# Patient Record
Sex: MALE | Race: WHITE | Employment: FULL TIME | URBAN - METROPOLITAN AREA
[De-identification: names, ages, dates, MRNs, and addresses within clinical notes are randomized per-mention and may not be internally consistent; named-entity substitution may affect disease eponyms.]

---

## 2019-01-19 ENCOUNTER — HOSPITAL ENCOUNTER (INPATIENT)
Age: 54
LOS: 12 days | Discharge: HOME | DRG: 871 | End: 2019-01-31
Attending: EMERGENCY MEDICINE | Admitting: GENERAL ACUTE CARE HOSPITAL
Payer: COMMERCIAL

## 2019-01-19 ENCOUNTER — APPOINTMENT (OUTPATIENT)
Dept: GENERAL RADIOLOGY | Age: 54
DRG: 871 | End: 2019-01-19
Attending: EMERGENCY MEDICINE
Payer: COMMERCIAL

## 2019-01-19 DIAGNOSIS — E11.65 TYPE 2 DIABETES MELLITUS WITH HYPERGLYCEMIA, WITHOUT LONG-TERM CURRENT USE OF INSULIN (HCC): ICD-10-CM

## 2019-01-19 DIAGNOSIS — J18.9 COMMUNITY ACQUIRED PNEUMONIA OF LEFT LOWER LOBE OF LUNG: ICD-10-CM

## 2019-01-19 DIAGNOSIS — J10.1 INFLUENZA A: Primary | ICD-10-CM

## 2019-01-19 PROBLEM — J11.1 INFLUENZA: Status: ACTIVE | Noted: 2019-01-19

## 2019-01-19 LAB
ALBUMIN SERPL-MCNC: 2.9 G/DL (ref 3.5–5)
ALBUMIN/GLOB SERPL: 0.6 {RATIO} (ref 1.1–2.2)
ALP SERPL-CCNC: 110 U/L (ref 45–117)
ALT SERPL-CCNC: 17 U/L (ref 12–78)
ANION GAP SERPL CALC-SCNC: 14 MMOL/L (ref 5–15)
AST SERPL-CCNC: 34 U/L (ref 15–37)
BASOPHILS # BLD: 0 K/UL (ref 0–0.1)
BASOPHILS NFR BLD: 0 % (ref 0–1)
BILIRUB SERPL-MCNC: 0.6 MG/DL (ref 0.2–1)
BUN SERPL-MCNC: 24 MG/DL (ref 6–20)
BUN/CREAT SERPL: 21 (ref 12–20)
CALCIUM SERPL-MCNC: 8.9 MG/DL (ref 8.5–10.1)
CHLORIDE SERPL-SCNC: 89 MMOL/L (ref 97–108)
CK SERPL-CCNC: 53 U/L (ref 39–308)
CO2 SERPL-SCNC: 26 MMOL/L (ref 21–32)
CREAT SERPL-MCNC: 1.16 MG/DL (ref 0.7–1.3)
DIFFERENTIAL METHOD BLD: ABNORMAL
EOSINOPHIL # BLD: 0 K/UL (ref 0–0.4)
EOSINOPHIL NFR BLD: 0 % (ref 0–7)
ERYTHROCYTE [DISTWIDTH] IN BLOOD BY AUTOMATED COUNT: 12.3 % (ref 11.5–14.5)
GLOBULIN SER CALC-MCNC: 4.8 G/DL (ref 2–4)
GLUCOSE BLD STRIP.AUTO-MCNC: 365 MG/DL (ref 65–100)
GLUCOSE BLD STRIP.AUTO-MCNC: 368 MG/DL (ref 65–100)
GLUCOSE SERPL-MCNC: 361 MG/DL (ref 65–100)
HCT VFR BLD AUTO: 52.7 % (ref 36.6–50.3)
HGB BLD-MCNC: 18.1 G/DL (ref 12.1–17)
IMM GRANULOCYTES # BLD AUTO: 0 K/UL (ref 0–0.04)
IMM GRANULOCYTES NFR BLD AUTO: 0 % (ref 0–0.5)
LIPASE SERPL-CCNC: 67 U/L (ref 73–393)
LYMPHOCYTES # BLD: 0.9 K/UL (ref 0.8–3.5)
LYMPHOCYTES NFR BLD: 10 % (ref 12–49)
MCH RBC QN AUTO: 30.6 PG (ref 26–34)
MCHC RBC AUTO-ENTMCNC: 34.3 G/DL (ref 30–36.5)
MCV RBC AUTO: 89.2 FL (ref 80–99)
MONOCYTES # BLD: 0.2 K/UL (ref 0–1)
MONOCYTES NFR BLD: 2 % (ref 5–13)
NEUTS BAND NFR BLD MANUAL: 3 %
NEUTS SEG # BLD: 8 K/UL (ref 1.8–8)
NEUTS SEG NFR BLD: 85 % (ref 32–75)
NRBC # BLD: 0 K/UL (ref 0–0.01)
NRBC BLD-RTO: 0 PER 100 WBC
PLATELET # BLD AUTO: 240 K/UL (ref 150–400)
PLATELET COMMENTS,PCOM: ABNORMAL
PMV BLD AUTO: 9.8 FL (ref 8.9–12.9)
POTASSIUM SERPL-SCNC: 3.7 MMOL/L (ref 3.5–5.1)
PROT SERPL-MCNC: 7.7 G/DL (ref 6.4–8.2)
RBC # BLD AUTO: 5.91 M/UL (ref 4.1–5.7)
RBC MORPH BLD: ABNORMAL
SERVICE CMNT-IMP: ABNORMAL
SERVICE CMNT-IMP: ABNORMAL
SODIUM SERPL-SCNC: 129 MMOL/L (ref 136–145)
TROPONIN I SERPL-MCNC: <0.05 NG/ML
WBC # BLD AUTO: 9.1 K/UL (ref 4.1–11.1)

## 2019-01-19 PROCEDURE — 96375 TX/PRO/DX INJ NEW DRUG ADDON: CPT

## 2019-01-19 PROCEDURE — 94640 AIRWAY INHALATION TREATMENT: CPT

## 2019-01-19 PROCEDURE — 96376 TX/PRO/DX INJ SAME DRUG ADON: CPT

## 2019-01-19 PROCEDURE — 80053 COMPREHEN METABOLIC PANEL: CPT

## 2019-01-19 PROCEDURE — 74011250636 HC RX REV CODE- 250/636: Performed by: GENERAL ACUTE CARE HOSPITAL

## 2019-01-19 PROCEDURE — 74011636637 HC RX REV CODE- 636/637: Performed by: EMERGENCY MEDICINE

## 2019-01-19 PROCEDURE — 96365 THER/PROPH/DIAG IV INF INIT: CPT

## 2019-01-19 PROCEDURE — 82550 ASSAY OF CK (CPK): CPT

## 2019-01-19 PROCEDURE — 94761 N-INVAS EAR/PLS OXIMETRY MLT: CPT

## 2019-01-19 PROCEDURE — 74011000250 HC RX REV CODE- 250: Performed by: EMERGENCY MEDICINE

## 2019-01-19 PROCEDURE — 77030029684 HC NEB SM VOL KT MONA -A

## 2019-01-19 PROCEDURE — 84484 ASSAY OF TROPONIN QUANT: CPT

## 2019-01-19 PROCEDURE — 36415 COLL VENOUS BLD VENIPUNCTURE: CPT

## 2019-01-19 PROCEDURE — 83690 ASSAY OF LIPASE: CPT

## 2019-01-19 PROCEDURE — 96366 THER/PROPH/DIAG IV INF ADDON: CPT

## 2019-01-19 PROCEDURE — 65660000000 HC RM CCU STEPDOWN

## 2019-01-19 PROCEDURE — 74011250636 HC RX REV CODE- 250/636: Performed by: EMERGENCY MEDICINE

## 2019-01-19 PROCEDURE — 99285 EMERGENCY DEPT VISIT HI MDM: CPT

## 2019-01-19 PROCEDURE — 74011636637 HC RX REV CODE- 636/637

## 2019-01-19 PROCEDURE — 82962 GLUCOSE BLOOD TEST: CPT

## 2019-01-19 PROCEDURE — 85025 COMPLETE CBC W/AUTO DIFF WBC: CPT

## 2019-01-19 PROCEDURE — 71046 X-RAY EXAM CHEST 2 VIEWS: CPT

## 2019-01-19 PROCEDURE — 93005 ELECTROCARDIOGRAM TRACING: CPT

## 2019-01-19 PROCEDURE — 96361 HYDRATE IV INFUSION ADD-ON: CPT

## 2019-01-19 RX ORDER — SODIUM CHLORIDE 9 MG/ML
125 INJECTION, SOLUTION INTRAVENOUS CONTINUOUS
Status: DISCONTINUED | OUTPATIENT
Start: 2019-01-19 | End: 2019-01-21

## 2019-01-19 RX ORDER — PRAVASTATIN SODIUM 10 MG/1
20 TABLET ORAL DAILY
Status: DISCONTINUED | OUTPATIENT
Start: 2019-01-20 | End: 2019-01-31 | Stop reason: HOSPADM

## 2019-01-19 RX ORDER — DEXTROSE 50 % IN WATER (D50W) INTRAVENOUS SYRINGE
12.5-25 AS NEEDED
Status: DISCONTINUED | OUTPATIENT
Start: 2019-01-19 | End: 2019-01-31 | Stop reason: HOSPADM

## 2019-01-19 RX ORDER — PANTOPRAZOLE SODIUM 40 MG/1
40 TABLET, DELAYED RELEASE ORAL
Status: DISCONTINUED | OUTPATIENT
Start: 2019-01-19 | End: 2019-01-30

## 2019-01-19 RX ORDER — ONDANSETRON 2 MG/ML
4 INJECTION INTRAMUSCULAR; INTRAVENOUS
Status: DISCONTINUED | OUTPATIENT
Start: 2019-01-19 | End: 2019-01-31 | Stop reason: HOSPADM

## 2019-01-19 RX ORDER — ENOXAPARIN SODIUM 100 MG/ML
40 INJECTION SUBCUTANEOUS EVERY 24 HOURS
Status: DISCONTINUED | OUTPATIENT
Start: 2019-01-20 | End: 2019-01-31 | Stop reason: HOSPADM

## 2019-01-19 RX ORDER — INSULIN LISPRO 100 [IU]/ML
INJECTION, SOLUTION INTRAVENOUS; SUBCUTANEOUS
Status: DISCONTINUED | OUTPATIENT
Start: 2019-01-20 | End: 2019-01-31 | Stop reason: HOSPADM

## 2019-01-19 RX ORDER — KETOROLAC TROMETHAMINE 30 MG/ML
15 INJECTION, SOLUTION INTRAMUSCULAR; INTRAVENOUS
Status: COMPLETED | OUTPATIENT
Start: 2019-01-19 | End: 2019-01-19

## 2019-01-19 RX ORDER — INSULIN GLARGINE 100 [IU]/ML
10 INJECTION, SOLUTION SUBCUTANEOUS
Status: DISCONTINUED | OUTPATIENT
Start: 2019-01-19 | End: 2019-01-21

## 2019-01-19 RX ORDER — SODIUM CHLORIDE 0.9 % (FLUSH) 0.9 %
5-40 SYRINGE (ML) INJECTION AS NEEDED
Status: DISCONTINUED | OUTPATIENT
Start: 2019-01-19 | End: 2019-01-31 | Stop reason: HOSPADM

## 2019-01-19 RX ORDER — SODIUM CHLORIDE 0.9 % (FLUSH) 0.9 %
5-40 SYRINGE (ML) INJECTION EVERY 8 HOURS
Status: DISCONTINUED | OUTPATIENT
Start: 2019-01-19 | End: 2019-01-21

## 2019-01-19 RX ORDER — HYDROCHLOROTHIAZIDE 25 MG/1
25 TABLET ORAL DAILY
Status: DISCONTINUED | OUTPATIENT
Start: 2019-01-20 | End: 2019-01-20

## 2019-01-19 RX ORDER — ASPIRIN 325 MG
325 TABLET ORAL DAILY
Status: DISCONTINUED | OUTPATIENT
Start: 2019-01-20 | End: 2019-01-23 | Stop reason: ALTCHOICE

## 2019-01-19 RX ORDER — LEVOFLOXACIN 5 MG/ML
750 INJECTION, SOLUTION INTRAVENOUS EVERY 24 HOURS
Status: DISCONTINUED | OUTPATIENT
Start: 2019-01-20 | End: 2019-01-22

## 2019-01-19 RX ORDER — MAGNESIUM SULFATE 100 %
4 CRYSTALS MISCELLANEOUS AS NEEDED
Status: DISCONTINUED | OUTPATIENT
Start: 2019-01-19 | End: 2019-01-31 | Stop reason: HOSPADM

## 2019-01-19 RX ORDER — ACETAMINOPHEN 325 MG/1
650 TABLET ORAL
Status: DISCONTINUED | OUTPATIENT
Start: 2019-01-19 | End: 2019-01-20

## 2019-01-19 RX ORDER — LEVOFLOXACIN 5 MG/ML
750 INJECTION, SOLUTION INTRAVENOUS
Status: COMPLETED | OUTPATIENT
Start: 2019-01-19 | End: 2019-01-19

## 2019-01-19 RX ORDER — IPRATROPIUM BROMIDE AND ALBUTEROL SULFATE 2.5; .5 MG/3ML; MG/3ML
3 SOLUTION RESPIRATORY (INHALATION) ONCE
Status: COMPLETED | OUTPATIENT
Start: 2019-01-19 | End: 2019-01-19

## 2019-01-19 RX ADMIN — IPRATROPIUM BROMIDE AND ALBUTEROL SULFATE 3 ML: .5; 3 SOLUTION RESPIRATORY (INHALATION) at 19:57

## 2019-01-19 RX ADMIN — SODIUM CHLORIDE 1000 ML: 900 INJECTION, SOLUTION INTRAVENOUS at 19:39

## 2019-01-19 RX ADMIN — LEVOFLOXACIN 750 MG: 5 INJECTION, SOLUTION INTRAVENOUS at 21:08

## 2019-01-19 RX ADMIN — INSULIN HUMAN 10 UNITS: 100 INJECTION, SOLUTION PARENTERAL at 19:51

## 2019-01-19 RX ADMIN — INSULIN HUMAN 10 UNITS: 100 INJECTION, SOLUTION PARENTERAL at 22:27

## 2019-01-19 RX ADMIN — SODIUM CHLORIDE 1000 ML: 900 INJECTION, SOLUTION INTRAVENOUS at 22:26

## 2019-01-19 RX ADMIN — KETOROLAC TROMETHAMINE 15 MG: 30 INJECTION, SOLUTION INTRAMUSCULAR at 20:07

## 2019-01-19 RX ADMIN — SODIUM CHLORIDE 100 ML/HR: 900 INJECTION, SOLUTION INTRAVENOUS at 23:50

## 2019-01-20 LAB
ANION GAP SERPL CALC-SCNC: 7 MMOL/L (ref 5–15)
ATRIAL RATE: 116 BPM
BASOPHILS # BLD: 0 K/UL (ref 0–0.1)
BASOPHILS NFR BLD: 0 % (ref 0–1)
BUN SERPL-MCNC: 21 MG/DL (ref 6–20)
BUN/CREAT SERPL: 27 (ref 12–20)
CALCIUM SERPL-MCNC: 7.7 MG/DL (ref 8.5–10.1)
CALCULATED P AXIS, ECG09: 40 DEGREES
CALCULATED R AXIS, ECG10: -39 DEGREES
CALCULATED T AXIS, ECG11: 35 DEGREES
CHLORIDE SERPL-SCNC: 97 MMOL/L (ref 97–108)
CO2 SERPL-SCNC: 25 MMOL/L (ref 21–32)
CREAT SERPL-MCNC: 0.79 MG/DL (ref 0.7–1.3)
DIAGNOSIS, 93000: NORMAL
DIFFERENTIAL METHOD BLD: ABNORMAL
EOSINOPHIL # BLD: 0 K/UL (ref 0–0.4)
EOSINOPHIL NFR BLD: 0 % (ref 0–7)
ERYTHROCYTE [DISTWIDTH] IN BLOOD BY AUTOMATED COUNT: 12.3 % (ref 11.5–14.5)
GLUCOSE BLD STRIP.AUTO-MCNC: 178 MG/DL (ref 65–100)
GLUCOSE BLD STRIP.AUTO-MCNC: 201 MG/DL (ref 65–100)
GLUCOSE BLD STRIP.AUTO-MCNC: 213 MG/DL (ref 65–100)
GLUCOSE BLD STRIP.AUTO-MCNC: 236 MG/DL (ref 65–100)
GLUCOSE SERPL-MCNC: 260 MG/DL (ref 65–100)
HCT VFR BLD AUTO: 42.4 % (ref 36.6–50.3)
HGB BLD-MCNC: 14.6 G/DL (ref 12.1–17)
IMM GRANULOCYTES # BLD AUTO: 0 K/UL (ref 0–0.04)
IMM GRANULOCYTES NFR BLD AUTO: 0 % (ref 0–0.5)
LYMPHOCYTES # BLD: 1.3 K/UL (ref 0.8–3.5)
LYMPHOCYTES NFR BLD: 19 % (ref 12–49)
MAGNESIUM SERPL-MCNC: 2 MG/DL (ref 1.6–2.4)
MCH RBC QN AUTO: 30.2 PG (ref 26–34)
MCHC RBC AUTO-ENTMCNC: 34.4 G/DL (ref 30–36.5)
MCV RBC AUTO: 87.8 FL (ref 80–99)
MONOCYTES # BLD: 0.1 K/UL (ref 0–1)
MONOCYTES NFR BLD: 1 % (ref 5–13)
NEUTS BAND NFR BLD MANUAL: 2 %
NEUTS SEG # BLD: 5.6 K/UL (ref 1.8–8)
NEUTS SEG NFR BLD: 78 % (ref 32–75)
NRBC # BLD: 0 K/UL (ref 0–0.01)
NRBC BLD-RTO: 0 PER 100 WBC
P-R INTERVAL, ECG05: 154 MS
PHOSPHATE SERPL-MCNC: 1.8 MG/DL (ref 2.6–4.7)
PLATELET # BLD AUTO: 207 K/UL (ref 150–400)
PMV BLD AUTO: 10.1 FL (ref 8.9–12.9)
POTASSIUM SERPL-SCNC: 3.3 MMOL/L (ref 3.5–5.1)
Q-T INTERVAL, ECG07: 336 MS
QRS DURATION, ECG06: 100 MS
QTC CALCULATION (BEZET), ECG08: 467 MS
RBC # BLD AUTO: 4.83 M/UL (ref 4.1–5.7)
RBC MORPH BLD: ABNORMAL
SERVICE CMNT-IMP: ABNORMAL
SODIUM SERPL-SCNC: 129 MMOL/L (ref 136–145)
VENTRICULAR RATE, ECG03: 116 BPM
WBC # BLD AUTO: 7 K/UL (ref 4.1–11.1)

## 2019-01-20 PROCEDURE — 94760 N-INVAS EAR/PLS OXIMETRY 1: CPT

## 2019-01-20 PROCEDURE — 74011636637 HC RX REV CODE- 636/637: Performed by: GENERAL ACUTE CARE HOSPITAL

## 2019-01-20 PROCEDURE — 85025 COMPLETE CBC W/AUTO DIFF WBC: CPT

## 2019-01-20 PROCEDURE — 84100 ASSAY OF PHOSPHORUS: CPT

## 2019-01-20 PROCEDURE — 36415 COLL VENOUS BLD VENIPUNCTURE: CPT

## 2019-01-20 PROCEDURE — 65660000000 HC RM CCU STEPDOWN

## 2019-01-20 PROCEDURE — 74011250637 HC RX REV CODE- 250/637: Performed by: INTERNAL MEDICINE

## 2019-01-20 PROCEDURE — 74011250636 HC RX REV CODE- 250/636: Performed by: GENERAL ACUTE CARE HOSPITAL

## 2019-01-20 PROCEDURE — 77010033678 HC OXYGEN DAILY

## 2019-01-20 PROCEDURE — 82962 GLUCOSE BLOOD TEST: CPT

## 2019-01-20 PROCEDURE — 83735 ASSAY OF MAGNESIUM: CPT

## 2019-01-20 PROCEDURE — 87040 BLOOD CULTURE FOR BACTERIA: CPT

## 2019-01-20 PROCEDURE — 80048 BASIC METABOLIC PNL TOTAL CA: CPT

## 2019-01-20 PROCEDURE — 74011250637 HC RX REV CODE- 250/637: Performed by: GENERAL ACUTE CARE HOSPITAL

## 2019-01-20 RX ORDER — ACETAMINOPHEN 10 MG/ML
1000 INJECTION, SOLUTION INTRAVENOUS
Status: DISCONTINUED | OUTPATIENT
Start: 2019-01-20 | End: 2019-01-20

## 2019-01-20 RX ORDER — ACETAMINOPHEN 650 MG/1
975 SUPPOSITORY RECTAL EVERY 8 HOURS
Status: DISCONTINUED | OUTPATIENT
Start: 2019-01-20 | End: 2019-01-21

## 2019-01-20 RX ORDER — IBUPROFEN 400 MG/1
400 TABLET ORAL
Status: DISCONTINUED | OUTPATIENT
Start: 2019-01-20 | End: 2019-01-31 | Stop reason: HOSPADM

## 2019-01-20 RX ORDER — ACETAMINOPHEN 10 MG/ML
1000 INJECTION, SOLUTION INTRAVENOUS EVERY 8 HOURS
Status: DISCONTINUED | OUTPATIENT
Start: 2019-01-20 | End: 2019-01-20

## 2019-01-20 RX ADMIN — ENOXAPARIN SODIUM 40 MG: 40 INJECTION SUBCUTANEOUS at 09:34

## 2019-01-20 RX ADMIN — INSULIN GLARGINE 10 UNITS: 100 INJECTION, SOLUTION SUBCUTANEOUS at 22:41

## 2019-01-20 RX ADMIN — ACETAMINOPHEN 650 MG: 325 TABLET ORAL at 01:12

## 2019-01-20 RX ADMIN — ACETAMINOPHEN 650 MG: 325 TABLET ORAL at 07:07

## 2019-01-20 RX ADMIN — ASPIRIN 325 MG: 325 TABLET ORAL at 09:34

## 2019-01-20 RX ADMIN — ONDANSETRON 4 MG: 2 INJECTION INTRAMUSCULAR; INTRAVENOUS at 09:21

## 2019-01-20 RX ADMIN — LEVOFLOXACIN 750 MG: 5 INJECTION, SOLUTION INTRAVENOUS at 20:51

## 2019-01-20 RX ADMIN — INSULIN GLARGINE 10 UNITS: 100 INJECTION, SOLUTION SUBCUTANEOUS at 01:12

## 2019-01-20 RX ADMIN — PRAVASTATIN SODIUM 20 MG: 10 TABLET ORAL at 09:34

## 2019-01-20 RX ADMIN — Medication 10 ML: at 06:02

## 2019-01-20 RX ADMIN — Medication 10 ML: at 14:49

## 2019-01-20 RX ADMIN — INSULIN LISPRO 2 UNITS: 100 INJECTION, SOLUTION INTRAVENOUS; SUBCUTANEOUS at 17:29

## 2019-01-20 RX ADMIN — INSULIN LISPRO 2 UNITS: 100 INJECTION, SOLUTION INTRAVENOUS; SUBCUTANEOUS at 22:41

## 2019-01-20 RX ADMIN — INSULIN LISPRO 3 UNITS: 100 INJECTION, SOLUTION INTRAVENOUS; SUBCUTANEOUS at 11:30

## 2019-01-20 RX ADMIN — IBUPROFEN 400 MG: 400 TABLET ORAL at 11:42

## 2019-01-20 RX ADMIN — INSULIN LISPRO 3 UNITS: 100 INJECTION, SOLUTION INTRAVENOUS; SUBCUTANEOUS at 08:01

## 2019-01-20 RX ADMIN — Medication 10 ML: at 22:41

## 2019-01-20 NOTE — ED NOTES
TRANSFER - OUT REPORT: 
 
Verbal report given to Xochilt JOHNSON(name) on David Hinojosa  being transferred to Renal (unit) for routine progression of care Report consisted of patients Situation, Background, Assessment and  
Recommendations(SBAR). Information from the following report(s) SBAR, ED Summary, [facility] and Recent Results was reviewed with the receiving nurse. Lines:  
Peripheral IV 01/19/19 Left Antecubital (Active) Site Assessment Clean, dry, & intact 1/19/2019  7:33 PM  
Phlebitis Assessment 0 1/19/2019  7:33 PM  
Infiltration Assessment 0 1/19/2019  7:33 PM  
Dressing Status Clean, dry, & intact 1/19/2019  7:33 PM  
Dressing Type Transparent 1/19/2019  7:33 PM  
Hub Color/Line Status Pink 1/19/2019  7:33 PM  
  
 
Opportunity for questions and clarification was provided. Patient transported with: 
 ZUCHEM

## 2019-01-20 NOTE — H&P
Hospitalist Admission NoteNAME: Thomas Eisenmenger :  1965 MRN:  [id number] Date/Time:  2019 10:37 PM 
 
Patient PCP: None 
______________________________________________________________________ Given the patient's current clinical presentation, I have a high level of concern for decompensation if discharged from the emergency department. Complex decision making was performed, which includes reviewing the patient's available past medical records, laboratory results, and x-ray films. My assessment of this patient's clinical condition and my plan of care is as follows. Assessment / Plan: 
Sepsis secondary to Influenza A and/or LLL PNA Dehydration Decreased PO intake with N/V 
-Admit to Telemetry -IVF - pt clearly dehydrated - H/H above normal, Cr elevated from baseline  
-Continue Levofloxacin qdaily 
-O2 supplementation as needed 
-Follow BCx 
-Anti-pyretics PRN 
-Anti-emetics PRN 
-CLD - titrate up as tolerated 
-CXR PA/Lateral: Left lower lobe pneumonia versus atelectasis. -Isolation DMI 
-Will give low dose Lantus, FS monitoring, SS 
-Hold oral anti-hypogylcemics Code Status: Full Surrogate Decision Maker: Wife DVT Prophylaxis: Lovenox GI Prophylaxis: not indicated Baseline: Independent Subjective: CHIEF COMPLAINT: fever, chills, nausea, vomiting, decreased PO intake HISTORY OF PRESENT ILLNESS:    
Thomas Eisenmenger is a 47 y.o.  male who presents with CC listed above. Pt states that he has been feeling unwell since  evening and has just progressively become worse. He states that he went to [facility] on Tuesday and was diagnosed with Influenza A and was given the option of starting Tamiflu. Pt states that he refused because he was not sure how it would interact with his DM. Pt states that he has been constantly febrile at home, around 101-102.  Pt also states that he cannot remember the last time he had a full meal.  
 Currently he states that he feels \"terrible. \" We were asked to admit for work up and evaluation of the above problems. Past Medical History:  
Diagnosis Date  Diabetes ([address]  Nausea & vomiting  Pancreatitis  Unspecified sleep apnea Past Surgical History:  
Procedure Laterality Date  HX GI    
 upper endoscopies  HX HEENT    
 oral surgery  HX HEENT    
 turbinate reduction Social History Tobacco Use  Smoking status: Former Smoker Years: 1.00 Types: Cigarettes  Smokeless tobacco: Never Used Substance Use Topics  Alcohol use: Yes Family History Problem Relation Age of Onset  Heart Disease Mother   
     s/p stent  Diabetes Father  Cancer Brother   
     stomach cancer Allergies Allergen Reactions  Lisinopril Anaphylaxis Prior to Admission medications Medication Sig Start Date End Date Taking? Authorizing Provider  
pravastatin (PRAVACHOL) 20 mg tablet Take 1 Tab by mouth daily. 6/6/14   Valerie Bee MD  
hydrochlorothiazide (HYDRODIURIL) 25 mg tablet Take 1 Tab by mouth daily. 6/6/14   Valerie Bee MD  
insulin detemir (LEVEMIR) 100 unit/mL (3 mL) pen 45 Units by SubCUTAneous route daily. Indications: TYPE 2 DIABETES MELLITUS 6/6/14   Valerie Bee MD  
insulin aspart (NOVOLOG) 100 unit/mL flexpen 10 Units by SubCUTAneous route three (3) times daily (with meals). 6/6/14   Valerie Bee MD  
glipiZIDE SR (GLUCOTROL) 5 mg CR tablet Take 1 Tab by mouth two (2) times a day. 6/6/14   Valerie Bee MD  
glucose blood VI test strips (ACCU-CHEK COMPACT TEST) strip Check fsbs bid--disp 90 day supply 6/6/14   Valerie Bee MD  
metFORMIN (GLUCOPHAGE) 500 mg tablet Take 1 Tab by mouth two (2) times daily (with meals). 6/6/14   Valerie Bee MD  
omeprazole (PRILOSEC) 20 mg capsule Take 20 mg by mouth daily as needed.     Provider, Historical  
Insulin Needles, Disposable, 32 x 1/4 \" Ndle 1 Container by Does Not Apply route daily. Use with chelsea flex pen 5/4/12   Sergio Alfredo MD  
Lancets [facility] Lancet for accuchek compact plus glucometer 5/4/12   Sergio Alfredo MD  
aspirin (ASPIRIN) 325 mg tablet Take 325 mg by mouth as needed. Provider, Historical  
 
 
REVIEW OF SYSTEMS:    
I am not able to complete the review of systems because: The patient is intubated and sedated The patient has altered mental status due to his acute medical problems The patient has baseline aphasia from prior stroke(s) The patient has baseline dementia and is not reliable historian The patient is in acute medical distress and unable to provide information Total of 12 systems reviewed as follows:   
   POSITIVE= underlined text  Negative = text not underlined General:  fever, chills, sweats, generalized weakness, weight loss/gain,  
   loss of appetite Eyes:    blurred vision, eye pain, loss of vision, double vision ENT:    rhinorrhea, pharyngitis Respiratory:   cough, sputum production, SOB, FABIAN, wheezing, pleuritic pain  
Cardiology:   chest pain, palpitations, orthopnea, PND, edema, syncope Gastrointestinal:  abdominal pain , N/V, diarrhea, dysphagia, constipation, bleeding Genitourinary:  frequency, urgency, dysuria, hematuria, incontinence Muskuloskeletal :  arthralgia, myalgia, back pain Hematology:  easy bruising, nose or gum bleeding, lymphadenopathy Dermatological: rash, ulceration, pruritis, color change / jaundice Endocrine:   hot flashes or polydipsia Neurological:  headache, dizziness, confusion, focal weakness, paresthesia, Speech difficulties, memory loss, gait difficulty Psychological: Feelings of anxiety, depression, agitation Objective: VITALS:   
Visit Vitals BP (!) 143/101 (BP 1 Location: Left arm, BP Patient Position: At rest) Pulse (!) 113 Temp 97.9 °F (36.6 °C) Resp 18 Ht 6' (1.829 m) Wt 192 lb 0.3 oz (87.1 kg) SpO2 92% BMI 26.04 kg/m² PHYSICAL EXAM: 
 
 General:    Alert, cooperative, in distress, diaphoretic HEENT: Atraumatic, anicteric sclerae, pink conjunctivae No oral ulcers, mucosa moist, throat clear, dentition fair Neck:  Supple, symmetrical,  thyroid: non tender Lungs:   Clear to auscultation bilaterally. No Wheezing or Rhonchi. No rales. Chest wall:  No tenderness  No Accessory muscle use. Heart:   Regular  rhythm,  No  murmur   No edema Abdomen:   Soft, non-tender. Not distended. Bowel sounds normal 
Extremities: No cyanosis. No clubbing,   
  Skin turgor normal, Capillary refill normal, Radial dial pulse 2+ Skin:     Not pale. Not Jaundiced  No rashes Psych:  Good insight. Not depressed. Not anxious or agitated. Neurologic: EOMs intact. No facial asymmetry. No aphasia or slurred speech. Symmetrical strength, Sensation grossly intact. Alert and oriented X 4.  
 
_______________________________________________________________________ Care Plan discussed with: 
  Comments Patient x Family  x   
RN x Care Manager Consultant:     
_______________________________________________________________________ Expected  Disposition:  
Home with Family HH/PT/OT/RN x  
SNF/LTC   
LUIGI   
________________________________________________________________________ TOTAL TIME:  55 Minutes Critical Care Provided     Minutes non procedure based Comments Reviewed previous records  
>50% of visit spent in counseling and coordination of care  Discussion with patient and/or family and questions answered 
  
 
________________________________________________________________________ Signed: Ash Schmitt MD 
 
Procedures: see electronic medical records for all procedures/Xrays and details which were not copied into this note but were reviewed prior to creation of Plan. LAB DATA REVIEWED:   
Recent Results (from the past 24 hour(s)) EKG, 12 LEAD, INITIAL  Collection Time: 01/19/19  6:38 PM  
 Result Value Ref Range Ventricular Rate 116 BPM  
 Atrial Rate 116 BPM  
 P-R Interval 154 ms QRS Duration 100 ms Q-T Interval 336 ms  
 QTC Calculation (Bezet) 467 ms Calculated P Axis 40 degrees Calculated R Axis -39 degrees Calculated T Axis 35 degrees Diagnosis Sinus tachycardia Possible Left atrial enlargement Left axis deviation Anteroseptal infarct (cited on or before 30-MAY-2014) When compared with ECG of 30-MAY-2014 11:28, 
Criteria for Inferior infarct are no longer present CBC WITH AUTOMATED DIFF Collection Time: 01/19/19  6:52 PM  
Result Value Ref Range WBC 9.1 4.1 - 11.1 K/uL  
 RBC 5.91 (H) 4.10 - 5.70 M/uL  
 HGB 18.1 (H) 12.1 - 17.0 g/dL HCT 52.7 (H) 36.6 - 50.3 % MCV 89.2 80.0 - 99.0 FL  
 MCH 30.6 26.0 - 34.0 PG  
 MCHC 34.3 30.0 - 36.5 g/dL  
 RDW 12.3 11.5 - 14.5 % PLATELET 725 361 - 854 K/uL MPV 9.8 8.9 - 12.9 FL  
 NRBC 0.0 0  WBC ABSOLUTE NRBC 0.00 0.00 - 0.01 K/uL NEUTROPHILS 85 (H) 32 - 75 % BAND NEUTROPHILS 3 % LYMPHOCYTES 10 (L) 12 - 49 % MONOCYTES 2 (L) 5 - 13 % EOSINOPHILS 0 0 - 7 % BASOPHILS 0 0 - 1 % IMMATURE GRANULOCYTES 0 0.0 - 0.5 % ABS. NEUTROPHILS 8.0 1.8 - 8.0 K/UL  
 ABS. LYMPHOCYTES 0.9 0.8 - 3.5 K/UL  
 ABS. MONOCYTES 0.2 0.0 - 1.0 K/UL  
 ABS. EOSINOPHILS 0.0 0.0 - 0.4 K/UL  
 ABS. BASOPHILS 0.0 0.0 - 0.1 K/UL  
 ABS. IMM. GRANS. 0.0 0.00 - 0.04 K/UL  
 DF AUTOMATED PLATELET COMMENTS FEW ATYPICAL LYMPHS   
 RBC COMMENTS NORMOCYTIC, NORMOCHROMIC METABOLIC PANEL, COMPREHENSIVE Collection Time: 01/19/19  6:52 PM  
Result Value Ref Range Sodium 129 (L) 136 - 145 mmol/L Potassium 3.7 3.5 - 5.1 mmol/L Chloride 89 (L) 97 - 108 mmol/L  
 CO2 26 21 - 32 mmol/L Anion gap 14 5 - 15 mmol/L Glucose 361 (H) 65 - 100 mg/dL BUN 24 (H) 6 - 20 MG/DL Creatinine 1.16 0.70 - 1.30 MG/DL  
 BUN/Creatinine ratio 21 (H) 12 - 20 GFR est AA >60 >60 ml/min/1.73m2 GFR est non-AA >60 >60 ml/min/1.73m2 Calcium 8.9 8.5 - 10.1 MG/DL Bilirubin, total 0.6 0.2 - 1.0 MG/DL  
 ALT (SGPT) 17 12 - 78 U/L  
 AST (SGOT) 34 15 - 37 U/L Alk. phosphatase 110 45 - 117 U/L Protein, total 7.7 6.4 - 8.2 g/dL Albumin 2.9 (L) 3.5 - 5.0 g/dL Globulin 4.8 (H) 2.0 - 4.0 g/dL A-G Ratio 0.6 (L) 1.1 - 2.2 LIPASE Collection Time: 01/19/19  6:52 PM  
Result Value Ref Range Lipase 67 (L) 73 - 393 U/L  
CK W/ REFLX CKMB Collection Time: 01/19/19  6:52 PM  
Result Value Ref Range CK 53 39 - 308 U/L  
TROPONIN I Collection Time: 01/19/19  6:52 PM  
Result Value Ref Range Troponin-I, Qt. <0.05 <0.05 ng/mL GLUCOSE, POC Collection Time: 01/19/19  7:38 PM  
Result Value Ref Range Glucose (POC) 368 (H) 65 - 100 mg/dL Performed by Liban Valenzuela GLUCOSE, POC Collection Time: 01/19/19  9:42 PM  
Result Value Ref Range Glucose (POC) 365 (H) 65 - 100 mg/dL Performed by Liban Valenzuela

## 2019-01-20 NOTE — PROGRESS NOTES
Hospitalist Progress Note NAME: Yue Fish :  1965 MRN:  [id number] Assessment / Plan: 
Sepsis secondary to Influenza A and/or LLL PNA Dehydration Decreased PO intake with N/V 
-IVF - pt clearly dehydrated - H/H above normal, Cr elevated from baseline  
-Continue Levofloxacin qdaily 
-O2 supplementation as needed 
-Follow BCx 
-Anti-pyretics PRN 
-Anti-emetics PRN 
-CLD - titrate up as tolerated 
-CXR PA/Lateral: Left lower lobe pneumonia versus atelectasis. - droplets iIsolation DMI 
- blood sugars elevated  
- will check Hba1c  
-Will give low dose Lantus, FS monitoring, SS 
-Hold oral anti-hypogylcemics Code Status: Full Surrogate Decision Maker: Wife DVT Prophylaxis: Lovenox GI Prophylaxis: not indicated Baseline: Independent Body mass index is 26.04 kg/m². therefore classifying patient as overweight, NOS (body mass index [BMI] of 25 to 29.9 kg/m2) 
-counseled exercise/diet. Patient receptive. Subjective: Chief Complaint / Reason for Physician Visit Had a rapid response this am for MEWS 5. Discussed with RN events overnight. Review of Systems: 
Symptom Y/N Comments  Symptom Y/N Comments Fever/Chills y   Chest Pain n   
Poor Appetite y   Edema Cough    Abdominal Pain n   
Sputum    Joint Pain SOB/FABIAN n   Pruritis/Rash Nausea/vomit y   Tolerating PT/OT Diarrhea n   Tolerating Diet n   
Constipation    Other Could NOT obtain due to:   
 
Objective: VITALS:  
Last 24hrs VS reviewed since prior progress note. Most recent are: 
Patient Vitals for the past 24 hrs: 
 Temp Pulse Resp BP SpO2  
19 0827 (!) (P) 101.1 °F (38.4 °C) (!) (P) 111 (P) 20 (!) (P) 154/93   
19 0716 (!) (P) 102.8 °F (39.3 °C) (!) 108 24 (!) 172/101 (!) 89 % 19 0701 (!) 102 °F (38.9 °C) (!) 102     
19 0604 99 °F (37.2 °C) (!) 102 19 133/80 92 % 19 0307 99.7 °F (37.6 °C) (!) 103 19 133/83 91 % 01/20/19 0052 (!) 102 °F (38.9 °C) (!) 105 18 (!) 145/91 91 % 01/19/19 2348 97.4 °F (36.3 °C) (!) 108 19 135/76 91 % 01/19/19 2115     92 % 01/19/19 2100     91 % 01/19/19 2045     92 % 01/19/19 2030     95 % 01/19/19 2015     96 % 01/19/19 2000     94 % 01/19/19 1931    141/85   
01/19/19 1834 97.9 °F (36.6 °C) (!) 113 18 (!) 143/101 97 % Intake/Output Summary (Last 24 hours) at 1/20/2019 9402 Last data filed at 1/20/2019 8338 Gross per 24 hour Intake 355.83 ml Output 650 ml Net -294.17 ml PHYSICAL EXAM: 
General: WD, WN. Alert, cooperative, no acute distress   
EENT:  EOMI. Anicteric sclerae. MMM Resp:  CTA bilaterally, no wheezing or rales. No accessory muscle use CV:  Regular  rhythm,  No edema GI:  Soft, Non distended, Non tender.  +Bowel sounds Neurologic:  Alert and oriented X 3, normal speech, Psych:   Good insight. Not anxious nor agitated Skin:  No rashes. No jaundice Reviewed most current lab test results and cultures  YES Reviewed most current radiology test results   YES Review and summation of old records today    NO Reviewed patient's current orders and MAR    YES 
PMH/SH reviewed - no change compared to H&P 
________________________________________________________________________ Care Plan discussed with: 
  Comments Patient x Family  x wife RN x Care Manager Consultant Multidiciplinary team rounds were held today with , nursing, pharmacist and clinical coordinator. Patient's plan of care was discussed; medications were reviewed and discharge planning was addressed. ________________________________________________________________________ Total NON critical care TIME:  35   Minutes Total CRITICAL CARE TIME Spent:   Minutes non procedure based Comments >50% of visit spent in counseling and coordination of care x As above ________________________________________________________________________ Nadia Spicer MD  
 
Procedures: see electronic medical records for all procedures/Xrays and details which were not copied into this note but were reviewed prior to creation of Plan. LABS: 
I reviewed today's most current labs and imaging studies. Pertinent labs include: 
Recent Labs  
  01/20/19 0359 01/19/19 1852 WBC 7.0 9.1 HGB 14.6 18.1* HCT 42.4 52.7*  
 240 Recent Labs  
  01/20/19 0359 01/19/19 1852 * 129*  
K 3.3* 3.7 CL 97 89* CO2 25 26 * 361* BUN 21* 24* CREA 0.79 1.16  
CA 7.7* 8.9 MG 2.0  --   
PHOS 1.8*  --   
ALB  --  2.9* TBILI  --  0.6 SGOT  --  34 ALT  --  17 Signed: Nadia Spicer MD

## 2019-01-20 NOTE — PROGRESS NOTES
Pharmacy Automatic Renal Dosing Protocol - Antimicrobials Indication for Antimicrobials: CAP Current Regimen of Each Antimicrobial: 
Levofloxacin 750mg IV q24h x7 days  (Start Date ; Day # 1 of 7) Previous Antimicrobial Therapy: 
 
 
Significant Cultures:  
 
 
Radiology / Imaging results: (X-ray, CT scan or MRI):  
: CXR: Left lower lobe pneumonia versus atelectasis. Paralysis, amputations, malnutrition: n/a Labs: 
Recent Labs  
  19 
1852 CREA 1.16  
BUN 24* WBC 9.1 Temp (24hrs), Av.9 °F (36.6 °C), Min:97.9 °F (36.6 °C), Max:97.9 °F (36.6 °C) Creatinine Clearance (mL/min) or Dialysis: 82 ml/min Impression/Plan:  
· Levofloxacin 750mg IV q24h is dosed appropriately based on indication and renal function · Antimicrobial stop date 7 days Pharmacy will follow daily and adjust medications as appropriate for renal function and/or serum levels. Thank you, 
Amadou Haro, [facility] Recommended duration of therapy 
[email or web address]/[place]/[facility]/Pharmacy/Clinical%20Companion/Duration%[email or web address]. docx Renal Dosing 
[email or web address]/[place]/[facility]/Pharmacy/Clinical%20Companion/Renal%[email or web address]. pdf

## 2019-01-20 NOTE — ED NOTES
Assumed care of pt. Pt c/o testing positive for flu earlier this week (Influenza A) and having trouble breathing. Pt reports having diabetes (type 2) and that he is currently feeling dizzy. Pt reports taking advil for fever and that he only gets down to 101.0.

## 2019-01-20 NOTE — PROGRESS NOTES
Bedside and Verbal shift change report given to Oumou Sauer (oncoming nurse) by Surjit Handy RN (offgoing nurse). Report included the following information SBAR, Kardex, Intake/Output, MAR, Accordion, Recent Results and Med Rec Status.

## 2019-01-20 NOTE — PROGRESS NOTES
RAPID RESPONSE TEAM 
 
0913  Responded to rapid response due to elevated MEWS of 5. Patient has an elevated temperature and is tachycardic. He was admitted on on 1/19 with Influenza A and possible PNA. Primary RN administered Tylenol at 8375 with minimal decline in temp. On assessment, patient is alert and oriented. He states \"I am burning up and feel crummy, but no worse than before\". VSS. No acute distress. 0930   at bedside to evaluate patient. MD to enter orders for additional antipyretics. 1055  Septic shock assessment triggered positive due to elevated temp, heart rate, and blood glucose. It appears that blood cultures and lactic acid were not drawn during the sepsis work up. Spoke with  and received orders for paired blood cultures to be drawn. Jeanne Aguilar Rapid Response RN Nicanor Winn

## 2019-01-20 NOTE — ED PROVIDER NOTES
EMERGENCY DEPARTMENT HISTORY AND PHYSICAL EXAM 
 
 
Date: 1/19/2019 Patient Name: Sara Cao History of Presenting Illness Chief Complaint Patient presents with  Shortness of Breath Pt ambulatory to triage, states he was dx with Flu type A and has been having SOB ever since; denies CP  Flu  
  x 1 week; nausea, decreased appetite  Abdominal Pain  
  generalized abdominal pain  Dizziness  
  intermittently x 2 days History Provided By: Patient and Patient's Wife HPI: Sara Cao, 47 y.o. male with PMHx significant for DM, pancreatitis, sleep apnea, presents ambulatory to the ED with cc of SOB that started 1 week ago. Pt states he was seen at [facility] 1 week ago and was Dx'ed with the flu (type A). Pt states he has had a decreased appetite and diffuse abd pain. Pt notes intermittent dizziness for the past 2 days. Pt states his DM is diet controlled. Pt states he has taken no meds to try and relieve his Sx. He denies any modifying factors to his Sx. Pt specifically denies CP, fever, chills, nausea, vomiting, diarrhea, hematemesis, hematuria, dysuria, melena, hematochezia, constipation, and back pain. There are no other complaints, changes, or physical findings at this time. PCP: None Current Facility-Administered Medications Medication Dose Route Frequency Provider Last Rate Last Dose  levoFLOXacin (LEVAQUIN) 750 mg in D5W IVPB  750 mg IntraVENous NOW Mason Purcell  mL/hr at 01/19/19 2108 750 mg at 01/19/19 2108  insulin regular (NOVOLIN R, HUMULIN R) injection 10 Units  10 Units IntraVENous NOW Chcuk White MD      
 sodium chloride 0.9 % bolus infusion 1,000 mL  1,000 mL IntraVENous NOW Mason Purcell MD      
 
Current Outpatient Medications Medication Sig Dispense Refill  pravastatin (PRAVACHOL) 20 mg tablet Take 1 Tab by mouth daily. 90 Tab 1  
 hydrochlorothiazide (HYDRODIURIL) 25 mg tablet Take 1 Tab by mouth daily.  90 Tab 1  
  insulin detemir (LEVEMIR) 100 unit/mL (3 mL) pen 45 Units by SubCUTAneous route daily. Indications: TYPE 2 DIABETES MELLITUS 3 Package 3  
 insulin aspart (NOVOLOG) 100 unit/mL flexpen 10 Units by SubCUTAneous route three (3) times daily (with meals). 3 Package 3  
 glipiZIDE SR (GLUCOTROL) 5 mg CR tablet Take 1 Tab by mouth two (2) times a day. 180 Tab 1  
 glucose blood VI test strips (ACCU-CHEK COMPACT TEST) strip Check fsbs bid--disp 90 day supply 3 Package 3  
 metFORMIN (GLUCOPHAGE) 500 mg tablet Take 1 Tab by mouth two (2) times daily (with meals). 180 Tab 1  
 omeprazole (PRILOSEC) 20 mg capsule Take 20 mg by mouth daily as needed.  Insulin Needles, Disposable, 32 x 1/4 \" Ndle 1 Container by Does Not Apply route daily. Use with chelsea flex pen 3 Container 3  
 Lancets Misc Lancet for accuchek compact plus glucometer 3 Package 3  
 aspirin (ASPIRIN) 325 mg tablet Take 325 mg by mouth as needed. Past History Past Medical History: 
Past Medical History:  
Diagnosis Date  Diabetes ([address]  Nausea & vomiting  Pancreatitis  Unspecified sleep apnea Past Surgical History: 
Past Surgical History:  
Procedure Laterality Date  HX GI    
 upper endoscopies  HX HEENT    
 oral surgery  HX HEENT    
 turbinate reduction Family History: 
Family History Problem Relation Age of Onset  Heart Disease Mother   
     s/p stent  Diabetes Father  Cancer Brother   
     stomach cancer Social History: 
Social History Tobacco Use  Smoking status: Former Smoker Years: 1.00 Types: Cigarettes  Smokeless tobacco: Never Used Substance Use Topics  Alcohol use: Yes  Drug use: Not on file Allergies: Allergies Allergen Reactions  Lisinopril Anaphylaxis Review of Systems Review of Systems Constitutional: Positive for appetite change (decreased). Negative for activity change, chills, diaphoresis and fever. HENT: Negative for congestion and sore throat. Eyes: Negative for pain and redness. Respiratory: Positive for shortness of breath. Negative for cough and chest tightness. Cardiovascular: Negative for chest pain and palpitations. Gastrointestinal: Positive for abdominal pain (diffuse). Negative for blood in stool, constipation, diarrhea, nausea and vomiting. Denies melena, denies hematemesis, denies hematochezia Genitourinary: Negative for dysuria, frequency, hematuria and urgency. Musculoskeletal: Negative for back pain and neck pain. Skin: Negative for rash. Neurological: Positive for dizziness. Negative for syncope, light-headedness and headaches. Psychiatric/Behavioral: Negative for confusion. All other systems reviewed and are negative. Physical Exam  
Physical Exam  
Constitutional: He is oriented to person, place, and time. He appears well-developed and well-nourished. No distress. HENT:  
Head: Normocephalic. Nose: Nose normal.  
Mouth/Throat: Oropharynx is clear and moist. No oropharyngeal exudate. Eyes: Conjunctivae are normal. Pupils are equal, round, and reactive to light. No scleral icterus. Neck: Normal range of motion. Neck supple. No JVD present. No tracheal deviation present. No thyromegaly present. Cardiovascular: Normal rate, regular rhythm and intact distal pulses. Exam reveals no gallop and no friction rub. No murmur heard. Pulmonary/Chest: Effort normal. No stridor. No respiratory distress. He has wheezes (Bilateral respiratory wheezes). He has no rales. Abdominal: Soft. Bowel sounds are normal. He exhibits no distension. There is no tenderness. There is no rebound and no guarding. Musculoskeletal: Normal range of motion. He exhibits no edema. Lymphadenopathy:  
  He has no cervical adenopathy. Neurological: He is alert and oriented to person, place, and time. No cranial nerve deficit. He exhibits normal muscle tone.  Coordination normal.  
 Skin: Skin is warm and dry. No rash noted. He is not diaphoretic. No erythema. Psychiatric: He has a normal mood and affect. His behavior is normal.  
Nursing note and vitals reviewed. Diagnostic Study Results Labs - Recent Results (from the past 12 hour(s)) EKG, 12 LEAD, INITIAL Collection Time: 01/19/19  6:38 PM  
Result Value Ref Range Ventricular Rate 116 BPM  
 Atrial Rate 116 BPM  
 P-R Interval 154 ms QRS Duration 100 ms Q-T Interval 336 ms  
 QTC Calculation (Bezet) 467 ms Calculated P Axis 40 degrees Calculated R Axis -39 degrees Calculated T Axis 35 degrees Diagnosis Sinus tachycardia Possible Left atrial enlargement Left axis deviation Anteroseptal infarct (cited on or before 30-MAY-2014) When compared with ECG of 30-MAY-2014 11:28, 
Criteria for Inferior infarct are no longer present CBC WITH AUTOMATED DIFF Collection Time: 01/19/19  6:52 PM  
Result Value Ref Range WBC 9.1 4.1 - 11.1 K/uL  
 RBC 5.91 (H) 4.10 - 5.70 M/uL  
 HGB 18.1 (H) 12.1 - 17.0 g/dL HCT 52.7 (H) 36.6 - 50.3 % MCV 89.2 80.0 - 99.0 FL  
 MCH 30.6 26.0 - 34.0 PG  
 MCHC 34.3 30.0 - 36.5 g/dL  
 RDW 12.3 11.5 - 14.5 % PLATELET 775 920 - 924 K/uL MPV 9.8 8.9 - 12.9 FL  
 NRBC 0.0 0  WBC ABSOLUTE NRBC 0.00 0.00 - 0.01 K/uL NEUTROPHILS 85 (H) 32 - 75 % BAND NEUTROPHILS 3 % LYMPHOCYTES 10 (L) 12 - 49 % MONOCYTES 2 (L) 5 - 13 % EOSINOPHILS 0 0 - 7 % BASOPHILS 0 0 - 1 % IMMATURE GRANULOCYTES 0 0.0 - 0.5 % ABS. NEUTROPHILS 8.0 1.8 - 8.0 K/UL  
 ABS. LYMPHOCYTES 0.9 0.8 - 3.5 K/UL  
 ABS. MONOCYTES 0.2 0.0 - 1.0 K/UL  
 ABS. EOSINOPHILS 0.0 0.0 - 0.4 K/UL  
 ABS. BASOPHILS 0.0 0.0 - 0.1 K/UL  
 ABS. IMM. GRANS. 0.0 0.00 - 0.04 K/UL  
 DF AUTOMATED PLATELET COMMENTS FEW ATYPICAL LYMPHS   
 RBC COMMENTS NORMOCYTIC, NORMOCHROMIC METABOLIC PANEL, COMPREHENSIVE Collection Time: 01/19/19  6:52 PM  
Result Value Ref Range Sodium 129 (L) 136 - 145 mmol/L Potassium 3.7 3.5 - 5.1 mmol/L Chloride 89 (L) 97 - 108 mmol/L  
 CO2 26 21 - 32 mmol/L Anion gap 14 5 - 15 mmol/L Glucose 361 (H) 65 - 100 mg/dL BUN 24 (H) 6 - 20 MG/DL Creatinine 1.16 0.70 - 1.30 MG/DL  
 BUN/Creatinine ratio 21 (H) 12 - 20 GFR est AA >60 >60 ml/min/1.73m2 GFR est non-AA >60 >60 ml/min/1.73m2 Calcium 8.9 8.5 - 10.1 MG/DL Bilirubin, total 0.6 0.2 - 1.0 MG/DL  
 ALT (SGPT) 17 12 - 78 U/L  
 AST (SGOT) 34 15 - 37 U/L Alk. phosphatase 110 45 - 117 U/L Protein, total 7.7 6.4 - 8.2 g/dL Albumin 2.9 (L) 3.5 - 5.0 g/dL Globulin 4.8 (H) 2.0 - 4.0 g/dL A-G Ratio 0.6 (L) 1.1 - 2.2 LIPASE Collection Time: 01/19/19  6:52 PM  
Result Value Ref Range Lipase 67 (L) 73 - 393 U/L  
CK W/ REFLX CKMB Collection Time: 01/19/19  6:52 PM  
Result Value Ref Range CK 53 39 - 308 U/L  
TROPONIN I Collection Time: 01/19/19  6:52 PM  
Result Value Ref Range Troponin-I, Qt. <0.05 <0.05 ng/mL GLUCOSE, POC Collection Time: 01/19/19  7:38 PM  
Result Value Ref Range Glucose (POC) 368 (H) 65 - 100 mg/dL Performed by Eudelia Wolff GLUCOSE, POC Collection Time: 01/19/19  9:42 PM  
Result Value Ref Range Glucose (POC) 365 (H) 65 - 100 mg/dL Performed by Eudelia Wolff Radiologic Studies -  
XR CHEST PA LAT Final Result IMPRESSION:  
  
Left lower lobe pneumonia versus atelectasis. Recommend followup PA and lateral  
chest views in 8-10 weeks to ensure resolution. CXR Results  (Last 48 hours) 01/19/19 1859  XR CHEST PA LAT Final result Impression:  IMPRESSION:  
   
Left lower lobe pneumonia versus atelectasis. Recommend followup PA and lateral  
chest views in 8-10 weeks to ensure resolution. Narrative:  EXAM: XR CHEST PA LAT INDICATION: Shortness of breath and intermittent dizziness for 2 days. COMPARISON: Chest views on 5/30/2014 TECHNIQUE: PA and lateral chest views FINDINGS: The cardiomediastinal and hilar contours are within normal limits. The  
pulmonary vasculature is within normal limits. Left lower lobe airspace opacity is new. Right lung is clear. No pneumothorax. The visualized bones and upper abdomen are age-appropriate. Medical Decision Making I am the first provider for this patient. I reviewed the vital signs, available nursing notes, past medical history, past surgical history, family history and social history. Vital Signs-Reviewed the patient's vital signs. Patient Vitals for the past 12 hrs: 
 Temp Pulse Resp BP SpO2  
01/19/19 2115     92 % 01/19/19 2100     91 % 01/19/19 2045     92 % 01/19/19 2030     95 % 01/19/19 2015     96 % 01/19/19 2000     94 % 01/19/19 1834 97.9 °F (36.6 °C) (!) 113 18 (!) 143/101 97 % Pulse Oximetry Analysis - 97% on RA Cardiac Monitor:  
Rate: 113 bpm 
Rhythm: Sinus Tachycardia ED EKG interpretation:18:38 Rhythm: sinus tachycardia; and regular . Rate (approx.): 116; Axis: left axis deviation; [place] Interval: normal; QRS interval: normal ; ST/T wave: non-specific changes; This EKG was interpreted by Ashlee Claudio MD,ED Provider. Records Reviewed: Nursing Notes, Old Medical Records, Previous Radiology Studies and Previous Laboratory Studies Provider Notes (Medical Decision Making): DDx: PNA, flu, acute bronchitis, metabolic abnormality ED Course:  
Initial assessment performed. The patients presenting problems have been discussed, and they are in agreement with the care plan formulated and outlined with them. I have encouraged them to ask questions as they arise throughout their visit. Progress Note: 
9:40 PM 
Pt ambulated with drops to his O2 saturations. Will page the hospitalist for admission. Consult Note: 
10:06 PM 
Ashlee Claudio MD spoke with Dr. Lana Luna, Specialty: Hospitalist 
 Discussed pt's hx, disposition, and available diagnostic and imaging results. Reviewed care plans. Consultant agrees with plans as outlined. Dr. Ritchie Ovalles will admit this patient. Medications  
levoFLOXacin (LEVAQUIN) 750 mg in D5W IVPB (750 mg IntraVENous New Bag 1/19/19 2108) insulin regular (NOVOLIN R, HUMULIN R) injection 10 Units (not administered)  
sodium chloride 0.9 % bolus infusion 1,000 mL (not administered)  
sodium chloride 0.9 % bolus infusion 1,000 mL (0 mL IntraVENous IV Completed 1/19/19 2028) insulin regular (NOVOLIN R, HUMULIN R) injection 10 Units (10 Units IntraVENous Given 1/19/19 1951)  
albuterol-ipratropium (DUO-NEB) 2.5 MG-0.5 MG/3 ML (3 mL Nebulization Given 1/19/19 1957)  
ketorolac (TORADOL) injection 15 mg (15 mg IntraVENous Given 1/19/19 2007) Critical Care Time:  
0 minutes Pt with significant sob/wheezing and dyspnea with minimal exertion; flu a positive with LLL pneumonia on cxr; cbc wnl; sats dropped to 88/89% with ambulation; sx improved with neb yet resting sats now 90%; pt without pcp; currently on no meds as outpt; discussed need for possible admission for iv abx, iv fluids, blood sugar control and pt was agreeable; will admit to hospitalist. Allen Dela Cruz MD 
 
 
Disposition: 
Admit Note: 
10:05 PM 
Pt is being admitted by Dr. Ritchie Ovalles. The results of their tests and reason(s) for their admission have been discussed with pt and/or available family. They convey agreement and understanding for the need to be admitted and for admission diagnosis. PLAN: 
1. Current Discharge Medication List  
  
 
2. Follow-up Information None Return to ED if worse Diagnosis Clinical Impression:  
1. Influenza A 2. Community acquired pneumonia of left lower lobe of lung ([address] 3. Type 2 diabetes mellitus with hyperglycemia, without long-term current use of insulin (HCC) Attestations: This note is prepared by Romain Oro.  Jose G Austin, acting as Scribe for [employer] Jonathan Rees MD. Rito Marr MD: The scribe's documentation has been prepared under my direction and personally reviewed by me in its entirety. I confirm that the note above accurately reflects all work, treatment, procedures, and medical decision making performed by me. This note will not be viewable in [address]

## 2019-01-20 NOTE — PROGRESS NOTES
Consult Information [address] [facility], [place] MRN [id number] Consult ID [id number] Facility Time Zone ET Date and Time of Consult 01/20/2019 01:49:41 AM 
Requesting Clinician Ramírez Grijalva Time of Call 01/20/2019 01:52:00 AM 
Patient Name Geoff Pleitez Date of Birth 1965 Gender Male Clinical Note Clinical Note: RN called as patient with elevated MEWS (BP mildly elevated, mildly tachycardic but febrile to 102). Patient just admitted on evening of 1/19 with Influenza A and possible PNA. Has recently received tylenol, despite fluids patient's urine is dark per RN report and admit labs suggest patient may be at risk for SHERIDAN. IVF rate increased and ice 
packs ordered. Will avoid NSAIDs at this time.  
 
This document was electronically signed by Mervin Laguerre MD 01/20/2019 02:00 AM 
Page 1 of 1

## 2019-01-20 NOTE — ED NOTES
Pt reports that he is feeling much better and breathing much better than he was when he came in. Pt O2 saturation 90-96%

## 2019-01-20 NOTE — PROGRESS NOTES
2338: Pt  with elevated MEWS score of 4,  Temp 102 and tylenol 650 mg given. Dr. Gwynne Osler on call notified. Orders received to increase IV fluid and  Ice  Packs. Will continue to monitor.

## 2019-01-20 NOTE — ED NOTES
Called housekeeping to see if hospital bed could be acquired for pt. Housekeeping looking for hospital bed.

## 2019-01-20 NOTE — PROGRESS NOTES
Spiritual Care Assessment/Progress Note [address] 
 
 
NAME: Francois Aguayo      MRN: [id number] AGE: 47 y.o. SEX: male Yarsani Affiliation: Anglican  
Language: English  
 
1/20/2019     Total Time (in minutes): 12 Spiritual Assessment begun in [facility] 3 MED TELE through conversation with: 
  
    []Patient        [x] Family    [] Friend(s) Reason for Consult: Rapid response team  
 
Spiritual beliefs: (Please include comment if needed) 
   [] Identifies with a jessie tradition:     
   [] Supported by a jessie community:        
   [] Claims no spiritual orientation:       
   [] Seeking spiritual identity:            
   [] Adheres to an individual form of spirituality:       
   [x] Not able to assess:                   
 
    
Identified resources for coping:  
   [] Prayer                           
   [] Music                  [] Guided Imagery [x] Family/friends                 [] Pet visits [] Devotional reading                         [] Unknown 
   [] Other:                                        
 
 
Interventions offered during this visit: (See comments for more details) Patient Interventions: Initial/Spiritual assessment, patient floor Family/Friend(s): Affirmation of emotions/emotional suffering, Catharsis/review of pertinent events in supportive environment Plan of Care: 
 
 [] Support spiritual and/or cultural needs  
 [] Support AMD and/or advance care planning process    
 [] Support grieving process 
 [] Coordinate Rites and/or Rituals  
 [] Coordination with community clergy 
 [x] No spiritual needs identified at this time 
 [] Detailed Plan of Care below (See Comments)  [] Make referral to Music Therapy 
[] Make referral to Pet Therapy    
[] Make referral to Addiction services 
[] Make referral to [facility] 
[] Make referral to Spiritual Care Partner 
[] No future visits requested       
[] Follow up visits as needed Comments:  Responded to RRT on Med Tele. Patient's wife was present. Patient was being evaluated. Patient's wife spoke briefly about 's present illness. She seems to be coping effectively at this time. No spiritual needs or concerns were raised. Provided pastoral presence and advised of  availability. INC Oro, [facility],  [facility]  Paging Service  [phone])

## 2019-01-20 NOTE — PROGRESS NOTES
TRANSFER - IN REPORT: 
 
Verbal report received from [facility]) on Olita Hipps  being received fromED(unit) for routine progression of care Report consisted of patients Situation, Background, Assessment and  
Recommendations(SBAR). Information from the following report(s) SBAR, Kardex, Intake/Output, MAR, Accordion, Recent Results and Med Rec Status was reviewed with the receiving nurse. Opportunity for questions and clarification was provided. Assessment completed upon patients arrival to unit and care assumed.

## 2019-01-21 ENCOUNTER — APPOINTMENT (OUTPATIENT)
Dept: GENERAL RADIOLOGY | Age: 54
DRG: 871 | End: 2019-01-21
Attending: INTERNAL MEDICINE
Payer: COMMERCIAL

## 2019-01-21 LAB
ANION GAP SERPL CALC-SCNC: 8 MMOL/L (ref 5–15)
ARTERIAL PATENCY WRIST A: YES
BASE EXCESS BLDA CALC-SCNC: 1.9 MMOL/L
BASOPHILS # BLD: 0 K/UL (ref 0–0.1)
BASOPHILS NFR BLD: 0 % (ref 0–1)
BDY SITE: ABNORMAL
BREATHS.SPONTANEOUS ON VENT: 20
BUN SERPL-MCNC: 15 MG/DL (ref 6–20)
BUN/CREAT SERPL: 23 (ref 12–20)
CALCIUM SERPL-MCNC: 7.6 MG/DL (ref 8.5–10.1)
CHLORIDE SERPL-SCNC: 102 MMOL/L (ref 97–108)
CO2 SERPL-SCNC: 25 MMOL/L (ref 21–32)
CREAT SERPL-MCNC: 0.64 MG/DL (ref 0.7–1.3)
DIFFERENTIAL METHOD BLD: ABNORMAL
EOSINOPHIL # BLD: 0 K/UL (ref 0–0.4)
EOSINOPHIL NFR BLD: 0 % (ref 0–7)
ERYTHROCYTE [DISTWIDTH] IN BLOOD BY AUTOMATED COUNT: 12.5 % (ref 11.5–14.5)
EST. AVERAGE GLUCOSE BLD GHB EST-MCNC: 278 MG/DL
GAS FLOW.O2 O2 DELIVERY SYS: 4.5 L/MIN
GLUCOSE BLD STRIP.AUTO-MCNC: 115 MG/DL (ref 65–100)
GLUCOSE BLD STRIP.AUTO-MCNC: 118 MG/DL (ref 65–100)
GLUCOSE BLD STRIP.AUTO-MCNC: 129 MG/DL (ref 65–100)
GLUCOSE BLD STRIP.AUTO-MCNC: 131 MG/DL (ref 65–100)
GLUCOSE SERPL-MCNC: 157 MG/DL (ref 65–100)
HBA1C MFR BLD: 11.3 % (ref 4.2–6.3)
HCO3 BLDA-SCNC: 25 MMOL/L (ref 22–26)
HCT VFR BLD AUTO: 44.7 % (ref 36.6–50.3)
HGB BLD-MCNC: 15.1 G/DL (ref 12.1–17)
IMM GRANULOCYTES # BLD AUTO: 0 K/UL (ref 0–0.04)
IMM GRANULOCYTES NFR BLD AUTO: 0 % (ref 0–0.5)
LYMPHOCYTES # BLD: 0.7 K/UL (ref 0.8–3.5)
LYMPHOCYTES NFR BLD: 10 % (ref 12–49)
MCH RBC QN AUTO: 30.4 PG (ref 26–34)
MCHC RBC AUTO-ENTMCNC: 33.8 G/DL (ref 30–36.5)
MCV RBC AUTO: 89.9 FL (ref 80–99)
MONOCYTES # BLD: 0.1 K/UL (ref 0–1)
MONOCYTES NFR BLD: 2 % (ref 5–13)
NEUTS SEG # BLD: 5.8 K/UL (ref 1.8–8)
NEUTS SEG NFR BLD: 88 % (ref 32–75)
NRBC # BLD: 0 K/UL (ref 0–0.01)
NRBC BLD-RTO: 0 PER 100 WBC
PCO2 BLDA: 35 MMHG (ref 35–45)
PH BLDA: 7.47 [PH] (ref 7.35–7.45)
PLATELET # BLD AUTO: 242 K/UL (ref 150–400)
PMV BLD AUTO: 9.8 FL (ref 8.9–12.9)
PO2 BLDA: 53 MMHG (ref 80–100)
POTASSIUM SERPL-SCNC: 3.3 MMOL/L (ref 3.5–5.1)
RBC # BLD AUTO: 4.97 M/UL (ref 4.1–5.7)
RBC MORPH BLD: ABNORMAL
SAO2 % BLD: 90 % (ref 92–97)
SAO2% DEVICE SAO2% SENSOR NAME: ABNORMAL
SERVICE CMNT-IMP: ABNORMAL
SODIUM SERPL-SCNC: 135 MMOL/L (ref 136–145)
SPECIMEN SITE: ABNORMAL
WBC # BLD AUTO: 6.6 K/UL (ref 4.1–11.1)

## 2019-01-21 PROCEDURE — 82803 BLOOD GASES ANY COMBINATION: CPT

## 2019-01-21 PROCEDURE — 36415 COLL VENOUS BLD VENIPUNCTURE: CPT

## 2019-01-21 PROCEDURE — 71045 X-RAY EXAM CHEST 1 VIEW: CPT

## 2019-01-21 PROCEDURE — 74011250636 HC RX REV CODE- 250/636: Performed by: INTERNAL MEDICINE

## 2019-01-21 PROCEDURE — 80048 BASIC METABOLIC PNL TOTAL CA: CPT

## 2019-01-21 PROCEDURE — 85025 COMPLETE CBC W/AUTO DIFF WBC: CPT

## 2019-01-21 PROCEDURE — 74011000250 HC RX REV CODE- 250: Performed by: INTERNAL MEDICINE

## 2019-01-21 PROCEDURE — 77030029684 HC NEB SM VOL KT MONA -A

## 2019-01-21 PROCEDURE — 74011636637 HC RX REV CODE- 636/637: Performed by: INTERNAL MEDICINE

## 2019-01-21 PROCEDURE — 74011250636 HC RX REV CODE- 250/636: Performed by: GENERAL ACUTE CARE HOSPITAL

## 2019-01-21 PROCEDURE — 74011250637 HC RX REV CODE- 250/637: Performed by: INTERNAL MEDICINE

## 2019-01-21 PROCEDURE — 83036 HEMOGLOBIN GLYCOSYLATED A1C: CPT

## 2019-01-21 PROCEDURE — 36600 WITHDRAWAL OF ARTERIAL BLOOD: CPT

## 2019-01-21 PROCEDURE — 65660000000 HC RM CCU STEPDOWN

## 2019-01-21 PROCEDURE — 82962 GLUCOSE BLOOD TEST: CPT

## 2019-01-21 PROCEDURE — 77010033678 HC OXYGEN DAILY

## 2019-01-21 PROCEDURE — 77030018846 HC SOL IRR STRL H20 ICUM -A

## 2019-01-21 PROCEDURE — 74011250637 HC RX REV CODE- 250/637: Performed by: GENERAL ACUTE CARE HOSPITAL

## 2019-01-21 PROCEDURE — 94760 N-INVAS EAR/PLS OXIMETRY 1: CPT

## 2019-01-21 RX ORDER — LOPERAMIDE HYDROCHLORIDE 2 MG/1
2 CAPSULE ORAL
Status: DISCONTINUED | OUTPATIENT
Start: 2019-01-21 | End: 2019-01-31 | Stop reason: HOSPADM

## 2019-01-21 RX ORDER — LEVALBUTEROL INHALATION SOLUTION 1.25 MG/3ML
1.25 SOLUTION RESPIRATORY (INHALATION)
Status: DISCONTINUED | OUTPATIENT
Start: 2019-01-21 | End: 2019-01-31 | Stop reason: HOSPADM

## 2019-01-21 RX ORDER — FUROSEMIDE 10 MG/ML
40 INJECTION INTRAMUSCULAR; INTRAVENOUS ONCE
Status: COMPLETED | OUTPATIENT
Start: 2019-01-21 | End: 2019-01-21

## 2019-01-21 RX ORDER — OSELTAMIVIR PHOSPHATE 75 MG/1
75 CAPSULE ORAL 2 TIMES DAILY
Status: DISCONTINUED | OUTPATIENT
Start: 2019-01-21 | End: 2019-01-23 | Stop reason: ALTCHOICE

## 2019-01-21 RX ORDER — INSULIN GLARGINE 100 [IU]/ML
5 INJECTION, SOLUTION SUBCUTANEOUS
Status: DISCONTINUED | OUTPATIENT
Start: 2019-01-21 | End: 2019-01-30

## 2019-01-21 RX ORDER — INSULIN GLARGINE 100 [IU]/ML
20 INJECTION, SOLUTION SUBCUTANEOUS
Status: DISCONTINUED | OUTPATIENT
Start: 2019-01-21 | End: 2019-01-21

## 2019-01-21 RX ORDER — POTASSIUM CHLORIDE 750 MG/1
40 TABLET, FILM COATED, EXTENDED RELEASE ORAL ONCE
Status: COMPLETED | OUTPATIENT
Start: 2019-01-21 | End: 2019-01-21

## 2019-01-21 RX ORDER — ACETAMINOPHEN 325 MG/1
650 TABLET ORAL
Status: DISCONTINUED | OUTPATIENT
Start: 2019-01-21 | End: 2019-01-31 | Stop reason: HOSPADM

## 2019-01-21 RX ORDER — LEVALBUTEROL INHALATION SOLUTION 1.25 MG/3ML
1.25 SOLUTION RESPIRATORY (INHALATION)
Status: COMPLETED | OUTPATIENT
Start: 2019-01-21 | End: 2019-01-21

## 2019-01-21 RX ORDER — INSULIN GLARGINE 100 [IU]/ML
15 INJECTION, SOLUTION SUBCUTANEOUS
Status: DISCONTINUED | OUTPATIENT
Start: 2019-01-21 | End: 2019-01-21

## 2019-01-21 RX ADMIN — ENOXAPARIN SODIUM 40 MG: 40 INJECTION SUBCUTANEOUS at 09:03

## 2019-01-21 RX ADMIN — IBUPROFEN 400 MG: 400 TABLET ORAL at 08:59

## 2019-01-21 RX ADMIN — LOPERAMIDE HYDROCHLORIDE 2 MG: 2 CAPSULE ORAL at 15:14

## 2019-01-21 RX ADMIN — INSULIN GLARGINE 5 UNITS: 100 INJECTION, SOLUTION SUBCUTANEOUS at 22:17

## 2019-01-21 RX ADMIN — Medication 10 ML: at 05:27

## 2019-01-21 RX ADMIN — LEVOFLOXACIN 750 MG: 5 INJECTION, SOLUTION INTRAVENOUS at 21:37

## 2019-01-21 RX ADMIN — IBUPROFEN 400 MG: 400 TABLET ORAL at 23:08

## 2019-01-21 RX ADMIN — LOPERAMIDE HYDROCHLORIDE 2 MG: 2 CAPSULE ORAL at 21:40

## 2019-01-21 RX ADMIN — POTASSIUM CHLORIDE 40 MEQ: 750 TABLET, EXTENDED RELEASE ORAL at 10:23

## 2019-01-21 RX ADMIN — SODIUM CHLORIDE 125 ML/HR: 900 INJECTION, SOLUTION INTRAVENOUS at 13:08

## 2019-01-21 RX ADMIN — SODIUM CHLORIDE 125 ML/HR: 900 INJECTION, SOLUTION INTRAVENOUS at 04:47

## 2019-01-21 RX ADMIN — FUROSEMIDE 40 MG: 10 INJECTION, SOLUTION INTRAMUSCULAR; INTRAVENOUS at 21:10

## 2019-01-21 RX ADMIN — LOPERAMIDE HYDROCHLORIDE 2 MG: 2 CAPSULE ORAL at 00:26

## 2019-01-21 RX ADMIN — LEVALBUTEROL HYDROCHLORIDE 1.25 MG: 1.25 SOLUTION RESPIRATORY (INHALATION) at 21:07

## 2019-01-21 NOTE — PROGRESS NOTES
Reason for Admission:   Flu RRAT Score:          9 Plan for utilizing home health:      No plan to use [facility] Likelihood of Readmission:  Low Transition of Care Plan:     Introduced myself and explained purpose of CM. Wife at bedside. Anticipate D/C to home with wife when stable. Care Management Interventions Palliative Care Criteria Met (RRAT>21 & CHF Dx)?: No 
Mode of Transport at Discharge: (Wife at bedside, to transport home at time of discharge.) Transition of Care Consult (CM Consult): (Patient anticipates D/C to home, wife anticipates \"by Thursday\". No services anticipated.) Physical Therapy Consult: No 
Current Support Network: Lives with Spouse(Independent of all needs in the home. Lifes with wife. Does not anticipate any services at D/C.) Confirm Follow Up Transport: Family Plan discussed with Pt/Family/Caregiver: Yes Discharge Location Discharge Placement: (Anticipates DC to home with family.) CM will follow and assist with any transition to home needs.  
 
Elmer River RN

## 2019-01-21 NOTE — DIABETES MGMT
[facility] Elevated A1C Visit Note Recommendations/ Comments: noted lantus increased to 20 units daily. Pt will likely need to be be discharged on insulin given hgb A1c > 9 percent. Pt will require the following prescriptions at discharge: 
Contour Next strips # 50 Simi Microlet Lancets # 100 Current hospital DM medication: lispro correction - normal sensitivity  , Lantus 20 units Patient is a 47 y.o. male with hx of DM, reported he was diagnosed 20 years ago and \"has been on every medication there is , but none of them brought my A1c down\". Pt has not been taking any diabetes medications for years, shared that \"after I lost weight, I didn't need to take it\" (this is contradictory to patient reporting that A1c never came down? ?). Pt shared that he was on metformin and insulin 5+ years ago, reports that he feels comfortable giving himself insulin; shared that he used to inject in front side part of arm. Pt shared that he does not have glucometer currently. Pt reported that his appetite was good before getting sick, ate 3 meals daily, avoided sugary beverages. A1c:  
Lab Results Component Value Date/Time Hemoglobin A1c 11.3 (H) 01/21/2019 02:54 AM  
 Hemoglobin A1c 11.5 (H) 05/30/2014 07:56 AM  
 
 
Recent Glucose Results:  
Lab Results Component Value Date/Time  (H) 01/21/2019 02:54 AM  
 GLUCPOC 131 (H) 01/21/2019 11:12 AM  
 GLUCPOC 129 (H) 01/21/2019 07:19 AM  
 GLUCPOC 201 (H) 01/20/2019 08:55 PM  
  
 
Lab Results Component Value Date/Time Creatinine 0.64 (L) 01/21/2019 02:54 AM  
 
 
Active Orders Diet DIET CLEAR LIQUID No Conc. Sweets Assessed and instructed patient on the following:  
·  blood sugar goals, complications of diabetes mellitus, hypoglycemia prevention and treatment, illness management, nutrition, referred to Diabetes Educator and site rotation · Discussed importance in checking BG at least 1x/daily, provided pt with Contour Next One glucometer (pt declined for demonstration) · Discussed that pt will likely require at least basal insulin to help with BG, discussed proper injection sites for insulin, rotation of insulin, discussed importance in f/u with PCP and/or endocrinologist if BG is above target · Strongly encouraged pt to schedule outpt education to help with DM management given reported difficulty in managing DM on his own. · Discussed complications that can arise with elevated BG, emphasized complications from getting sick Provided patient with the following: [x]          Diabetes Self-Care Guide 
             []          Insulin education materials 
             []          Diabetes survival skills handout []          BG guidelines for post-op patients []          \"Decreasing  the Cost of Diabetes Care\"               [x]          Outpatient DTC contact number Patient to follow up with PCP after discharge. Pt did not want to schedule outpt education at this time as \" I just want to wait until I get better first\". Overall, pt expressed discouraged manner regarding DM and did not seem particularly interested in education. Will continue to follow as needed. Thank you. Bruno Reynolds RD [facility] Office: [phone] Time spent: 15 minutes

## 2019-01-21 NOTE — PROGRESS NOTES
Rapid Response called, patient Temp 102.8 with MEWS 5. Patient verbalized having difficult time taking Tylenol po with gagging and feeling nauseated. Dr Albarado Gains in to assess patient with Tylenol supp ordered. Zofran IV given per order. Tylenol supp ordered and refused by patient. Motrin for temperature given instead. Patient sleeping remainder of shift. Up to bathroomr x1- no further manifestations of nausea or vomiting. Temp stable and patient in bed resting. Denied pain, discomfort when asked.

## 2019-01-21 NOTE — INTERDISCIPLINARY ROUNDS
Interdisciplinary team rounds were held 1/21/2019 with the following team members:Nursing, Pharmacy and Physician and the patient. Plan of care discussed. See clinical pathway and/or care plan for interventions and desired outcomes. Cont current tx-IVF, IV abx. MD anticipates d/c to home in next 2-3 days. Med rec completed.

## 2019-01-21 NOTE — PROGRESS NOTES
Hospitalist Progress Note NAME: Elvia Berg :  1965 MRN:  [id number] Assessment / Plan: 
Sepsis secondary to Influenza A and/or LLL PNA Dehydration Decreased PO intake with N/V resolved -c/w IVF  
-Continue Levofloxacin qdaily 
-O2 supplementation as needed 
-Follow BCx 
-Anti-pyretics PRN 
-Anti-emetics PRN 
-CLD - titrate up as tolerated 
-CXR PA/Lateral: Left lower lobe pneumonia versus atelectasis. - droplets iIsolation 
- refused Tamiflu on admission DMI HLD  
- blood sugars elevated - check Hba1c 11% 
-Will increase  dose Lantus, FS monitoring, SS 
-Hold oral anti-hypogylcemics - pt reported not taking lantus or any medication for many years . Med rec to be updated by pharmacy . I 
- I explained the importance on staying on insulin and statin Code Status: Full Surrogate Decision Maker: Wife DVT Prophylaxis: Lovenox GI Prophylaxis: not indicated Baseline: Independent Body mass index is 26.04 kg/m². therefore classifying patient as overweight, NOS (body mass index [BMI] of 25 to 29.9 kg/m2) 
-counseled exercise/diet. Patient receptive. Subjective: Chief Complaint / Reason for Physician Visit Persistent fever , feels sleepy . Very pale   Discussed with RN events overnight. Review of Systems: 
Symptom Y/N Comments  Symptom Y/N Comments Fever/Chills y   Chest Pain n   
Poor Appetite y   Edema Cough    Abdominal Pain n   
Sputum    Joint Pain SOB/FABIAN n   Pruritis/Rash Nausea/vomit n   Tolerating PT/OT Diarrhea n   Tolerating Diet n   
Constipation    Other Could NOT obtain due to:   
 
Objective: VITALS:  
Last 24hrs VS reviewed since prior progress note. Most recent are: 
Patient Vitals for the past 24 hrs: 
 Temp Pulse Resp BP SpO2  
19 0721 100 °F (37.8 °C) (!) 103 16 (!) 149/93 91 % 19 0245 98.4 °F (36.9 °C) 100 16 (!) 151/91 93 % 19 2244 98.2 °F (36.8 °C) (!) 101 15 133/88 92 % 01/20/19 1905 98.6 °F (37 °C) 93 15 (!) 149/94 94 % 01/20/19 1652 98.3 °F (36.8 °C) 91 13 138/88 90 % 01/20/19 1224 99 °F (37.2 °C) 95 19 134/87 92 % Intake/Output Summary (Last 24 hours) at 1/21/2019 9762 Last data filed at 1/21/2019 7038 Gross per 24 hour Intake 3104.16 ml Output  Net 3104.16 ml PHYSICAL EXAM: 
General: WD, WN. Pale and tired , ill appearing , cooperative, no acute distress   
EENT:  EOMI. Anicteric sclerae. MMM Resp:  CTA bilaterally, no wheezing or rales. No accessory muscle use CV:  Regular  rhythm,  No edema GI:  Soft, Non distended, Non tender.  +Bowel sounds Neurologic:  Alert and oriented X 3, normal speech, Psych:   Good insight. Not anxious nor agitated Skin:  No rashes. No jaundice Reviewed most current lab test results and cultures  YES Reviewed most current radiology test results   YES Review and summation of old records today    NO Reviewed patient's current orders and MAR    YES 
PMH/ reviewed - no change compared to H&P 
________________________________________________________________________ Care Plan discussed with: 
  Comments Patient x Family  x wife RN x Care Manager Consultant Multidiciplinary team rounds were held today with , nursing, pharmacist and clinical coordinator. Patient's plan of care was discussed; medications were reviewed and discharge planning was addressed. ________________________________________________________________________ Total NON critical care TIME:  25  Minutes Total CRITICAL CARE TIME Spent:   Minutes non procedure based Comments >50% of visit spent in counseling and coordination of care x As above   
________________________________________________________________________ Joy Anglin MD  
 
Procedures: see electronic medical records for all procedures/Xrays and details which were not copied into this note but were reviewed prior to creation of Plan. LABS: 
I reviewed today's most current labs and imaging studies. Pertinent labs include: 
Recent Labs  
  01/21/19 0254 01/20/19 0359 01/19/19 1852 WBC 6.6 7.0 9.1 HGB 15.1 14.6 18.1* HCT 44.7 42.4 52.7*  
 207 240 Recent Labs  
  01/21/19 0254 01/20/19 0359 01/19/19 1852 * 129* 129*  
K 3.3* 3.3* 3.7  97 89* CO2 25 25 26 * 260* 361* BUN 15 21* 24* CREA 0.64* 0.79 1.16  
CA 7.6* 7.7* 8.9 MG  --  2.0  --   
PHOS  --  1.8*  --   
ALB  --   --  2.9* TBILI  --   --  0.6 SGOT  --   --  34 ALT  --   --  17 Signed: Reyes Bullock, MD

## 2019-01-21 NOTE — PROGRESS NOTES
Pharmacy Medication Reconciliation The patient was interviewed regarding current PTA medication list, use and drug allergies;  patient present in room and obtained permission from patient to discuss drug regimen with visitor(s) present. The patient was questioned regarding use of any other inhalers, topical products, over the counter medications, herbal medications, vitamin products or ophthalmic/nasal/otic medication use. Allergy Update: None Recommendations/Findings: The following amendments were made to the patient's active medication list on file at [facility]:  
1) Additions: None 2) Deletions: All previous medications 3) Changes: None 
 
 
-Clarified PTA med list with Fill history, patient. PTA medication list was corrected to the following:  
 
None Only medication from insurance fill history was tamiflu on 1/15/19 which would have completed on 1/20 Thank you, Cedrick Chavarria, GÓMEZD

## 2019-01-22 LAB
BNP SERPL-MCNC: 321 PG/ML (ref 0–125)
GLUCOSE BLD STRIP.AUTO-MCNC: 138 MG/DL (ref 65–100)
GLUCOSE BLD STRIP.AUTO-MCNC: 140 MG/DL (ref 65–100)
GLUCOSE BLD STRIP.AUTO-MCNC: 148 MG/DL (ref 65–100)
GLUCOSE BLD STRIP.AUTO-MCNC: 152 MG/DL (ref 65–100)
SERVICE CMNT-IMP: ABNORMAL

## 2019-01-22 PROCEDURE — 74011250636 HC RX REV CODE- 250/636: Performed by: GENERAL ACUTE CARE HOSPITAL

## 2019-01-22 PROCEDURE — 82962 GLUCOSE BLOOD TEST: CPT

## 2019-01-22 PROCEDURE — 74011636637 HC RX REV CODE- 636/637: Performed by: GENERAL ACUTE CARE HOSPITAL

## 2019-01-22 PROCEDURE — 74011250637 HC RX REV CODE- 250/637: Performed by: GENERAL ACUTE CARE HOSPITAL

## 2019-01-22 PROCEDURE — 77010033711 HC HIGH FLOW OXYGEN

## 2019-01-22 PROCEDURE — 74011250637 HC RX REV CODE- 250/637: Performed by: INTERNAL MEDICINE

## 2019-01-22 PROCEDURE — 83880 ASSAY OF NATRIURETIC PEPTIDE: CPT

## 2019-01-22 PROCEDURE — 74011636637 HC RX REV CODE- 636/637: Performed by: INTERNAL MEDICINE

## 2019-01-22 PROCEDURE — 65660000000 HC RM CCU STEPDOWN

## 2019-01-22 PROCEDURE — 36415 COLL VENOUS BLD VENIPUNCTURE: CPT

## 2019-01-22 RX ORDER — LEVOFLOXACIN 750 MG/1
750 TABLET ORAL EVERY 24 HOURS
Status: DISCONTINUED | OUTPATIENT
Start: 2019-01-22 | End: 2019-01-24

## 2019-01-22 RX ADMIN — INSULIN GLARGINE 5 UNITS: 100 INJECTION, SOLUTION SUBCUTANEOUS at 22:50

## 2019-01-22 RX ADMIN — ASPIRIN 325 MG: 325 TABLET ORAL at 08:20

## 2019-01-22 RX ADMIN — PRAVASTATIN SODIUM 20 MG: 10 TABLET ORAL at 08:20

## 2019-01-22 RX ADMIN — INSULIN LISPRO 2 UNITS: 100 INJECTION, SOLUTION INTRAVENOUS; SUBCUTANEOUS at 12:47

## 2019-01-22 RX ADMIN — LEVOFLOXACIN 750 MG: 750 TABLET, FILM COATED ORAL at 20:47

## 2019-01-22 RX ADMIN — ENOXAPARIN SODIUM 40 MG: 40 INJECTION SUBCUTANEOUS at 08:20

## 2019-01-22 NOTE — PROGRESS NOTES
Upon assessment, pt. Has crackles to Right middle lobe, SOB on 4.5 liters NC, and fluids running at 125mL./hr. Called Dr. Brian Falcon. Fluids paused, orders for stat ABG, discontinue fluids, and Xopenex Neb treatment now and PRN ordered. Dr. Brian Falcon placed orders for transfer of pt. To higher level of care. Still waiting on xopenex from pharmacy. Have called twice. Pharmacy says they will tube asap. Spoke to Delfino from [facility], stated she was in a Delivery and gave permission for myself to give breathing treatment. Will give when med delivered to unit. 2100: Breathing treatment administered. Updated pt. And family about transfer to higher level of care. 2120: . Lantus (20 units) scheduled for bedtime. Will message on call MD 
2130: 5 units Lantus ordered 2210: Report given to Harley Selby on PCU 
 
2245: Pt. Transferred to PCU with 2 RNs, tech and family member TRANSFER - OUT REPORT: 
 
Verbal report given to ELIZABETH Muñiz(name) on Soniya Stafford  being transferred to PCU (unit) for change in pt. condition Report consisted of patients Situation, Background, Assessment and  
Recommendations(SBAR). Information from the following report(s) SBAR, Kardex, ED Summary, Procedure Summary, Intake/Output, MAR, Recent Results and Cardiac Rhythm ST was reviewed with the receiving nurse. Lines:  
Peripheral IV 01/19/19 Left Antecubital (Active) Site Assessment Clean, dry, & intact 1/21/2019  7:33 PM  
Phlebitis Assessment 0 1/21/2019  7:33 PM  
Infiltration Assessment 0 1/21/2019  7:33 PM  
Dressing Status Clean, dry, & intact 1/21/2019  7:33 PM  
Dressing Type Tape;Transparent 1/21/2019  3:14 PM  
Hub Color/Line Status Pink; Infusing 1/21/2019  3:14 PM  
  
 
Opportunity for questions and clarification was provided. Patient transported with: 
 Monitor O2 @ 4 liters Patient-specific medications from Pharmacy Registered Nurse Tech

## 2019-01-22 NOTE — ROUTINE PROCESS
Notified of Pt declining with worsening SOB and hypoxia today (previously on 2 LPM, no on 4.5 LPM). Pt admitted with influenza A and possible PNA, on levaquin. Per H&P, Pt dx with influenza A 6 days ago and refused Tamiflu due to concerns that it would interact with DM. 
 
- STAT ABG 
- STAT CXR 
- xopenex neb now. Nebs added prn as well. - Pt now 6 days beyond Influenza dx, so outside traditional treatment window for Tamiflu, but given clinical worsening will still offer treatment per guidelines

## 2019-01-22 NOTE — ROUTINE PROCESS
F/u on prior nursing call, testing: - ABG 7.47/35/53 on 4.5L 
- CXR with progression of bilateral airspace disease which may represent pulmonary edema versus diffuse pneumonia. I am concerned for progression of influenza vs pulmonary edema r/t fluids. - lasix 40mg IV now 
- transfer to stepdown 
- high flow prn (no CO2 retention) - tamiflu as ordered by me on prior call

## 2019-01-22 NOTE — CONSULTS
[facility]  Pulmonary, Critical Care, and Sleep Medicine    Initial Patient Consult    Name: Maureen Celis MRN: [id number]   : 1965 Hospital: [facility]   Date: 2019        IMPRESSION:   · Acute hypoxic respiratory failure  · Acute influenza pneumonia, clinically worsening  · Can not exclude bacterial superinfection  · Can not completely exclude heart failure  · Uncontrolled diabetes       RECOMMENDATIONS:   · O2, high flow if needed  · Bronchodilators as needed  · Agree with empiric antibiotics  · Patient has refused Tamiflu  · Agree with checking echo for completeness  · Insulin  · At risk for further decompensation from influenza pneumonia. Continue close monitoring in PCU. Subjective: This patient has been seen and evaluated at the request of Dr. Sarah Mata for worsening pneumonia. Patient is a 47 y.o. male former smoker, presented to the ER  with dyspnea and generalized malaise. He was diagnosed with influenza at [facility] 1 week ago. He declined to take Tamiflu because he was concerned it would give him side effects. He did not get a flu shot this year. He has been treated with antibiotics, IVF, but he has become progressively more hypoxic with malaise and dyspnea. He was again offered Tamiflu here but has refused again. Due to worsening hypoxia, I was asked to evaluate him.      Past Medical History:   Diagnosis Date    Diabetes ([address]     Nausea & vomiting     Pancreatitis     Unspecified sleep apnea       Past Surgical History:   Procedure Laterality Date    HX GI      upper endoscopies    HX HEENT      oral surgery    HX HEENT      turbinate reduction      Prior to Admission medications    Not on File     Allergies   Allergen Reactions    Lisinopril Anaphylaxis      Social History     Tobacco Use    Smoking status: Former Smoker     Years: 1.00     Types: Cigarettes    Smokeless tobacco: Never Used   Substance Use Topics    Alcohol use: Yes      Family History   Problem Relation Age of Onset    Heart Disease Mother         s/p stent    Diabetes Father     Cancer Brother         stomach cancer        Current Facility-Administered Medications   Medication Dose Route Frequency    levoFLOXacin (LEVAQUIN) tablet 750 mg  750 mg Oral Q24H    oseltamivir (TAMIFLU) capsule 75 mg  75 mg Oral BID    insulin glargine (LANTUS) injection 5 Units  5 Units SubCUTAneous QHS    pravastatin (PRAVACHOL) tablet 20 mg  20 mg Oral DAILY    insulin lispro (HUMALOG) injection   SubCUTAneous AC&HS    aspirin tablet 325 mg  325 mg Oral DAILY    enoxaparin (LOVENOX) injection 40 mg  40 mg SubCUTAneous Q24H       Review of Systems:  A comprehensive review of systems was negative except for: Ears, nose, mouth, throat, and face: positive for snoring    Objective:   Vital Signs:    Visit Vitals  /80   Pulse 91   Temp 98.5 °F (36.9 °C)   Resp 20   Ht 6' (1.829 m)   Wt 87.1 kg (192 lb 0.3 oz)   SpO2 96%   BMI 26.04 kg/m²       O2 Device: Hi flow nasal cannula   O2 Flow Rate (L/min): 6 l/min   Temp (24hrs), Av.5 °F (36.9 °C), Min:97.1 °F (36.2 °C), Max:101.2 °F (38.4 °C)       Intake/Output:   Last shift:      No intake/output data recorded. Last 3 shifts:  1901 -  0700  In: 3104.2 [I.V.:3104.2]  Out: 400 [Urine:400]    Intake/Output Summary (Last 24 hours) at 2019 1622  Last data filed at 2019 0000  Gross per 24 hour   Intake    Output 400 ml   Net -400 ml      Physical Exam:   General:  Alert, ill appearing, no acute dstress   Head:  Normocephalic, without obvious abnormality, atraumatic. Eyes:  Conjunctivae/corneas clear. Nose: Nares normal. Septum midline. Mucosa normal.     Throat: Lips, mucosa, and tongue normal. Teeth and gums normal.   Neck: Supple, symmetrical, trachea midline    Back:   Symmetric, no curvature. ROM normal.   Lungs:   Scattered bilateral rales   Chest wall:  No tenderness or deformity.    Heart: Tachy, regular, no murmur   Abdomen:   Soft, non-tender.  Bowel sounds normal.     Extremities: Extremities normal, atraumatic, no cyanosis or clubbing   Skin: Skin color, texture, turgor normal. No rashes or lesions   Lymph nodes: Cervical, supraclavicular nodes normal.   Neurologic: Grossly nonfocal     Data review:     Recent Results (from the past 24 hour(s))   GLUCOSE, POC    Collection Time: 01/21/19  4:48 PM   Result Value Ref Range    Glucose (POC) 115 (H) 65 - 100 mg/dL    Performed by Maricel Saldivar (CON) PCT    BLOOD GAS, ARTERIAL    Collection Time: 01/21/19  8:10 PM   Result Value Ref Range    pH 7.47 (H) 7.35 - 7.45      PCO2 35 35.0 - 45.0 mmHg    PO2 53 (L) 80 - 100 mmHg    O2 SAT 90 (L) 92 - 97 %    BICARBONATE 25 22 - 26 mmol/L    BASE EXCESS 1.9 mmol/L    O2 METHOD NASAL O2      O2 FLOW RATE 4.50 L/min    SPONTANEOUS RATE 20.0      Sample source ARTERIAL      SITE LEFT RADIAL      SARHA'S TEST YES     GLUCOSE, POC    Collection Time: 01/21/19  8:46 PM   Result Value Ref Range    Glucose (POC) 118 (H) 65 - 100 mg/dL    Performed by [place] (PCT)    GLUCOSE, POC    Collection Time: 01/22/19  8:19 AM   Result Value Ref Range    Glucose (POC) 138 (H) 65 - 100 mg/dL    Performed by Marielle Trejo    GLUCOSE, POC    Collection Time: 01/22/19 11:18 AM   Result Value Ref Range    Glucose (POC) 152 (H) 65 - 100 mg/dL    Performed by Lexi Diop    NT-PRO BNP    Collection Time: 01/22/19  1:59 PM   Result Value Ref Range    NT pro- (H) 0 - 125 PG/ML       Imaging:  I have personally reviewed the patients radiographs and have reviewed the reports:  Increasing bilateral interstitial infiltrates         Flavia Stovall MD

## 2019-01-22 NOTE — PROGRESS NOTES
[facility]  
 
2259-Received pt to unit. Spouse present. Did not wear PPE mask. Educated on rationale for use. Still refused. Both pt and wife appeared agitated, with pt not moving on command and wife answering questions for him. Pt noted for gruff, pressured speech. 2308-Medicated with motrin for Fever 101.2 debbie. Wants hi-flow 02 on even though sats 92-95% Respiratory called. 1238-hi-flow nc applied 0125-Temp 97.5 oral.

## 2019-01-22 NOTE — PROGRESS NOTES
Hospitalist Progress Note NAME: Pam Edwards :  1965 MRN:  [id number] Assessment / Plan: 
 
Acute hypoxic respiratory failure Sepsis secondary to Influenza A Viral PNA vs Pulmonary edema (? Developing ARDS) -CXR  Progression of bilateral airspace disease which may represent pulmonary edema versus diffuse pneumonia 
-Refused Tamiflu 
-continue empiric levaquin 
-get Echocardiogram and proBNP. Lasix prn 
-concern about progression in the setting of influenza. Consult Pulmonary 
-currently on 6L. Adjust prn Dehydration Decreased PO intake with N/V resolved -IVFs stopped overnight.   
-nausea resolved. Advance diet DMI HLD  
- check Hba1c 11% - pt reported not taking lantus or any medication for many years . Med rec to be updated by pharmacy  
- continue lantus with SSI prn Code Status: Full Surrogate Decision Maker: Wife DVT Prophylaxis: Lovenox GI Prophylaxis: not indicated Baseline: Independent Body mass index is 26.04 kg/m². therefore classifying patient as overweight, NOS (body mass index [BMI] of 25 to 29.9 kg/m2) 
-counseled exercise/diet. Patient receptive. Subjective: Chief Complaint / Reason for Physician Visit Follow up PNA, influenza, hypoxemia, DM Events from last night noted. Review of Systems: 
Symptom Y/N Comments  Symptom Y/N Comments Fever/Chills y   Chest Pain n   
Poor Appetite y   Edema Cough    Abdominal Pain n   
Sputum    Joint Pain SOB/FABIAN y   Pruritis/Rash Nausea/vomit n   Tolerating PT/OT Diarrhea n   Tolerating Diet n   
Constipation    Other Could NOT obtain due to:   
 
Objective: VITALS:  
Last 24hrs VS reviewed since prior progress note. Most recent are: 
Patient Vitals for the past 24 hrs: 
 Temp Pulse Resp BP SpO2  
19 1115 98.7 °F (37.1 °C) 93 20 131/78 96 % 19 0756 98.9 °F (37.2 °C) 94 20 146/76 94 % 19 0400    145/77  01/22/19 0308 97.1 °F (36.2 °C) 86 20 137/77 95 % 01/22/19 0125 97.4 °F (36.3 °C)      
01/22/19 0021     96 % 01/21/19 2259 (!) 101.2 °F (38.4 °C) (!) 102 20 (!) 118/98 91 % 01/21/19 1933     93 % 01/21/19 1849 97.7 °F (36.5 °C) 94 16 (!) 167/93 92 % 01/21/19 1514 97.7 °F (36.5 °C) 89 16 134/85 91 % Intake/Output Summary (Last 24 hours) at 1/22/2019 1309 Last data filed at 1/22/2019 0000 Gross per 24 hour Intake  Output 400 ml Net -400 ml PHYSICAL EXAM: 
General: WD, WN. Pale and tired , ill appearing , cooperative, no acute distress   
EENT:  EOMI. Anicteric sclerae. MMM Resp:  Bibasal rales. No accessory muscle use CV:  Regular  rhythm,  Trace edema GI:  Soft, Non distended, Non tender.  +Bowel sounds Neurologic:  Alert and oriented X 3, normal speech, Psych:   Good insight. Not anxious nor agitated Skin:  No rashes. No jaundice Reviewed most current lab test results and cultures  YES Reviewed most current radiology test results   YES Review and summation of old records today    NO Reviewed patient's current orders and MAR    YES 
PMH/SH reviewed - no change compared to H&P 
________________________________________________________________________ Care Plan discussed with: 
  Comments Patient x Family  x wife RN x Care Manager Consultant  x Multidiciplinary team rounds were held today with , nursing, pharmacist and clinical coordinator. Patient's plan of care was discussed; medications were reviewed and discharge planning was addressed. ________________________________________________________________________ Total NON critical care TIME:  35  Minutes Total CRITICAL CARE TIME Spent:   Minutes non procedure based Comments >50% of visit spent in counseling and coordination of care x As above   
________________________________________________________________________ Taylor Vyas MD  
 
 Procedures: see electronic medical records for all procedures/Xrays and details which were not copied into this note but were reviewed prior to creation of Plan. LABS: 
I reviewed today's most current labs and imaging studies. Pertinent labs include: 
Recent Labs  
  01/21/19 0254 01/20/19 0359 01/19/19 1852 WBC 6.6 7.0 9.1 HGB 15.1 14.6 18.1* HCT 44.7 42.4 52.7*  
 207 240 Recent Labs  
  01/21/19 0254 01/20/19 0359 01/19/19 1852 * 129* 129*  
K 3.3* 3.3* 3.7  97 89* CO2 25 25 26 * 260* 361* BUN 15 21* 24* CREA 0.64* 0.79 1.16  
CA 7.6* 7.7* 8.9 MG  --  2.0  --   
PHOS  --  1.8*  --   
ALB  --   --  2.9* TBILI  --   --  0.6 SGOT  --   --  34 ALT  --   --  17 Signed: Pia Viera MD

## 2019-01-23 LAB
ALBUMIN SERPL-MCNC: 1.9 G/DL (ref 3.5–5)
ALBUMIN/GLOB SERPL: 0.5 {RATIO} (ref 1.1–2.2)
ALP SERPL-CCNC: 77 U/L (ref 45–117)
ALT SERPL-CCNC: 16 U/L (ref 12–78)
ANION GAP SERPL CALC-SCNC: 10 MMOL/L (ref 5–15)
AST SERPL-CCNC: 43 U/L (ref 15–37)
BASOPHILS # BLD: 0 K/UL (ref 0–0.1)
BASOPHILS NFR BLD: 0 % (ref 0–1)
BILIRUB SERPL-MCNC: 0.6 MG/DL (ref 0.2–1)
BUN SERPL-MCNC: 11 MG/DL (ref 6–20)
BUN/CREAT SERPL: 22 (ref 12–20)
CALCIUM SERPL-MCNC: 7.7 MG/DL (ref 8.5–10.1)
CHLORIDE SERPL-SCNC: 99 MMOL/L (ref 97–108)
CO2 SERPL-SCNC: 28 MMOL/L (ref 21–32)
CREAT SERPL-MCNC: 0.51 MG/DL (ref 0.7–1.3)
DIFFERENTIAL METHOD BLD: ABNORMAL
EOSINOPHIL # BLD: 0 K/UL (ref 0–0.4)
EOSINOPHIL NFR BLD: 0 % (ref 0–7)
ERYTHROCYTE [DISTWIDTH] IN BLOOD BY AUTOMATED COUNT: 12.6 % (ref 11.5–14.5)
GLOBULIN SER CALC-MCNC: 4.2 G/DL (ref 2–4)
GLUCOSE BLD STRIP.AUTO-MCNC: 154 MG/DL (ref 65–100)
GLUCOSE BLD STRIP.AUTO-MCNC: 163 MG/DL (ref 65–100)
GLUCOSE BLD STRIP.AUTO-MCNC: 166 MG/DL (ref 65–100)
GLUCOSE BLD STRIP.AUTO-MCNC: 168 MG/DL (ref 65–100)
GLUCOSE SERPL-MCNC: 163 MG/DL (ref 65–100)
HCT VFR BLD AUTO: 42.1 % (ref 36.6–50.3)
HGB BLD-MCNC: 14.3 G/DL (ref 12.1–17)
IMM GRANULOCYTES # BLD AUTO: 0.1 K/UL (ref 0–0.04)
IMM GRANULOCYTES NFR BLD AUTO: 1 % (ref 0–0.5)
LYMPHOCYTES # BLD: 1.2 K/UL (ref 0.8–3.5)
LYMPHOCYTES NFR BLD: 13 % (ref 12–49)
MCH RBC QN AUTO: 30.4 PG (ref 26–34)
MCHC RBC AUTO-ENTMCNC: 34 G/DL (ref 30–36.5)
MCV RBC AUTO: 89.4 FL (ref 80–99)
MONOCYTES # BLD: 0.6 K/UL (ref 0–1)
MONOCYTES NFR BLD: 6 % (ref 5–13)
NEUTS SEG # BLD: 7.3 K/UL (ref 1.8–8)
NEUTS SEG NFR BLD: 80 % (ref 32–75)
NRBC # BLD: 0 K/UL (ref 0–0.01)
NRBC BLD-RTO: 0 PER 100 WBC
PLATELET # BLD AUTO: 340 K/UL (ref 150–400)
PMV BLD AUTO: 9.6 FL (ref 8.9–12.9)
POTASSIUM SERPL-SCNC: 2.9 MMOL/L (ref 3.5–5.1)
PROT SERPL-MCNC: 6.1 G/DL (ref 6.4–8.2)
RBC # BLD AUTO: 4.71 M/UL (ref 4.1–5.7)
SERVICE CMNT-IMP: ABNORMAL
SODIUM SERPL-SCNC: 137 MMOL/L (ref 136–145)
WBC # BLD AUTO: 9.2 K/UL (ref 4.1–11.1)

## 2019-01-23 PROCEDURE — 65660000000 HC RM CCU STEPDOWN

## 2019-01-23 PROCEDURE — 74011250637 HC RX REV CODE- 250/637: Performed by: GENERAL ACUTE CARE HOSPITAL

## 2019-01-23 PROCEDURE — 74011636637 HC RX REV CODE- 636/637: Performed by: GENERAL ACUTE CARE HOSPITAL

## 2019-01-23 PROCEDURE — 80053 COMPREHEN METABOLIC PANEL: CPT

## 2019-01-23 PROCEDURE — 77030021668 HC NEB PREFIL KT VYRM -A

## 2019-01-23 PROCEDURE — 93306 TTE W/DOPPLER COMPLETE: CPT

## 2019-01-23 PROCEDURE — 74011250637 HC RX REV CODE- 250/637: Performed by: INTERNAL MEDICINE

## 2019-01-23 PROCEDURE — 77010033711 HC HIGH FLOW OXYGEN

## 2019-01-23 PROCEDURE — 74011250636 HC RX REV CODE- 250/636: Performed by: GENERAL ACUTE CARE HOSPITAL

## 2019-01-23 PROCEDURE — 85025 COMPLETE CBC W/AUTO DIFF WBC: CPT

## 2019-01-23 PROCEDURE — 82962 GLUCOSE BLOOD TEST: CPT

## 2019-01-23 PROCEDURE — 77030027138 HC INCENT SPIROMETER -A

## 2019-01-23 PROCEDURE — 36415 COLL VENOUS BLD VENIPUNCTURE: CPT

## 2019-01-23 PROCEDURE — 74011636637 HC RX REV CODE- 636/637: Performed by: INTERNAL MEDICINE

## 2019-01-23 RX ORDER — POTASSIUM CHLORIDE 750 MG/1
40 TABLET, FILM COATED, EXTENDED RELEASE ORAL
Status: COMPLETED | OUTPATIENT
Start: 2019-01-23 | End: 2019-01-23

## 2019-01-23 RX ADMIN — INSULIN LISPRO 2 UNITS: 100 INJECTION, SOLUTION INTRAVENOUS; SUBCUTANEOUS at 17:13

## 2019-01-23 RX ADMIN — SALINE NASAL SPRAY 2 SPRAY: 1.5 SOLUTION NASAL at 11:05

## 2019-01-23 RX ADMIN — INSULIN LISPRO 2 UNITS: 100 INJECTION, SOLUTION INTRAVENOUS; SUBCUTANEOUS at 21:58

## 2019-01-23 RX ADMIN — POTASSIUM CHLORIDE 40 MEQ: 750 TABLET, EXTENDED RELEASE ORAL at 11:05

## 2019-01-23 RX ADMIN — INSULIN GLARGINE 5 UNITS: 100 INJECTION, SOLUTION SUBCUTANEOUS at 22:00

## 2019-01-23 RX ADMIN — LEVOFLOXACIN 750 MG: 750 TABLET, FILM COATED ORAL at 21:58

## 2019-01-23 RX ADMIN — ENOXAPARIN SODIUM 40 MG: 40 INJECTION SUBCUTANEOUS at 08:27

## 2019-01-23 RX ADMIN — PRAVASTATIN SODIUM 20 MG: 10 TABLET ORAL at 08:27

## 2019-01-23 RX ADMIN — ONDANSETRON 4 MG: 2 INJECTION INTRAMUSCULAR; INTRAVENOUS at 22:03

## 2019-01-23 RX ADMIN — ASPIRIN 325 MG: 325 TABLET ORAL at 08:27

## 2019-01-23 RX ADMIN — INSULIN LISPRO 2 UNITS: 100 INJECTION, SOLUTION INTRAVENOUS; SUBCUTANEOUS at 08:27

## 2019-01-23 NOTE — PROGRESS NOTES
Hospitalist Progress Note NAME: Soniya Stafford :  1965 MRN:  [id number] Assessment / Plan: 
 
Acute hypoxic respiratory failure Sepsis secondary to Influenza A pneumonia 
-CXR  Progression of bilateral airspace disease which may represent pulmonary edema versus diffuse pneumonia 
-Refused Tamiflu so will discontinue 
-continue empiric levaquin 
-Echo EF 41-29%, diastolic dysfunction, no MR, no AS 
-currently on high flow 6L. Adjust prn Dehydration Decreased PO intake with N/V, resolved  
-off IVFs. Tolerating diet Hypokalemia 
-replete and check Mg 
 
DMI HLD  
- Hba1c 11%; pt reported not taking lantus or any medication for many years . - continue lantus with SSI prn Code Status: Full Surrogate Decision Maker: Wife DVT Prophylaxis: Lovenox GI Prophylaxis: not indicated Baseline: Independent Body mass index is 26.04 kg/m². therefore classifying patient as overweight, NOS (body mass index [BMI] of 25 to 29.9 kg/m2) 
-counseled exercise/diet. Patient receptive. Subjective: Chief Complaint / Reason for Physician Visit Follow up PNA, influenza, hypoxemia, DM Feels a bit better. Still on high flow 6-7L Review of Systems: 
Symptom Y/N Comments  Symptom Y/N Comments Fever/Chills    Chest Pain n   
Poor Appetite    Edema Cough    Abdominal Pain n   
Sputum    Joint Pain SOB/FABIAN   improved  Pruritis/Rash Nausea/vomit n   Tolerating PT/OT Diarrhea n   Tolerating Diet n   
Constipation    Other Could NOT obtain due to:   
 
Objective: VITALS:  
Last 24hrs VS reviewed since prior progress note. Most recent are: 
Patient Vitals for the past 24 hrs: 
 Temp Pulse Resp BP SpO2  
19 1236 98.8 °F (37.1 °C) 95 20 120/78   
19 0752 98.5 °F (36.9 °C) 99 20 148/82   
19 0346 98.6 °F (37 °C) 89 20 132/86 97 % 19 0000 98.6 °F (37 °C) 98 18 132/80 97 % 19 2258    143/83  01/22/19 2000 99.1 °F (37.3 °C) 97 18 146/85 91 % 01/22/19 1925    125/71  No intake or output data in the 24 hours ending 01/23/19 1726 PHYSICAL EXAM: 
General: WD, WN. Pale and tired , ill appearing , cooperative, no acute distress   
EENT:  EOMI. Anicteric sclerae. MMM Resp:  Bibasal rales. No accessory muscle use CV:  Regular  rhythm,  Trace edema GI:  Soft, Non distended, Non tender.  +Bowel sounds Neurologic:  Alert and oriented X 3, normal speech, Psych:   Good insight. Not anxious nor agitated Skin:  No rashes. No jaundice Reviewed most current lab test results and cultures  YES Reviewed most current radiology test results   YES Review and summation of old records today    NO Reviewed patient's current orders and MAR    YES 
PMH/ reviewed - no change compared to H&P 
________________________________________________________________________ Care Plan discussed with: 
  Comments Patient x Family  x wife RN x Care Manager Consultant Multidiciplinary team rounds were held today with , nursing, pharmacist and clinical coordinator. Patient's plan of care was discussed; medications were reviewed and discharge planning was addressed. ________________________________________________________________________ Total NON critical care TIME:  20  Minutes Total CRITICAL CARE TIME Spent:   Minutes non procedure based Comments >50% of visit spent in counseling and coordination of care x As above   
________________________________________________________________________ Marlene Ornelas MD  
 
Procedures: see electronic medical records for all procedures/Xrays and details which were not copied into this note but were reviewed prior to creation of Plan. LABS: 
I reviewed today's most current labs and imaging studies. Pertinent labs include: 
Recent Labs  
  01/23/19 
0348 01/21/19 
0254 WBC 9.2 6.6 HGB 14.3 15.1 HCT 42.1 44.7  242 Recent Labs  
  01/23/19 
0348 01/21/19 
0254  135*  
K 2.9* 3.3*  
CL 99 102 CO2 28 25 * 157* BUN 11 15 CREA 0.51* 0.64* CA 7.7* 7.6* ALB 1.9*  --   
TBILI 0.6  --   
SGOT 43*  --   
ALT 16  --   
 
 
Signed: Debbie Duarte MD

## 2019-01-23 NOTE — PROGRESS NOTES
[facility], Critical Care, and Sleep Medicine Name: Jennifer Vernon MRN: [id number] : 1965 Hospital: [place] [facility] Date: 2019 IMPRESSION:  
· Acute hypoxic respiratory failure · Acute influenza pneumonia, clinically worsening · Can not exclude bacterial superinfection · Can not completely exclude heart failure · Uncontrolled diabetes RECOMMENDATIONS:  
· O2, high flow if needed · Bronchodilators as needed · Agree with empiric antibiotics · Patient has refused Tamiflu · Await echo · Insulin · Still at risk for further decompensation from influenza pneumonia. Subjective:  
 
19: no events. Feeling better overall. Lots of complaints about bed discomfort, echo not done, etc.  
 
Initial history: This patient has been seen and evaluated at the request of Dr. Emma Thompson for worsening pneumonia. Patient is a 47 y.o. male former smoker, presented to the ER  with dyspnea and generalized malaise. He was diagnosed with influenza at [facility] 1 week ago. He declined to take Tamiflu because he was concerned it would give him side effects. He did not get a flu shot this year. He has been treated with antibiotics, IVF, but he has become progressively more hypoxic with malaise and dyspnea. He was again offered Tamiflu here but has refused again. Due to worsening hypoxia, I was asked to evaluate him. Past Medical History:  
Diagnosis Date  Diabetes ([address]  Nausea & vomiting  Pancreatitis  Unspecified sleep apnea Past Surgical History:  
Procedure Laterality Date  HX GI    
 upper endoscopies  HX HEENT    
 oral surgery  HX HEENT    
 turbinate reduction Prior to Admission medications Not on File Allergies Allergen Reactions  Lisinopril Anaphylaxis Social History Tobacco Use  Smoking status: Former Smoker Years: 1.00 Types: Cigarettes  Smokeless tobacco: Never Used Substance Use Topics  Alcohol use: Yes Family History Problem Relation Age of Onset  Heart Disease Mother   
     s/p stent  Diabetes Father  Cancer Brother   
     stomach cancer Current Facility-Administered Medications Medication Dose Route Frequency  levoFLOXacin (LEVAQUIN) tablet 750 mg  750 mg Oral Q24H  
 oseltamivir (TAMIFLU) capsule 75 mg  75 mg Oral BID  insulin glargine (LANTUS) injection 5 Units  5 Units SubCUTAneous QHS  pravastatin (PRAVACHOL) tablet 20 mg  20 mg Oral DAILY  insulin lispro (HUMALOG) injection   SubCUTAneous AC&HS  aspirin tablet 325 mg  325 mg Oral DAILY  enoxaparin (LOVENOX) injection 40 mg  40 mg SubCUTAneous Q24H Review of Systems: A comprehensive review of systems was negative except for: Ears, nose, mouth, throat, and face: positive for snoring Objective: 
Vital Signs:   
Visit Vitals /86 Pulse 98 Temp 98.6 °F (37 °C) Resp 18 Ht 6' (1.829 m) Wt 87.1 kg (192 lb 0.3 oz) SpO2 97% BMI 26.04 kg/m² O2 Device: Hi flow nasal cannula O2 Flow Rate (L/min): 6 l/min Temp (24hrs), Av.7 °F (37.1 °C), Min:98.5 °F (36.9 °C), Max:99.1 °F (37.3 °C) Intake/Output:  
Last shift:      No intake/output data recorded. Last 3 shifts:  1901 -  0700 In: -  
Out: 400 [Urine:400] No intake or output data in the 24 hours ending 19 0756 Physical Exam:  
General:  Alert, ill appearing, no acute dstress Head:  Normocephalic, without obvious abnormality, atraumatic. Eyes:  Conjunctivae/corneas clear. Nose: Nares normal. Septum midline. Mucosa normal.    
Throat: Lips, mucosa, and tongue normal. Teeth and gums normal.  
Neck: Supple, symmetrical, trachea midline Back:   Symmetric, no curvature. ROM normal.  
Lungs:   Scattered bilateral rales Chest wall:  No tenderness or deformity. Heart:  Tachy, regular, no murmur Abdomen:   Soft, non-tender. Bowel sounds normal.    
Extremities: Extremities normal, atraumatic, no cyanosis or clubbing Skin: Skin color, texture, turgor normal. No rashes or lesions Neurologic: Grossly nonfocal  
 
Data:  
Labs: 
Recent Labs  
  01/23/19 0348 01/21/19 
0254 WBC 9.2 6.6 HGB 14.3 15.1 HCT 42.1 44.7  242 Recent Labs  
  01/23/19 0348 01/21/19 0254  135*  
K 2.9* 3.3*  
CL 99 102 CO2 28 25 * 157* BUN 11 15 CREA 0.51* 0.64* CA 7.7* 7.6* ALB 1.9*  --   
TBILI 0.6  --   
SGOT 43*  --   
ALT 16  --   
 
Recent Labs  
  01/21/19 2010 PH 7.47* PCO2 35  
PO2 53* HCO3 25 Imaging: 
I have personally reviewed the patients radiographs: 
None today Edilma Hawley MD

## 2019-01-23 NOTE — PROGRESS NOTES
2000-pt and spouse both more communicative. Pt still flushed, still not feeling well but reports feels better since eating. Resting in bed without complaints.  Temp 99.1 oral.

## 2019-01-24 ENCOUNTER — APPOINTMENT (OUTPATIENT)
Dept: GENERAL RADIOLOGY | Age: 54
DRG: 871 | End: 2019-01-24
Attending: INTERNAL MEDICINE
Payer: COMMERCIAL

## 2019-01-24 LAB
ANION GAP SERPL CALC-SCNC: 12 MMOL/L (ref 5–15)
BUN SERPL-MCNC: 10 MG/DL (ref 6–20)
BUN/CREAT SERPL: 20 (ref 12–20)
CALCIUM SERPL-MCNC: 8 MG/DL (ref 8.5–10.1)
CHLORIDE SERPL-SCNC: 99 MMOL/L (ref 97–108)
CO2 SERPL-SCNC: 27 MMOL/L (ref 21–32)
CREAT SERPL-MCNC: 0.51 MG/DL (ref 0.7–1.3)
GLUCOSE BLD STRIP.AUTO-MCNC: 167 MG/DL (ref 65–100)
GLUCOSE BLD STRIP.AUTO-MCNC: 170 MG/DL (ref 65–100)
GLUCOSE BLD STRIP.AUTO-MCNC: 176 MG/DL (ref 65–100)
GLUCOSE BLD STRIP.AUTO-MCNC: 189 MG/DL (ref 65–100)
GLUCOSE SERPL-MCNC: 174 MG/DL (ref 65–100)
MAGNESIUM SERPL-MCNC: 2.1 MG/DL (ref 1.6–2.4)
PHOSPHATE SERPL-MCNC: 2.3 MG/DL (ref 2.6–4.7)
POTASSIUM SERPL-SCNC: 3.2 MMOL/L (ref 3.5–5.1)
SERVICE CMNT-IMP: ABNORMAL
SODIUM SERPL-SCNC: 138 MMOL/L (ref 136–145)

## 2019-01-24 PROCEDURE — 82962 GLUCOSE BLOOD TEST: CPT

## 2019-01-24 PROCEDURE — 36415 COLL VENOUS BLD VENIPUNCTURE: CPT

## 2019-01-24 PROCEDURE — 74011000250 HC RX REV CODE- 250: Performed by: INTERNAL MEDICINE

## 2019-01-24 PROCEDURE — 77010033678 HC OXYGEN DAILY

## 2019-01-24 PROCEDURE — C9113 INJ PANTOPRAZOLE SODIUM, VIA: HCPCS | Performed by: INTERNAL MEDICINE

## 2019-01-24 PROCEDURE — 80048 BASIC METABOLIC PNL TOTAL CA: CPT

## 2019-01-24 PROCEDURE — 74011250637 HC RX REV CODE- 250/637: Performed by: GENERAL ACUTE CARE HOSPITAL

## 2019-01-24 PROCEDURE — 83735 ASSAY OF MAGNESIUM: CPT

## 2019-01-24 PROCEDURE — 74011250636 HC RX REV CODE- 250/636: Performed by: GENERAL ACUTE CARE HOSPITAL

## 2019-01-24 PROCEDURE — 84100 ASSAY OF PHOSPHORUS: CPT

## 2019-01-24 PROCEDURE — 74011250636 HC RX REV CODE- 250/636: Performed by: INTERNAL MEDICINE

## 2019-01-24 PROCEDURE — 71045 X-RAY EXAM CHEST 1 VIEW: CPT

## 2019-01-24 PROCEDURE — 74011636637 HC RX REV CODE- 636/637: Performed by: INTERNAL MEDICINE

## 2019-01-24 PROCEDURE — 74011636637 HC RX REV CODE- 636/637: Performed by: GENERAL ACUTE CARE HOSPITAL

## 2019-01-24 PROCEDURE — 65660000000 HC RM CCU STEPDOWN

## 2019-01-24 RX ORDER — PANTOPRAZOLE SODIUM 40 MG/10ML
40 INJECTION, POWDER, LYOPHILIZED, FOR SOLUTION INTRAVENOUS ONCE
Status: COMPLETED | OUTPATIENT
Start: 2019-01-24 | End: 2019-01-24

## 2019-01-24 RX ORDER — LEVOFLOXACIN 5 MG/ML
750 INJECTION, SOLUTION INTRAVENOUS EVERY 24 HOURS
Status: COMPLETED | OUTPATIENT
Start: 2019-01-24 | End: 2019-01-25

## 2019-01-24 RX ADMIN — PRAVASTATIN SODIUM 20 MG: 10 TABLET ORAL at 08:34

## 2019-01-24 RX ADMIN — INSULIN LISPRO 2 UNITS: 100 INJECTION, SOLUTION INTRAVENOUS; SUBCUTANEOUS at 08:34

## 2019-01-24 RX ADMIN — PANTOPRAZOLE SODIUM 40 MG: 40 INJECTION, POWDER, FOR SOLUTION INTRAVENOUS at 03:34

## 2019-01-24 RX ADMIN — ENOXAPARIN SODIUM 40 MG: 40 INJECTION SUBCUTANEOUS at 08:34

## 2019-01-24 RX ADMIN — INSULIN LISPRO 2 UNITS: 100 INJECTION, SOLUTION INTRAVENOUS; SUBCUTANEOUS at 17:52

## 2019-01-24 RX ADMIN — PROCHLORPERAZINE EDISYLATE 10 MG: 5 INJECTION INTRAMUSCULAR; INTRAVENOUS at 03:34

## 2019-01-24 RX ADMIN — INSULIN LISPRO 2 UNITS: 100 INJECTION, SOLUTION INTRAVENOUS; SUBCUTANEOUS at 12:38

## 2019-01-24 RX ADMIN — INSULIN GLARGINE 5 UNITS: 100 INJECTION, SOLUTION SUBCUTANEOUS at 22:03

## 2019-01-24 RX ADMIN — ONDANSETRON 4 MG: 2 INJECTION INTRAMUSCULAR; INTRAVENOUS at 02:13

## 2019-01-24 RX ADMIN — SODIUM CHLORIDE: 900 INJECTION, SOLUTION INTRAVENOUS at 10:56

## 2019-01-24 RX ADMIN — LEVOFLOXACIN 750 MG: 5 INJECTION, SOLUTION INTRAVENOUS at 22:03

## 2019-01-24 RX ADMIN — Medication 10 ML: at 05:24

## 2019-01-24 NOTE — PROGRESS NOTES
1/23/19-received report from Susana Weiner [facility] bedside report and update. Pt resting in bed with eyes closed attempting not to participate. 1/23/-medicated for nausea. Pt reports he feels r/t levoquin. No complaints of nausea on previous night and levoquin given. 2230-gave pt incentive spirometer and discussed it's use, rationales and benefits with spouse and pt. Demonstrated technique and got pt to re-call and demonstrate. Although pt reluctantly performed technique, he did very well. Encouraged wife to remind pt. Instructed to to sit up and use, bath and have linens changed so he can feel better and move more. Also encouraged pt to use NC and wean off high flow which is PRN but pt has used continuously. Both acknowleged understanding and agreement. 0247-pt complained of nausea and levaquin having bad taste and feeling stuck. Also at this time, sats drop despite being on 7L of hi flow o2. Pt hesitant to use dejan spirometer but when he did sats when up to 97%. Message send to hospitalist requesting another antiemetic and protonix IV for reflux. Pt refused PO protonix. 0250-spoke with Dr. Scarlett Spicer who wrote for compazine and protonix and am cxr. Informed pt and spouse of new orders and encouraged them to speak with MD in the morning. Awaiting verification of medication from pharmacy. 0330-medicated with compazine and protonix. Pt resting. 0430-pt resting . 0545-pt sats 88% on 9LPM hii flow. Got pt to use incentive. Pt more cooperative, even using on his own.  spoke with RT who agreed incentive would be benefit pt. Asked about accapella/flutter valve, RT agreed and ordered flutter valve. Pt open to it use.

## 2019-01-24 NOTE — PROGRESS NOTES
Bedside shift change report given to [facility]. (oncoming nurse) by Joe Malin (offgoing nurse). Report included the following information SBAR.

## 2019-01-24 NOTE — PROGRESS NOTES
[facility], Critical Care, and Sleep Medicine Name: Livan President MRN: [id number] : 1965 Hospital: [address] [address] Date: 2019 IMPRESSION:  
· Acute hypoxic respiratory failure · Acute influenza pneumonia, clinically worsening · Can not exclude bacterial superinfection · Can not completely exclude heart failure · Uncontrolled diabetes RECOMMENDATIONS:  
· O2, high flow · Repeat chest X-ray · Bronchodilators as needed · Agree with empiric antibiotics · Patient has refused Tamiflu · Insulin · Still at risk for further decompensation from influenza pneumonia. Subjective:  
 
19: worsening hypoxia overnight, though he denies worsening dyspnea. Poorly tolerating PO levofloxacin. Initial history: This patient has been seen and evaluated at the request of Dr. Zoila Ulrich for worsening pneumonia. Patient is a 47 y.o. male former smoker, presented to the ER  with dyspnea and generalized malaise. He was diagnosed with influenza at [facility] 1 week ago. He declined to take Tamiflu because he was concerned it would give him side effects. He did not get a flu shot this year. He has been treated with antibiotics, IVF, but he has become progressively more hypoxic with malaise and dyspnea. He was again offered Tamiflu here but has refused again. Due to worsening hypoxia, I was asked to evaluate him. Past Medical History:  
Diagnosis Date  Diabetes ([address]  Nausea & vomiting  Pancreatitis  Unspecified sleep apnea Past Surgical History:  
Procedure Laterality Date  HX GI    
 upper endoscopies  HX HEENT    
 oral surgery  HX HEENT    
 turbinate reduction Prior to Admission medications Not on File Allergies Allergen Reactions  Lisinopril Anaphylaxis Social History Tobacco Use  Smoking status: Former Smoker Years: 1.00 Types: Cigarettes  Smokeless tobacco: Never Used Substance Use Topics  Alcohol use: Yes Family History Problem Relation Age of Onset  Heart Disease Mother   
     s/p stent  Diabetes Father  Cancer Brother   
     stomach cancer Current Facility-Administered Medications Medication Dose Route Frequency  levoFLOXacin (LEVAQUIN) tablet 750 mg  750 mg Oral Q24H  
 insulin glargine (LANTUS) injection 5 Units  5 Units SubCUTAneous QHS  pravastatin (PRAVACHOL) tablet 20 mg  20 mg Oral DAILY  insulin lispro (HUMALOG) injection   SubCUTAneous AC&HS  enoxaparin (LOVENOX) injection 40 mg  40 mg SubCUTAneous Q24H Review of Systems: A comprehensive review of systems was negative except for: Ears, nose, mouth, throat, and face: positive for snoring Objective: 
Vital Signs:   
Visit Vitals /80 (BP 1 Location: Right arm, BP Patient Position: At rest) Pulse 97 Temp 98.5 °F (36.9 °C) Resp 20 Ht 6' (1.829 m) Wt 87.1 kg (192 lb 0.3 oz) SpO2 98% BMI 26.04 kg/m² O2 Device: Hi flow nasal cannula O2 Flow Rate (L/min): 7 l/min Temp (24hrs), Av.9 °F (37.2 °C), Min:98.5 °F (36.9 °C), Max:99.5 °F (37.5 °C) Intake/Output:  
Last shift:      No intake/output data recorded. Last 3 shifts: No intake/output data recorded. No intake or output data in the 24 hours ending 19 0719 Physical Exam:  
General:  Alert, ill appearing, no acute dstress Head:  Normocephalic, without obvious abnormality, atraumatic. Eyes:  Conjunctivae/corneas clear. Nose: Nares normal. Septum midline. Mucosa normal.    
Throat: Lips, mucosa, and tongue normal. Teeth and gums normal.  
Neck: Supple, symmetrical, trachea midline Back:   Symmetric, no curvature. ROM normal.  
Lungs:   Scattered bilateral rales Chest wall:  No tenderness or deformity. Heart:  Tachy, regular, no murmur Abdomen:   Soft, non-tender.  Bowel sounds normal.    
 Extremities: Extremities normal, atraumatic, no cyanosis or clubbing Skin: Skin color, texture, turgor normal. No rashes or lesions Neurologic: Grossly nonfocal  
 
Data:  
Labs: 
Recent Labs  
  01/23/19 
0348 WBC 9.2 HGB 14.3 HCT 42.1  Recent Labs  
  01/24/19 
0516 01/23/19 
0348  137  
K 3.2* 2.9*  
CL 99 99 CO2 27 28 * 163* BUN 10 11 CREA 0.51* 0.51* CA 8.0* 7.7* MG 2.1  --   
PHOS 2.3*  --   
ALB  --  1.9* TBILI  --  0.6 SGOT  --  43* ALT  --  16 Recent Labs  
  01/21/19 2010 PH 7.47* PCO2 35  
PO2 53* HCO3 25 Imaging: 
I have personally reviewed the patients radiographs: 
None today Jacinta Moses MD

## 2019-01-24 NOTE — CDMP QUERY
Dr. Charito Escalera MD : 
There is noted documentation of \"DMI\" and \"Uncontrolled diabetes\" on this record. Could this be further clarified as 
 
=> Type 1 diabetes mellitus with hyperglycemia in the setting of known history of DM1 in 53M treated with Lantus and SSI prn 
=> Type 1 diabetes mellitus with hyperglycemia in the setting of non-compliance with insulin in 53M treated with Lantus and SSI prn 
=> Other explanation of clinical findings 
=> Clinically Undetermined (no explanation for clinical findings) The medical record reflects the following clinical findings, treatment, and risk factors. Risk Factors:  Hx DM1; pt reported not taking Lantus or any meds for many years Clinical Indicators:  Gluc: 361 ^ 260 ^ 157 ^ 163 ; Hgb A1c: 11.3; Est Average Glucose: 278 Treatment: Lantus and SSI prn Please clarify and document your clinical opinion in the progress notes and discharge summary including the definitive and/or presumptive diagnosis, (suspected or probable), related to the above clinical findings. Please include clinical findings supporting your diagnosis. Thank Lisa [facility] 
[phone]

## 2019-01-24 NOTE — PROGRESS NOTES
Hospitalist Progress Note NAME: Shaun Hoover :  1965 MRN:  [id number] Assessment / Plan: 
 
Acute hypoxic respiratory failure Sepsis secondary to Influenza A pneumonia 
-CXR  Progression of bilateral airspace disease which may represent pulmonary edema versus diffuse pneumonia 
-Refused Tamiflu so will discontinue 
-continue empiric levaquin 
-Echo EF 64-31%, diastolic dysfunction, no MR, no AS 
-clinically worse and needing more oxygen, 13L high flow. Repeat CXR pending Nausea vomiting Hypokalemia Hypophosphatemia 
-levaquin and K makes things worse. Switch to IV levaquin 
-replete Kphos IV 
 
DMI HLD  
- Hba1c 11%; pt reported not taking lantus or any medication for many years . - continue lantus with SSI prn Code Status: Full Surrogate Decision Maker: Wife DVT Prophylaxis: Lovenox GI Prophylaxis: not indicated Baseline: Independent Body mass index is 26.04 kg/m². therefore classifying patient as overweight, NOS (body mass index [BMI] of 25 to 29.9 kg/m2) 
-counseled exercise/diet. Patient receptive. Subjective: Chief Complaint / Reason for Physician Visit Follow up PNA, influenza, hypoxemia, DM Still feels weak. Needing more oxygen today Review of Systems: 
Symptom Y/N Comments  Symptom Y/N Comments Fever/Chills    Chest Pain n   
Poor Appetite    Edema Cough    Abdominal Pain n   
Sputum    Joint Pain SOB/FABIAN   improved  Pruritis/Rash Nausea/vomit n   Tolerating PT/OT Diarrhea n   Tolerating Diet n   
Constipation    Other Could NOT obtain due to:   
 
Objective: VITALS:  
Last 24hrs VS reviewed since prior progress note. Most recent are: 
Patient Vitals for the past 24 hrs: 
 Temp Pulse Resp BP SpO2  
19 0822 98.4 °F (36.9 °C) 97 20 136/76 96 % 19 0252 98.5 °F (36.9 °C) 97 20 128/80 98 % 19 2349 99.3 °F (37.4 °C) 93 18 154/85 94 % 19    112/86  01/23/19 1937 99.5 °F (37.5 °C) 95 18 142/87 96 % 01/23/19 1236 98.8 °F (37.1 °C) 95 20 120/78  No intake or output data in the 24 hours ending 01/24/19 0851 PHYSICAL EXAM: 
General: WD, WN. Pale and tired , ill appearing , cooperative, no acute distress   
EENT:  EOMI. Anicteric sclerae. MMM Resp:  Bibasal rales. No accessory muscle use CV:  Regular  rhythm,  Trace edema GI:  Soft, Non distended, Non tender.  +Bowel sounds Neurologic:  Alert and oriented X 3, normal speech, Psych:   Good insight. Not anxious nor agitated Skin:  No rashes. No jaundice Reviewed most current lab test results and cultures  YES Reviewed most current radiology test results   YES Review and summation of old records today    NO Reviewed patient's current orders and MAR    YES 
PMH/ reviewed - no change compared to H&P 
________________________________________________________________________ Care Plan discussed with: 
  Comments Patient x Family  x wife RN x Care Manager Consultant Multidiciplinary team rounds were held today with , nursing, pharmacist and clinical coordinator. Patient's plan of care was discussed; medications were reviewed and discharge planning was addressed. ________________________________________________________________________ Total NON critical care TIME:  20  Minutes Total CRITICAL CARE TIME Spent:   Minutes non procedure based Comments >50% of visit spent in counseling and coordination of care x As above   
________________________________________________________________________ Archie Negrete MD  
 
Procedures: see electronic medical records for all procedures/Xrays and details which were not copied into this note but were reviewed prior to creation of Plan. LABS: 
I reviewed today's most current labs and imaging studies. Pertinent labs include: 
Recent Labs  
  01/23/19 
0348 WBC 9.2 HGB 14.3 HCT 42.1  Recent Labs  
  01/24/19 
0516 01/23/19 
0348  137  
K 3.2* 2.9*  
CL 99 99 CO2 27 28 * 163* BUN 10 11 CREA 0.51* 0.51* CA 8.0* 7.7* MG 2.1  --   
PHOS 2.3*  --   
ALB  --  1.9* TBILI  --  0.6 SGOT  --  43* ALT  --  16 Signed: Lala Vaca MD

## 2019-01-25 LAB
ALBUMIN SERPL-MCNC: 1.8 G/DL (ref 3.5–5)
ALBUMIN/GLOB SERPL: 0.4 {RATIO} (ref 1.1–2.2)
ALP SERPL-CCNC: 72 U/L (ref 45–117)
ALT SERPL-CCNC: 20 U/L (ref 12–78)
ANION GAP SERPL CALC-SCNC: 12 MMOL/L (ref 5–15)
AST SERPL-CCNC: 25 U/L (ref 15–37)
BACTERIA SPEC CULT: NORMAL
BILIRUB SERPL-MCNC: 0.7 MG/DL (ref 0.2–1)
BUN SERPL-MCNC: 9 MG/DL (ref 6–20)
BUN/CREAT SERPL: 18 (ref 12–20)
CALCIUM SERPL-MCNC: 8.3 MG/DL (ref 8.5–10.1)
CHLORIDE SERPL-SCNC: 97 MMOL/L (ref 97–108)
CO2 SERPL-SCNC: 28 MMOL/L (ref 21–32)
CREAT SERPL-MCNC: 0.5 MG/DL (ref 0.7–1.3)
GLOBULIN SER CALC-MCNC: 4.9 G/DL (ref 2–4)
GLUCOSE BLD STRIP.AUTO-MCNC: 166 MG/DL (ref 65–100)
GLUCOSE BLD STRIP.AUTO-MCNC: 168 MG/DL (ref 65–100)
GLUCOSE BLD STRIP.AUTO-MCNC: 173 MG/DL (ref 65–100)
GLUCOSE BLD STRIP.AUTO-MCNC: 184 MG/DL (ref 65–100)
GLUCOSE SERPL-MCNC: 180 MG/DL (ref 65–100)
MAGNESIUM SERPL-MCNC: 2.1 MG/DL (ref 1.6–2.4)
PHOSPHATE SERPL-MCNC: 2 MG/DL (ref 2.6–4.7)
POTASSIUM SERPL-SCNC: 3 MMOL/L (ref 3.5–5.1)
PROT SERPL-MCNC: 6.7 G/DL (ref 6.4–8.2)
SERVICE CMNT-IMP: ABNORMAL
SERVICE CMNT-IMP: NORMAL
SODIUM SERPL-SCNC: 137 MMOL/L (ref 136–145)

## 2019-01-25 PROCEDURE — 94760 N-INVAS EAR/PLS OXIMETRY 1: CPT

## 2019-01-25 PROCEDURE — 82962 GLUCOSE BLOOD TEST: CPT

## 2019-01-25 PROCEDURE — 74011250636 HC RX REV CODE- 250/636: Performed by: INTERNAL MEDICINE

## 2019-01-25 PROCEDURE — 80053 COMPREHEN METABOLIC PANEL: CPT

## 2019-01-25 PROCEDURE — 74011250636 HC RX REV CODE- 250/636: Performed by: GENERAL ACUTE CARE HOSPITAL

## 2019-01-25 PROCEDURE — 65660000000 HC RM CCU STEPDOWN

## 2019-01-25 PROCEDURE — 84100 ASSAY OF PHOSPHORUS: CPT

## 2019-01-25 PROCEDURE — 74011250637 HC RX REV CODE- 250/637: Performed by: GENERAL ACUTE CARE HOSPITAL

## 2019-01-25 PROCEDURE — 77010033711 HC HIGH FLOW OXYGEN

## 2019-01-25 PROCEDURE — 74011636637 HC RX REV CODE- 636/637: Performed by: INTERNAL MEDICINE

## 2019-01-25 PROCEDURE — 74011636637 HC RX REV CODE- 636/637: Performed by: GENERAL ACUTE CARE HOSPITAL

## 2019-01-25 PROCEDURE — 77010033678 HC OXYGEN DAILY

## 2019-01-25 PROCEDURE — 83735 ASSAY OF MAGNESIUM: CPT

## 2019-01-25 PROCEDURE — 74011000250 HC RX REV CODE- 250: Performed by: INTERNAL MEDICINE

## 2019-01-25 PROCEDURE — 36415 COLL VENOUS BLD VENIPUNCTURE: CPT

## 2019-01-25 RX ADMIN — LEVOFLOXACIN 750 MG: 5 INJECTION, SOLUTION INTRAVENOUS at 20:15

## 2019-01-25 RX ADMIN — INSULIN LISPRO 2 UNITS: 100 INJECTION, SOLUTION INTRAVENOUS; SUBCUTANEOUS at 17:55

## 2019-01-25 RX ADMIN — PRAVASTATIN SODIUM 20 MG: 10 TABLET ORAL at 08:37

## 2019-01-25 RX ADMIN — SODIUM CHLORIDE: 900 INJECTION, SOLUTION INTRAVENOUS at 13:47

## 2019-01-25 RX ADMIN — INSULIN GLARGINE 5 UNITS: 100 INJECTION, SOLUTION SUBCUTANEOUS at 21:38

## 2019-01-25 RX ADMIN — ENOXAPARIN SODIUM 40 MG: 40 INJECTION SUBCUTANEOUS at 08:36

## 2019-01-25 RX ADMIN — INSULIN LISPRO 2 UNITS: 100 INJECTION, SOLUTION INTRAVENOUS; SUBCUTANEOUS at 11:56

## 2019-01-25 RX ADMIN — INSULIN LISPRO 2 UNITS: 100 INJECTION, SOLUTION INTRAVENOUS; SUBCUTANEOUS at 08:37

## 2019-01-25 NOTE — PROGRESS NOTES
CM will complete room visit and review d/c plans and needs with pt. Pt will review with pt to verify if pt has an PCP to schedule an follow up appointment. CM will continue to follow. CLARENCE Everett CM 
[phone]

## 2019-01-25 NOTE — PROGRESS NOTES
PCU SHIFT NURSING NOTE Bedside and Verbal shift [facility] by Leona Perez RN (offgoing nurse). Report included the following information SBAR, Kardex, ED Summary, Intake/Output, MAR, Accordion, Recent Results, Med Rec Status and Cardiac Rhythm NSR/ST. Shift Summary:  
2215: Pt refusing bipap that was ordered for him to wear overnight as pt has sleep apnea. Pt stated \"I have not worn my cpap mask in years. \" Pts wife also stated \"please don't make him wear it as he keeps wanting to take it off to drink. \" 
0015: Bedside and Verbal shift change report given to [address] (oncoming nurse) by [address] [facility] (offgoing nurse). Report included the following information SBAR, Kardex, ED Summary, Intake/Output, MAR, Accordion, Recent Results, Med Rec Status and Cardiac Rhythm NSR/ST. Admission Date 1/19/2019 Admission Diagnosis Influenza Consults IP CONSULT TO HOSPITALIST 
IP CONSULT TO INTENSIVIST Consults []PT []OT []Speech  
[]Case Management  
  
[] Palliative Cardiac Monitoring Order []Yes []No  
 
IV drips []Yes Drip:                            Dose: 
Drip:                            Dose: 
Drip:                            Dose:  
[]No  
 
GI Prophylaxis []Yes []No  
 
 
 
DVT Prophylaxis SCDs:     
     
 Bartolome stockings:     
  
[] Medication []Contraindicated []None Activity Level Activity Level: Up with Assistance Activity Assistance: Partial (one person) Purposeful Rounding every 1-2 hour? []Yes Trivedi Score  Total Score: 2 Bed Alarm (If score 3 or >) []Yes  
[] Refused (See signed refusal form in chart) Connor Score  Connor Score: 19 Connor Score (if score 14 or less) []PMT consult  
[]Wound Care consult []Specialty bed  
[] Nutrition consult Needs prior to discharge:  
Home O2 required:   
[]Yes []No  
 If yes, how much O2 required? Other: Last Bowel Movement: Last Bowel Movement Date: 01/21/19 Influenza Vaccine Received Flu Vaccine for Current Season (usually Sept-March): No  
 Patient/Guardian Refused (Notify MD): Yes Pneumonia Vaccine Diet Active Orders Diet DIET DIABETIC CONSISTENT CARB Regular LDAs Peripheral IV 01/19/19 Left Antecubital (Active) Site Assessment Clean, dry, & intact 1/25/2019  3:33 PM  
Phlebitis Assessment 0 1/25/2019  3:33 PM  
Infiltration Assessment 0 1/25/2019  3:33 PM  
Dressing Status Clean, dry, & intact 1/25/2019  3:33 PM  
Dressing Type Transparent 1/25/2019  3:33 PM  
Hub Color/Line Status Pink;Flushed 1/25/2019  3:33 PM  
Action Taken Open ports on tubing capped 1/24/2019  7:47 PM  
Alcohol Cap Used Yes 1/24/2019  7:47 PM  
                  
Urinary Catheter Intake & Output Date 01/24/19 0700 - 01/25/19 5998 01/25/19 0700 - 01/26/19 0567 Shift [id number] 0948-4914 24 Hour Total 2294-6059 9293-1099 24 Hour Total  
INTAKE  
P.O.    840  840  
  P. O.    840  840  
I. V.(mL/kg/hr)  150(0.1) 150(0.1) Volume (levoFLOXacin (LEVAQUIN) 750 mg in D5W IVPB)  150 150 Shift Total(mL/kg)  150(1.7) 150(1.7) 840(9.6)  840(9.6) OUTPUT Urine(mL/kg/hr) Urine Occurrence(s)  1 x 1 x Shift Total(mL/kg) NET  150 150 840  840 Weight (kg) 87.1 87.1 87.1 87.1 87.1 87.1 Readmission Risk Assessment Tool Score Low Risk 12 Total Score 3 Has Seen PCP in Last 6 Months (Yes=3, No=0) 2 . Living with Significant Other. Assisted Living. LTAC. SNF. or  
Rehab  
 3 Patient Length of Stay (>5 days = 3) 4 Charlson Comorbidity Score (Age + Comorbid Conditions) Criteria that do not apply:  
 IP Visits Last 12 Months (1-3=4, 4=9, >4=11) Pt. Coverage (Medicare=5 , Medicaid, or Self-Pay=4) Expected Length of Stay 4d 19h Actual Length of Stay 6

## 2019-01-25 NOTE — PROGRESS NOTES
Initial Nutrition Assessment: 
 
INTERVENTIONS/RECOMMENDATIONS:  
· Meals/Snacks: General/healthful diet: Switch to Consistent carbohydrate diet · Supplements: Commercial supplement: Add Glucerna BID ASSESSMENT:  
Patient medically noted for respiratory failure, influenza A, and developing ARDS. PMH for DM, HTN, and GERD. Patient not up for much of a discussion today; reports being very tired. Able to report a poor appetite and not eating well. Using menu and room service. Agreeable to trying Glucerna shakes. Will switch to CCD, essentially liberalizing diet. A1c 11.3. Encouraged intake of meals/supplements. Diet Order: Cardiac 
% Eaten:   
Patient Vitals for the past 72 hrs: 
 % Diet Eaten 01/25/19 1004 10 % Pertinent Medications: [x]Reviewed []Other: lantus, Humalog, Pravastatin, Kphos Pertinent Labs: [x]Reviewed []Other: K+ 3.0, Phos 2.0, [phone], A1c 11.3 Food Allergies: [x]None []Other Last BM: 1/21 [x]Active     []Hyperactive  []Hypoactive       [] Absent BS Skin:    [x] Intact   [] Incision  [] Breakdown: [] Edema []Other: Anthropometrics:  
Height: 6' (182.9 cm) Weight: 87.1 kg (192 lb 0.3 oz) IBW (%IBW):   ( ) UBW (%UBW):   (  %) Last Weight Metrics: 
Weight Loss Metrics 1/19/2019 6/6/2014 5/30/2014 4/21/2011 4/1/2011 2/4/2011 10/1/2010 Today's Wt 192 lb 0.3 oz 209 lb 207 lb 14.3 oz 221 lb 6.4 oz 226 lb 228 lb 234 lb BMI 26.04 kg/m2 25.45 kg/m2 25.32 kg/m2 32.68 kg/m2 33.36 kg/m2 28.11 kg/m2 30.45 kg/m2 BMI: Body mass index is 26.04 kg/m². This BMI is indicative of: 
 []Underweight    []Normal    [x]Overweight    [] Obesity   [] Extreme Obesity (BMI>40) Estimated Nutrition Needs (Based on):  
7129 Kcals/day(BMR (9708) x 1. 3AF) , 87 g(1.0 g/kg bw) Protein Carbohydrate: At Least 130 g/day  Fluids: 2300 mL/day (1ml/kcal) Pt expected to meet estimated nutrient needs: [x]Yes []No 
 
NUTRITION DIAGNOSES:  
Problem:  Inadequate oral intake Etiology: related to decreased appetite Signs/Symptoms: as evidenced by PO intake <25% of meals NUTRITION INTERVENTIONS: 
Meals/Snacks: General/healthful diet   Supplements: Commercial supplement GOAL:  
PO intake >50% of meals/supplement next 3-5 days LEARNING NEEDS (Diet, Food/Nutrient-Drug Interaction):  
 [x] None Identified 
 [] Identified and Education Provided/Documented 
 [] Identified and Pt declined/was not appropriate Cultural, Quaker, OR Ethnic Dietary Needs:  
 [x] None Identified 
 [] Identified and Addressed 
 
 [x] Interdisciplinary Care Plan Reviewed/Documented  
 [x] Discharge Planning:  Consistent carb diet MONITORING /EVALUATION:  
Food/Nutrient Intake Outcomes: Total energy intake Physical Signs/Symptoms Outcomes: Weight/weight change, Glucose profile, Electrolyte and renal profile NUTRITION RISK:  
 [] High              [x] Moderate           []  Low  []  Minimal/Uncompromised PT SEEN FOR:  
 []  MD Consult: []Calorie Count []Diabetic Diet Education []Diet Education []Electrolyte Management []General Nutrition Management and Supplements []Management of Tube Feeding []TPN Recommendations []  RN Referral:  []MST score >=2 
   []Enteral/Parenteral Nutrition PTA []Pregnant: Gestational DM or Multigestation 
   []Pressure Ulcer/Wound Care needs 
     
[]  Low BMI [x]  LOS Sriram Boys Pager [phone] Weekend Pager [phone]

## 2019-01-25 NOTE — PROGRESS NOTES
Hospitalist Progress Note NAME: Ede Shen :  1965 MRN:  [id number] Assessment / Plan: 
 
Acute hypoxic respiratory failure Sepsis secondary to Influenza A pneumonia Developing ARDS 
-CXR  Progression of bilateral airspace disease which may represent pulmonary edema versus diffuse pneumonia 
-Refused Tamiflu  
-completed course of empiric levaquin  
-Echo EF 91-28%, diastolic dysfunction, no MR, no AS 
-weaning down high flow oxygen. Nausea vomiting, improving Hypokalemia Hypophosphatemia 
-levaquin and K makes things worse. Switch to IV levaquin 
-replete Kphos IV 
 
DMI, uncontrolled with hyperglycemia POA HLD  
- Hba1c 11%; pt reported not taking lantus or any medication for many years . - continue lantus with SSI prn 
 
KATJA 
- ordered NIV for night Code Status: Full Surrogate Decision Maker: Wife DVT Prophylaxis: Lovenox GI Prophylaxis: not indicated Baseline: Independent Body mass index is 26.04 kg/m². therefore classifying patient as overweight, NOS (body mass index [BMI] of 25 to 29.9 kg/m2) 
-counseled exercise/diet. Patient receptive. Subjective: Chief Complaint / Reason for Physician Visit Follow up PNA, influenza, hypoxemia, DM Looks more comfortable on high flow oxygen Review of Systems: 
Symptom Y/N Comments  Symptom Y/N Comments Fever/Chills    Chest Pain n   
Poor Appetite    Edema Cough    Abdominal Pain n   
Sputum    Joint Pain SOB/FABIAN   improved  Pruritis/Rash Nausea/vomit n   Tolerating PT/OT Diarrhea n   Tolerating Diet n   
Constipation    Other Could NOT obtain due to:   
 
Objective: VITALS:  
Last 24hrs VS reviewed since prior progress note. Most recent are: 
Patient Vitals for the past 24 hrs: 
 Temp Pulse Resp BP SpO2  
19 0800 98.9 °F (37.2 °C) 98 20 150/81 92 % 19 0400  (!) 101  137/82 92 % 19 2345  (!) 130   (!) 88 % 01/24/19 2242 98.9 °F (37.2 °C) 98 18 153/78 92 % 01/24/19 2000    151/88   
01/24/19 1947 99.3 °F (37.4 °C) 93 18 152/75 93 % 01/24/19 1601 99.1 °F (37.3 °C) 92 20 142/80 96 % 01/24/19 1146 98.9 °F (37.2 °C) (!) 102 20 142/77 90 % Intake/Output Summary (Last 24 hours) at 1/25/2019 1119 Last data filed at 1/25/2019 1004 Gross per 24 hour Intake 750 ml Output  Net 750 ml PHYSICAL EXAM: 
General: WD, WN. Pale and tired , ill appearing , cooperative, no acute distress   
EENT:  EOMI. Anicteric sclerae. MMM Resp:  Bibasal rales. No accessory muscle use CV:  Regular  rhythm,  Trace edema GI:  Soft, Non distended, Non tender.  +Bowel sounds Neurologic:  Alert and oriented X 3, normal speech, Psych:   Good insight. Not anxious nor agitated Skin:  No rashes. No jaundice Reviewed most current lab test results and cultures  YES Reviewed most current radiology test results   YES Review and summation of old records today    NO Reviewed patient's current orders and MAR    YES 
PMH/SH reviewed - no change compared to H&P 
________________________________________________________________________ Care Plan discussed with: 
  Comments Patient x Family  x wife RN x Care Manager Consultant Multidiciplinary team rounds were held today with , nursing, pharmacist and clinical coordinator. Patient's plan of care was discussed; medications were reviewed and discharge planning was addressed. ________________________________________________________________________ Total NON critical care TIME:  20  Minutes Total CRITICAL CARE TIME Spent:   Minutes non procedure based Comments >50% of visit spent in counseling and coordination of care x As above   
________________________________________________________________________ Mumtaz Mcnair MD  
 
Procedures: see electronic medical records for all procedures/Xrays and details which were not copied into this note but were reviewed prior to creation of Plan. LABS: 
I reviewed today's most current labs and imaging studies. Pertinent labs include: 
Recent Labs  
  01/23/19 
0348 WBC 9.2 HGB 14.3 HCT 42.1  Recent Labs  
  01/25/19 
0415 01/24/19 
0516 01/23/19 
0348  138 137  
K 3.0* 3.2* 2.9*  
CL 97 99 99 CO2 28 27 28 * 174* 163* BUN 9 10 11 CREA 0.50* 0.51* 0.51* CA 8.3* 8.0* 7.7* MG 2.1 2.1  --   
PHOS 2.0* 2.3*  --   
ALB 1.8*  --  1.9* TBILI 0.7  --  0.6 SGOT 25  --  43* ALT 20  --  16 Signed: Josh Alegria MD

## 2019-01-25 NOTE — PROGRESS NOTES
[facility], Critical Care, and Sleep Medicine Name: Jennifer Vernon MRN: [id number] : 1965 Hospital: [facility] Date: 2019 IMPRESSION:  
· Acute hypoxic respiratory failure · Acute influenza pneumonia, clinically worsening · Mild-to-moderate ARDS · Can not exclude bacterial superinfection · Can not completely exclude heart failure · Uncontrolled diabetes RECOMMENDATIONS:  
· O2, high flow · Will try NIV at night to accommodate patient request for treatment of his KATJA for comfort at night · Repeat chest X-ray · Bronchodilators as needed · Agree with empiric antibiotics · Patient has refused Tamiflu · Insulin · Still at risk for further decompensation from influenza pneumonia. Subjective:  
 
19: oxygenation remains poor; on 10 LPM nasal cannula. Complaining about nasal dryness, but doesn't want mask that covers mouth. He also is complaining about sleep apnea and he feels that this is why he is hypoxic during the day. Has not worn CPAP in years, but now is upset he is not being treated with it nightly here. He does not recall his settings, but he knows they were \"high\". From his description he had an auto-CPAP. Initial history: This patient has been seen and evaluated at the request of Dr. Emma Thompson for worsening pneumonia. Patient is a 47 y.o. male former smoker, presented to the ER  with dyspnea and generalized malaise. He was diagnosed with influenza at [facility] 1 week ago. He declined to take Tamiflu because he was concerned it would give him side effects. He did not get a flu shot this year. He has been treated with antibiotics, IVF, but he has become progressively more hypoxic with malaise and dyspnea. He was again offered Tamiflu here but has refused again. Due to worsening hypoxia, I was asked to evaluate him. Past Medical History:  
Diagnosis Date  Diabetes ([address]  Nausea & vomiting  Pancreatitis  Unspecified sleep apnea Past Surgical History:  
Procedure Laterality Date  HX GI    
 upper endoscopies  HX HEENT    
 oral surgery  HX HEENT    
 turbinate reduction Prior to Admission medications Not on File Allergies Allergen Reactions  Lisinopril Anaphylaxis Social History Tobacco Use  Smoking status: Former Smoker Years: 1.00 Types: Cigarettes  Smokeless tobacco: Never Used Substance Use Topics  Alcohol use: Yes Family History Problem Relation Age of Onset  Heart Disease Mother   
     s/p stent  Diabetes Father  Cancer Brother   
     stomach cancer Current Facility-Administered Medications Medication Dose Route Frequency  levoFLOXacin (LEVAQUIN) 750 mg in D5W IVPB  750 mg IntraVENous Q24H  
 insulin glargine (LANTUS) injection 5 Units  5 Units SubCUTAneous QHS  pravastatin (PRAVACHOL) tablet 20 mg  20 mg Oral DAILY  insulin lispro (HUMALOG) injection   SubCUTAneous AC&HS  enoxaparin (LOVENOX) injection 40 mg  40 mg SubCUTAneous Q24H Review of Systems: A comprehensive review of systems was negative except for: Ears, nose, mouth, throat, and face: positive for snoring Objective: 
Vital Signs:   
Visit Vitals /81 (BP 1 Location: Right arm, BP Patient Position: At rest) Pulse 98 Temp 98.9 °F (37.2 °C) Resp 20 Ht 6' (1.829 m) Wt 87.1 kg (192 lb 0.3 oz) SpO2 92% BMI 26.04 kg/m² O2 Device: Hi flow nasal cannula O2 Flow Rate (L/min): 10 l/min Temp (24hrs), Av °F (37.2 °C), Min:98.9 °F (37.2 °C), Max:99.3 °F (37.4 °C) Intake/Output:  
Last shift:      No intake/output data recorded. Last 3 shifts:  1901 -  0700 In: 150 [I.V.:150] Out: - Intake/Output Summary (Last 24 hours) at 2019 0830 Last data filed at 2019 2203 Gross per 24 hour Intake 150 ml Output  Net 150 ml Physical Exam: General:  Alert, ill appearing, no acute dstress Head:  Normocephalic, without obvious abnormality, atraumatic. Eyes:  Conjunctivae/corneas clear. Nose: Nares normal. Septum midline. Mucosa normal.    
Throat: Lips, mucosa, and tongue normal. Teeth and gums normal.  
Neck: Supple, symmetrical, trachea midline Back:   Symmetric, no curvature. ROM normal.  
Lungs:   Scattered bilateral rales Chest wall:  No tenderness or deformity. Heart:  Tachy, regular, no murmur Abdomen:   Soft, non-tender. Bowel sounds normal.    
Extremities: Extremities normal, atraumatic, no cyanosis or clubbing Skin: Skin color, texture, turgor normal. No rashes or lesions Neurologic: Grossly nonfocal  
 
Data:  
Labs: 
Recent Labs  
  01/23/19 
0348 WBC 9.2 HGB 14.3 HCT 42.1  Recent Labs  
  01/25/19 
0415 01/24/19 
0516 01/23/19 
0348  138 137  
K 3.0* 3.2* 2.9*  
CL 97 99 99 CO2 28 27 28 * 174* 163* BUN 9 10 11 CREA 0.50* 0.51* 0.51* CA 8.3* 8.0* 7.7* MG 2.1 2.1  --   
PHOS 2.0* 2.3*  --   
ALB 1.8*  --  1.9* TBILI 0.7  --  0.6 SGOT 25  --  43* ALT 20  --  16 No results for input(s): PH, PCO2, PO2, HCO3, FIO2 in the last 72 hours. Imaging: 
I have personally reviewed the patients radiographs: 
No change yesterday Marissa Reynolds MD

## 2019-01-25 NOTE — PROGRESS NOTES
1937-Received bedside report from Abhilash Jackson pt up in recliner without complaints. Sats 94%  Wife at bedside. Both more verbal. 
 
2345-episode of ST in the 130s sats 88% pt removed hi flow cannula to clean nose. Wife brushed staff nurse off who responded to pt's alarm. I went to check on pt she reports \"he's ok\". Hi flow increased per RT to 15L pt sats on 90-92% 0025-decreased hi flow to 12 sats 94% pt and wife complaining of nasal prongs causing stuffiness and wanting take it off and switch for a mask. Discussed process of trying to wean pt not increase dependence. 0130 able to decrease hi flow to 10L sats 91-92% 0430-decrease pt to 9L hi-flow and pt desatted. Kept pt on 10L sats 94. 
 
0700-discussed with respiratory about using Bipap on pt's Cpap settings.  Informed oncoming nurse Celso Tavares, RN during bedside report to inform MD.

## 2019-01-25 NOTE — PROGRESS NOTES
PCU SHIFT NURSING NOTE Bedside shift change report given to Anthony Berg (oncoming nurse) by Blaze Buck (offgoing nurse). Report included the following information SBAR, Kardex, Intake/Output, MAR and Recent Results. Shift Summary: 0800  MEWS 1  Pt in bed resting  No complaints of pain  High flow O2 10L  NSR- ST on monitor  UAL  A&O x 4  Wife at bedside 1138  MEWS 1  Pt back in bed  Wife reports she assisted with bath, clean gown [facility] given to [employer] Admission Date 1/19/2019 Admission Diagnosis Influenza Consults IP CONSULT TO HOSPITALIST 
IP CONSULT TO INTENSIVIST Consults []PT []OT []Speech  
[]Case Management  
  
[] Palliative Cardiac Monitoring Order []Yes []No  
 
IV drips []Yes Drip:                            Dose: 
Drip:                            Dose: 
Drip:                            Dose:  
[]No  
 
GI Prophylaxis []Yes []No  
 
 
 
DVT Prophylaxis SCDs:     
     
 Bartolome stockings:     
  
[] Medication []Contraindicated []None Activity Level Activity Level: Up with Assistance Activity Assistance: Partial (one person) Purposeful Rounding every 1-2 hour? []Yes Trivedi Score  Total Score: 2 Bed Alarm (If score 3 or >) []Yes  
[] Refused (See signed refusal form in chart) Connor Score  Connor Score: 19 Connor Score (if score 14 or less) []PMT consult  
[]Wound Care consult []Specialty bed  
[] Nutrition consult Needs prior to discharge:  
Home O2 required:   
[]Yes []No  
 If yes, how much O2 required? Other:  
 Last Bowel Movement: Last Bowel Movement Date: 01/21/19 Influenza Vaccine Received Flu Vaccine for Current Season (usually Sept-March): No  
 Patient/Guardian Refused (Notify MD): Yes Pneumonia Vaccine Diet Active Orders Diet DIET CARDIAC Regular LDAs Peripheral IV 01/19/19 Left Antecubital (Active) Site Assessment Clean, dry, & intact 1/24/2019  7:47 PM  
Phlebitis Assessment 0 1/24/2019  7:47 PM  
Infiltration Assessment 0 1/24/2019  7:47 PM  
Dressing Status Clean, dry, & intact 1/24/2019  7:47 PM  
Dressing Type Transparent 1/24/2019  7:47 PM  
Hub Color/Line Status Pink; Infusing 1/24/2019  7:47 PM  
Action Taken Open ports on tubing capped 1/24/2019  7:47 PM  
Alcohol Cap Used Yes 1/24/2019  7:47 PM  
                  
Urinary Catheter Intake & Output Date 01/24/19 0700 - 01/25/19 1753 01/25/19 0700 - 01/26/19 7064 Shift 3359-4805 4132-0019 24 Hour Total 2446-5729 7319-1774 24 Hour Total  
INTAKE  
I.V.(mL/kg/hr)  150(0.1) 150(0.1) Volume (levoFLOXacin (LEVAQUIN) 750 mg in D5W IVPB)  150 150 Shift Total(mL/kg)  150(1.7) 150(1.7) OUTPUT Urine(mL/kg/hr) Urine Occurrence(s)  1 x 1 x Shift Total(mL/kg) NET  150 150 Weight (kg) 87.1 87.1 87.1 87.1 87.1 87.1 Readmission Risk Assessment Tool Score Low Risk 12 Total Score 3 Has Seen PCP in Last 6 Months (Yes=3, No=0) 2 . Living with Significant Other. Assisted Living. LTAC. SNF. or  
Rehab  
 3 Patient Length of Stay (>5 days = 3) 4 Charlson Comorbidity Score (Age + Comorbid Conditions) Criteria that do not apply:  
 IP Visits Last 12 Months (1-3=4, 4=9, >4=11) Pt. Coverage (Medicare=5 , Medicaid, or Self-Pay=4) Expected Length of Stay 4d 19h Actual Length of Stay 6

## 2019-01-25 NOTE — PROGRESS NOTES
Bedside shift change report given to [facility] (oncoming nurse) by Dustin Valerio (offgoing nurse). Report included the following information SBAR.

## 2019-01-26 LAB
ANION GAP SERPL CALC-SCNC: 12 MMOL/L (ref 5–15)
BUN SERPL-MCNC: 9 MG/DL (ref 6–20)
BUN/CREAT SERPL: 18 (ref 12–20)
CALCIUM SERPL-MCNC: 8.3 MG/DL (ref 8.5–10.1)
CHLORIDE SERPL-SCNC: 97 MMOL/L (ref 97–108)
CO2 SERPL-SCNC: 29 MMOL/L (ref 21–32)
CREAT SERPL-MCNC: 0.5 MG/DL (ref 0.7–1.3)
GLUCOSE BLD STRIP.AUTO-MCNC: 138 MG/DL (ref 65–100)
GLUCOSE BLD STRIP.AUTO-MCNC: 151 MG/DL (ref 65–100)
GLUCOSE BLD STRIP.AUTO-MCNC: 175 MG/DL (ref 65–100)
GLUCOSE BLD STRIP.AUTO-MCNC: 181 MG/DL (ref 65–100)
GLUCOSE SERPL-MCNC: 168 MG/DL (ref 65–100)
PHOSPHATE SERPL-MCNC: 2.5 MG/DL (ref 2.6–4.7)
POTASSIUM SERPL-SCNC: 3.2 MMOL/L (ref 3.5–5.1)
SERVICE CMNT-IMP: ABNORMAL
SODIUM SERPL-SCNC: 138 MMOL/L (ref 136–145)

## 2019-01-26 PROCEDURE — 84100 ASSAY OF PHOSPHORUS: CPT

## 2019-01-26 PROCEDURE — 74011250637 HC RX REV CODE- 250/637: Performed by: GENERAL ACUTE CARE HOSPITAL

## 2019-01-26 PROCEDURE — 74011000250 HC RX REV CODE- 250: Performed by: HOSPITALIST

## 2019-01-26 PROCEDURE — 74011636637 HC RX REV CODE- 636/637: Performed by: GENERAL ACUTE CARE HOSPITAL

## 2019-01-26 PROCEDURE — 36415 COLL VENOUS BLD VENIPUNCTURE: CPT

## 2019-01-26 PROCEDURE — 65660000000 HC RM CCU STEPDOWN

## 2019-01-26 PROCEDURE — 80048 BASIC METABOLIC PNL TOTAL CA: CPT

## 2019-01-26 PROCEDURE — 74011250636 HC RX REV CODE- 250/636: Performed by: HOSPITALIST

## 2019-01-26 PROCEDURE — 77010033711 HC HIGH FLOW OXYGEN

## 2019-01-26 PROCEDURE — 74011250636 HC RX REV CODE- 250/636: Performed by: GENERAL ACUTE CARE HOSPITAL

## 2019-01-26 PROCEDURE — 82962 GLUCOSE BLOOD TEST: CPT

## 2019-01-26 PROCEDURE — 74011636637 HC RX REV CODE- 636/637: Performed by: INTERNAL MEDICINE

## 2019-01-26 RX ORDER — POTASSIUM CHLORIDE 20 MEQ/1
20 TABLET, EXTENDED RELEASE ORAL
Status: DISPENSED | OUTPATIENT
Start: 2019-01-26 | End: 2019-01-27

## 2019-01-26 RX ORDER — OXYMETAZOLINE HCL 0.05 %
2 SPRAY, NON-AEROSOL (ML) NASAL
Status: DISCONTINUED | OUTPATIENT
Start: 2019-01-26 | End: 2019-01-31 | Stop reason: HOSPADM

## 2019-01-26 RX ADMIN — SODIUM CHLORIDE: 900 INJECTION, SOLUTION INTRAVENOUS at 17:58

## 2019-01-26 RX ADMIN — INSULIN LISPRO 2 UNITS: 100 INJECTION, SOLUTION INTRAVENOUS; SUBCUTANEOUS at 08:46

## 2019-01-26 RX ADMIN — ENOXAPARIN SODIUM 40 MG: 40 INJECTION SUBCUTANEOUS at 08:46

## 2019-01-26 RX ADMIN — Medication 10 ML: at 22:00

## 2019-01-26 RX ADMIN — INSULIN LISPRO 2 UNITS: 100 INJECTION, SOLUTION INTRAVENOUS; SUBCUTANEOUS at 12:18

## 2019-01-26 RX ADMIN — INSULIN GLARGINE 5 UNITS: 100 INJECTION, SOLUTION SUBCUTANEOUS at 21:58

## 2019-01-26 RX ADMIN — PRAVASTATIN SODIUM 20 MG: 10 TABLET ORAL at 08:46

## 2019-01-26 NOTE — PROGRESS NOTES
Bedside and Verbal shift change report given to Anahy Rose (oncoming nurse) by Emory Santos (offgoing nurse). Report included the following information SBAR, Kardex, MAR and Recent Results.

## 2019-01-26 NOTE — PROGRESS NOTES
Pt is refusing to wear Bipap at this time. Mult attempts made by RT, but patient keeps taking off mask to drink and then refuses to wear again. Pt's spouse at bedside and is not helpful with pt continuing therapy. RN aware of findings. Pt currently on 10L via Hi flow nasal cannula.

## 2019-01-26 NOTE — PROGRESS NOTES
PCU SHIFT NURSING NOTE Bedside shift change report given to Juno Esparza (oncoming nurse) by Wendy Eli (offgoing nurse). Report included the following information SBAR, Kardex, Intake/Output, MAR and Recent Results. Shift Summary: 0710  Pt in bed resting  Wife at bedside  10L high flow  No complaints of pain  ST on monitor  Encouraged use of incentive spirometer and flutter valve  UAL  A&O x 4   
0850  Decreased high flow to 8L  Continue to monitor 1043  MEWS 1  
1045  High flow 7L  SPO2 92% 1218  High flow 6L  SPO2 96%  Pt up in chair 1527  MEWS 2   
1532  Pt SPO2 86% - this is after standing at side of bed per wife report  Pt symptomatic, increased high flow to 8L  SPO2 92 %  Continue to monitor 1800  Pt refused PO K+ Admission Date 1/19/2019 Admission Diagnosis Influenza Consults IP CONSULT TO HOSPITALIST 
IP CONSULT TO INTENSIVIST Consults []PT []OT []Speech  
[]Case Management  
  
[] Palliative Cardiac Monitoring Order []Yes []No  
 
IV drips []Yes Drip:                            Dose: 
Drip:                            Dose: 
Drip:                            Dose:  
[]No  
 
GI Prophylaxis []Yes []No  
 
 
 
DVT Prophylaxis SCDs:     
     
 Bartolome stockings:     
  
[] Medication []Contraindicated []None Activity Level Activity Level: Up with Assistance Activity Assistance: Partial (one person) Purposeful Rounding every 1-2 hour? []Yes Trivedi Score  Total Score: 2 Bed Alarm (If score 3 or >) []Yes  
[] Refused (See signed refusal form in chart) Connor Score  Connor Score: 19 Connor Score (if score 14 or less) []PMT consult  
[]Wound Care consult []Specialty bed  
[] Nutrition consult Needs prior to discharge:  
Home O2 required:   
[]Yes []No  
 If yes, how much O2 required? Other:  
 Last Bowel Movement: Last Bowel Movement Date: 01/21/19 Influenza Vaccine Received Flu Vaccine for Current Season (usually Sept-March): No  
 Patient/Guardian Refused (Notify MD): Yes Pneumonia Vaccine Diet Active Orders Diet DIET DIABETIC CONSISTENT CARB Regular LDAs Peripheral IV 01/19/19 Left Antecubital (Active) Site Assessment Clean, dry, & intact 1/26/2019  3:45 AM  
Phlebitis Assessment 0 1/26/2019  3:45 AM  
Infiltration Assessment 0 1/26/2019  3:45 AM  
Dressing Status Clean, dry, & intact 1/26/2019  3:45 AM  
Dressing Type Tape;Transparent 1/26/2019  3:45 AM  
Hub Color/Line Status Pink;Capped 1/26/2019  3:45 AM  
Action Taken Open ports on tubing capped 1/25/2019  7:20 PM  
Alcohol Cap Used Yes 1/25/2019  7:20 PM  
                  
Urinary Catheter Intake & Output Date 01/25/19 0700 - 01/26/19 0659 01/26/19 0700 - 01/27/19 0307 Shift 9110-3498 5180-4355 24 Hour Total 0713-6920 5571-4841 24 Hour Total  
INTAKE  
P.O. 1080  1080     
  P. O. 1080  1080 Shift Total(mL/kg) 2530(72.6)  [id number]) OUTPUT Shift Total(mL/kg) NET 1080  1080 Weight (kg) 87.1 87.1 87.1 87.1 87.1 87.1 Readmission Risk Assessment Tool Score Low Risk 12 Total Score 3 Has Seen PCP in Last 6 Months (Yes=3, No=0) 2 . Living with Significant Other. Assisted Living. LTAC. SNF. or  
Rehab  
 3 Patient Length of Stay (>5 days = 3) 4 Charlson Comorbidity Score (Age + Comorbid Conditions) Criteria that do not apply:  
 IP Visits Last 12 Months (1-3=4, 4=9, >4=11) Pt. Coverage (Medicare=5 , Medicaid, or Self-Pay=4) Expected Length of Stay 4d 19h Actual Length of Stay 7

## 2019-01-26 NOTE — PROGRESS NOTES
Hospitalist Progress Note NAME: Francois Aguayo :  1965 MRN:  [id number] Assessment / Plan: 
 
Acute hypoxic respiratory failure Sepsis secondary to Influenza A pneumonia Developing ARDS 
-CXR  Progression of bilateral airspace disease which may represent pulmonary edema versus diffuse pneumonia Repeat chest xray on :Slightly improved aeration relative to comparison which may be technical and 
-Refused Tamiflu  
-completed course of empiric levaquin  
-Echo EF 07-33%, diastolic dysfunction, no MR, no AS 
-trying to wean high flow oxygen Nausea vomiting, improving Hypokalemia Hypophosphatemia 
- replace prn, check labs in am 
 
DMI, uncontrolled with hyperglycemia POA HLD  
- Hba1c 11%; pt reported not taking lantus or any medication for many years . - continue lantus with SSI prn 
 
KATJA 
- ordered NIV Per the RT note on  pt refusing bipap Code Status: Full Surrogate Decision Maker: Wife DVT Prophylaxis: Lovenox GI Prophylaxis: not indicated Baseline: Independent Body mass index is 26.04 kg/m². therefore classifying patient as overweight, NOS (body mass index [BMI] of 25 to 29.9 kg/m2) 
-counseled exercise/diet. Patient receptive. Subjective: Chief Complaint / Reason for Physician Visit Follow up PNA, influenza, hypoxemia, DM Sitting in the recliner, tired, able to talk in full sentences, wife at the bedside Refused bipap over the night Review of Systems: 
Symptom Y/N Comments  Symptom Y/N Comments Fever/Chills    Chest Pain n   
Poor Appetite    Edema Cough    Abdominal Pain n   
Sputum    Joint Pain SOB/FABIAN   improving  Pruritis/Rash Nausea/vomit n   Tolerating PT/OT Diarrhea n   Tolerating Diet n   
Constipation    Other Could NOT obtain due to:   
 
Objective: VITALS:  
Last 24hrs VS reviewed since prior progress note. Most recent are: 
Patient Vitals for the past 24 hrs: 
 Temp Pulse Resp BP SpO2 01/26/19 1532     92 % 01/26/19 1527 98.3 °F (36.8 °C) (!) 105 20 151/86 (!) 86 % 01/26/19 1218     96 % 01/26/19 1045     92 % 01/26/19 1043 98.5 °F (36.9 °C) 95 20 145/87 92 % 01/26/19 0850     94 % 01/26/19 0710 98.8 °F (37.1 °C) (!) 102 18 147/86 92 % 01/26/19 0345 99.2 °F (37.3 °C) 100 18 139/84 98 % 01/25/19 2253 98.3 °F (36.8 °C) 94 18 122/74 98 % 01/25/19 1920 98.1 °F (36.7 °C) 86 20 140/85 95 % Intake/Output Summary (Last 24 hours) at 1/26/2019 1558 Last data filed at 1/26/2019 3994 Gross per 24 hour Intake 720 ml Output  Net 720 ml PHYSICAL EXAM: 
General: WD, WN. Pale and tired , ill appearing , cooperative, no acute distress   
EENT:  EOMI. Anicteric sclerae. MMM Resp:  Bilateral air entry, basal rales  No accessory muscle use CV:  Regular  rhythm,  Trace edema GI:  Soft, Non distended, Non tender.  +Bowel sounds Neurologic:  Alert and oriented X 3, normal speech, Psych:   Good insight. Not anxious nor agitated Skin:  No rashes. No jaundice Reviewed most current lab test results and cultures  YES Reviewed most current radiology test results   YES Review and summation of old records today    NO Reviewed patient's current orders and MAR    YES 
PMH/SH reviewed - no change compared to H&P 
________________________________________________________________________ Care Plan discussed with: 
  Comments Patient y Family  y wife RN y   
Care Manager Consultant Multidiciplinary team rounds were held today with , nursing, pharmacist and clinical coordinator. Patient's plan of care was discussed; medications were reviewed and discharge planning was addressed. ________________________________________________________________________ Total NON critical care TIME: 25 Minutes Total CRITICAL CARE TIME Spent:   Minutes non procedure based Comments >50% of visit spent in counseling and coordination of care x As above   
________________________________________________________________________ Jairo Boo MD  
 
Procedures: see electronic medical records for all procedures/Xrays and details which were not copied into this note but were reviewed prior to creation of Plan. LABS: 
I reviewed today's most current labs and imaging studies. Pertinent labs include: No results for input(s): WBC, HGB, HCT, PLT, HGBEXT, HCTEXT, PLTEXT, HGBEXT, HCTEXT, PLTEXT in the last 72 hours. Recent Labs  
  01/26/19 
0455 01/25/19 
0415 01/24/19 
1128  137 138  
K 3.2* 3.0* 3.2*  
CL 97 97 99 CO2 29 28 27 * 180* 174* BUN 9 9 10 CREA 0.50* 0.50* 0.51* CA 8.3* 8.3* 8.0*  
MG  --  2.1 2.1 PHOS 2.5* 2.0* 2.3* ALB  --  1.8*  --   
TBILI  --  0.7  --   
SGOT  --  25  --   
ALT  --  20  --   
 
 
Signed: Jairo Boo MD

## 2019-01-27 LAB
ANION GAP SERPL CALC-SCNC: 11 MMOL/L (ref 5–15)
BUN SERPL-MCNC: 9 MG/DL (ref 6–20)
BUN/CREAT SERPL: 17 (ref 12–20)
CALCIUM SERPL-MCNC: 8 MG/DL (ref 8.5–10.1)
CHLORIDE SERPL-SCNC: 99 MMOL/L (ref 97–108)
CO2 SERPL-SCNC: 29 MMOL/L (ref 21–32)
CREAT SERPL-MCNC: 0.53 MG/DL (ref 0.7–1.3)
ERYTHROCYTE [DISTWIDTH] IN BLOOD BY AUTOMATED COUNT: 12.7 % (ref 11.5–14.5)
GLUCOSE BLD STRIP.AUTO-MCNC: 124 MG/DL (ref 65–100)
GLUCOSE BLD STRIP.AUTO-MCNC: 133 MG/DL (ref 65–100)
GLUCOSE BLD STRIP.AUTO-MCNC: 150 MG/DL (ref 65–100)
GLUCOSE BLD STRIP.AUTO-MCNC: 155 MG/DL (ref 65–100)
GLUCOSE SERPL-MCNC: 160 MG/DL (ref 65–100)
HCT VFR BLD AUTO: 38.5 % (ref 36.6–50.3)
HGB BLD-MCNC: 13.1 G/DL (ref 12.1–17)
MAGNESIUM SERPL-MCNC: 2.1 MG/DL (ref 1.6–2.4)
MCH RBC QN AUTO: 30.8 PG (ref 26–34)
MCHC RBC AUTO-ENTMCNC: 34 G/DL (ref 30–36.5)
MCV RBC AUTO: 90.4 FL (ref 80–99)
NRBC # BLD: 0 K/UL (ref 0–0.01)
NRBC BLD-RTO: 0 PER 100 WBC
PHOSPHATE SERPL-MCNC: 3 MG/DL (ref 2.6–4.7)
PLATELET # BLD AUTO: 691 K/UL (ref 150–400)
PMV BLD AUTO: 9.6 FL (ref 8.9–12.9)
POTASSIUM SERPL-SCNC: 3 MMOL/L (ref 3.5–5.1)
RBC # BLD AUTO: 4.26 M/UL (ref 4.1–5.7)
SERVICE CMNT-IMP: ABNORMAL
SODIUM SERPL-SCNC: 139 MMOL/L (ref 136–145)
WBC # BLD AUTO: 11 K/UL (ref 4.1–11.1)

## 2019-01-27 PROCEDURE — 74011636637 HC RX REV CODE- 636/637: Performed by: INTERNAL MEDICINE

## 2019-01-27 PROCEDURE — 74011250637 HC RX REV CODE- 250/637: Performed by: GENERAL ACUTE CARE HOSPITAL

## 2019-01-27 PROCEDURE — 65660000000 HC RM CCU STEPDOWN

## 2019-01-27 PROCEDURE — 80048 BASIC METABOLIC PNL TOTAL CA: CPT

## 2019-01-27 PROCEDURE — 74011636637 HC RX REV CODE- 636/637: Performed by: GENERAL ACUTE CARE HOSPITAL

## 2019-01-27 PROCEDURE — 36415 COLL VENOUS BLD VENIPUNCTURE: CPT

## 2019-01-27 PROCEDURE — 85027 COMPLETE CBC AUTOMATED: CPT

## 2019-01-27 PROCEDURE — 77010033711 HC HIGH FLOW OXYGEN

## 2019-01-27 PROCEDURE — 83735 ASSAY OF MAGNESIUM: CPT

## 2019-01-27 PROCEDURE — 74011250636 HC RX REV CODE- 250/636: Performed by: GENERAL ACUTE CARE HOSPITAL

## 2019-01-27 PROCEDURE — 74011250636 HC RX REV CODE- 250/636: Performed by: HOSPITALIST

## 2019-01-27 PROCEDURE — 84100 ASSAY OF PHOSPHORUS: CPT

## 2019-01-27 PROCEDURE — 82962 GLUCOSE BLOOD TEST: CPT

## 2019-01-27 RX ORDER — POTASSIUM CHLORIDE 7.45 MG/ML
10 INJECTION INTRAVENOUS ONCE
Status: COMPLETED | OUTPATIENT
Start: 2019-01-27 | End: 2019-01-27

## 2019-01-27 RX ORDER — POTASSIUM CHLORIDE 20 MEQ/1
40 TABLET, EXTENDED RELEASE ORAL EVERY 4 HOURS
Status: DISPENSED | OUTPATIENT
Start: 2019-01-27 | End: 2019-01-27

## 2019-01-27 RX ADMIN — INSULIN LISPRO 2 UNITS: 100 INJECTION, SOLUTION INTRAVENOUS; SUBCUTANEOUS at 11:38

## 2019-01-27 RX ADMIN — ENOXAPARIN SODIUM 40 MG: 40 INJECTION SUBCUTANEOUS at 09:18

## 2019-01-27 RX ADMIN — PRAVASTATIN SODIUM 20 MG: 10 TABLET ORAL at 09:18

## 2019-01-27 RX ADMIN — INSULIN GLARGINE 5 UNITS: 100 INJECTION, SOLUTION SUBCUTANEOUS at 22:37

## 2019-01-27 RX ADMIN — POTASSIUM CHLORIDE 10 MEQ: 10 INJECTION, SOLUTION INTRAVENOUS at 10:15

## 2019-01-27 RX ADMIN — INSULIN LISPRO 2 UNITS: 100 INJECTION, SOLUTION INTRAVENOUS; SUBCUTANEOUS at 09:18

## 2019-01-27 NOTE — PROGRESS NOTES
0800 morning medications late. Attempted to give patient his medication as his breakfast arrived. I entered the room and patients wife yelled \"for god sake he is eating his breakfast can we please have some privacy\". Exited room, will give medications when they 'allow'

## 2019-01-27 NOTE — PROGRESS NOTES
Problem: Falls - Risk of 
Goal: *Absence of Falls Document Su[place] End Fall Risk and appropriate interventions in the flowsheet. Outcome: Progressing Towards Goal 
Fall Risk Interventions: 
Mobility Interventions: Bed/chair exit alarm, OT consult for ADLs, PT Consult for mobility concerns Medication Interventions: Evaluate medications/consider consulting pharmacy, Patient to call before getting OOB Elimination Interventions: Bed/chair exit alarm, Call light in reach, Urinal in reach History of Falls Interventions: Evaluate medications/consider consulting pharmacy

## 2019-01-27 NOTE — PROGRESS NOTES
Hospitalist Progress Note NAME: Yue Fish :  1965 MRN:  [id number] Assessment / Plan: 
Acute hypoxic respiratory failure Sepsis secondary to Influenza A pneumonia Mild to moderate ARDS 
 
-CXR  Progression of bilateral airspace disease which may represent pulmonary edema versus diffuse pneumonia Repeat chest xray on :Slightly improved aeration relative to comparison which may be technical and 
-Refused Tamiflu  
-completed course of empiric levaquin  
-Echo EF 09-93%, diastolic dysfunction, no MR, no AS 
-continue efforts to wean high flow - PT eval  
 
Nausea vomiting,resolved Hypokalemia, phos and mag normal today 
- replace k, repeat labs in am 
 
DMI, uncontrolled with hyperglycemia POA HLD  
- Hba1c 11%; pt reported not taking lantus or any medication for many years . - continue lantus with SSI prn 
BS reviewed 175 151  138  181  150 KTAJA 
- ordered NIV Per the RT note on  pt refusing bipap Code Status: Full Surrogate Decision Maker: Wife DVT Prophylaxis: Lovenox GI Prophylaxis: not indicated Baseline: Independent Body mass index is 26.04 kg/m². therefore classifying patient as overweight, NOS (body mass index [BMI] of 25 to 29.9 kg/m2) 
-counseled exercise/diet by my collegue Subjective: Chief Complaint / Reason for Physician Visit Follow up PNA, influenza, hypoxemia, DM In the bed, unable to get to comfortable position Refused bipap over the night Review of Systems: 
Symptom Y/N Comments  Symptom Y/N Comments Fever/Chills n   Chest Pain n   
Poor Appetite    Edema Cough    Abdominal Pain n   
Sputum    Joint Pain SOB/FABIAN y  improving  Pruritis/Rash Nausea/vomit n   Tolerating PT/OT Diarrhea n   Tolerating Diet n   
Constipation    Other Could NOT obtain due to:   
 
Objective: VITALS:  
Last 24hrs VS reviewed since prior progress note. Most recent are: 
Patient Vitals for the past 24 hrs: Temp Pulse Resp BP SpO2  
01/27/19 0751 98.1 °F (36.7 °C) 86 18 139/73 95 % 01/27/19 0400  86 18 141/81 96 % 01/27/19 0000  87 18 139/81 96 % 01/26/19 2357  87   96 % 01/26/19 2000 98.6 °F (37 °C) 99 20 147/81 95 % 01/26/19 1959  99   95 % 01/26/19 1532     92 % 01/26/19 1527 98.3 °F (36.8 °C) (!) 105 20 151/86 (!) 86 % 01/26/19 1218     96 % 01/26/19 1045     92 % 01/26/19 1043 98.5 °F (36.9 °C) 95 20 145/87 92 % Intake/Output Summary (Last 24 hours) at 1/27/2019 4791 Last data filed at 1/27/2019 4253 Gross per 24 hour Intake 2400 ml Output  Net 2400 ml PHYSICAL EXAM: 
General: WD, WN. Pale and tired , ill appearing , cooperative, no acute distress   
EENT:  EOMI. Anicteric sclerae. MMM Resp:  Bilateral decreased air entry, no crackles  No accessory muscle use CV:  Regular  rhythm,  Trace edema GI:  Soft, Non distended, Non tender.  +Bowel sounds Neurologic:  Alert and oriented X 3, normal speech, Psych:   Good insight. Not anxious nor agitated Skin:  No rashes. No jaundice Reviewed most current lab test results and cultures  YES Reviewed most current radiology test results   YES Review and summation of old records today    NO Reviewed patient's current orders and MAR    YES 
PMH/ reviewed - no change compared to H&P 
________________________________________________________________________ Care Plan discussed with: 
  Comments Patient y Family  y wife RN y   
Care Manager Consultant Multidiciplinary team rounds were held today with , nursing, pharmacist and clinical coordinator. Patient's plan of care was discussed; medications were reviewed and discharge planning was addressed. ________________________________________________________________________ Total NON critical care TIME: 20 Minutes Total CRITICAL CARE TIME Spent:   Minutes non procedure based Comments >50% of visit spent in counseling and coordination of care x As above   
________________________________________________________________________ Ortega Martinez MD  
 
Procedures: see electronic medical records for all procedures/Xrays and details which were not copied into this note but were reviewed prior to creation of Plan. LABS: 
I reviewed today's most current labs and imaging studies. Pertinent labs include: 
Recent Labs  
  01/27/19 
0538 WBC 11.0 HGB 13.1 HCT 38.5 * Recent Labs  
  01/27/19 
0538 01/26/19 
0455 01/25/19 
0415  138 137  
K 3.0* 3.2* 3.0*  
CL 99 97 97 CO2 29 29 28 * 168* 180* BUN 9 9 9 CREA 0.53* 0.50* 0.50* CA 8.0* 8.3* 8.3*  
MG 2.1  --  2.1 PHOS 3.0 2.5* 2.0* ALB  --   --  1.8* TBILI  --   --  0.7 SGOT  --   --  25 ALT  --   --  20 Signed: Ortega Martinez MD

## 2019-01-28 LAB
ANION GAP SERPL CALC-SCNC: 11 MMOL/L (ref 5–15)
BUN SERPL-MCNC: 10 MG/DL (ref 6–20)
BUN/CREAT SERPL: 21 (ref 12–20)
CALCIUM SERPL-MCNC: 8.3 MG/DL (ref 8.5–10.1)
CHLORIDE SERPL-SCNC: 98 MMOL/L (ref 97–108)
CO2 SERPL-SCNC: 31 MMOL/L (ref 21–32)
CREAT SERPL-MCNC: 0.47 MG/DL (ref 0.7–1.3)
ERYTHROCYTE [DISTWIDTH] IN BLOOD BY AUTOMATED COUNT: 12.7 % (ref 11.5–14.5)
GLUCOSE BLD STRIP.AUTO-MCNC: 130 MG/DL (ref 65–100)
GLUCOSE BLD STRIP.AUTO-MCNC: 138 MG/DL (ref 65–100)
GLUCOSE BLD STRIP.AUTO-MCNC: 157 MG/DL (ref 65–100)
GLUCOSE BLD STRIP.AUTO-MCNC: 170 MG/DL (ref 65–100)
GLUCOSE SERPL-MCNC: 148 MG/DL (ref 65–100)
HCT VFR BLD AUTO: 38.4 % (ref 36.6–50.3)
HGB BLD-MCNC: 12.7 G/DL (ref 12.1–17)
MCH RBC QN AUTO: 30 PG (ref 26–34)
MCHC RBC AUTO-ENTMCNC: 33.1 G/DL (ref 30–36.5)
MCV RBC AUTO: 90.8 FL (ref 80–99)
NRBC # BLD: 0 K/UL (ref 0–0.01)
NRBC BLD-RTO: 0 PER 100 WBC
PLATELET # BLD AUTO: 732 K/UL (ref 150–400)
PMV BLD AUTO: 9.5 FL (ref 8.9–12.9)
POTASSIUM SERPL-SCNC: 3.1 MMOL/L (ref 3.5–5.1)
RBC # BLD AUTO: 4.23 M/UL (ref 4.1–5.7)
SERVICE CMNT-IMP: ABNORMAL
SODIUM SERPL-SCNC: 140 MMOL/L (ref 136–145)
WBC # BLD AUTO: 11 K/UL (ref 4.1–11.1)

## 2019-01-28 PROCEDURE — 74011636637 HC RX REV CODE- 636/637: Performed by: INTERNAL MEDICINE

## 2019-01-28 PROCEDURE — 74011250636 HC RX REV CODE- 250/636: Performed by: GENERAL ACUTE CARE HOSPITAL

## 2019-01-28 PROCEDURE — 36415 COLL VENOUS BLD VENIPUNCTURE: CPT

## 2019-01-28 PROCEDURE — 82962 GLUCOSE BLOOD TEST: CPT

## 2019-01-28 PROCEDURE — 74011000250 HC RX REV CODE- 250: Performed by: INTERNAL MEDICINE

## 2019-01-28 PROCEDURE — 74011636637 HC RX REV CODE- 636/637: Performed by: GENERAL ACUTE CARE HOSPITAL

## 2019-01-28 PROCEDURE — 80048 BASIC METABOLIC PNL TOTAL CA: CPT

## 2019-01-28 PROCEDURE — 97116 GAIT TRAINING THERAPY: CPT | Performed by: PHYSICAL THERAPIST

## 2019-01-28 PROCEDURE — 65660000000 HC RM CCU STEPDOWN

## 2019-01-28 PROCEDURE — 74011250636 HC RX REV CODE- 250/636: Performed by: INTERNAL MEDICINE

## 2019-01-28 PROCEDURE — 85027 COMPLETE CBC AUTOMATED: CPT

## 2019-01-28 PROCEDURE — C9113 INJ PANTOPRAZOLE SODIUM, VIA: HCPCS | Performed by: INTERNAL MEDICINE

## 2019-01-28 PROCEDURE — 74011250637 HC RX REV CODE- 250/637: Performed by: GENERAL ACUTE CARE HOSPITAL

## 2019-01-28 PROCEDURE — 97161 PT EVAL LOW COMPLEX 20 MIN: CPT | Performed by: PHYSICAL THERAPIST

## 2019-01-28 RX ORDER — ACETAMINOPHEN 10 MG/ML
1000 INJECTION, SOLUTION INTRAVENOUS
Status: DISPENSED | OUTPATIENT
Start: 2019-01-28 | End: 2019-01-29

## 2019-01-28 RX ORDER — POTASSIUM CHLORIDE 7.45 MG/ML
10 INJECTION INTRAVENOUS
Status: COMPLETED | OUTPATIENT
Start: 2019-01-28 | End: 2019-01-28

## 2019-01-28 RX ADMIN — SODIUM CHLORIDE 40 MG: 9 INJECTION, SOLUTION INTRAMUSCULAR; INTRAVENOUS; SUBCUTANEOUS at 13:43

## 2019-01-28 RX ADMIN — ENOXAPARIN SODIUM 40 MG: 40 INJECTION SUBCUTANEOUS at 08:56

## 2019-01-28 RX ADMIN — POTASSIUM CHLORIDE 10 MEQ: 10 INJECTION, SOLUTION INTRAVENOUS at 13:42

## 2019-01-28 RX ADMIN — INSULIN LISPRO 2 UNITS: 100 INJECTION, SOLUTION INTRAVENOUS; SUBCUTANEOUS at 08:57

## 2019-01-28 RX ADMIN — PRAVASTATIN SODIUM 20 MG: 10 TABLET ORAL at 08:56

## 2019-01-28 RX ADMIN — ACETAMINOPHEN 1000 MG: 10 INJECTION, SOLUTION INTRAVENOUS at 11:55

## 2019-01-28 RX ADMIN — INSULIN LISPRO 2 UNITS: 100 INJECTION, SOLUTION INTRAVENOUS; SUBCUTANEOUS at 11:57

## 2019-01-28 RX ADMIN — POTASSIUM CHLORIDE 10 MEQ: 10 INJECTION, SOLUTION INTRAVENOUS at 14:00

## 2019-01-28 RX ADMIN — POTASSIUM CHLORIDE 10 MEQ: 10 INJECTION, SOLUTION INTRAVENOUS at 11:58

## 2019-01-28 RX ADMIN — INSULIN GLARGINE 5 UNITS: 100 INJECTION, SOLUTION SUBCUTANEOUS at 21:38

## 2019-01-28 RX ADMIN — POTASSIUM CHLORIDE 10 MEQ: 10 INJECTION, SOLUTION INTRAVENOUS at 15:10

## 2019-01-28 NOTE — PROGRESS NOTES
[facility] Name: Ymailet Cortés MRN: [id number] : 1965 Hospital: [facility] Date: 2019 IMPRESSION:  
· Acute hypoxic respiratory failure · Acute influenza pneumonia, lingering · Mild-to-moderate ARDS · Dysphagia · KATJA · Can not exclude bacterial superinfection · Can not completely exclude heart failure · Uncontrolled diabetes RECOMMENDATIONS:  
· O2, high flow · NIV prn · GI consult · Aspiration precautions · May need TPN · chest X-ray in AM 
· Bronchodilators as needed · Agree with empiric antibiotics · Patient has refused Tamiflu · Insulin · Still at risk for further decompensation from influenza pneumonia.  refusing BIPAP but sats holding on high flow/ midflow nasal O2. Long h/o dysphagia. Poor PO intake for many days. Remote extensive workup by . Subjective:  
 
19: oxygenation remains poor; on 10 LPM nasal cannula. Complaining about nasal dryness, but doesn't want mask that covers mouth. He also is complaining about sleep apnea and he feels that this is why he is hypoxic during the day. Has not worn CPAP in years, but now is upset he is not being treated with it nightly here. He does not recall his settings, but he knows they were \"high\". From his description he had an auto-CPAP. Initial history: This patient has been seen and evaluated at the request of Dr. Roberta Sal for worsening pneumonia. Patient is a 47 y.o. male former smoker, presented to the ER  with dyspnea and generalized malaise. He was diagnosed with influenza at [facility] 1 week ago. He declined to take Tamiflu because he was concerned it would give him side effects. He did not get a flu shot this year. He has been treated with antibiotics, IVF, but he has become progressively more hypoxic with malaise and dyspnea.   He was again offered Tamiflu here but has refused again. Due to worsening hypoxia, I was asked to evaluate him. Past Medical History:  
Diagnosis Date  Diabetes ([address]  Nausea & vomiting  Pancreatitis  Unspecified sleep apnea Past Surgical History:  
Procedure Laterality Date  HX GI    
 upper endoscopies  HX HEENT    
 oral surgery  HX HEENT    
 turbinate reduction Prior to Admission medications Not on File Allergies Allergen Reactions  Lisinopril Anaphylaxis Social History Tobacco Use  Smoking status: Former Smoker Years: 1.00 Types: Cigarettes  Smokeless tobacco: Never Used Substance Use Topics  Alcohol use: Yes Family History Problem Relation Age of Onset  Heart Disease Mother   
     s/p stent  Diabetes Father  Cancer Brother   
     stomach cancer Current Facility-Administered Medications Medication Dose Route Frequency  potassium chloride 10 mEq in 100 ml IVPB  10 mEq IntraVENous Q1H  
 pantoprazole (PROTONIX) 40 mg in sodium chloride 0.9% 10 mL injection  40 mg IntraVENous DAILY  insulin glargine (LANTUS) injection 5 Units  5 Units SubCUTAneous QHS  pravastatin (PRAVACHOL) tablet 20 mg  20 mg Oral DAILY  insulin lispro (HUMALOG) injection   SubCUTAneous AC&HS  enoxaparin (LOVENOX) injection 40 mg  40 mg SubCUTAneous Q24H Review of Systems: A comprehensive review of systems was negative except for: Ears, nose, mouth, throat, and face: positive for snoring Objective: 
Vital Signs:   
Visit Vitals /77 (BP 1 Location: Right arm, BP Patient Position: At rest) Pulse 92 Temp 98.9 °F (37.2 °C) Resp 20 Ht 6' (1.829 m) Wt 77.9 kg (171 lb 11.8 oz) SpO2 96% BMI 23.29 kg/m² O2 Device: Hi flow nasal cannula O2 Flow Rate (L/min): 4 l/min Temp (24hrs), Av.1 °F (37.3 °C), Min:98.1 °F (36.7 °C), Max:99.5 °F (37.5 °C) Intake/Output: Last shift:      01/28 0701 - 01/28 1900 In: 500 [P.O.:500] Out: - Last 3 shifts: 01/26 1901 - 01/28 0700 In: 2460 [P.O.:2460] Out: - Intake/Output Summary (Last 24 hours) at 1/28/2019 1451 Last data filed at 1/28/2019 1598 Gross per 24 hour Intake 1060 ml Output  Net 1060 ml Physical Exam:  
General:  Alert, ill appearing, no acute dstress Head:  Normocephalic, without obvious abnormality, atraumatic. Eyes:  Conjunctivae/corneas clear. Nose: Nares normal. Septum midline. Mucosa normal.    
Throat: Lips, mucosa, and tongue normal. Teeth and gums normal.  
Neck: Supple, symmetrical, trachea midline Back:   Symmetric, no curvature. ROM normal.  
Lungs:   Scattered bilateral rales Chest wall:  No tenderness or deformity. Heart:  Tachy, regular, no murmur Abdomen:   Soft, non-tender. Bowel sounds normal.    
Extremities: Extremities normal, atraumatic, no cyanosis or clubbing Skin: Skin color, texture, turgor normal. No rashes or lesions Neurologic: Grossly nonfocal  
 
Data:  
Labs: 
Recent Labs  
  01/28/19 
0449 01/27/19 
4490 WBC 11.0 11.0 HGB 12.7 13.1 HCT 38.4 38.5 * 691* Recent Labs  
  01/28/19 
0449 01/27/19 
0538 01/26/19 
0455  139 138  
K 3.1* 3.0* 3.2*  
CL 98 99 97 CO2 31 29 29 * 160* 168* BUN 10 9 9 CREA 0.47* 0.53* 0.50* CA 8.3* 8.0* 8.3*  
MG  --  2.1  --   
PHOS  --  3.0 2.5* No results for input(s): PH, PCO2, PO2, HCO3, FIO2 in the last 72 hours. Imaging: 
I have personally reviewed the patients radiographs: 
No change yesterday Chasidy Petty MD

## 2019-01-28 NOTE — PROGRESS NOTES
Speech pathology note Reviewed chart and discussed case with RN. Note MBS/SLP ordered in error, as patient with long-standing esophageal dysphagia. Order corrected for Barium swallow/esophogram to be completed today. If there is concern for oropharyngeal dysphagia and SLP evaluation desired, please re-consult. Thank you. Neno Mcgarry., CCC-SLP

## 2019-01-28 NOTE — PROGRESS NOTES
Bedside shift change report given to P. Hanley RN (oncoming nurse) by Chandra Blackmon (offgoing nurse). Report included the following information SBAR, Kardex, ED Summary, Intake/Output, MAR, Accordion, Recent Results, Med Rec Status and Cardiac Rhythm NSR.  
  
no c/o overnight- VSS, afebrile, wife assisted with cares, continue to monitor

## 2019-01-28 NOTE — PROGRESS NOTES
Problem: Falls - Risk of 
Goal: *Absence of Falls Document Kamille Georges Fall Risk and appropriate interventions in the flowsheet. Outcome: Progressing Towards Goal 
Fall Risk Interventions: 
Mobility Interventions: Bed/chair exit alarm, OT consult for ADLs, PT Consult for mobility concerns Medication Interventions: Bed/chair exit alarm, Evaluate medications/consider consulting pharmacy, Teach patient to arise slowly Elimination Interventions: Call light in reach, Elevated toilet seat, Toilet paper/wipes in reach History of Falls Interventions: Evaluate medications/consider consulting pharmacy

## 2019-01-28 NOTE — PROGRESS NOTES
Problem: Mobility Impaired (Adult and Pediatric) Goal: *Acute Goals and Plan of Care (Insert Text) Physical Therapy Goals Initiated 1/28/2019 1. Patient will move from supine to sit and sit to supine  in bed with independence within 7 day(s). 2.  Patient will transfer from bed to chair and chair to bed with independence using the least restrictive device within 7 day(s). 3.  Patient will perform sit to stand with independence within 7 day(s). 4.  Patient will ambulate with independence for 200 feet with the least restrictive device within 7 day(s). 5.  Patient will ascend/descend 12 stairs with 1 handrail(s) with modified independence within 7 day(s). physical Therapy EVALUATION Patient: Keyon Devries (24 y.o. male) Date: 1/28/2019 Primary Diagnosis: Influenza Precautions:   Fall ASSESSMENT :  
Based on the objective data described below, the patient presents with Stand-by assistance functional transfers, Contact guard assistance gait, 50 feet ambulated, and with no assistive device. Maintained SpO2 92% on % L high flow O2 after ambulation. Patient is safe to mobilize in room with wife to work on strength and endurance. The following are barriers to independence while in acute care:  
-Cognitive and/or behavioral: None 
-Medical condition: strength, standing balance and pulmonary tolerance   
-Other:    
 
Patient will benefit from skilled acute intervention to address the above impairments. Patients rehabilitation potential is considered to be Good Discharge recommendations: None-MAYBE  PT if strength does not return prior to DC Patient's barriers to discharging home, in addition to above impairments: none. Equipment recommendations for successful discharge (if) home: none PLAN : 
Recommendations and Planned Interventions: bed mobility training, transfer training, gait training, therapeutic exercises, therapeutic activities and neuromuscular re-education Frequency/Duration: Patient will be followed by physical therapy  3 times a week to address goals. SUBJECTIVE:  
Patient stated I would be doing better if I was off this oxygen and I could move around like I want to.  OBJECTIVE DATA SUMMARY:  
HISTORY:   
Past Medical History:  
Diagnosis Date  Diabetes ([address]  Nausea & vomiting  Pancreatitis  Unspecified sleep apnea Past Surgical History:  
Procedure Laterality Date  HX GI    
 upper endoscopies  HX HEENT    
 oral surgery  HX HEENT    
 turbinate reduction Prior Level of Function/Home Situation: Independent at baseline. Works in IT setting. Personal factors and/or comorbidities impacting plan of care:  
 
Home Situation Home Environment: Private residence # Steps to Enter: 3 Rails to Enter: Yes One/Two Story Residence: Two story Living Alone: No 
Support Systems: Spouse/Significant Other/Partner Patient Expects to be Discharged to[de-identified] Private residence Current DME Used/Available at Home: None EXAMINATION/PRESENTATION/DECISION MAKING: Critical Behavior: 
Neurologic State: Alert Orientation Level: Oriented X4 Hearing: Auditory Auditory Impairment: None Range Of Motion: 
AROM: Generally decreased, functional 
  
  
  
  
  
  
  
Strength:   
Strength: Generally decreased, functional 
  
  
  
Functional Mobility: 
Bed Mobility: 
  
  
  
  
Transfers: 
Sit to Stand: Stand-by assistance Stand to Sit: Stand-by assistance Balance:  
Sitting: Intact Standing: Impaired Standing - Static: Good Standing - Dynamic : FairAmbulation/Gait Training:Distance (ft): 50 Feet (ft) Assistive Device: Gait belt Ambulation - Level of Assistance: Contact guard assistance; Additional time;Assist x1 Gait Description (WDL): Exceptions to [facility] Gait Abnormalities: Decreased step clearance Base of Support: Narrowed Step Length: Left shortened;Right shortened Functional Measure: Barthel Index: 
 
Bathin Bladder: 10 Bowels: 10 
Groomin Dressin Feeding: 10 Mobility: 10 Stairs: 5 Toilet Use: 10 Transfer (Bed to Chair and Back): 10 Total: 80 The Barthel ADL Index: Guidelines 1. The index should be used as a record of what a patient does, not as a record of what a patient could do. 2. The main aim is to establish degree of independence from any help, physical or verbal, however minor and for whatever reason. 3. The need for supervision renders the patient not independent. 4. A patient's performance should be established using the best available evidence. Asking the patient, friends/relatives and nurses are the usual sources, but direct observation and common sense are also important. However direct testing is not needed. 5. Usually the patient's performance over the preceding 24-48 hours is important, but occasionally longer periods will be relevant. 6. Middle categories imply that the patient supplies over 50 per cent of the effort. 7. Use of aids to be independent is allowed. Ema Cho., Barthel, DAilynW. (5755). Functional evaluation: the Barthel Index. [address] [facility] (14)2. Nilsa Lindsey antonino JESUS Rogers.F, Samir Light., [employer] [place], [address] [place] [address] (). Measuring the change indisability after inpatient rehabilitation; comparison of the responsiveness of the Barthel Index and Functional Miller Measure. Journal of Neurology, Neurosurgery, and Psychiatry, 66(4), 717-952. Alfonso Godinez, N.J.A, VIKRAM Busch, & Tamica Lawson M.A. (2004.) Assessment of post-stroke quality of life in cost-effectiveness studies: The usefulness of the Barthel Index and the EuroQoL-5D. [facility], 13, 420-41 Pain: 
Pre treatment: 0 /10 Post treatment:  0/10 Location: n/a Description:none Aggravating factors: Activity Tolerance:  
Fair Please refer to the flowsheet for vital signs taken during this treatment. After treatment patient left:  
Up in chair Caregiver at bedside COMMUNICATION/EDUCATION:  
The patients plan of care was discussed with: Physical Therapist, Occupational Therapist and Registered Nurse. Fall prevention education was provided and the patient/caregiver indicated understanding. Thank you for this referral. 
Marc Trent, PT, DPT Time Calculation: 28 mins

## 2019-01-28 NOTE — CONSULTS
GI Consultation Note Cyrus Schofield, previously Veronica)    NAME: Alexis Boyd : 1965 MRN: [id number]   ATTG: Luis Manuel Alejandre MD PCP: None  Date/Time:  2019 10:12 AM  Subjective:   REASON FOR CONSULT:      Rober Philippe is a 47 y.o.   male who I was asked to see for dysphagia, esophageal dysfunction. He has been admitted to the hospital since 20 for acute respiratory failure secondary to influenza A, PNA, and ARDS. Medical history includes DM, Nausea, pancreatitis, sleep apnea (not compliant with home CPAP), and hx of tobacco abuse. Currently states he cannot tolerate anything except ice chips at this time and even they get stuck going down. When he eats or drinks anything, causes his subxiphoid area to spasm and tighten up. States he has had this issue started early 's but has gotten worse the last couple of days. He was previously seen by Dr Nuvia Carter with an extensive work up including barium swallow, manometry, botox injections and dilation- last seen by GI in late . We have none of these records to review unfortunately. No abdominal pain, nausea, vomiting, or acid reflux. Denies weight loss prior to admission or inability to maintain weight, states this \"trouble swallowing hasn't changed in years until being here. \" Years ago tried protonix but not currently on any PPI. Currently on high flow NC and shortness of breath with mobility. Refuses to wear bipap, he was never intubated.        Past Medical History:   Diagnosis Date    Diabetes ([address]     Nausea & vomiting     Pancreatitis     Unspecified sleep apnea       Past Surgical History:   Procedure Laterality Date    HX GI      upper endoscopies    HX HEENT      oral surgery    HX HEENT      turbinate reduction     Social History     Tobacco Use    Smoking status: Former Smoker     Years: 1.00     Types: Cigarettes    Smokeless tobacco: Never Used   Substance Use Topics    Alcohol use: Yes      Family History   Problem Relation Age of Onset    Heart Disease Mother         s/p stent    Diabetes Father     Cancer Brother         stomach cancer      Allergies   Allergen Reactions    Lisinopril Anaphylaxis      Home Medications:  None     Hospital medications:  Current Facility-Administered Medications   Medication Dose Route Frequency    oxymetazoline (AFRIN) 0.05 % nasal spray 2 Spray  2 Spray Both Nostrils BID PRN    sodium chloride (OCEAN) 0.65 % nasal squeeze bottle 2 Spray  2 Spray Both Nostrils Q2H PRN    loperamide (IMODIUM) capsule 2 mg  2 mg Oral Q4H PRN    acetaminophen (TYLENOL) tablet 650 mg  650 mg Oral Q6H PRN    levalbuterol (XOPENEX) nebulizer soln 1.25 mg/3 mL  1.25 mg Nebulization Q4H PRN    insulin glargine (LANTUS) injection 5 Units  5 Units SubCUTAneous QHS    ibuprofen (MOTRIN) tablet 400 mg  400 mg Oral Q6H PRN    pravastatin (PRAVACHOL) tablet 20 mg  20 mg Oral DAILY    pantoprazole (PROTONIX) tablet 40 mg  40 mg Oral DAILY PRN    insulin lispro (HUMALOG) injection   SubCUTAneous AC&HS    glucose chewable tablet 16 g  4 Tab Oral PRN    dextrose (D50W) injection syrg 12.5-25 g  12.5-25 g IntraVENous PRN    glucagon (GLUCAGEN) injection 1 mg  1 mg IntraMUSCular PRN    sodium chloride (NS) flush 5-40 mL  5-40 mL IntraVENous PRN    ondansetron (ZOFRAN) injection 4 mg  4 mg IntraVENous Q4H PRN    enoxaparin (LOVENOX) injection 40 mg  40 mg SubCUTAneous Q24H     REVIEW OF SYSTEMS:     []     Unable to obtain  ROS due to  []    mental status change  []    sedated   []    intubated   [x]    Total of 11 systems reviewed as follows:  Const:   negative fever, negative chills, negative weight loss  Eyes:   negative diplopia or visual changes, negative eye pain  ENT:   negative coryza, negative sore throat  Resp:   negative hemoptysis  Cards:  negative for chest pain, palpitations, lower extremity edema  :  negative for frequency, dysuria and hematuria  Skin:   negative for rash and pruritus  Heme:   negative for easy bruising and gum/nose bleeding  MS:  negative for myalgias, arthralgias, back pain and muscle weakness  Neurolo:  negative for headaches, dizziness, vertigo, memory problems   Psych:  negative for feelings of anxiety, depression     Pertinent Positives include : dyspnea    Objective:   VITALS:    Visit Vitals  /84   Pulse 92   Temp 99.1 °F (37.3 °C)   Resp 20   Ht 6' (1.829 m)   Wt 77.9 kg (171 lb 11.8 oz)   SpO2 94%   BMI 23.29 kg/m²     Temp (24hrs), Av.9 °F (37.2 °C), Min:98.1 °F (36.7 °C), Max:99.5 °F (37.5 °C)    PHYSICAL EXAM:   General:    Alert, cooperative, no distress, appears stated age. Head:   Normocephalic, without obvious abnormality, atraumatic. Eyes:   Conjunctivae clear, anicteric sclerae. Pupils are equal  Nose:  Nares normal. No drainage or sinus tenderness. Throat: Lips, mucosa, and tongue normal.  No Thrush  Neck:  Supple, symmetrical,  no adenopathy, thyroid: non tender  Back:    Symmetric,  No CVA tenderness. Lungs:   CTA bilaterally. No wheezing/rhonchi/rales. Chest wall:  No tenderness or deformity. No Accessory muscle use. Heart:   Regular rate and rhythm,  no murmur, rub or gallop. Abdomen:   Soft, non-tender. Not distended. Bowel sounds normal. No masses. No epigastric tenderness. Extremities: Atraumatic, No cyanosis. No edema. No clubbing  Skin:     Texture, turgor normal. No rashes/lesions/jaundice  Lymph: Cervical, supraclavicular normal.  Psych:  Good insight. Not depressed. Not anxious or agitated. Neurologic: EOMs intact. No facial asymmetry. No aphasia or slurred speech. Normal  strength, A/O X 3.      LAB DATA REVIEWED:    Recent Results (from the past 48 hour(s))   GLUCOSE, POC    Collection Time: 19 11:29 AM   Result Value Ref Range    Glucose (POC) 151 (H) 65 - 100 mg/dL    Performed by [employer] [place], POC    Collection Time: 19  5:18 PM   Result Value Ref Range    Glucose (POC) 138 (H) 65 - 100 mg/dL    Performed by [employer] [place], POC    Collection Time: 01/26/19  9:06 PM   Result Value Ref Range    Glucose (POC) 181 (H) 65 - 100 mg/dL    Performed by Esau Eric (PCT)    PHOSPHORUS    Collection Time: 01/27/19  5:38 AM   Result Value Ref Range    Phosphorus 3.0 2.6 - 4.7 MG/DL   METABOLIC PANEL, BASIC    Collection Time: 01/27/19  5:38 AM   Result Value Ref Range    Sodium 139 136 - 145 mmol/L    Potassium 3.0 (L) 3.5 - 5.1 mmol/L    Chloride 99 97 - 108 mmol/L    CO2 29 21 - 32 mmol/L    Anion gap 11 5 - 15 mmol/L    Glucose 160 (H) 65 - 100 mg/dL    BUN 9 6 - 20 MG/DL    Creatinine 0.53 (L) 0.70 - 1.30 MG/DL    BUN/Creatinine ratio 17 12 - 20      GFR est AA >60 >60 ml/min/1.73m2    GFR est non-AA >60 >60 ml/min/1.73m2    Calcium 8.0 (L) 8.5 - 10.1 MG/DL   CBC W/O DIFF    Collection Time: 01/27/19  5:38 AM   Result Value Ref Range    WBC 11.0 4.1 - 11.1 K/uL    RBC 4.26 4.10 - 5.70 M/uL    HGB 13.1 12.1 - 17.0 g/dL    HCT 38.5 36.6 - 50.3 %    MCV 90.4 80.0 - 99.0 FL    MCH 30.8 26.0 - 34.0 PG    MCHC 34.0 30.0 - 36.5 g/dL    RDW 12.7 11.5 - 14.5 %    PLATELET 886 (H) 420 - 400 K/uL    MPV 9.6 8.9 - 12.9 FL    NRBC 0.0 0  WBC    ABSOLUTE NRBC 0.00 0.00 - 0.01 K/uL   MAGNESIUM    Collection Time: 01/27/19  5:38 AM   Result Value Ref Range    Magnesium 2.1 1.6 - 2.4 mg/dL   GLUCOSE, POC    Collection Time: 01/27/19  7:45 AM   Result Value Ref Range    Glucose (POC) 150 (H) 65 - 100 mg/dL    Performed by Lynette Chatman    GLUCOSE, POC    Collection Time: 01/27/19 11:34 AM   Result Value Ref Range    Glucose (POC) 155 (H) 65 - 100 mg/dL    Performed by Lynette Chatman    GLUCOSE, POC    Collection Time: 01/27/19  3:58 PM   Result Value Ref Range    Glucose (POC) 124 (H) 65 - 100 mg/dL    Performed by Lynette Chatman    GLUCOSE, POC    Collection Time: 01/27/19  8:50 PM   Result Value Ref Range    Glucose (POC) 133 (H) 65 - 100 mg/dL    Performed by Esau Eric (REBECA)    METABOLIC PANEL, BASIC    Collection Time: 01/28/19  4:49 AM Result Value Ref Range    Sodium 140 136 - 145 mmol/L    Potassium 3.1 (L) 3.5 - 5.1 mmol/L    Chloride 98 97 - 108 mmol/L    CO2 31 21 - 32 mmol/L    Anion gap 11 5 - 15 mmol/L    Glucose 148 (H) 65 - 100 mg/dL    BUN 10 6 - 20 MG/DL    Creatinine 0.47 (L) 0.70 - 1.30 MG/DL    BUN/Creatinine ratio 21 (H) 12 - 20      GFR est AA >60 >60 ml/min/1.73m2    GFR est non-AA >60 >60 ml/min/1.73m2    Calcium 8.3 (L) 8.5 - 10.1 MG/DL   CBC W/O DIFF    Collection Time: 01/28/19  4:49 AM   Result Value Ref Range    WBC 11.0 4.1 - 11.1 K/uL    RBC 4.23 4. 10 - 5.70 M/uL    HGB 12.7 12.1 - 17.0 g/dL    HCT 38.4 36.6 - 50.3 %    MCV 90.8 80.0 - 99.0 FL    MCH 30.0 26.0 - 34.0 PG    MCHC 33.1 30.0 - 36.5 g/dL    RDW 12.7 11.5 - 14.5 %    PLATELET 555 (H) 722 - 400 K/uL    MPV 9.5 8.9 - 12.9 FL    NRBC 0.0 0  WBC    ABSOLUTE NRBC 0.00 0.00 - 0.01 K/uL   GLUCOSE, POC    Collection Time: 01/28/19  7:27 AM   Result Value Ref Range    Glucose (POC) 157 (H) 65 - 100 mg/dL    Performed by Rosendo Briceno (PCT)      IMAGING RESULTS:     No related imaging to review. Recommendations/Plan:      Active Problems:    Influenza (1/19/2019)       Mr. David Arthur is a 47 y.o. Male who was admitted for acute respiratory failure with worsening chronic (since 1990's) esophageal dysfunction for the past few days. Primarily tolerating only ice chips at this time, has glucerna shakes but these get stuck. Previously had extensive work up in 1990's with little to no resolution. Some improvement with botox for for only short period of time. Not currently on any PPI. Previously never tried any CCB for esophageal spasms. Concern for esophageal spasm, achalasia, and cannot exclude obstruction or mass. Not ideal time to complete EGD due to respiratory status and no dramatic change in symptoms.  Much of this can be done outpatient  but will start work up.  ___________________________________________________  RECOMMENDATIONS:      1. Modified barium swallow today  2. Will hold off on EGD due to respiratory status on High flow NC and may be able to complete outpatient pending findings. 3. Consider CCB if esophageal spasm but will need manometry first  4. No PPI needed at this time. 5. Change to liquid diet to see if he tolerates this better. 6. Encourage PO intake including glucerna shakes, small amounts     Discussed Code Status:    [x]    Full Code      []    DNR    ___________________________________________________  Care Plan discussed with:    [x]    Patient   [x]    Family - spouse   []    Nursing   []    Attending  Total Time :  50   minutes   ___________________________________________________  GI: Kwaku Nowak NP   GI Consult Attg Addendum Anselmo Churchill):  Pt seen and d/w [address] [place] NP. Agree with eval and plan as above. In brief pt is a 48yo with long-standing swallowing issues worked up 15-20yrs ago, here now with acute respiratory illness over last 1-2 weeks, c/o of worsening swallowing now. Tolerating ice, but feels like even his Glucerna shake are getting stuck. Reports transient improvement with Botox injections in the past, but these records are not available. Labs reviewed with no anemia noted. Meds reviewed with no PPI use noted. PE with RRR, CTA b/l, and benign abd. DDx includes but is not limited to esophageal dyskinesia due to spasm, GERD (prolonged supine position), or achalasia. Mass or stricture is less likely. Cannot exlude diverticulum. Plan:  1) BS today  2) Will add daily PPI IV for now  3) Encourage PO as a liquid diet  4) EGD should be deferred until respiratory status much improved. 5) Remainder of his w/u that would include esophageal manometry and later EGD could be done as OP  6) Consider Ca-Channel Blockers if spasm or Achalasia Type 3 demonstrated. If achalasia type 1 or 2, would need other therapy.   Demetrius Hemphill MD

## 2019-01-28 NOTE — PROGRESS NOTES
Hospitalist Progress Note NAME: Yaniv Bliss :  1965 MRN:  [id number] Assessment / Plan: 
 
Acute hypoxic respiratory failure Sepsis secondary to Influenza A pneumonia Mild to moderate ARDS 
-CXR  Progression of bilateral airspace disease which may represent pulmonary edema versus diffuse pneumonia. Repeat chest xray on :Slightly improved aeration relative to comparison which may be technical and 
-Refused Tamiflu  
-completed course of empiric levaquin  
-Echo EF 13-83%, diastolic dysfunction, no MR, no AS 
-continue efforts to wean high flow - down to 4L this AM 
- PT eval  
 
Nausea vomiting Hx esophageal motility issues -\"it's 20x worse now\" -GI input appreciated. Starting with barium swallow 
-start IV protonix Hypokalemia 
-replete IV 
 
DMI, uncontrolled with hyperglycemia POA HLD  
- Hba1c 11%; pt reported not taking lantus or any medication for many years . - continue low dose lantus with SSI prn 
 
KATJA 
- ordered NIV but patient refusing to use - \" the pressure is too strong\" Code Status: Full Surrogate Decision Maker: Wife DVT Prophylaxis: Lovenox GI Prophylaxis: not indicated Baseline: Independent Body mass index is 23.29 kg/m². therefore classifying patient as overweight, NOS (body mass index [BMI] of 25 to 29.9 kg/m2) 
-counseled exercise/diet by my collegue Subjective: Chief Complaint / Reason for Physician Visit Follow up PNA, influenza, hypoxemia, DM Breathing better but still not able to keep even liquids down. Review of Systems: 
Symptom Y/N Comments  Symptom Y/N Comments Fever/Chills n   Chest Pain n   
Poor Appetite    Edema Cough    Abdominal Pain n   
Sputum    Joint Pain SOB/FABIAN y  improving  Pruritis/Rash Nausea/vomit YY   Tolerating PT/OT Diarrhea n   Tolerating Diet n   
Constipation    Other Could NOT obtain due to:   
 
Objective: VITALS:  
 Last 24hrs VS reviewed since prior progress note. Most recent are: 
Patient Vitals for the past 24 hrs: 
 Temp Pulse Resp BP SpO2  
01/28/19 1144 98.9 °F (37.2 °C)   142/77 96 % 01/28/19 0719 99.1 °F (37.3 °C) 92 20 163/84 94 % 01/28/19 0451 99.4 °F (37.4 °C) 95 20 148/86 93 % 01/27/19 2243 99.5 °F (37.5 °C) 99 20 147/85 92 % 01/27/19 1919 99.3 °F (37.4 °C) 97 18 129/63 96 % 01/27/19 1519 98.1 °F (36.7 °C) 82 18 147/82 93 % Intake/Output Summary (Last 24 hours) at 1/28/2019 1300 Last data filed at 1/28/2019 4212 Gross per 24 hour Intake 1060 ml Output  Net 1060 ml PHYSICAL EXAM: 
General: WD, WN. Pale and tired , ill appearing , cooperative, no acute distress   
EENT:  EOMI. Anicteric sclerae. MMM Resp:  Bilateral decreased air entry, no crackles  No accessory muscle use CV:  Regular  rhythm,  Trace edema GI:  Soft, Non distended, Non tender.  +Bowel sounds Neurologic:  Alert and oriented X 3, normal speech, Psych:   Good insight. Not anxious nor agitated Skin:  No rashes. No jaundice Reviewed most current lab test results and cultures  YES Reviewed most current radiology test results   YES Review and summation of old records today    NO Reviewed patient's current orders and MAR    YES 
PMH/ reviewed - no change compared to H&P 
________________________________________________________________________ Care Plan discussed with: 
  Comments Patient y Family  y wife RN y   
Care Manager Consultant Multidiciplinary team rounds were held today with , nursing, pharmacist and clinical coordinator. Patient's plan of care was discussed; medications were reviewed and discharge planning was addressed. ________________________________________________________________________ Total NON critical care TIME: 20 Minutes Total CRITICAL CARE TIME Spent:   Minutes non procedure based Comments >50% of visit spent in counseling and coordination of care x As above   
________________________________________________________________________ Ailyn Concepcion MD  
 
Procedures: see electronic medical records for all procedures/Xrays and details which were not copied into this note but were reviewed prior to creation of Plan. LABS: 
I reviewed today's most current labs and imaging studies. Pertinent labs include: 
Recent Labs  
  01/28/19 0449 01/27/19 
7881 WBC 11.0 11.0 HGB 12.7 13.1 HCT 38.4 38.5 * 691* Recent Labs  
  01/28/19 0449 01/27/19 
0538 01/26/19 
0455  139 138  
K 3.1* 3.0* 3.2*  
CL 98 99 97 CO2 31 29 29 * 160* 168* BUN 10 9 9 CREA 0.47* 0.53* 0.50* CA 8.3* 8.0* 8.3*  
MG  --  2.1  --   
PHOS  --  3.0 2.5* Signed: Ailyn Concepcion MD

## 2019-01-28 NOTE — PROGRESS NOTES
Report given to [facility]

## 2019-01-29 ENCOUNTER — APPOINTMENT (OUTPATIENT)
Dept: GENERAL RADIOLOGY | Age: 54
DRG: 871 | End: 2019-01-29
Attending: INTERNAL MEDICINE
Payer: COMMERCIAL

## 2019-01-29 ENCOUNTER — ANESTHESIA EVENT (OUTPATIENT)
Dept: SURGERY | Age: 54
DRG: 871 | End: 2019-01-29
Payer: COMMERCIAL

## 2019-01-29 ENCOUNTER — APPOINTMENT (OUTPATIENT)
Dept: GENERAL RADIOLOGY | Age: 54
DRG: 871 | End: 2019-01-29
Attending: NURSE PRACTITIONER
Payer: COMMERCIAL

## 2019-01-29 LAB
ANION GAP SERPL CALC-SCNC: 10 MMOL/L (ref 5–15)
BUN SERPL-MCNC: 9 MG/DL (ref 6–20)
BUN/CREAT SERPL: 18 (ref 12–20)
CALCIUM SERPL-MCNC: 8.2 MG/DL (ref 8.5–10.1)
CHLORIDE SERPL-SCNC: 100 MMOL/L (ref 97–108)
CO2 SERPL-SCNC: 30 MMOL/L (ref 21–32)
CREAT SERPL-MCNC: 0.49 MG/DL (ref 0.7–1.3)
GLUCOSE BLD STRIP.AUTO-MCNC: 117 MG/DL (ref 65–100)
GLUCOSE BLD STRIP.AUTO-MCNC: 119 MG/DL (ref 65–100)
GLUCOSE BLD STRIP.AUTO-MCNC: 122 MG/DL (ref 65–100)
GLUCOSE BLD STRIP.AUTO-MCNC: 133 MG/DL (ref 65–100)
GLUCOSE SERPL-MCNC: 128 MG/DL (ref 65–100)
MAGNESIUM SERPL-MCNC: 2.2 MG/DL (ref 1.6–2.4)
PHOSPHATE SERPL-MCNC: 2.7 MG/DL (ref 2.6–4.7)
POTASSIUM SERPL-SCNC: 3.2 MMOL/L (ref 3.5–5.1)
SERVICE CMNT-IMP: ABNORMAL
SODIUM SERPL-SCNC: 140 MMOL/L (ref 136–145)

## 2019-01-29 PROCEDURE — 74011250636 HC RX REV CODE- 250/636: Performed by: INTERNAL MEDICINE

## 2019-01-29 PROCEDURE — 77010033678 HC OXYGEN DAILY

## 2019-01-29 PROCEDURE — 36415 COLL VENOUS BLD VENIPUNCTURE: CPT

## 2019-01-29 PROCEDURE — 65660000000 HC RM CCU STEPDOWN

## 2019-01-29 PROCEDURE — 74011250636 HC RX REV CODE- 250/636: Performed by: GENERAL ACUTE CARE HOSPITAL

## 2019-01-29 PROCEDURE — 83735 ASSAY OF MAGNESIUM: CPT

## 2019-01-29 PROCEDURE — 82962 GLUCOSE BLOOD TEST: CPT

## 2019-01-29 PROCEDURE — 84100 ASSAY OF PHOSPHORUS: CPT

## 2019-01-29 PROCEDURE — 80048 BASIC METABOLIC PNL TOTAL CA: CPT

## 2019-01-29 PROCEDURE — 74220 X-RAY XM ESOPHAGUS 1CNTRST: CPT

## 2019-01-29 PROCEDURE — C9113 INJ PANTOPRAZOLE SODIUM, VIA: HCPCS | Performed by: INTERNAL MEDICINE

## 2019-01-29 PROCEDURE — 71045 X-RAY EXAM CHEST 1 VIEW: CPT

## 2019-01-29 PROCEDURE — 74011636637 HC RX REV CODE- 636/637: Performed by: INTERNAL MEDICINE

## 2019-01-29 PROCEDURE — 74011000250 HC RX REV CODE- 250: Performed by: INTERNAL MEDICINE

## 2019-01-29 RX ORDER — POTASSIUM CHLORIDE 7.45 MG/ML
10 INJECTION INTRAVENOUS
Status: COMPLETED | OUTPATIENT
Start: 2019-01-29 | End: 2019-01-29

## 2019-01-29 RX ADMIN — SODIUM CHLORIDE 40 MG: 9 INJECTION, SOLUTION INTRAMUSCULAR; INTRAVENOUS; SUBCUTANEOUS at 09:40

## 2019-01-29 RX ADMIN — POTASSIUM CHLORIDE 10 MEQ: 10 INJECTION, SOLUTION INTRAVENOUS at 12:48

## 2019-01-29 RX ADMIN — POTASSIUM CHLORIDE 10 MEQ: 10 INJECTION, SOLUTION INTRAVENOUS at 14:12

## 2019-01-29 RX ADMIN — INSULIN GLARGINE 5 UNITS: 100 INJECTION, SOLUTION SUBCUTANEOUS at 21:41

## 2019-01-29 RX ADMIN — POTASSIUM CHLORIDE 10 MEQ: 10 INJECTION, SOLUTION INTRAVENOUS at 16:06

## 2019-01-29 RX ADMIN — POTASSIUM CHLORIDE 10 MEQ: 10 INJECTION, SOLUTION INTRAVENOUS at 15:00

## 2019-01-29 RX ADMIN — ENOXAPARIN SODIUM 40 MG: 40 INJECTION SUBCUTANEOUS at 09:41

## 2019-01-29 NOTE — PROGRESS NOTES
PCU SHIFT NURSING NOTE Bedside shift change report given to Ritu Golden (oncoming nurse) by Deann Min (offgoing nurse). Report included the following information SBAR, Kardex, Intake/Output, MAR and Recent Results. Shift Summary: 0715  Pt in bed resting  No complaints of pain  3L O2 NC  A&O x 4  Wife at bedside  Esophagram this morning 
0800  Pt left unit for esophagram   
0915  Pt returned to unit   High grade stricture noted on results  Called Dr Luna Suresh office and LM   Will keep patient NPO   
8798  Per Dr Jamar Montalvo pt may have sips of clears [address] [employer]  Entered pt's room, he was laying in bed and had removed his NC b/c he felt dry   SPO2 88%  Placed O2 back on patient and requested he not remove for extended periods of time  Pt verbalized understanding 1915  Report given to Yusuf Torres Admission Date 1/19/2019 Admission Diagnosis Influenza Consults IP CONSULT TO HOSPITALIST 
IP CONSULT TO INTENSIVIST 
IP CONSULT TO GASTROENTEROLOGY Consults []PT []OT []Speech  
[]Case Management  
  
[] Palliative Cardiac Monitoring Order []Yes []No  
 
IV drips []Yes Drip:                            Dose: 
Drip:                            Dose: 
Drip:                            Dose:  
[]No  
 
GI Prophylaxis []Yes []No  
 
 
 
DVT Prophylaxis SCDs:     
     
 Bartolome stockings:     
  
[] Medication []Contraindicated []None Activity Level Activity Level: Up with Assistance Activity Assistance: Partial (one person) Purposeful Rounding every 1-2 hour? []Yes Trivedi Score  Total Score: 1 Bed Alarm (If score 3 or >) []Yes  
[] Refused (See signed refusal form in chart) Connor Score  Connor Score: 19 Connor Score (if score 14 or less) []PMT consult  
[]Wound Care consult []Specialty bed  
[] Nutrition consult Needs prior to discharge:  
Home O2 required:   
[]Yes []No  
 If yes, how much O2 required? Other: Last Bowel Movement: Last Bowel Movement Date: 01/21/19(refusing stooll med at this time as sched for GI tests in am) Influenza Vaccine Received Flu Vaccine for Current Season (usually Sept-March): No  
 Patient/Guardian Refused (Notify MD): Yes Pneumonia Vaccine Diet Active Orders Diet DIET FULL LIQUID  
  
LDAs Peripheral IV 01/25/19 Left Arm (Active) Site Assessment Clean, dry, & intact 1/28/2019  7:12 PM  
Phlebitis Assessment 0 1/29/2019  3:26 AM  
Infiltration Assessment 0 1/29/2019  3:26 AM  
Dressing Status Clean, dry, & intact 1/28/2019  7:12 PM  
Dressing Type Transparent 1/28/2019  7:12 PM  
Hub Color/Line Status Pink;Capped 1/28/2019  7:12 PM  
Action Taken Open ports on tubing capped 1/28/2019  7:12 PM  
Alcohol Cap Used Yes 1/28/2019  7:12 PM  
                  
Urinary Catheter Intake & Output Date 01/28/19 0700 - 01/29/19 0659 01/29/19 0700 - 01/30/19 3247 Shift 3764-5575 2591-1712 24 Hour Total 8197-4232 3507-3895 24 Hour Total  
INTAKE  
P.O. 1200  1200     
  P. O. 1200  1200 Shift Total(mL/kg) 5076(02.6)  1824(45.1) OUTPUT Urine(mL/kg/hr) Urine Occurrence(s) 4 x 5 x 9 x Shift Total(mL/kg) NET 1200  1200 Weight (kg) 77.9 77.9 77.9 77.9 77.9 77.9 Readmission Risk Assessment Tool Score Low Risk 12 Total Score 3 Has Seen PCP in Last 6 Months (Yes=3, No=0) 2 . Living with Significant Other. Assisted Living. LTAC. SNF. or  
Rehab  
 3 Patient Length of Stay (>5 days = 3) 4 Charlson Comorbidity Score (Age + Comorbid Conditions) Criteria that do not apply:  
 IP Visits Last 12 Months (1-3=4, 4=9, >4=11) Pt. Coverage (Medicare=5 , Medicaid, or Self-Pay=4) Expected Length of Stay 4d 19h Actual Length of Stay 10

## 2019-01-29 NOTE — PROGRESS NOTES
[facility], Critical Care, and Sleep Medicine Name: Yaniv Bliss MRN: [id number] : 1965 Hospital: [place] Date: 2019 IMPRESSION:  
· Acute hypoxic respiratory failure · Acute influenza pneumonia, lingering · Mild-to-moderate ARDS · Dysphagia · Severe esophageal stricture · KATJA · Can not exclude bacterial superinfection · Can not completely exclude heart failure · Uncontrolled diabetes RECOMMENDATIONS:  
· Wean O2,  
· NIV prn · Appreciate Speech and GI consults · Aspiration precautions · If stricture not improved some to allow better Po intake, May need TPN · Bronchodilators as needed · Agree with empiric antibiotics · Patient has refused Tamiflu, still on isolation · Insulin · Still at risk for further decompensation from influenza pneumonia.  Very tight distal esophageal stricture on Barium study. Down to 3 LPM. Pt is vehement that dilitation did not work. Dr. Kristy Mg showed pt the images and was very thorough explaining pt's findings and and for a methodical approach to evaluation. Pt has had this for about 20 yrs and needs reevaluation. This will not be fixed overnight, need more information.  refusing BIPAP but sats holding on high flow/ midflow nasal O2. Long h/o dysphagia. Poor PO intake for many days. Remote extensive workup by . Subjective:  
 
19: oxygenation remains poor; on 10 LPM nasal cannula. Complaining about nasal dryness, but doesn't want mask that covers mouth. He also is complaining about sleep apnea and he feels that this is why he is hypoxic during the day. Has not worn CPAP in years, but now is upset he is not being treated with it nightly here. He does not recall his settings, but he knows they were \"high\". From his description he had an auto-CPAP. Initial history: This patient has been seen and evaluated at the request of Dr. Alex Cedillo for worsening pneumonia. Patient is a 47 y.o. male former smoker, presented to the ER 1/19 with dyspnea and generalized malaise. He was diagnosed with influenza at [facility] 1 week ago. He declined to take Tamiflu because he was concerned it would give him side effects. He did not get a flu shot this year. He has been treated with antibiotics, IVF, but he has become progressively more hypoxic with malaise and dyspnea. He was again offered Tamiflu here but has refused again. Due to worsening hypoxia, I was asked to evaluate him. Past Medical History:  
Diagnosis Date  Diabetes ([address]  Nausea & vomiting  Pancreatitis  Unspecified sleep apnea Past Surgical History:  
Procedure Laterality Date  HX GI    
 upper endoscopies  HX HEENT    
 oral surgery  HX HEENT    
 turbinate reduction Prior to Admission medications Not on File Allergies Allergen Reactions  Lisinopril Anaphylaxis Social History Tobacco Use  Smoking status: Former Smoker Years: 1.00 Types: Cigarettes  Smokeless tobacco: Never Used Substance Use Topics  Alcohol use: Yes Family History Problem Relation Age of Onset  Heart Disease Mother   
     s/p stent  Diabetes Father  Cancer Brother   
     stomach cancer Current Facility-Administered Medications Medication Dose Route Frequency  potassium chloride 10 mEq in 100 ml IVPB  10 mEq IntraVENous Q1H  
 pantoprazole (PROTONIX) 40 mg in sodium chloride 0.9% 10 mL injection  40 mg IntraVENous DAILY  insulin glargine (LANTUS) injection 5 Units  5 Units SubCUTAneous QHS  pravastatin (PRAVACHOL) tablet 20 mg  20 mg Oral DAILY  insulin lispro (HUMALOG) injection   SubCUTAneous AC&HS  enoxaparin (LOVENOX) injection 40 mg  40 mg SubCUTAneous Q24H Review of Systems: A comprehensive review of systems was negative except for: Ears, nose, mouth, throat, and face: positive for snoring Objective: 
Vital Signs:   
Visit Vitals BP (!) 146/95 Pulse 87 Temp 99.1 °F (37.3 °C) Resp 18 Ht 6' (1.829 m) Wt 77.9 kg (171 lb 11.8 oz) SpO2 95% BMI 23.29 kg/m² O2 Device: Nasal cannula O2 Flow Rate (L/min): 3 l/min Temp (24hrs), Av.6 °F (37 °C), Min:98 °F (36.7 °C), Max:99.3 °F (37.4 °C) Intake/Output:  
Last shift:      No intake/output data recorded. Last 3 shifts:  1901 -  0700 In: 1560 [P.O.:1560] Out: - Intake/Output Summary (Last 24 hours) at 2019 1207 Last data filed at 2019 1054 Gross per 24 hour Intake 700 ml Output  Net 700 ml Physical Exam:  
General:  Alert, ill appearing, no acute dstress Head:  Normocephalic, without obvious abnormality, atraumatic. Eyes:  Conjunctivae/corneas clear. Nose: Nares normal. Septum midline. Mucosa normal.    
Throat: Lips, mucosa, and tongue normal. Teeth and gums normal.  
Neck: Supple, symmetrical, trachea midline Back:   Symmetric, no curvature. ROM normal.  
Lungs:   Scattered bilateral rales Chest wall:  No tenderness or deformity. Heart:  Tachy, regular, no murmur Abdomen:   Soft, non-tender. Bowel sounds normal.    
Extremities: Extremities normal, atraumatic, no cyanosis or clubbing Skin: Skin color, texture, turgor normal. No rashes or lesions Neurologic: Grossly nonfocal  
 
Data:  
Labs: 
Recent Labs  
  19 
0449 19 
1190 WBC 11.0 11.0 HGB 12.7 13.1 HCT 38.4 38.5 * 691* Recent Labs  
  19 
0248 19 
0449 19 
8618  140 139  
K 3.2* 3.1* 3.0*  
 98 99 CO2 30 31 29 * 148* 160* BUN 9 10 9 CREA 0.49* 0.47* 0.53* CA 8.2* 8.3* 8.0*  
MG 2.2  --  2.1 PHOS 2.7  --  3.0 No results for input(s): PH, PCO2, PO2, HCO3, FIO2 in the last 72 hours. Imaging: 
I have personally reviewed the patients radiographs: 
No change yesterday Bob Mcclure MD

## 2019-01-29 NOTE — PROGRESS NOTES
GI Progress Note [place] [facility] Stager :1965 KX:[id number] ATTG: Noah Viveros MD PCP: None 
Date/Time:  2019 12:21 PM  
Assessment: · Dysphagia- attributed to apparent distal esophageal stricture as seen on BS 
· Esophageal stricture · Retained esophageal food bolus Plan: · EGD tomorrow in OR for food bolus removal and dilatation · Allow sips of clears · NPO after MN Subjective:  
Discussed with RN events overnight. Continued c/o dysphagia. Breathing improving Complaint Y/N Description Abdominal Pain n Hematemesis n Hematochezia n Melena n   
Constipation n   
Diarrhea n Dyspepsia n Dysphagia y   
Jaundiced n   
Nausea/vomiting n Review of Systems: 
Symptom Y/N Comments  Symptom Y/N Comments Fever/Chills n   Chest Pain n   
Cough n   Headaches n Sputum n   Joint Pain n   
SOB/FABIAN n   Pruritis/Rash n Tolerating Diet y Sips/ice  Other Could NOT obtain due to:   
 
Objective: VITALS:  
Last 24hrs VS reviewed since prior progress note. Most recent are: 
Visit Vitals BP (!) 146/95 Pulse 87 Temp 99.1 °F (37.3 °C) Resp 18 Ht 6' (1.829 m) Wt 77.9 kg (171 lb 11.8 oz) SpO2 95% BMI 23.29 kg/m² Intake/Output Summary (Last 24 hours) at 2019 1221 Last data filed at 2019 1054 Gross per 24 hour Intake 700 ml Output  Net 700 ml PHYSICAL EXAM: 
General: WD, WN. Alert, cooperative, no acute distress   
HEENT: NC, Atraumatic. PERRL. Anicteric sclerae. Lungs:  CTA Bilaterally. No Wheezing/Rhonchi/Rales. Heart:  Regular  rhythm,  No murmur/Rub/Gallops Abdomen: Soft, Non distended, Non tender.  +BS Extremities: No c/c/e Neurologic:  CN 2-12 gi, A/O X 3. No acute neurological distress Psych:   Good insight. Not anxious nor agitated. Lab and Radiology Data Reviewed: (see below) BS 19 FINDINGS:  
Preliminary chest radiograph again demonstrates diffuse bilateral interstitial 
 and alveolar opacities. No pleural effusion is evident. Double contrast esophagram was performed using liquid barium and effervescent 
powder. There is a high-grade stricture at the gastroesophageal junction, which 
significantly limited passage of liquid barium. The patient vomited several 
times during the examination. Esophageal motility is impaired. There is no 
evidence of gastroesophageal reflux. No hiatal hernia was seen. IMPRESSION: High-grade stricture at the gastroesophageal junction. Impaired 
esophageal motility. 
  
Medications Reviewed: (see below) PMH/SH reviewed - no change compared to H&P 
________________________________________________________________________ Total time spent with patient: 15 minutes ________________________________________________________________________ Care Plan discussed with: 
Patient y Family  y  
RN y  
           Consultant:  filippo Mario MD  
 
Procedures: see electronic medical records for all procedures/Xrays and details which were not copied into this note but were reviewed prior to creation of Plan. LABS: 
Recent Labs  
  01/28/19 
0449 01/27/19 
6147 WBC 11.0 11.0 HGB 12.7 13.1 HCT 38.4 38.5 * 691* Recent Labs  
  01/29/19 
0248 01/28/19 
0449 01/27/19 
7898  140 139  
K 3.2* 3.1* 3.0*  
 98 99 CO2 30 31 29 BUN 9 10 9 CREA 0.49* 0.47* 0.53* * 148* 160* CA 8.2* 8.3* 8.0*  
MG 2.2  --  2.1 PHOS 2.7  --  3.0 No results for input(s): SGOT, GPT, AP, TBIL, TP, ALB, GLOB, GGT, AML, LPSE in the last 72 hours. No lab exists for component: AMYP, HLPSE No results for input(s): INR, PTP, APTT in the last 72 hours. No lab exists for component: INREXT No results for input(s): FE, TIBC, PSAT, FERR in the last 72 hours. No results found for: FOL, RBCF No results for input(s): PH, PCO2, PO2 in the last 72 hours. No results for input(s): CPK, CKMB in the last 72 hours. No lab exists for component: TROPONINI Lab Results Component Value Date/Time Color YELLOW/STRAW 05/30/2014 07:20 PM  
 Appearance CLEAR 05/30/2014 07:20 PM  
 Specific gravity 1.020 05/30/2014 07:20 PM  
 pH (UA) 5.5 05/30/2014 07:20 PM  
 Protein NEGATIVE  05/30/2014 07:20 PM  
 Glucose >1,000 (A) 05/30/2014 07:20 PM  
 Ketone 40 (A) 05/30/2014 07:20 PM  
 Bilirubin NEGATIVE  05/30/2014 07:20 PM  
 Urobilinogen 0.2 05/30/2014 07:20 PM  
 Nitrites NEGATIVE  05/30/2014 07:20 PM  
 Leukocyte Esterase NEGATIVE  05/30/2014 07:20 PM  
 Epithelial cells FEW 05/30/2014 07:20 PM  
 Bacteria 1+ (A) 05/30/2014 07:20 PM  
 WBC 5-10 05/30/2014 07:20 PM  
 RBC 0-5 05/30/2014 07:20 PM  
 
 
MEDICATIONS: 
Current Facility-Administered Medications Medication Dose Route Frequency  potassium chloride 10 mEq in 100 ml IVPB  10 mEq IntraVENous Q1H  
 pantoprazole (PROTONIX) 40 mg in sodium chloride 0.9% 10 mL injection  40 mg IntraVENous DAILY  oxymetazoline (AFRIN) 0.05 % nasal spray 2 Spray  2 Spray Both Nostrils BID PRN  
 sodium chloride (OCEAN) 0.65 % nasal squeeze bottle 2 Spray  2 Spray Both Nostrils Q2H PRN  
 loperamide (IMODIUM) capsule 2 mg  2 mg Oral Q4H PRN  
 acetaminophen (TYLENOL) tablet 650 mg  650 mg Oral Q6H PRN  
 levalbuterol (XOPENEX) nebulizer soln 1.25 mg/3 mL  1.25 mg Nebulization Q4H PRN  
 insulin glargine (LANTUS) injection 5 Units  5 Units SubCUTAneous QHS  ibuprofen (MOTRIN) tablet 400 mg  400 mg Oral Q6H PRN  pravastatin (PRAVACHOL) tablet 20 mg  20 mg Oral DAILY  pantoprazole (PROTONIX) tablet 40 mg  40 mg Oral DAILY PRN  
 insulin lispro (HUMALOG) injection   SubCUTAneous AC&HS  
 glucose chewable tablet 16 g  4 Tab Oral PRN  
 dextrose (D50W) injection syrg 12.5-25 g  12.5-25 g IntraVENous PRN  
 glucagon (GLUCAGEN) injection 1 mg  1 mg IntraMUSCular PRN  
 sodium chloride (NS) flush 5-40 mL  5-40 mL IntraVENous PRN  
  ondansetron (ZOFRAN) injection 4 mg  4 mg IntraVENous Q4H PRN  
 enoxaparin (LOVENOX) injection 40 mg  40 mg SubCUTAneous Q24H

## 2019-01-29 NOTE — PROGRESS NOTES
Hospitalist Progress Note NAME: Sara Cao :  1965 MRN:  [id number] Assessment / Plan: 
 
Acute hypoxic respiratory failure Sepsis secondary to Influenza A pneumonia Mild to moderate ARDS 
-CXR  Progression of bilateral airspace disease which may represent pulmonary edema versus diffuse pneumonia. Repeat chest xray on :Slightly improved aeration relative to comparison which may be technical and 
-Refused Tamiflu  
-completed course of empiric levaquin  
-Echo EF 94-15%, diastolic dysfunction, no MR, no AS 
-continue efforts to wean high flow - down to 3L this AM.  He clinically looks better respiratory wise. Nausea vomiting Hx esophageal motility issues -\"it's 20x worse now\" -BS High-grade stricture at the gastroesophageal junction. Impaired esophageal motility 
-continue IV protonix 
-for endoscopic evaluation tomorrow. Dicussed with GI Hypokalemia 
-replete IV again DMI, uncontrolled with hyperglycemia POA HLD  
- Hba1c 11%; pt reported not taking lantus or any medication for many years . - continue low dose lantus with SSI prn 
 
KATJA 
- ordered NIV but patient refusing to use - \" the pressure is too strong\" Code Status: Full Surrogate Decision Maker: Wife DVT Prophylaxis: Lovenox GI Prophylaxis: not indicated Baseline: Independent Body mass index is 23.29 kg/m². therefore classifying patient as overweight, NOS (body mass index [BMI] of 25 to 29.9 kg/m2) 
-counseled exercise/diet by my collegue Subjective: Chief Complaint / Reason for Physician Visit Follow up PNA, influenza, hypoxemia, DM Breathing better but still not able to keep even liquids down. Wife and pt getting frustrated Review of Systems: 
Symptom Y/N Comments  Symptom Y/N Comments Fever/Chills n   Chest Pain n   
Poor Appetite    Edema Cough    Abdominal Pain n   
Sputum    Joint Pain SOB/FABIAN y  improving  Pruritis/Rash Nausea/vomit YY   Tolerating PT/OT Diarrhea n   Tolerating Diet n   
Constipation    Other Could NOT obtain due to:   
 
Objective: VITALS:  
Last 24hrs VS reviewed since prior progress note. Most recent are: 
Patient Vitals for the past 24 hrs: 
 Temp Pulse Resp BP SpO2  
01/29/19 0715 99.3 °F (37.4 °C) 82 18 148/80 95 % 01/29/19 0326 98.4 °F (36.9 °C) 76 18 148/84 95 % 01/28/19 2249 98.2 °F (36.8 °C) 86 20 156/86 94 % 01/28/19 1912 98 °F (36.7 °C) 80 22 150/86 95 % 01/28/19 1529 98.7 °F (37.1 °C) 90 20 144/80 96 % 01/28/19 1230     95 % 01/28/19 1144 98.9 °F (37.2 °C)   142/77 96 % Intake/Output Summary (Last 24 hours) at 1/29/2019 1132 Last data filed at 1/29/2019 1054 Gross per 24 hour Intake 700 ml Output  Net 700 ml PHYSICAL EXAM: 
General: WD, WN. Pale and tired , ill appearing , cooperative, no acute distress   
EENT:  EOMI. Anicteric sclerae. MMM Resp:  Bilateral decreased air entry, no crackles  No accessory muscle use CV:  Regular  rhythm,  Trace edema GI:  Soft, Non distended, Non tender.  +Bowel sounds Neurologic:  Alert and oriented X 3, normal speech, Psych:   Good insight. Not anxious nor agitated Skin:  No rashes. No jaundice Reviewed most current lab test results and cultures  YES Reviewed most current radiology test results   YES Review and summation of old records today    NO Reviewed patient's current orders and MAR    YES 
PMH/SH reviewed - no change compared to H&P 
________________________________________________________________________ Care Plan discussed with: 
  Comments Patient y Family  y wife RN y   
Care Manager Consultant Multidiciplinary team rounds were held today with , nursing, pharmacist and clinical coordinator. Patient's plan of care was discussed; medications were reviewed and discharge planning was addressed. ________________________________________________________________________ Total NON critical care TIME: 20 Minutes Total CRITICAL CARE TIME Spent:   Minutes non procedure based Comments >50% of visit spent in counseling and coordination of care x As above   
________________________________________________________________________ Fidelia Ayala MD  
 
Procedures: see electronic medical records for all procedures/Xrays and details which were not copied into this note but were reviewed prior to creation of Plan. LABS: 
I reviewed today's most current labs and imaging studies. Pertinent labs include: 
Recent Labs  
  01/28/19 
0449 01/27/19 
7943 WBC 11.0 11.0 HGB 12.7 13.1 HCT 38.4 38.5 * 691* Recent Labs  
  01/29/19 
0248 01/28/19 0449 01/27/19 
9746  140 139  
K 3.2* 3.1* 3.0*  
 98 99 CO2 30 31 29 * 148* 160* BUN 9 10 9 CREA 0.49* 0.47* 0.53* CA 8.2* 8.3* 8.0*  
MG 2.2  --  2.1 PHOS 2.7  --  3.0 Signed: Fidelia Ayala MD

## 2019-01-29 NOTE — PROGRESS NOTES
PCU SHIFT NURSING NOTE Bedside and Verbal shift change report given to Obdulia Varma RN (oncoming nurse) by Brianda Schwab (offgoing nurse). Report included the following information SBAR, Kardex, Intake/Output, MAR, Recent Results and Cardiac Rhythm NSR with PVC'. Shift Summary: Received pt lying in bed with no noted acute distress. See flowcharting for full assessment. Will monitor. Admission Date 1/19/2019 Admission Diagnosis Influenza Consults IP CONSULT TO HOSPITALIST 
IP CONSULT TO INTENSIVIST 
IP CONSULT TO GASTROENTEROLOGY Consults []PT []OT []Speech  
[]Case Management  
  
[] Palliative Cardiac Monitoring Order []Yes []No  
 
IV drips []Yes Drip:                            Dose: 
Drip:                            Dose: 
Drip:                            Dose:  
[]No  
 
GI Prophylaxis []Yes []No  
 
 
 
DVT Prophylaxis SCDs:     
     
 Bartolome stockings:     
  
[] Medication []Contraindicated []None Activity Level Activity Level: Up with Assistance Activity Assistance: Partial (one person) Purposeful Rounding every 1-2 hour? []Yes Trivedi Score  Total Score: 1 Bed Alarm (If score 3 or >) []Yes  
[] Refused (See signed refusal form in chart) Connor Score  Connor Score: 19 Connor Score (if score 14 or less) []PMT consult  
[]Wound Care consult []Specialty bed  
[] Nutrition consult Needs prior to discharge:  
Home O2 required:   
[]Yes []No  
 If yes, how much O2 required? Other:  
 Last Bowel Movement: Last Bowel Movement Date: 01/21/19(refusing stooll med at this time as sched for GI tests in am) Influenza Vaccine Received Flu Vaccine for Current Season (usually Sept-March): No  
 Patient/Guardian Refused (Notify MD): Yes Pneumonia Vaccine Diet Active Orders Diet DIET FULL LIQUID  
  
LDAs Peripheral IV 01/25/19 Left Arm (Active) Site Assessment Clean, dry, & intact 1/28/2019  7:12 PM  
Phlebitis Assessment 0 1/28/2019  7:12 PM  
Infiltration Assessment 0 1/28/2019  7:12 PM  
Dressing Status Clean, dry, & intact 1/28/2019  7:12 PM  
Dressing Type Transparent 1/28/2019  7:12 PM  
Hub Color/Line Status Pink;Capped 1/28/2019  7:12 PM  
Action Taken Open ports on tubing capped 1/28/2019  7:12 PM  
Alcohol Cap Used Yes 1/28/2019  7:12 PM  
                  
Urinary Catheter Intake & Output Date 01/27/19 1900 - 01/28/19 0659 01/28/19 0700 - 01/29/19 4447 Shift 3976-6911 24 Hour Total 8181-0696 3028-1998 24 Hour Total  
INTAKE  
P.O. 360 1460 1200  1200  
  P. O. 360 1460 1200  1200 Shift Total(mL/kg) 360(4.6) 4736(57.9) 8313(74.1)  1200(15.4) OUTPUT Urine(mL/kg/hr) Urine Occurrence(s) 2 x 5 x 4 x  4 x Shift Total(mL/kg)  1460 1200  1200 Weight (kg) 77.9 77.9 77.9 77.9 77.9 Readmission Risk Assessment Tool Score Low Risk 12 Total Score 3 Has Seen PCP in Last 6 Months (Yes=3, No=0) 2 . Living with Significant Other. Assisted Living. LTAC. SNF. or  
Rehab  
 3 Patient Length of Stay (>5 days = 3) 4 Charlson Comorbidity Score (Age + Comorbid Conditions) Criteria that do not apply:  
 IP Visits Last 12 Months (1-3=4, 4=9, >4=11) Pt. Coverage (Medicare=5 , Medicaid, or Self-Pay=4) Expected Length of Stay 4d 19h Actual Length of Stay 9

## 2019-01-29 NOTE — PROGRESS NOTES
PCU SHIFT NURSING NOTE Bedside and Verbal shift change report given [facility] [address] [facility] (oncoming nurse) by Vernon RN (offgoing nurse). Report included the following information SBAR, Kardex, ED Summary, Intake/Output, MAR, Accordion, Recent Results, Med Rec Status and Cardiac Rhythm NSR. Shift Summary:  
2100: Pt rang for RN assistance. Per wife and pt he had been receiving potassium supplement PO a couple days ago. Pt has been spitting up (not vomiting) just about everything he drinks. Pt showed this RN that a potassium pill had just been \"spit up\" he believes from \"esophagus\" around 2030. Pt wanted to inform Dr. Richard Tejeda in case the pills is what is causing the other blockage along with the stricture that Dr. Richard Tejeda saw on barium swallow. Bedside and Verbal shift change report given to [address] [facility] (oncoming nurse) by Stef Pelayo RN (offgoing nurse). Report included the following information SBAR, Kardex, ED Summary, Intake/Output, MAR, Accordion, Recent Results, Med Rec Status and Cardiac Rhythm NSR. Admission Date 1/19/2019 Admission Diagnosis Influenza Consults IP CONSULT TO HOSPITALIST 
IP CONSULT TO INTENSIVIST 
IP CONSULT TO GASTROENTEROLOGY Consults []PT []OT []Speech  
[]Case Management  
  
[] Palliative Cardiac Monitoring Order []Yes []No  
 
IV drips []Yes Drip:                            Dose: 
Drip:                            Dose: 
Drip:                            Dose:  
[]No  
 
GI Prophylaxis []Yes []No  
 
 
 
DVT Prophylaxis SCDs:     
     
 Bartolome stockings:     
  
[] Medication []Contraindicated []None Activity Level Activity Level: Up with Assistance Activity Assistance: Partial (one person) Purposeful Rounding every 1-2 hour? []Yes Trivedi Score  Total Score: 1 Bed Alarm (If score 3 or >) []Yes  
[] Refused (See signed refusal form in chart) Connor Score  Connor Score: 19 Connor Score (if score 14 or less) []PMT consult []Wound Care consult []Specialty bed  
[] Nutrition consult Needs prior to discharge:  
Home O2 required:   
[]Yes []No  
 If yes, how much O2 required? Other:  
 Last Bowel Movement: Last Bowel Movement Date: 01/21/19 Influenza Vaccine Received Flu Vaccine for Current Season (usually Sept-March): No  
 Patient/Guardian Refused (Notify MD): Yes Pneumonia Vaccine Diet Active Orders Diet DIET CLEAR LIQUID  
 DIET NPO  
  
LDAs Peripheral IV 01/25/19 Left Arm (Active) Site Assessment Clean, dry, & intact 1/29/2019  3:23 PM  
Phlebitis Assessment 0 1/29/2019  3:23 PM  
Infiltration Assessment 0 1/29/2019  3:23 PM  
Dressing Status Clean, dry, & intact 1/29/2019  3:23 PM  
Dressing Type Transparent 1/29/2019  3:23 PM  
Hub Color/Line Status Pink; Infusing 1/29/2019  3:23 PM  
Action Taken Open ports on tubing capped 1/28/2019  7:12 PM  
Alcohol Cap Used Yes 1/28/2019  7:12 PM  
                  
Urinary Catheter Intake & Output Date 01/28/19 0700 - 01/29/19 0659 01/29/19 0700 - 01/30/19 8928 Shift 9092-3858 4877-4169 24 Hour Total 4443-6792 1706-6620 24 Hour Total  
INTAKE  
P.O. 1200  1200 0  0  
  P.O. 1200  1200 0  0 Shift Total(mL/kg) 1200(15.4)  1200(15.4) 0(0)  0(0) OUTPUT Urine(mL/kg/hr) Urine Occurrence(s) 4 x 5 x 9 x Shift Total(mL/kg) NET 1200  1200 0  0 Weight (kg) 77.9 77.9 77.9 77.9 77.9 77.9 Readmission Risk Assessment Tool Score Low Risk 12 Total Score 3 Has Seen PCP in Last 6 Months (Yes=3, No=0) 2 . Living with Significant Other. Assisted Living. LTAC. SNF. or  
Rehab  
 3 Patient Length of Stay (>5 days = 3) 4 Charlson Comorbidity Score (Age + Comorbid Conditions) Criteria that do not apply:  
 IP Visits Last 12 Months (1-3=4, 4=9, >4=11) Pt. Coverage (Medicare=5 , Medicaid, or Self-Pay=4) Expected Length of Stay 4d 19h Actual Length of Stay 10

## 2019-01-30 ENCOUNTER — ANESTHESIA (OUTPATIENT)
Dept: SURGERY | Age: 54
DRG: 871 | End: 2019-01-30
Payer: COMMERCIAL

## 2019-01-30 ENCOUNTER — APPOINTMENT (OUTPATIENT)
Dept: GENERAL RADIOLOGY | Age: 54
DRG: 871 | End: 2019-01-30
Attending: INTERNAL MEDICINE
Payer: COMMERCIAL

## 2019-01-30 LAB
ANION GAP SERPL CALC-SCNC: 9 MMOL/L (ref 5–15)
BUN SERPL-MCNC: 9 MG/DL (ref 6–20)
BUN/CREAT SERPL: 20 (ref 12–20)
CALCIUM SERPL-MCNC: 8.2 MG/DL (ref 8.5–10.1)
CHLORIDE SERPL-SCNC: 101 MMOL/L (ref 97–108)
CO2 SERPL-SCNC: 29 MMOL/L (ref 21–32)
CREAT SERPL-MCNC: 0.46 MG/DL (ref 0.7–1.3)
GLUCOSE BLD STRIP.AUTO-MCNC: 106 MG/DL (ref 65–100)
GLUCOSE BLD STRIP.AUTO-MCNC: 125 MG/DL (ref 65–100)
GLUCOSE BLD STRIP.AUTO-MCNC: 132 MG/DL (ref 65–100)
GLUCOSE SERPL-MCNC: 110 MG/DL (ref 65–100)
POTASSIUM SERPL-SCNC: 3.3 MMOL/L (ref 3.5–5.1)
SERVICE CMNT-IMP: ABNORMAL
SODIUM SERPL-SCNC: 139 MMOL/L (ref 136–145)

## 2019-01-30 PROCEDURE — 74011250636 HC RX REV CODE- 250/636

## 2019-01-30 PROCEDURE — 0DB68ZX EXCISION OF STOMACH, VIA NATURAL OR ARTIFICIAL OPENING ENDOSCOPIC, DIAGNOSTIC: ICD-10-PCS | Performed by: INTERNAL MEDICINE

## 2019-01-30 PROCEDURE — C1726 CATH, BAL DIL, NON-VASCULAR: HCPCS | Performed by: INTERNAL MEDICINE

## 2019-01-30 PROCEDURE — 77010033678 HC OXYGEN DAILY

## 2019-01-30 PROCEDURE — 0DB38ZX EXCISION OF LOWER ESOPHAGUS, VIA NATURAL OR ARTIFICIAL OPENING ENDOSCOPIC, DIAGNOSTIC: ICD-10-PCS | Performed by: INTERNAL MEDICINE

## 2019-01-30 PROCEDURE — 77030032490 HC SLV COMPR SCD KNE COVD -B: Performed by: INTERNAL MEDICINE

## 2019-01-30 PROCEDURE — 77030037186 HC VLV ENDOSC STRL DEFENDO DISP MVAT -A: Performed by: INTERNAL MEDICINE

## 2019-01-30 PROCEDURE — 80048 BASIC METABOLIC PNL TOTAL CA: CPT

## 2019-01-30 PROCEDURE — 88305 TISSUE EXAM BY PATHOLOGIST: CPT

## 2019-01-30 PROCEDURE — 76010000138 HC OR TIME 0.5 TO 1 HR: Performed by: INTERNAL MEDICINE

## 2019-01-30 PROCEDURE — C9113 INJ PANTOPRAZOLE SODIUM, VIA: HCPCS | Performed by: INTERNAL MEDICINE

## 2019-01-30 PROCEDURE — 76060000032 HC ANESTHESIA 0.5 TO 1 HR: Performed by: INTERNAL MEDICINE

## 2019-01-30 PROCEDURE — 76210000006 HC OR PH I REC 0.5 TO 1 HR: Performed by: INTERNAL MEDICINE

## 2019-01-30 PROCEDURE — 77030008684 HC TU ET CUF COVD -B: Performed by: NURSE ANESTHETIST, CERTIFIED REGISTERED

## 2019-01-30 PROCEDURE — 0DB98ZX EXCISION OF DUODENUM, VIA NATURAL OR ARTIFICIAL OPENING ENDOSCOPIC, DIAGNOSTIC: ICD-10-PCS | Performed by: INTERNAL MEDICINE

## 2019-01-30 PROCEDURE — 82962 GLUCOSE BLOOD TEST: CPT

## 2019-01-30 PROCEDURE — 77030019988 HC FCPS ENDOSC DISP BSC -B: Performed by: INTERNAL MEDICINE

## 2019-01-30 PROCEDURE — 74011000250 HC RX REV CODE- 250: Performed by: INTERNAL MEDICINE

## 2019-01-30 PROCEDURE — 65660000000 HC RM CCU STEPDOWN

## 2019-01-30 PROCEDURE — 77030021678 HC GLIDESCP STAT DISP VERT -B: Performed by: NURSE ANESTHETIST, CERTIFIED REGISTERED

## 2019-01-30 PROCEDURE — 74011250636 HC RX REV CODE- 250/636: Performed by: INTERNAL MEDICINE

## 2019-01-30 PROCEDURE — 0DC38ZZ EXTIRPATION OF MATTER FROM LOWER ESOPHAGUS, VIA NATURAL OR ARTIFICIAL OPENING ENDOSCOPIC: ICD-10-PCS | Performed by: INTERNAL MEDICINE

## 2019-01-30 PROCEDURE — 77030021392 HC DEV RETRV POLYP BSC -B: Performed by: INTERNAL MEDICINE

## 2019-01-30 PROCEDURE — 74011250636 HC RX REV CODE- 250/636: Performed by: ANESTHESIOLOGY

## 2019-01-30 PROCEDURE — 0D738ZZ DILATION OF LOWER ESOPHAGUS, VIA NATURAL OR ARTIFICIAL OPENING ENDOSCOPIC: ICD-10-PCS | Performed by: INTERNAL MEDICINE

## 2019-01-30 PROCEDURE — 36415 COLL VENOUS BLD VENIPUNCTURE: CPT

## 2019-01-30 PROCEDURE — 77030020782 HC GWN BAIR PAWS FLX 3M -B

## 2019-01-30 PROCEDURE — 88312 SPECIAL STAINS GROUP 1: CPT

## 2019-01-30 RX ORDER — SODIUM CHLORIDE 0.9 % (FLUSH) 0.9 %
5-40 SYRINGE (ML) INJECTION EVERY 8 HOURS
Status: DISCONTINUED | OUTPATIENT
Start: 2019-01-30 | End: 2019-01-30 | Stop reason: HOSPADM

## 2019-01-30 RX ORDER — SODIUM CHLORIDE 9 MG/ML
75 INJECTION, SOLUTION INTRAVENOUS CONTINUOUS
Status: DISPENSED | OUTPATIENT
Start: 2019-01-30 | End: 2019-01-30

## 2019-01-30 RX ORDER — MIDAZOLAM HYDROCHLORIDE 1 MG/ML
INJECTION, SOLUTION INTRAMUSCULAR; INTRAVENOUS AS NEEDED
Status: DISCONTINUED | OUTPATIENT
Start: 2019-01-30 | End: 2019-01-30 | Stop reason: HOSPADM

## 2019-01-30 RX ORDER — KETOROLAC TROMETHAMINE 30 MG/ML
30 INJECTION, SOLUTION INTRAMUSCULAR; INTRAVENOUS
Status: DISCONTINUED | OUTPATIENT
Start: 2019-01-30 | End: 2019-01-31 | Stop reason: HOSPADM

## 2019-01-30 RX ORDER — SODIUM CHLORIDE 0.9 % (FLUSH) 0.9 %
5-40 SYRINGE (ML) INJECTION AS NEEDED
Status: DISCONTINUED | OUTPATIENT
Start: 2019-01-30 | End: 2019-01-30 | Stop reason: HOSPADM

## 2019-01-30 RX ORDER — SODIUM CHLORIDE, SODIUM LACTATE, POTASSIUM CHLORIDE, CALCIUM CHLORIDE 600; 310; 30; 20 MG/100ML; MG/100ML; MG/100ML; MG/100ML
25 INJECTION, SOLUTION INTRAVENOUS CONTINUOUS
Status: DISCONTINUED | OUTPATIENT
Start: 2019-01-30 | End: 2019-01-30 | Stop reason: HOSPADM

## 2019-01-30 RX ORDER — PROPOFOL 10 MG/ML
INJECTION, EMULSION INTRAVENOUS AS NEEDED
Status: DISCONTINUED | OUTPATIENT
Start: 2019-01-30 | End: 2019-01-30 | Stop reason: HOSPADM

## 2019-01-30 RX ORDER — NALOXONE HYDROCHLORIDE 0.4 MG/ML
0.4 INJECTION, SOLUTION INTRAMUSCULAR; INTRAVENOUS; SUBCUTANEOUS
Status: ACTIVE | OUTPATIENT
Start: 2019-01-30 | End: 2019-01-30

## 2019-01-30 RX ORDER — PHENYLEPHRINE HCL IN 0.9% NACL 0.4MG/10ML
SYRINGE (ML) INTRAVENOUS AS NEEDED
Status: DISCONTINUED | OUTPATIENT
Start: 2019-01-30 | End: 2019-01-30 | Stop reason: HOSPADM

## 2019-01-30 RX ORDER — SODIUM CHLORIDE 0.9 % (FLUSH) 0.9 %
5-40 SYRINGE (ML) INJECTION EVERY 8 HOURS
Status: DISCONTINUED | OUTPATIENT
Start: 2019-01-30 | End: 2019-01-30

## 2019-01-30 RX ORDER — MORPHINE SULFATE 2 MG/ML
1 INJECTION, SOLUTION INTRAMUSCULAR; INTRAVENOUS ONCE
Status: ACTIVE | OUTPATIENT
Start: 2019-01-30 | End: 2019-01-31

## 2019-01-30 RX ORDER — DEXTROMETHORPHAN/PSEUDOEPHED 2.5-7.5/.8
1.2 DROPS ORAL
Status: DISCONTINUED | OUTPATIENT
Start: 2019-01-30 | End: 2019-01-30

## 2019-01-30 RX ORDER — MIDAZOLAM HYDROCHLORIDE 1 MG/ML
.25-5 INJECTION, SOLUTION INTRAMUSCULAR; INTRAVENOUS
Status: ACTIVE | OUTPATIENT
Start: 2019-01-30 | End: 2019-01-30

## 2019-01-30 RX ORDER — FLUMAZENIL 0.1 MG/ML
0.2 INJECTION INTRAVENOUS
Status: ACTIVE | OUTPATIENT
Start: 2019-01-30 | End: 2019-01-30

## 2019-01-30 RX ORDER — FENTANYL CITRATE 50 UG/ML
25 INJECTION, SOLUTION INTRAMUSCULAR; INTRAVENOUS
Status: ACTIVE | OUTPATIENT
Start: 2019-01-30 | End: 2019-01-30

## 2019-01-30 RX ORDER — ATROPINE SULFATE 0.1 MG/ML
0.5 INJECTION INTRAVENOUS
Status: DISCONTINUED | OUTPATIENT
Start: 2019-01-30 | End: 2019-01-30

## 2019-01-30 RX ORDER — FENTANYL CITRATE 50 UG/ML
25 INJECTION, SOLUTION INTRAMUSCULAR; INTRAVENOUS
Status: DISCONTINUED | OUTPATIENT
Start: 2019-01-30 | End: 2019-01-30 | Stop reason: HOSPADM

## 2019-01-30 RX ORDER — ONDANSETRON 2 MG/ML
INJECTION INTRAMUSCULAR; INTRAVENOUS AS NEEDED
Status: DISCONTINUED | OUTPATIENT
Start: 2019-01-30 | End: 2019-01-30 | Stop reason: HOSPADM

## 2019-01-30 RX ORDER — HYDROMORPHONE HYDROCHLORIDE 1 MG/ML
0.2 INJECTION, SOLUTION INTRAMUSCULAR; INTRAVENOUS; SUBCUTANEOUS
Status: DISCONTINUED | OUTPATIENT
Start: 2019-01-30 | End: 2019-01-30 | Stop reason: HOSPADM

## 2019-01-30 RX ORDER — DIPHENHYDRAMINE HYDROCHLORIDE 50 MG/ML
12.5 INJECTION, SOLUTION INTRAMUSCULAR; INTRAVENOUS AS NEEDED
Status: DISCONTINUED | OUTPATIENT
Start: 2019-01-30 | End: 2019-01-30 | Stop reason: HOSPADM

## 2019-01-30 RX ORDER — SUCCINYLCHOLINE CHLORIDE 20 MG/ML
INJECTION INTRAMUSCULAR; INTRAVENOUS AS NEEDED
Status: DISCONTINUED | OUTPATIENT
Start: 2019-01-30 | End: 2019-01-30 | Stop reason: HOSPADM

## 2019-01-30 RX ORDER — LIDOCAINE HYDROCHLORIDE 10 MG/ML
0.1 INJECTION, SOLUTION EPIDURAL; INFILTRATION; INTRACAUDAL; PERINEURAL AS NEEDED
Status: DISCONTINUED | OUTPATIENT
Start: 2019-01-30 | End: 2019-01-30 | Stop reason: HOSPADM

## 2019-01-30 RX ORDER — LIDOCAINE HYDROCHLORIDE 20 MG/ML
INJECTION, SOLUTION EPIDURAL; INFILTRATION; INTRACAUDAL; PERINEURAL AS NEEDED
Status: DISCONTINUED | OUTPATIENT
Start: 2019-01-30 | End: 2019-01-30 | Stop reason: HOSPADM

## 2019-01-30 RX ORDER — SODIUM CHLORIDE 0.9 % (FLUSH) 0.9 %
5-40 SYRINGE (ML) INJECTION AS NEEDED
Status: DISCONTINUED | OUTPATIENT
Start: 2019-01-30 | End: 2019-01-30

## 2019-01-30 RX ORDER — EPINEPHRINE 0.1 MG/ML
1 INJECTION INTRACARDIAC; INTRAVENOUS
Status: DISCONTINUED | OUTPATIENT
Start: 2019-01-30 | End: 2019-01-30

## 2019-01-30 RX ADMIN — Medication 80 MCG: at 11:27

## 2019-01-30 RX ADMIN — Medication 80 MCG: at 11:31

## 2019-01-30 RX ADMIN — SODIUM CHLORIDE 40 MG: 9 INJECTION, SOLUTION INTRAMUSCULAR; INTRAVENOUS; SUBCUTANEOUS at 08:15

## 2019-01-30 RX ADMIN — ONDANSETRON 4 MG: 2 INJECTION INTRAMUSCULAR; INTRAVENOUS at 11:41

## 2019-01-30 RX ADMIN — Medication 80 MCG: at 11:38

## 2019-01-30 RX ADMIN — SUCCINYLCHOLINE CHLORIDE 140 MG: 20 INJECTION INTRAMUSCULAR; INTRAVENOUS at 11:20

## 2019-01-30 RX ADMIN — MIDAZOLAM HYDROCHLORIDE 1 MG: 1 INJECTION, SOLUTION INTRAMUSCULAR; INTRAVENOUS at 11:13

## 2019-01-30 RX ADMIN — LIDOCAINE HYDROCHLORIDE 80 MG: 20 INJECTION, SOLUTION EPIDURAL; INFILTRATION; INTRACAUDAL; PERINEURAL at 11:20

## 2019-01-30 RX ADMIN — SODIUM CHLORIDE, SODIUM LACTATE, POTASSIUM CHLORIDE, AND CALCIUM CHLORIDE 25 ML/HR: 600; 310; 30; 20 INJECTION, SOLUTION INTRAVENOUS at 10:21

## 2019-01-30 RX ADMIN — PROPOFOL 150 MG: 10 INJECTION, EMULSION INTRAVENOUS at 11:20

## 2019-01-30 NOTE — H&P
See office History and Physical/prior Consultation Note. The patient was reexamined. No interval change in the last 30 days. Proceed with procedure(s) with deep sedation or GA as clinically indicated.

## 2019-01-30 NOTE — PROGRESS NOTES
[facility], Critical Care, and Sleep Medicine Name: Alat Hale MRN: [id number] : 1965 Hospital: [place] [address] Date: 2019 IMPRESSION:  
· Acute hypoxic respiratory failure- on nasla O2 
· Acute influenza pneumonia, better · Mild-to-moderate ARDS · Dysphagia · Severe esophageal dysmotility, achalasia · KATJA · Can not exclude bacterial superinfection · Can not completely exclude heart failure · Uncontrolled diabetes RECOMMENDATIONS:  
· Wean O2,  
· NIV prn · Appreciate Speech and GI consults · Aspiration precautions- expalined to pt and wife importance of following Dr. Kasi Leon instructions on drinking when upright and not lying down for 2-3 three hours after ingestion to allow full passage of fluids, to avoid reflux, aspiration- that would result in major pulmonary set back which he cannot afford · If stricture not improved some to allow better Po intake, May need TPN · Bronchodilators as needed · Agree with empiric antibiotics · Patient has refused Tamiflu, still on isolation · Insulin · Still at risk for further decompensation from influenza pneumonia.  EGD results noted. d/w with  at bedside.  Very tight distal esophageal stricture on Barium study. Down to 3 LPM. Pt is vehement that dilitation did not work. Dr. Keyanna Cortes showed pt the images and was very thorough explaining pt's findings and and for a methodical approach to evaluation. Pt has had this for about 20 yrs and needs reevaluation. This will not be fixed overnight, need more information.  refusing BIPAP but sats holding on high flow/ midflow nasal O2. Long h/o dysphagia. Poor PO intake for many days. Remote extensive workup by . Subjective:  
 
19: oxygenation remains poor; on 10 LPM nasal cannula. Complaining about nasal dryness, but doesn't want mask that covers mouth.   He also is complaining about sleep apnea and he feels that this is why he is hypoxic during the day. Has not worn CPAP in years, but now is upset he is not being treated with it nightly here. He does not recall his settings, but he knows they were \"high\". From his description he had an auto-CPAP. Initial history: This patient has been seen and evaluated at the request of Dr. Jem Mazariegos for worsening pneumonia. Patient is a 47 y.o. male former smoker, presented to the ER 1/19 with dyspnea and generalized malaise. He was diagnosed with influenza at [facility] 1 week ago. He declined to take Tamiflu because he was concerned it would give him side effects. He did not get a flu shot this year. He has been treated with antibiotics, IVF, but he has become progressively more hypoxic with malaise and dyspnea. He was again offered Tamiflu here but has refused again. Due to worsening hypoxia, I was asked to evaluate him. Past Medical History:  
Diagnosis Date  Diabetes ([facility] [address]  Nausea & vomiting  Pancreatitis  Unspecified sleep apnea Past Surgical History:  
Procedure Laterality Date  HX GI    
 upper endoscopies  HX HEENT    
 oral surgery  IA EGD FLEXIBLE FOREIGN BODY REMOVAL  1/30/2019  UPPER GI ENDOSCOPY,BALL DIL,30MM  1/30/2019  UPPER GI ENDOSCOPY,BIOPSY  1/30/2019 Prior to Admission medications Not on File Allergies Allergen Reactions  Lisinopril Anaphylaxis Social History Tobacco Use  Smoking status: Former Smoker Years: 1.00 Types: Cigarettes  Smokeless tobacco: Never Used Substance Use Topics  Alcohol use: Yes Family History Problem Relation Age of Onset  Heart Disease Mother   
     s/p stent  Diabetes Father  Cancer Brother   
     stomach cancer Current Facility-Administered Medications Medication Dose Route Frequency  sodium chloride (NS) flush 5-40 mL  5-40 mL IntraVENous Q8H  
  morphine injection 1 mg  1 mg IntraVENous ONCE  pantoprazole (PROTONIX) 40 mg in sodium chloride 0.9% 10 mL injection  40 mg IntraVENous DAILY  insulin glargine (LANTUS) injection 5 Units  5 Units SubCUTAneous QHS  pravastatin (PRAVACHOL) tablet 20 mg  20 mg Oral DAILY  insulin lispro (HUMALOG) injection   SubCUTAneous AC&HS  enoxaparin (LOVENOX) injection 40 mg  40 mg SubCUTAneous Q24H Review of Systems: A comprehensive review of systems was negative except for: Ears, nose, mouth, throat, and face: positive for snoring Objective: 
Vital Signs:   
Visit Vitals /79 (BP 1 Location: Right arm, BP Patient Position: At rest) Pulse 88 Temp 97.3 °F (36.3 °C) Resp 20 Ht 6' (1.829 m) Wt 77.9 kg (171 lb 11.8 oz) SpO2 92% BMI 23.29 kg/m² O2 Device: Nasal cannula O2 Flow Rate (L/min): 3 l/min Temp (24hrs), Av.4 °F (36.9 °C), Min:97.3 °F (36.3 °C), Max:98.8 °F (37.1 °C) Intake/Output:  
Last shift:       0701 -  1900 In: 750 [I.V.:750] Out: 0 Last 3 shifts:  190 -  0700 In: 480 [P.O.:480] Out: - Intake/Output Summary (Last 24 hours) at 2019 1526 Last data filed at 2019 1300 Gross per 24 hour Intake 1230 ml Output 0 ml Net 1230 ml Physical Exam:  
General:  Alert, ill appearing, no acute dstress Head:  Normocephalic, without obvious abnormality, atraumatic. Eyes:  Conjunctivae/corneas clear. Nose: Nares normal. Septum midline. Mucosa normal.    
Throat: Lips, mucosa, and tongue normal. Teeth and gums normal.  
Neck: Supple, symmetrical, trachea midline Back:   Symmetric, no curvature. ROM normal.  
Lungs:   Scattered bilateral rales Chest wall:  No tenderness or deformity. Heart:  Tachy, regular, no murmur Abdomen:   Soft, non-tender. Bowel sounds normal.    
Extremities: Extremities normal, atraumatic, no cyanosis or clubbing Skin: Skin color, texture, turgor normal. No rashes or lesions Neurologic: Grossly nonfocal  
 
Data:  
Labs: 
Recent Labs  
  01/28/19 
0449 WBC 11.0 HGB 12.7 HCT 38.4 * Recent Labs  
  01/30/19 
0320 01/29/19 
0248 01/28/19 
8793  140 140  
K 3.3* 3.2* 3.1*  
 100 98 CO2 29 30 31 * 128* 148* BUN 9 9 10 CREA 0.46* 0.49* 0.47* CA 8.2* 8.2* 8.3*  
MG  --  2.2  --   
PHOS  --  2.7  -- No results for input(s): PH, PCO2, PO2, HCO3, FIO2 in the last 72 hours. Imaging: 
I have personally reviewed the patients radiographs: 
No change yesterday Devika Maher MD

## 2019-01-30 NOTE — PERIOP NOTES
Handoff Report from Operating Room to PACU Report received from KISHAN Ricks and SUNNY Banuelos CRNA regarding Alta Hale. Surgeon(s): 
Radha Cortes MD  And Procedure(s) (LRB): ESOPHAGOGASTRODUODENOSCOPY (EGD) WITH DILATION AND BIOPSIES (N/A)  confirmed  
with allergies discussed. Anesthesia type, drugs, patient history, complications, estimated blood loss, vital signs, intake and output, and last pain medication, lines and temperature were reviewed.

## 2019-01-30 NOTE — PROGRESS NOTES
PCU SHIFT NURSING NOTE Bedside and Verbal shift change report given to [address] (oncoming nurse) by Juno Esparza RN (offgoing nurse). Report included the following information SBAR, Kardex, ED Summary, Procedure Summary, Intake/Output, MAR, Accordion, Recent Results, Med Rec Status and Cardiac Rhythm NSR. Shift Summary:   
2045: Released and acknowledged orders that were placed upon arrival back to unit from EGD earlier today. Does not look like any new orders were missed. Will continue to monitor. 2050: Pts current BG is 106. Pt is due for 5 units of lantus. However, this morning his BG was 75 after receiving the 5 units of lantus dose last night. Pt is currently NPO with only clear liquids. Paged on call MD to confirm if lantus could be held for tonight. Spoke with Dr. Elbert Sutherland @ 2100. Per Dr. Elbert Sutherland, lantus order d/c'd and Dr. Biju Garg can address if pt needs to be back on lantus since A1c was elevated. Due to pt unable to eat, pt may need IVF with D5 possibly? Will pass along to dayshift to address with Dr. Biju Garg in AM. 0500: Due to pt not eating and glucose being lower last night, checked glucose this AM to make sure it wasn't below 80. Pts glucose 102. Will continue to monitor closely. Bedside and Verbal shift change report given to [address] [facility] (oncoming nurse) by [facility] (offgoing nurse). Report included the following information SBAR, Kardex, ED Summary, Intake/Output, MAR, Accordion, Recent Results, Med Rec Status and Cardiac Rhythm NSR. Admission Date 1/19/2019 Admission Diagnosis Influenza Consults IP CONSULT TO HOSPITALIST 
IP CONSULT TO INTENSIVIST 
IP CONSULT TO GASTROENTEROLOGY Consults []PT []OT []Speech  
[]Case Management  
  
[] Palliative Cardiac Monitoring Order []Yes []No  
 
IV drips []Yes Drip:                            Dose: 
Drip:                            Dose: 
Drip:                            Dose:  
[]No  
 
GI Prophylaxis []Yes []No  
 
 
 
 DVT Prophylaxis SCDs:  Sequential Compression Device: Bilateral  
     
 Bartolome stockings:     
  
[] Medication []Contraindicated []None Activity Level Activity Level: Up with Assistance Activity Assistance: Partial (one person) Purposeful Rounding every 1-2 hour? []Yes Trivedi Score  Total Score: 2 Bed Alarm (If score 3 or >) []Yes  
[] Refused (See signed refusal form in chart) Connor Score  Connor Score: 19 Connor Score (if score 14 or less) []PMT consult  
[]Wound Care consult []Specialty bed  
[] Nutrition consult Needs prior to discharge:  
Home O2 required:   
[]Yes []No  
 If yes, how much O2 required? Other:  
 Last Bowel Movement: Last Bowel Movement Date: 01/21/19 Influenza Vaccine Received Flu Vaccine for Current Season (usually Sept-March): No  
 Patient/Guardian Refused (Notify MD): Yes Pneumonia Vaccine Diet Active Orders Diet DIET NPO  
  
LDAs Peripheral IV 01/25/19 Left Arm (Active) Site Assessment Clean, dry, & intact 1/30/2019  1:27 PM  
Phlebitis Assessment 0 1/30/2019  1:27 PM  
Infiltration Assessment 0 1/30/2019  1:27 PM  
Dressing Status Clean, dry, & intact 1/30/2019  1:27 PM  
Dressing Type Transparent 1/30/2019  1:27 PM  
Hub Color/Line Status Pink; Infusing 1/30/2019  1:27 PM  
Action Taken Open ports on tubing capped 1/30/2019  3:22 AM  
Alcohol Cap Used Yes 1/30/2019  3:22 AM  
                  
Urinary Catheter Intake & Output Date 01/29/19 0700 - 01/30/19 0659 01/30/19 0700 - 01/31/19 1208 Shift 0700-1859 1900-0659 24 Hour Total 0700-1859 1900-0659 24 Hour Total  
INTAKE  
P.O. 0 480 480 0  0  
  P. O. 0 480 480 0  0  
I. V.(mL/kg/hr)    750  750 I. V.    50  50 Volume (lactated Ringers infusion)    700  700 Shift Total(mL/kg) 0(0) 480(6.2) 480(6.2) 750(9.6)  750(9.6) OUTPUT Urine(mL/kg/hr)    0  0 Urine Voided    0  0 Urine Occurrence(s)    0 x  0 x Emesis/NG output    0  0 Emesis    0  0 Emesis Occurrence(s)    0 x  0 x Other    0  0 Other Output    0  0 Stool    0  0 Stool Occurrence(s)    0 x  0 x Stool    0  0 Blood    0  0 Blood    0  0 Shift Total(mL/kg)    0(0)  0(0) NET 0 480 480 750  750 Weight (kg) 77.9 77.9 77.9 77.9 77.9 77.9 Readmission Risk Assessment Tool Score Low Risk 12 Total Score 3 Has Seen PCP in Last 6 Months (Yes=3, No=0) 2 . Living with Significant Other. Assisted Living. LTAC. SNF. or  
Rehab  
 3 Patient Length of Stay (>5 days = 3) 4 Charlson Comorbidity Score (Age + Comorbid Conditions) Criteria that do not apply:  
 IP Visits Last 12 Months (1-3=4, 4=9, >4=11) Pt. Coverage (Medicare=5 , Medicaid, or Self-Pay=4) Expected Length of Stay 4d 19h Actual Length of Stay 11

## 2019-01-30 NOTE — PROGRESS NOTES
PCU SHIFT NURSING NOTE Bedside shift change report given to Becky Antoine (oncoming nurse) by Babita Coker (offgoing nurse). Report included the following information SBAR, Kardex, Intake/Output, MAR and Recent Results. Shift Summary: 0709  MEWS 1  Pt in bed resting  Wife at bedside  NPO  EGD scheduled for 1100  3LO2 NC  NSR on monitor  A&O x4  Up with 1 assist   
3731  Report given to April in surgery center  
[phone]  Spoke with Pat in infection control  - ok to dc droplet precautions as pt is showing no s/s of flu  Diagnosis was over 2 weeks ago   
0957  Transport from surgery center on unit  Pt transported in bed, on 3LO2, with this RN present d/t cardiac monitoring 1327  MEWS 1   Pt back on unit  C/O pain 10/10 s/p EGD  Declines medication at this time  Dr Dinesh Fernández paged - need something on hand IV    
1457  Dr Dinesh Fernández at bedside  New order for 1x dose Morphine was given by Natali Toney NP, however pt declined 1556  MEWS 1  Pt states pain is acceptable at this time 1900  Report given to Babita Coker Admission Date 1/19/2019 Admission Diagnosis Influenza Consults IP CONSULT TO HOSPITALIST 
IP CONSULT TO INTENSIVIST 
IP CONSULT TO GASTROENTEROLOGY Consults []PT []OT []Speech  
[]Case Management  
  
[] Palliative Cardiac Monitoring Order []Yes []No  
 
IV drips []Yes Drip:                            Dose: 
Drip:                            Dose: 
Drip:                            Dose:  
[]No  
 
GI Prophylaxis []Yes []No  
 
 
 
DVT Prophylaxis SCDs:     
     
 Bartolome stockings:     
  
[] Medication []Contraindicated []None Activity Level Activity Level: Up with Assistance Activity Assistance: Partial (one person) Purposeful Rounding every 1-2 hour? []Yes Trivedi Score  Total Score: 2 Bed Alarm (If score 3 or >) []Yes  
[] Refused (See signed refusal form in chart) Connor Score  Connor Score: 19 Connor Score (if score 14 or less) []PMT consult  
[]Wound Care consult []Specialty bed  
[] Nutrition consult Needs prior to discharge:  
Home O2 required:   
[]Yes []No  
 If yes, how much O2 required? Other:  
 Last Bowel Movement: Last Bowel Movement Date: 01/21/19 Influenza Vaccine Received Flu Vaccine for Current Season (usually Sept-March): No  
 Patient/Guardian Refused (Notify MD): Yes Pneumonia Vaccine Diet Active Orders Diet DIET NPO  
  
LDAs Peripheral IV 01/25/19 Left Arm (Active) Site Assessment Clean, dry, & intact 1/30/2019  3:22 AM  
Phlebitis Assessment 0 1/30/2019  3:22 AM  
Infiltration Assessment 0 1/30/2019  3:22 AM  
Dressing Status Clean, dry, & intact 1/30/2019  3:22 AM  
Dressing Type Tape;Transparent 1/30/2019  3:22 AM  
Hub Color/Line Status Pink;Capped;Flushed 1/30/2019  3:22 AM  
Action Taken Open ports on tubing capped 1/30/2019  3:22 AM  
Alcohol Cap Used Yes 1/30/2019  3:22 AM  
                  
Urinary Catheter Intake & Output Date 01/29/19 0700 - 01/30/19 0659 01/30/19 0700 - 01/31/19 1386 Shift 8207-1947 3495-1252 24 Hour Total 8381-4535 2219-5577 24 Hour Total  
INTAKE  
P.O. 0 480 480     
  P. O. 0 480 480 Shift Total(mL/kg) 0(0) 480(6.2) 480(6.2) OUTPUT Shift Total(mL/kg) NET 0 480 480 Weight (kg) 77.9 77.9 77.9 77.9 77.9 77.9 Readmission Risk Assessment Tool Score Low Risk 12 Total Score 3 Has Seen PCP in Last 6 Months (Yes=3, No=0) 2 . Living with Significant Other. Assisted Living. LTAC. SNF. or  
Rehab  
 3 Patient Length of Stay (>5 days = 3) 4 Charlson Comorbidity Score (Age + Comorbid Conditions) Criteria that do not apply:  
 IP Visits Last 12 Months (1-3=4, 4=9, >4=11) Pt. Coverage (Medicare=5 , Medicaid, or Self-Pay=4) Expected Length of Stay 4d 19h Actual Length of Stay 11

## 2019-01-30 NOTE — PERIOP NOTES
10:25= spoke with Bia Ramon, Infection Control; pt may be removed from Droplet Precautions since he is asymptomatic now from influenza diagnosis on 1/15.

## 2019-01-30 NOTE — PROGRESS NOTES
GI Attg Addendum Tyra Madison): I had extended d/w pt and spouse regarding finding on EGD today including need to remove pills and food, apparent absent esophageal motility hypertensive LES overcome with scope pressure, absence of distal esophageal stricture, decreased gastric motility, apparent gastritis and duodenitis. I had discussed same with spouse after procedure. I explained that I suspect achalasia. He highlight continued intolerance of liquids here but I suspect it is because his ingestions are when supine. He noted that over years he could not tolerate red meats, but seafood (shrimp/fish) were tolerated. The spouse wants to know Recardo Musty are we going to do for him? Stew Bisi said you can give a medicine for this, nitroglycerin. \"  I have emphasized that is this is achalasia the treatment is surgery. The diagnosis will require confirmation and if present discussion with surgeon. Additionally evaluation of the pending biopsies is needed. Further, he should not be submitted to surgery without a clear diagnosis. She states \"he can't go home if he is not eating. \"  I have emphasized that the etiology of his acute exacerbation may not become clear, but the further we can distance him from his recent respiratory illness and promote improvement, the more clear his diagnosis will become as we pursue further testing. He additionally informed patient and spouse that we should not be pressured into pursuing testing or therapies at a point where they could compromise him or be done for a as of yet not fully defined diagnosis. His manometry catheter may in fact require endoscopic placement given hypertensive nature of LES and ideally could be done as OP.  Dr.J. Sina Sheffield MD was present for a portion of this discussion.  
Jena Fontanez MD

## 2019-01-30 NOTE — PROGRESS NOTES
Problem: Falls - Risk of 
Goal: *Absence of Falls Document Corrie Primes Fall Risk and appropriate interventions in the flowsheet. Outcome: Progressing Towards Goal 
Fall Risk Interventions: 
Mobility Interventions: Bed/chair exit alarm, Communicate number of staff needed for ambulation/transfer, OT consult for ADLs, PT Consult for mobility concerns, Patient to call before getting OOB, PT Consult for assist device competence Medication Interventions: Assess postural VS orthostatic hypotension, Bed/chair exit alarm, Evaluate medications/consider consulting pharmacy, Patient to call before getting OOB, Teach patient to arise slowly Elimination Interventions: Bed/chair exit alarm, Call light in reach, Patient to call for help with toileting needs, Toilet paper/wipes in reach, Toileting schedule/hourly rounds, Urinal in reach History of Falls Interventions: Bed/chair exit alarm, Door open when patient unattended

## 2019-01-30 NOTE — PERIOP NOTES
TRANSFER - OUT REPORT: 
 
Verbal report given to Vane JOHNSON(name) on Kamar Castañeda  being transferred to [facility]unit) for routine post - op Report consisted of patients Situation, Background, Assessment and  
Recommendations(SBAR). Information from the following report(s) SBAR, OR Summary, Intake/Output, MAR and Cardiac Rhythm NSR was reviewed with the receiving nurse. Opportunity for questions and clarification was provided. Patient transported with: monitor O2 @ 2 liters Registered Nurse

## 2019-01-30 NOTE — ANESTHESIA PREPROCEDURE EVALUATION
Anesthetic History PONV and other anesthesia complications Comments: Pt recalls an uncomfortable extubation after his sinus surgery 10 yrs ago, that \"caused\" prolonged, painful coughing. Review of Systems / Medical History Patient summary reviewed, nursing notes reviewed and pertinent labs reviewed Pulmonary Sleep apnea Comments: Influenza pneumonia Neuro/Psych Within defined limits Cardiovascular Hypertension Hyperlipidemia Exercise tolerance: >4 METS Comments: TTE (1/23/19):  Grade 1 Diastolic Dysfunction, AC=68-11% GI/Hepatic/Renal 
  
GERD Comments: Dysphagia Achalasia Esophageal Stricture Retained esophageal food bolus Endo/Other Diabetes: poorly controlled, type 2, using insulin Comments: Thrombocytosis Other Findings Comments: Influenza Physical Exam 
 
Airway Mallampati: III 
TM Distance: 4 - 6 cm Neck ROM: normal range of motion Mouth opening: Normal 
 
 Cardiovascular Regular rate and rhythm,  S1 and S2 normal,  no murmur, click, rub, or gallop Dental 
No notable dental hx Pulmonary Breath sounds clear to auscultation Abdominal 
GI exam deferred Other Findings Anesthetic Plan ASA: 3, emergent Anesthesia type: general and total IV anesthesia Induction: RSI and Intravenous Anesthetic plan and risks discussed with: Patient

## 2019-01-30 NOTE — ANESTHESIA POSTPROCEDURE EVALUATION
Procedure(s): ESOPHAGOGASTRODUODENOSCOPY (EGD) WITH DILATION AND BIOPSIES. Anesthesia Post Evaluation Patient location during evaluation: PACU Note status: Adequate. Level of consciousness: responsive to verbal stimuli and sleepy but conscious Pain management: satisfactory to patient Airway patency: patent Anesthetic complications: no 
Cardiovascular status: acceptable Respiratory status: acceptable Hydration status: acceptable Comments: +Post-Anesthesia Evaluation and Assessment Patient: Lara Howard MRN: [id number]  SSN: [id number] YOB: 1965  Age: 47 y.o. Sex: male Cardiovascular Function/Vital Signs /72   Pulse 83   Temp 36.9 °C (98.5 °F)   Resp 12   Ht 6' (1.829 m)   Wt 77.9 kg (171 lb 11.8 oz)   SpO2 95%   BMI 23.29 kg/m² Patient is status post Procedure(s): ESOPHAGOGASTRODUODENOSCOPY (EGD) WITH DILATION AND BIOPSIES. Nausea/Vomiting: Controlled. Postoperative hydration reviewed and adequate. Pain: 
Pain Scale 1: Numeric (0 - 10) (01/30/19 1210) Pain Intensity 1: 0 (01/30/19 1210) Managed. Neurological Status:  
Neuro (L): Exceptions to [facility] (01/30/19 1207) At baseline. Mental Status and Level of Consciousness: Arousable. Pulmonary Status:  
O2 Device: Nasal cannula (01/30/19 1211) Adequate oxygenation and airway patent. Complications related to anesthesia: None Post-anesthesia assessment completed. No concerns. Signed By: Sammie Cates MD  
 1/30/2019 Post anesthesia nausea and vomiting:  controlled Visit Vitals /72 Pulse 83 Temp 36.9 °C (98.5 °F) Resp 12 Ht 6' (1.829 m) Wt 77.9 kg (171 lb 11.8 oz) SpO2 95% BMI 23.29 kg/m²

## 2019-01-30 NOTE — PROGRESS NOTES
Nutrition Assessment: 
 
INTERVENTIONS/RECOMMENDATIONS:  
Advance diet as tolerated Resume Glucerna BID as able ASSESSMENT:  
Chart reviewed; patient has been NPO today for EGD, food bolus removal, and dilation. Suspected achalasia. PMH for DM, HTN, and GERD. BG better controlled. Consider bowel regimen; no documented BM since 1/21? Advance diet as tolerated and resume supplements. Encourage intake of meals. Diet Order: NPO 
% Eaten:   
Patient Vitals for the past 72 hrs: 
 % Diet Eaten 01/30/19 1300 0 % 01/29/19 1947 0 % 01/29/19 1322 0 %  
01/29/19 1054 0 % 01/28/19 1801 25 % 01/28/19 1230 25 % 01/28/19 0746 75 % 01/27/19 1855 75 % Pertinent Medications: [x] Reviewed []Other: Lantus, Humalog, Protonix, Pravastatin Pertinent Labs: [x]Reviewed  []Other: K+ 3.3, -[phone] Food Allergies: [x]None []Other:   Last BM: 1/21  []Active     []Hyperactive  []Hypoactive       [] Absent  BS Skin:    [x] Intact   [] Incision  [] Breakdown   []Edema   []Other: Anthropometrics: Height: 6' (182.9 cm) Weight: 77.9 kg (171 lb 11.8 oz) IBW (%IBW):   ( ) UBW (%UBW):   (  %) BMI: Body mass index is 23.29 kg/m². This BMI is indicative of: 
[]Underweight   [x]Normal   []Overweight   [] Obesity   [] Extreme Obesity (BMI>40) Last Weight Metrics: 
Weight Loss Metrics 1/28/2019 6/6/2014 5/30/2014 4/21/2011 4/1/2011 2/4/2011 10/1/2010 Today's Wt 171 lb 11.8 oz 209 lb 207 lb 14.3 oz 221 lb 6.4 oz 226 lb 228 lb 234 lb BMI 23.29 kg/m2 25.45 kg/m2 25.32 kg/m2 32.68 kg/m2 33.36 kg/m2 28.11 kg/m2 30.45 kg/m2 Estimated Nutrition Needs (Based on): 2168 Kcals/day(BMR (1668) x 1. 3AF) , 78 g(1.0 g/kg bw) Protein Carbohydrate: At Least 130 g/day  Fluids: 2150 mL/day Pt expected to meet estimated nutrient needs: [x]Yes []No 
 
NUTRITION DIAGNOSES:  
Problem:  Inadequate oral intake Etiology: related to decreased appetite, swallowing difficulty Signs/Symptoms: as evidenced by PO <50% of meals, NPO for EGD/dilation today NUTRITION INTERVENTIONS: 
Meals/Snacks: General/healthful diet   Supplements: Commercial supplement GOAL:  
Diet advanced and PO >50% of meals/supplement next 3-5 days NUTRITION MONITORING AND EVALUATION Food/Nutrient Intake Outcomes: Total energy intake Physical Signs/Symptoms Outcomes: Weight/weight change, Electrolyte and renal profile, GI profile, Glucose profile Previous Goal Met: 
 [] Met              [x] Progressing Towards Goal              [] Not Progressing Towards Goal  
Previous Recommendations: 
 [x] Implemented          [] Not Implemented          [] Not Applicable LEARNING NEEDS (Diet, Food/Nutrient-Drug Interaction):  
 [x] None Identified 
 [] Identified and Education Provided/Documented 
 [] Identified and Pt declined/was not appropriate Cultural, Church, OR Ethnic Dietary Needs:  
 [x] None Identified 
 [] Identified and Addressed 
 
 [x] Interdisciplinary Care Plan Reviewed/Documented  
 [x] Discharge Planning: Consistent carb/heart healthy diet [x] Participated in Interdisciplinary Rounds NUTRITION RISK:  
 [] High              [x] Moderate           []  Low  []  Minimal/Uncompromised Lali Millsman Pager [phone] Weekend Pager [phone]

## 2019-01-30 NOTE — PROCEDURES
NAME:  Alta Hale   :   1965   MRN:   [id number]     Date/Time:  2019 12:01 PM    Esophagogastroduodenoscopy (EGD) Procedure Note    Procedure: Esophagogastroduodenoscopy with biopsy, esophageal dilation, foreign body removal    Indication:  esophageal obstruction  Pre-operative Diagnosis: see indication above  Post-operative Diagnosis: see findings below  :  Ladonna Orantes MD  Referring Provider:   -Elise Davis MD-None    Exam:  Airway: clear, no airway problems anticipated  Heart: RRR, without gallops or rubs  Lungs: clear bilaterally without wheezes, crackles, or rhonchi  Abdomen: soft, nontender, nondistended, bowel sounds present  Mental Status: awake, alert and oriented to person, place and time     Anethesia/Sedation:  General anesthesia  Procedure Details   After informed consent was obtained for the procedure, with all risks and benefits of procedure explained the patient was taken to the endoscopy suite and placed in the left lateral decubitus position. Following sequential administration of sedation as per above, the [id number] gastroscope was inserted into the mouth and advanced under direct vision to third portion of the duodenum. A careful inspection was made as the gastroscope was withdrawn, including a retroflexed view of the proximal stomach; findings and interventions are described below. Findings:    -Aperistaltic esophagus suggestive of underlying achalasia  -Distal esophageal food bolus and pills; removed with Uday [place] Net  -Hypertensive LES zone, easily overcome with gentle endoscope pressure, but no stricture or hiatal hernia; This reion is biopsied and then dilated with 18mm balloon held for 1min and revaluated.   -Diffuse gastritis and decreased gastric motility; biopsied  -Duodenal inflammation suggestive of duodenitis; biopsied    Therapies:  esophageal dilation with 18mm sized balloon; biopsy of esophagus; biopsy of stomach;  biopsy of duodenal removal of foriegn body using lowery net  Specimens: #1 duodenum; #2 stomach; #3 distal esophagus  EBL:  None. Complications:   None; patient tolerated the procedure well. Impression:    -Aperistaltic esophagus suggestive of underlying achalasia  -Distal esophageal food bolus and pills; removed with Uday [place] Net  -Hypertensive LES zone, easily overcome with gentle endoscope pressure, but no stricture or hiatal hernia; This reion is biopsied and then dilated with 18mm balloon held for 1min and revaluated. -Diffuse gastritis and decreased gastric motility; biopsied  -Duodenal inflammation suggestive of duodenitis; biopsied    Recommendations:  -Await pathology. , -Follow symptoms. , -Pursue OP esophageal manometry    Discharge disposition:  To room after recovery in PACU    Ladonna Orantes MD

## 2019-01-30 NOTE — PROGRESS NOTES
Chart reviewed. Attempted to see pt this AM for PT intervention however pt currently off the floor. Will defer however continue to follow. Thank you Curly Simpson, PT, DPT

## 2019-01-30 NOTE — PROGRESS NOTES
Problem: Pressure Injury - Risk of 
Goal: *Prevention of pressure injury Document Connor Scale and appropriate interventions in the flowsheet. Outcome: Progressing Towards Goal 
Pressure Injury Interventions: 
  
 
Moisture Interventions: Absorbent underpads Activity Interventions: Increase time out of bed, Pressure redistribution bed/mattress(bed type), PT/OT evaluation Mobility Interventions: Float heels, HOB 30 degrees or less, Pressure redistribution bed/mattress (bed type), PT/OT evaluation, Turn and reposition approx. every two hours(pillow and wedges) Nutrition Interventions: Document food/fluid/supplement intake, Offer support with meals,snacks and hydration Friction and Shear Interventions: HOB 30 degrees or less, Lift sheet

## 2019-01-30 NOTE — PROGRESS NOTES
Hospitalist Progress Note NAME: David Hinojosa :  1965 MRN:  [id number] Assessment / Plan: 
 
Acute hypoxic respiratory failure Sepsis secondary to Influenza A pneumonia Mild to moderate ARDS 
-CXR  Progression of bilateral airspace disease which may represent pulmonary edema versus diffuse pneumonia. Repeat chest xray on :Slightly improved aeration relative to comparison which may be technical and 
-Refused Tamiflu  
-completed course of empiric levaquin  
-Echo EF 39-12%, diastolic dysfunction, no MR, no AS 
-continue efforts to wean high flow - down to 3L this AM.  He clinically looks better respiratory wise. Nausea vomiting Achalasia Esophageal obstruction 
-\"it's 20x worse now\" -BS High-grade stricture at the gastroesophageal junction. Impaired esophageal motility 
-S/P Esophagogastroduodenoscopy with biopsy, esophageal dilation, foreign body removal  -Findings:   
-Aperistaltic esophagus suggestive of underlying achalasia 
-Distal esophageal food bolus and pills; removed with oroeco Huffman Net 
-Hypertensive LES zone, easily overcome with gentle endoscope pressure, but no stricture or hiatal hernia; This reion is biopsied and then dilated with 18mm balloon held for 1min and revaluated. -Diffuse gastritis and decreased gastric motility; biopsied 
-Duodenal inflammation suggestive of duodenitis; biopsied Therapies:  esophageal dilation with 18mm sized balloon; biopsy of esophagus; biopsy of stomach;  biopsy of duodenal removal of foriegn body using lowery net 
 
-continue IV protonix 
-advance diet as tolerated DMI, uncontrolled with hyperglycemia POA HLD  
- Hba1c 11%; pt reported not taking lantus or any medication for many years . - continue low dose lantus with SSI prn 
 
KATJA 
- ordered NIV but patient refusing to use - \" the pressure is too strong\" Code Status: Full Surrogate Decision Maker: Wife DVT Prophylaxis: Lovenox GI Prophylaxis: not indicated Baseline: Independent Body mass index is 23.29 kg/m². therefore classifying patient as overweight, NOS (body mass index [BMI] of 25 to 29.9 kg/m2) 
-counseled exercise/diet by my collegue Subjective: Chief Complaint / Reason for Physician Visit Follow up PNA, influenza, hypoxemia, DM Back from endoscopy and still unable to tolerate even liquids Review of Systems: 
Symptom Y/N Comments  Symptom Y/N Comments Fever/Chills n   Chest Pain n   
Poor Appetite    Edema Cough    Abdominal Pain n   
Sputum    Joint Pain SOB/FABIAN y  improving  Pruritis/Rash Nausea/vomit YY   Tolerating PT/OT Diarrhea n   Tolerating Diet n   
Constipation    Other Could NOT obtain due to:   
 
Objective: VITALS:  
Last 24hrs VS reviewed since prior progress note. Most recent are: 
Patient Vitals for the past 24 hrs: 
 Temp Pulse Resp BP SpO2  
01/30/19 1556 98.3 °F (36.8 °C) 88 18 138/84 95 % 01/30/19 1327 97.3 °F (36.3 °C) 88 20 128/79 92 % 01/30/19 1300  82 14 126/74 93 % 01/30/19 1245 98.8 °F (37.1 °C) 83 12 122/72 95 % 01/30/19 1230  82 15 115/71 94 % 01/30/19 1225  84 17 115/67 91 % 01/30/19 1220  82 18 129/70 92 % 01/30/19 1215  82 24 110/71 91 % 01/30/19 1211 98.5 °F (36.9 °C) 83 21 121/71 91 % 01/30/19 1210  82 25 121/71 91 % 01/30/19 1207  81 24 121/75 92 % 01/30/19 1008 98.4 °F (36.9 °C) 80 16 (!) 152/92 94 % 01/30/19 0709 98.3 °F (36.8 °C) 86 18 159/88 95 % 01/30/19 0322 98.6 °F (37 °C) 84 20 (!) 161/93 93 % 01/29/19 2257 98.8 °F (37.1 °C) 79 20 146/88 95 % 01/29/19 1940 98.2 °F (36.8 °C) 79 20 155/88 97 % Intake/Output Summary (Last 24 hours) at 1/30/2019 1700 Last data filed at 1/30/2019 1300 Gross per 24 hour Intake 1230 ml Output 0 ml Net 1230 ml PHYSICAL EXAM: 
General: WD, WN. Pale and tired , ill appearing , cooperative, no acute distress   
EENT:  EOMI. Anicteric sclerae. MMM Resp:  Bilateral decreased air entry, no crackles  No accessory muscle use CV:  Regular  rhythm,  Trace edema GI:  Soft, Non distended, Non tender.  +Bowel sounds Neurologic:  Alert and oriented X 3, normal speech, Psych:   Good insight. Not anxious nor agitated Skin:  No rashes. No jaundice Reviewed most current lab test results and cultures  YES Reviewed most current radiology test results   YES Review and summation of old records today    NO Reviewed patient's current orders and MAR    YES 
PMH/SH reviewed - no change compared to H&P 
________________________________________________________________________ Care Plan discussed with: 
  Comments Patient y Family  y wife RN y   
Care Manager Consultant Multidiciplinary team rounds were held today with , nursing, pharmacist and clinical coordinator. Patient's plan of care was discussed; medications were reviewed and discharge planning was addressed. ________________________________________________________________________ Total NON critical care TIME: 20 Minutes Total CRITICAL CARE TIME Spent:   Minutes non procedure based Comments >50% of visit spent in counseling and coordination of care x As above   
________________________________________________________________________ Melony Weston MD  
 
Procedures: see electronic medical records for all procedures/Xrays and details which were not copied into this note but were reviewed prior to creation of Plan. LABS: 
I reviewed today's most current labs and imaging studies. Pertinent labs include: 
Recent Labs  
  01/28/19 
0449 WBC 11.0 HGB 12.7 HCT 38.4 * Recent Labs  
  01/30/19 
0320 01/29/19 
0248 01/28/19 
8315  140 140  
K 3.3* 3.2* 3.1*  
 100 98 CO2 29 30 31 * 128* 148* BUN 9 9 10 CREA 0.46* 0.49* 0.47* CA 8.2* 8.2* 8.3*  
MG  --  2.2  --   
PHOS  --  2.7  --   
 
 
 Signed: Rosi Guy MD

## 2019-01-31 VITALS
HEART RATE: 90 BPM | TEMPERATURE: 98.3 F | SYSTOLIC BLOOD PRESSURE: 136 MMHG | HEIGHT: 72 IN | OXYGEN SATURATION: 95 % | DIASTOLIC BLOOD PRESSURE: 80 MMHG | BODY MASS INDEX: 23.26 KG/M2 | WEIGHT: 171.74 LBS | RESPIRATION RATE: 16 BRPM

## 2019-01-31 LAB
GLUCOSE BLD STRIP.AUTO-MCNC: 102 MG/DL (ref 65–100)
GLUCOSE BLD STRIP.AUTO-MCNC: 124 MG/DL (ref 65–100)
GLUCOSE BLD STRIP.AUTO-MCNC: 167 MG/DL (ref 65–100)
SERVICE CMNT-IMP: ABNORMAL

## 2019-01-31 PROCEDURE — 74011250636 HC RX REV CODE- 250/636: Performed by: GENERAL ACUTE CARE HOSPITAL

## 2019-01-31 PROCEDURE — 74011250636 HC RX REV CODE- 250/636: Performed by: INTERNAL MEDICINE

## 2019-01-31 PROCEDURE — 74011636637 HC RX REV CODE- 636/637: Performed by: GENERAL ACUTE CARE HOSPITAL

## 2019-01-31 PROCEDURE — C9113 INJ PANTOPRAZOLE SODIUM, VIA: HCPCS | Performed by: INTERNAL MEDICINE

## 2019-01-31 PROCEDURE — 74011000250 HC RX REV CODE- 250: Performed by: INTERNAL MEDICINE

## 2019-01-31 PROCEDURE — 74011250637 HC RX REV CODE- 250/637: Performed by: GENERAL ACUTE CARE HOSPITAL

## 2019-01-31 PROCEDURE — 77010033678 HC OXYGEN DAILY

## 2019-01-31 PROCEDURE — 82962 GLUCOSE BLOOD TEST: CPT

## 2019-01-31 RX ORDER — INSULIN LISPRO 100 [IU]/ML
INJECTION, SOLUTION INTRAVENOUS; SUBCUTANEOUS
Qty: 1 PACKAGE | Refills: 2 | Status: SHIPPED | OUTPATIENT
Start: 2019-01-31 | End: 2019-04-02 | Stop reason: SDUPTHER

## 2019-01-31 RX ADMIN — SODIUM CHLORIDE 40 MG: 9 INJECTION, SOLUTION INTRAMUSCULAR; INTRAVENOUS; SUBCUTANEOUS at 08:52

## 2019-01-31 RX ADMIN — ENOXAPARIN SODIUM 40 MG: 40 INJECTION SUBCUTANEOUS at 08:52

## 2019-01-31 RX ADMIN — INSULIN LISPRO 2 UNITS: 100 INJECTION, SOLUTION INTRAVENOUS; SUBCUTANEOUS at 12:31

## 2019-01-31 RX ADMIN — PRAVASTATIN SODIUM 20 MG: 10 TABLET ORAL at 10:41

## 2019-01-31 NOTE — PROGRESS NOTES
PCP NIGEL appt scheduled with Dr. Carlo Johnson on 2/14/2019 at 2:15pm. Appt added to AVS. Brady Cooper CM Specialist

## 2019-01-31 NOTE — PROGRESS NOTES
[facility] [place]SE[place] 
 
Patient is stable for discharge. I have reviewed the discharge instructions with the patient and spouse and verbalized understanding. All questions were fully answered and denies any complaints. There were no personal belongings, valuables or home medications left at patients bedside,  or safe. Hard scripts and medication handouts were given and reviewed with the patient and spouse. Appropriate educational materials and side effects teaching were provided. Cardiac monitor and IV line(s) removed.

## 2019-01-31 NOTE — DISCHARGE INSTRUCTIONS
HOSPITALIST DISCHARGE INSTRUCTIONS    NAME: Jennifer Vernon   :  1965   MRN:  [id number]     Date/Time:  2019 12:12 PM    ADMIT DATE: 2019   DISCHARGE DATE: 2019         · It is important that you take the medication exactly as they are prescribed. · Keep your medication in the bottles provided by the pharmacist and keep a list of the medication names, dosages, and times to be taken in your wallet. · Do not take other medications without consulting your doctor. What to do at [address]:  Full liquid diet    Recommended activity: Activity as tolerated      If you have questions regarding the hospital related prescriptions or hospital related issues please call [facility] at [phone]. You can always direct your questions to your primary care doctor if you are unable to reach your hospital physician; your PCP works as an extension of your hospital doctor just like your hospital doctor is an extension of your PCP for your time at the hospital [facility])    If you experience any of the following symptoms then please call your primary care physician or return to the emergency room if you cannot get hold of your doctor:    Fever, chills, nausea, vomiting, or persistent diarrhea  Worsening weakness or new problems with your speech or balance  Dark stools or visible blood in your stools  New Leg swelling or shortness of breath as these could be signs of a clot    Additional Instructions:      Check your blood sugars before breakfast and before dinner. Record the readings for your doctor to review. Call your doctor if your blood sugar is persistently elevated >400 or low <80            Information obtained by :  I understand that if any problems occur once I am at home I am to contact my physician. I understand and acknowledge receipt of the instructions indicated above. Physician's or R.N.'s Signature                                                                  Date/Time                                                                                                                                              Patient or Representative Signature

## 2019-01-31 NOTE — PROGRESS NOTES
[facility]. 
 
 
 
 
 
1/31/2019 RE: Sara Cao (YOB: 1965) To Whom it May Concern: This is to certify that Sara Cao was admitted to [facility] from 1/19/2019 to 1/31/2019. He is advised to rest for a few days and may return to work on 2/11/2019 with no new restrictions. Please feel free to contact my office if you have any questions or concerns. Thank you for your assistance in this matter. Argenis Garrett MD 
[phone] office

## 2019-01-31 NOTE — PROGRESS NOTES
Nutrition Assessment: 
 
INTERVENTIONS/RECOMMENDATIONS:  
Meals/Snacks: General/healthful diet: Full liquids - diet education provided Supplements: Commercial supplement: Glucerna shakes 3-4x/day ASSESSMENT:  
Chart reviewed; diet advanced to Full liquids + Glucerna shakes. Patient reports an improved appetite today and tolerated breakfast well. Had oatmeal that he had no issues with it. Discussed full liquid diet; what is allowed and what to avoid. Encouraged patient to continue glucerna shakes or other protein supplements. Handouts provided and questions answered. Patient to be discharged today. Diet Order: Full liquids(Glucerna TID) % Eaten:   
Patient Vitals for the past 72 hrs: 
 % Diet Eaten 01/30/19 1300 0 % 01/29/19 1947 0 % 01/29/19 1322 0 %  
01/29/19 1054 0 % 01/28/19 1801 25 % Pertinent Medications: [x] Reviewed []Other: Humalog, Protonix, Pravastatin Pertinent Labs: [x]Reviewed  []Other: K+ 3.3, -[phone] Food Allergies: [x]None []Other:   Last BM: 1/21  [x]Active     []Hyperactive  []Hypoactive       [] Absent  BS Skin:    [x] Intact   [] Incision  [] Breakdown   []Edema   []Other: Anthropometrics: Height: 6' (182.9 cm) Weight: 77.9 kg (171 lb 11.8 oz) IBW (%IBW):   ( ) UBW (%UBW):   (  %) BMI: Body mass index is 23.29 kg/m². This BMI is indicative of: 
[]Underweight   [x]Normal   []Overweight   [] Obesity   [] Extreme Obesity (BMI>40) Last Weight Metrics: 
Weight Loss Metrics 1/28/2019 6/6/2014 5/30/2014 4/21/2011 4/1/2011 2/4/2011 10/1/2010 Today's Wt 171 lb 11.8 oz 209 lb 207 lb 14.3 oz 221 lb 6.4 oz 226 lb 228 lb 234 lb BMI 23.29 kg/m2 25.45 kg/m2 25.32 kg/m2 32.68 kg/m2 33.36 kg/m2 28.11 kg/m2 30.45 kg/m2 Estimated Nutrition Needs (Based on): 2168 Kcals/day(BMR (1668) x 1. 3AF) , 78 g(1.0 g/kg bw) Protein Carbohydrate: At Least 130 g/day  Fluids: 2150 mL/day  Pt expected to meet estimated nutrient needs: [x]Yes []No 
 
 NUTRITION DIAGNOSES:  
Problem:  Inadequate oral intake Etiology: related to decreased appetite, swallowing difficulty Signs/Symptoms: as evidenced by PO <50% of meals, NPO for EGD/dilation today Previous diagnosis resolved; PO improved s/p dilation NUTRITION INTERVENTIONS: 
Meals/Snacks: General/healthful diet   Supplements: Commercial supplement GOAL:  
PO intake >70% of meals/supplement next 5-7 days NUTRITION MONITORING AND EVALUATION Food/Nutrient Intake Outcomes: Total energy intake Physical Signs/Symptoms Outcomes: Weight/weight change, Electrolyte and renal profile, Glucose profile Previous Goal Met: 
 [x] Met              [] Progressing Towards Goal              [] Not Progressing Towards Goal  
Previous Recommendations: 
 [x] Implemented          [] Not Implemented          [] Not Applicable LEARNING NEEDS (Diet, Food/Nutrient-Drug Interaction):  
 [] None Identified 
 [x] Identified and Education Provided/Documented 
 [] Identified and Pt declined/was not appropriate Cultural, Gnosticist, OR Ethnic Dietary Needs:  
 [x] None Identified 
 [] Identified and Addressed 
 
 [x] Interdisciplinary Care Plan Reviewed/Documented  
 [x] Discharge Planning: Full liquids + Glucerna 3-4x/day [x] Participated in Interdisciplinary Rounds NUTRITION RISK:  
 [] High              [] Moderate           [x]  Low  []  Minimal/Uncompromised [facility] Pager [phone] Weekend Pager [phone]

## 2019-01-31 NOTE — PROGRESS NOTES
[facility] [place] [place] [facility], [facility] Sleep Medicine Name: Sara Cao MRN: [id number] : 1965 Hospital: [facility] Date: 2019 IMPRESSION:  
· Acute hypoxic respiratory failure- on nasla O2- resolved · Acute influenza pneumonia, better · Mild-to-moderate ARDS · Dysphagia · Severe esophageal dysmotility, achalasia s/p dilitation  · KATJA · Can not exclude bacterial superinfection · Can not completely exclude heart failure · Uncontrolled diabetes RECOMMENDATIONS:  
· Nutrition consult- will need liquids and as much protein as possible · At risk for refeeding syndrome · Phos in AM 
· Nothing to add from Pulmonary standpoint · Aspiration precautions- expalined to pt and wife importance of following Dr. Kirt Apley instructions on drinking when upright and not lying down for 2-3 three hours after ingestion to allow full passage of fluids, to avoid reflux, aspiration- that would result in major pulmonary set back which he cannot afford · If stricture not improved some to allow better Po intake, May need TPN 
· Hope he continues to rally, will sign off  
 
 
 had a good night. Woke up dry. Off O2 and sats 92-97%. Ecstatic about ability to drink water and broth!! Hungry  EGD results noted. d/w with  at bedside.  Very tight distal esophageal stricture on Barium study. Down to 3 LPM. Pt is vehement that dilitation did not work. Dr. Vi Ann showed pt the images and was very thorough explaining pt's findings and and for a methodical approach to evaluation. Pt has had this for about 20 yrs and needs reevaluation. This will not be fixed overnight, need more information.  refusing BIPAP but sats holding on high flow/ midflow nasal O2. Long h/o dysphagia. Poor PO intake for many days. Remote extensive workup by . Subjective:  
 
19: oxygenation remains poor; on 10 LPM nasal cannula.  Complaining about nasal dryness, but doesn't want mask that covers mouth. He also is complaining about sleep apnea and he feels that this is why he is hypoxic during the day. Has not worn CPAP in years, but now is upset he is not being treated with it nightly here. He does not recall his settings, but he knows they were \"high\". From his description he had an auto-CPAP. Initial history: This patient has been seen and evaluated at the request of Dr. Theresa Tello for worsening pneumonia. Patient is a 47 y.o. male former smoker, presented to the ER 1/19 with dyspnea and generalized malaise. He was diagnosed with influenza at [facility] 1 week ago. He declined to take Tamiflu because he was concerned it would give him side effects. He did not get a flu shot this year. He has been treated with antibiotics, IVF, but he has become progressively more hypoxic with malaise and dyspnea. He was again offered Tamiflu here but has refused again. Due to worsening hypoxia, I was asked to evaluate him. Past Medical History:  
Diagnosis Date  Diabetes ([facility] [address]  Nausea & vomiting  Pancreatitis  Unspecified sleep apnea Past Surgical History:  
Procedure Laterality Date  HX GI    
 upper endoscopies  HX HEENT    
 oral surgery  GA EGD FLEXIBLE FOREIGN BODY REMOVAL  1/30/2019  UPPER GI ENDOSCOPY,BALL DIL,30MM  1/30/2019  UPPER GI ENDOSCOPY,BIOPSY  1/30/2019 Prior to Admission medications Not on File Allergies Allergen Reactions  Lisinopril Anaphylaxis Social History Tobacco Use  Smoking status: Former Smoker Years: 1.00 Types: Cigarettes  Smokeless tobacco: Never Used Substance Use Topics  Alcohol use: Yes Family History Problem Relation Age of Onset  Heart Disease Mother   
     s/p stent  Diabetes Father  Cancer Brother   
     stomach cancer Current Facility-Administered Medications Medication Dose Route Frequency  pantoprazole (PROTONIX) 40 mg in sodium chloride 0.9% 10 mL injection  40 mg IntraVENous DAILY  pravastatin (PRAVACHOL) tablet 20 mg  20 mg Oral DAILY  insulin lispro (HUMALOG) injection   SubCUTAneous AC&HS  enoxaparin (LOVENOX) injection 40 mg  40 mg SubCUTAneous Q24H Review of Systems: A comprehensive review of systems was negative except for: Ears, nose, mouth, throat, and face: positive for snoring Objective: 
Vital Signs:   
Visit Vitals /81 (BP 1 Location: Right arm, BP Patient Position: Sitting; At rest) Pulse 89 Temp 98.5 °F (36.9 °C) Resp 16 Ht 6' (1.829 m) Wt 77.9 kg (171 lb 11.8 oz) SpO2 95% BMI 23.29 kg/m² O2 Device: Room air O2 Flow Rate (L/min): 3 l/min Temp (24hrs), Av.3 °F (36.8 °C), Min:97.3 °F (36.3 °C), Max:98.8 °F (37.1 °C) Intake/Output:  
Last shift:      No intake/output data recorded. Last 3 shifts:  1901 -  0700 In: 1260 [P.O.:510; I.V.:750] Out: 0 Intake/Output Summary (Last 24 hours) at 2019 1010 Last data filed at 2019 4095 Gross per 24 hour Intake 780 ml Output 0 ml Net 780 ml Physical Exam:  
General:  Alert, ill appearing, no acute dstress Head:  Normocephalic, without obvious abnormality, atraumatic. Eyes:  Conjunctivae/corneas clear. Nose: Nares normal. Septum midline. Mucosa normal.    
Throat: Lips, mucosa, and tongue normal. Teeth and gums normal.  
Neck: Supple, symmetrical, trachea midline Back:   Symmetric, no curvature. ROM normal.  
Lungs:   Scattered bilateral rales Chest wall:  No tenderness or deformity. Heart:  Tachy, regular, no murmur Abdomen:   Soft, non-tender. Bowel sounds normal.    
Extremities: Extremities normal, atraumatic, no cyanosis or clubbing Skin: Skin color, texture, turgor normal. No rashes or lesions Neurologic: Grossly nonfocal  
 
Data:  
Labs: 
No results for input(s): WBC, HGB, HCT, PLT, HGBEXT, HCTEXT, PLTEXT, HGBEXT, HCTEXT, PLTEXT in the last 72 hours. Recent Labs  
  01/30/19 
0320 01/29/19 
0248  140  
K 3.3* 3.2*  
 100 CO2 29 30 * 128* BUN 9 9  
CREA 0.46* 0.49* CA 8.2* 8.2* MG  --  2.2 PHOS  --  2.7 No results for input(s): PH, PCO2, PO2, HCO3, FIO2 in the last 72 hours. Imaging: 
I have personally reviewed the patients radiographs: 
No change yesterday Nicolette Lees MD

## 2019-01-31 NOTE — DISCHARGE SUMMARY
Hospitalist Discharge Summary     Patient ID:  Maureen Celis  [id number]  23 y.o.  1965    PCP on record: None    Admit date: 1/19/2019  Discharge date and time: 1/31/2019      DISCHARGE DIAGNOSIS:    Acute hypoxic respiratory failure  Sepsis secondary to Influenza A pneumonia  Mild to moderate ARDS  Achalasia  Esophageal obstruction  DMI, uncontrolled with hyperglycemia POA  HLD   KATJA    CONSULTATIONS:  IP CONSULT TO HOSPITALIST  IP CONSULT TO INTENSIVIST  IP CONSULT TO GASTROENTEROLOGY    Excerpted HPI from H&P of Susannah Caceres MD:  CHIEF COMPLAINT: fever, chills, nausea, vomiting, decreased PO intake     HISTORY OF PRESENT ILLNESS:     Maureen Celis is a 47 y.o.  male who presents with CC listed above. Pt states that he has been feeling unwell since Sunday evening and has just progressively become worse. He states that he went to [facility] on Tuesday and was diagnosed with Influenza A and was given the option of starting Tamiflu. Pt states that he refused because he was not sure how it would interact with his DM. Pt states that he has been constantly febrile at home, around 101-102. Pt also states that he cannot remember the last time he had a full meal.   Currently he states that he feels \"terrible. \"    ______________________________________________________________________  DISCHARGE SUMMARY/HOSPITAL COURSE:  for full details see H&P, daily progress notes, labs, consult notes. Acute hypoxic respiratory failure  Sepsis secondary to Influenza A pneumonia  Mild to moderate ARDS  -CXR 1/21 Progression of bilateral airspace disease which may represent pulmonary edema versus diffuse pneumonia.   Repeat chest xray on 1/24:Slightly improved aeration relative to comparison which may be technical and  -Refused Tamiflu   -completed course of empiric levaquin 1/25  -Echo EF 24-38%, diastolic dysfunction, no MR, no AS  -continue efforts to wean high flow - down to 3L this AM.  He clinically looks better respiratory wise.   -now off oxygen with RA >95%     Nausea vomiting  Achalasia  Esophageal obstruction  -\"it's 20x worse now\"  -BS High-grade stricture at the gastroesophageal junction. Impaired esophageal motility  -S/P Esophagogastroduodenoscopy with biopsy, esophageal dilation, foreign body removal 1/30  -Findings:    -Aperistaltic esophagus suggestive of underlying achalasia  -Distal esophageal food bolus and pills; removed with Maico Emmanuelle Net  -Hypertensive LES zone, easily overcome with gentle endoscope pressure, but no stricture or hiatal hernia;  This reion is biopsied and then dilated with 18mm balloon held for 1min and revaluated. -Diffuse gastritis and decreased gastric motility; biopsied  -Duodenal inflammation suggestive of duodenitis; biopsied  Therapies:  esophageal dilation with 18mm sized balloon; biopsy of esophagus; biopsy of stomach;  biopsy of duodenal removal of foriegn body using lowery net     -continue on full liquid diet. Follow up with GI to arrange for manometry as OP       DMI, uncontrolled with hyperglycemia POA  HLD   - Hba1c 11%; pt reported not taking lantus or any medication for many years . - needs to follow with a PCP of his choice. Discussed using humalog insulin following a sliding scale until he follows up with his PCP in a couple of weeks. Addendum:  The patient left before I could give him his Rx for humalog. Patient does have a follow up with Dr Dionne Snyder in about 2 weeks. He is currently just starting to tolerate liquids and his BG has been okay. He can just follow up with his PCP to discuss starting insulin or some other oral agent. KATJA  - ordered NIV but patient refusing to use - \" the pressure is too strong\"         _______________________________________________________________________  Patient seen and examined by me on discharge day. Pertinent Findings:  Gen:    Not in distress  Chest: Clear lungs  CVS:   Regular rhythm.   No edema  Abd: Soft, not distended, not tender  Neuro:  Alert, Oriented x 4, grossly non focal exam  _______________________________________________________________________  DISCHARGE MEDICATIONS:   Current Discharge Medication List      STOP taking these medications       pravastatin (PRAVACHOL) 20 mg tablet Comments:   Reason for Stopping:               My Recommended Diet, Activity, Wound Care, and follow-up labs are listed in the patient's Discharge Insturctions which I have personally completed and reviewed.   Risk of deterioration: Low    Condition at Discharge:  Stable  _____________________________________________________________________    Disposition  Home with family, no needs  ____________________________________________________________________    Care Plan discussed with:   Patient, Family, RN, Care Manager, Consultant    ____________________________________________________________________    Code Status: Full Code  ____________________________________________________________________      Condition at Discharge:  Stable  _____________________________________________________________________  Follow up with:   PCP : None  Follow-up Information     Follow up With Specialties Details Why Contact Info    Michelle Neil MD Internal Medicine Go on 2/14/2019 Hospital follow-up scheduled at 2:15pm ( If you have questions or need to reschedule please call [phone] [address]  [address] [facility]      Brandie Cabrera MD Gastroenterology In 3 weeks  [address] [place] [address]  [phone]                Total time in minutes spent coordinating this discharge (includes going over instructions, follow-up, prescriptions, and preparing report for sign off to her PCP) :  35 minutes    Signed:  Eliel Kirby MD

## 2019-01-31 NOTE — PROGRESS NOTES
Problem: Pressure Injury - Risk of 
Goal: *Prevention of pressure injury Document Connor Scale and appropriate interventions in the flowsheet. Outcome: Progressing Towards Goal 
Pressure Injury Interventions: 
  
 
Moisture Interventions: Absorbent underpads Activity Interventions: Increase time out of bed, Pressure redistribution bed/mattress(bed type), PT/OT evaluation Mobility Interventions: Float heels, HOB 30 degrees or less, Pressure redistribution bed/mattress (bed type), PT/OT evaluation, Turn and reposition approx. every two hours(pillow and wedges) Nutrition Interventions: Document food/fluid/supplement intake, Offer support with meals,snacks and hydration, Discuss nutritional consult with provider Friction and Shear Interventions: Lift sheet, HOB 30 degrees or less, Minimize layers, Transferring/repositioning devices

## 2019-01-31 NOTE — PROGRESS NOTES
Problem: Falls - Risk of 
Goal: *Absence of Falls Document Jennifer Sarmiento Fall Risk and appropriate interventions in the flowsheet. Outcome: Progressing Towards Goal 
Fall Risk Interventions: 
Mobility Interventions: Communicate number of staff needed for ambulation/transfer, OT consult for ADLs, Patient to call before getting OOB, PT Consult for mobility concerns, PT Consult for assist device competence Medication Interventions: Evaluate medications/consider consulting pharmacy, Patient to call before getting OOB, Teach patient to arise slowly Elimination Interventions: Call light in reach, Urinal in reach, Patient to call for help with toileting needs History of Falls Interventions: Door open when patient unattended

## 2019-01-31 NOTE — PROGRESS NOTES
GI Progress Note Cyrus Wells Huma Diaz :1965 CC Dm Collins MD PCP: None 
Date/Time:  2019 1015 Assessment:  
Dysphagia- EGD noted: 
-Aperistaltic esophagus suggestive of underlying achalasia 
-Distal esophageal food bolus and pills; removed with Linda Sue Net 
-Hypertensive LES zone, easily overcome with gentle endoscope pressure, but no stricture or hiatal hernia; This reion is biopsied and then dilated with 18mm balloon held for 1min and revaluated. -Diffuse gastritis and decreased gastric motility; biopsied 
-Duodenal inflammation suggestive of duodenitis; biopsied Plan: · Advance to FLD and not beyond liquid diet- if tolerates consider him for discharge home · Must be upright for 2-3 hrs after all meals and ingestions · Offer OP esophageal manometry · Dietary will see him again today Subjective:  
Discussed with RN events overnight. Significant improvement in dysphagia following . Complaint Y/N Description Abdominal Pain n Hematemesis n Hematochezia n Melena n   
Constipation n   
Diarrhea n Dyspepsia n Dysphagia n   
Jaundiced n   
Nausea/vomiting n Review of Systems: 
Symptom Y/N Comments  Symptom Y/N Comments Fever/Chills n   Chest Pain n   
Cough n   Headaches n Sputum n   Joint Pain n   
SOB/FABIAN n   Pruritis/Rash n Tolerating Diet y CLD  Other Could NOT obtain due to:   
 
Objective: VITALS:  
Last 24hrs VS reviewed since prior progress note. Most recent are: 
Visit Vitals /80 (BP 1 Location: Right arm, BP Patient Position: Sitting) Pulse 90 Temp 98.3 °F (36.8 °C) Resp 16 Ht 6' (1.829 m) Wt 77.9 kg (171 lb 11.8 oz) SpO2 95% BMI 23.29 kg/m² Intake/Output Summary (Last 24 hours) at 2019 1122 Last data filed at 2019 9961 Gross per 24 hour Intake 780 ml Output 0 ml Net 780 ml PHYSICAL EXAM: 
General: WD, WN.  Alert, cooperative, no acute distress   
 HEENT: NC, Atraumatic. PERRL. Anicteric sclerae. Lungs:  CTA Bilaterally. No Wheezing/Rhonchi/Rales. Heart:  Regular  rhythm,  No murmur/Rub/Gallops Abdomen: Soft, Non distended, Non tender.  +BS Extremities: No c/c/e Neurologic:  CN 2-12 gi, A/O X 3. No acute neurological distress Psych:   Good insight. Not anxious nor agitated. Lab and Radiology Data Reviewed: (see below) 
  Medications Reviewed: (see below) PMH/SH reviewed - no change compared to H&P 
________________________________________________________________________ Total time spent with patient: 15 minutes ________________________________________________________________________ Care Plan discussed with: 
Patient y Family  y  
RN y  
           Consultant:    
 
Brandan Mario MD  
 
Procedures: see electronic medical records for all procedures/Xrays and details which were not copied into this note but were reviewed prior to creation of Plan. LABS: 
No results for input(s): WBC, HGB, HCT, PLT, HGBEXT, HCTEXT, PLTEXT, HGBEXT, HCTEXT, PLTEXT in the last 72 hours. Recent Labs  
  01/30/19 
0320 01/29/19 
0248  140  
K 3.3* 3.2*  
 100 CO2 29 30 BUN 9 9  
CREA 0.46* 0.49* * 128* CA 8.2* 8.2* MG  --  2.2 PHOS  --  2.7 No results for input(s): SGOT, GPT, AP, TBIL, TP, ALB, GLOB, GGT, AML, LPSE in the last 72 hours. No lab exists for component: AMYP, HLPSE No results for input(s): INR, PTP, APTT in the last 72 hours. No lab exists for component: INREXT, INREXT No results for input(s): FE, TIBC, PSAT, FERR in the last 72 hours. No results found for: FOL, RBCF No results for input(s): PH, PCO2, PO2 in the last 72 hours. No results for input(s): CPK, CKMB in the last 72 hours. No lab exists for component: TROPONINI Lab Results Component Value Date/Time  Color YELLOW/STRAW 05/30/2014 07:20 PM  
 Appearance CLEAR 05/30/2014 07:20 PM  
 Specific gravity 1.020 05/30/2014 07:20 PM  
 pH (UA) 5.5 05/30/2014 07:20 PM  
 Protein NEGATIVE  05/30/2014 07:20 PM  
 Glucose >1,000 (A) 05/30/2014 07:20 PM  
 Ketone 40 (A) 05/30/2014 07:20 PM  
 Bilirubin NEGATIVE  05/30/2014 07:20 PM  
 Urobilinogen 0.2 05/30/2014 07:20 PM  
 Nitrites NEGATIVE  05/30/2014 07:20 PM  
 Leukocyte Esterase NEGATIVE  05/30/2014 07:20 PM  
 Epithelial cells FEW 05/30/2014 07:20 PM  
 Bacteria 1+ (A) 05/30/2014 07:20 PM  
 WBC 5-10 05/30/2014 07:20 PM  
 RBC 0-5 05/30/2014 07:20 PM  
 
 
MEDICATIONS: 
Current Facility-Administered Medications Medication Dose Route Frequency  ketorolac (TORADOL) injection 30 mg  30 mg IntraVENous Q6H PRN  pantoprazole (PROTONIX) 40 mg in sodium chloride 0.9% 10 mL injection  40 mg IntraVENous DAILY  oxymetazoline (AFRIN) 0.05 % nasal spray 2 Spray  2 Spray Both Nostrils BID PRN  
 sodium chloride (OCEAN) 0.65 % nasal squeeze bottle 2 Spray  2 Spray Both Nostrils Q2H PRN  
 loperamide (IMODIUM) capsule 2 mg  2 mg Oral Q4H PRN  
 acetaminophen (TYLENOL) tablet 650 mg  650 mg Oral Q6H PRN  
 levalbuterol (XOPENEX) nebulizer soln 1.25 mg/3 mL  1.25 mg Nebulization Q4H PRN  
 ibuprofen (MOTRIN) tablet 400 mg  400 mg Oral Q6H PRN  pravastatin (PRAVACHOL) tablet 20 mg  20 mg Oral DAILY  insulin lispro (HUMALOG) injection   SubCUTAneous AC&HS  
 glucose chewable tablet 16 g  4 Tab Oral PRN  
 dextrose (D50W) injection syrg 12.5-25 g  12.5-25 g IntraVENous PRN  
 glucagon (GLUCAGEN) injection 1 mg  1 mg IntraMUSCular PRN  
 sodium chloride (NS) flush 5-40 mL  5-40 mL IntraVENous PRN  
 ondansetron (ZOFRAN) injection 4 mg  4 mg IntraVENous Q4H PRN  
 enoxaparin (LOVENOX) injection 40 mg  40 mg SubCUTAneous Q24H

## 2019-02-07 ENCOUNTER — TELEPHONE (OUTPATIENT)
Dept: INTERNAL MEDICINE CLINIC | Age: 54
End: 2019-02-07

## 2019-02-07 NOTE — TELEPHONE ENCOUNTER
Pt calling stating he needs a document from the office stating for him to work half days next week as the HR is requesting due to him not being completely better after his recent dx. Best contact [phone].        Message received & copied from [facility]

## 2019-02-07 NOTE — TELEPHONE ENCOUNTER
Spoke with patient after 2 patient identifiers being note and advised that I could give him an appt on tomorrow at 11am, patient declined. There are no 30 min appts available on Monday or Tuesday. Patient still did not want the appt on tomorrow. Patient expressed understanding and has no further questions at this time.

## 2019-02-14 ENCOUNTER — HOSPITAL ENCOUNTER (OUTPATIENT)
Dept: GENERAL RADIOLOGY | Age: 54
Discharge: HOME | End: 2019-02-14
Payer: COMMERCIAL

## 2019-02-14 ENCOUNTER — OFFICE VISIT (OUTPATIENT)
Dept: INTERNAL MEDICINE CLINIC | Age: 54
End: 2019-02-14

## 2019-02-14 VITALS
HEART RATE: 118 BPM | OXYGEN SATURATION: 98 % | WEIGHT: 183 LBS | SYSTOLIC BLOOD PRESSURE: 151 MMHG | TEMPERATURE: 97.6 F | HEIGHT: 72 IN | BODY MASS INDEX: 24.79 KG/M2 | DIASTOLIC BLOOD PRESSURE: 88 MMHG

## 2019-02-14 DIAGNOSIS — L03.032 CELLULITIS OF LEFT TOE: ICD-10-CM

## 2019-02-14 DIAGNOSIS — J11.00 INFLUENZA AND PNEUMONIA: ICD-10-CM

## 2019-02-14 DIAGNOSIS — K22.0 ACHALASIA: ICD-10-CM

## 2019-02-14 DIAGNOSIS — E11.65 UNCONTROLLED TYPE 2 DIABETES MELLITUS WITH HYPERGLYCEMIA (HCC): ICD-10-CM

## 2019-02-14 DIAGNOSIS — R93.89 ABNORMAL CXR: ICD-10-CM

## 2019-02-14 DIAGNOSIS — R63.4 WEIGHT LOSS: ICD-10-CM

## 2019-02-14 DIAGNOSIS — J11.00 INFLUENZA AND PNEUMONIA: Primary | ICD-10-CM

## 2019-02-14 DIAGNOSIS — R91.8 OPACITIES OF BOTH LUNGS PRESENT ON CHEST X-RAY: ICD-10-CM

## 2019-02-14 PROCEDURE — 71046 X-RAY EXAM CHEST 2 VIEWS: CPT

## 2019-02-14 RX ORDER — LANCETS
EACH MISCELLANEOUS
Qty: 1 EACH | Refills: 11 | Status: SHIPPED | OUTPATIENT
Start: 2019-02-14 | End: 2019-04-02

## 2019-02-14 RX ORDER — INSULIN PUMP SYRINGE, 3 ML
EACH MISCELLANEOUS
Qty: 1 KIT | Refills: 0 | Status: SHIPPED | OUTPATIENT
Start: 2019-02-14 | End: 2021-06-10

## 2019-02-14 RX ORDER — CEPHALEXIN 500 MG/1
500 CAPSULE ORAL 3 TIMES DAILY
Qty: 28 CAP | Refills: 0 | Status: SHIPPED | OUTPATIENT
Start: 2019-02-14 | End: 2019-11-07 | Stop reason: ALTCHOICE

## 2019-02-14 RX ORDER — INSULIN LISPRO 100 [IU]/ML
INJECTION, SOLUTION INTRAVENOUS; SUBCUTANEOUS
Qty: 1 PACKAGE | Refills: 6 | Status: SHIPPED | OUTPATIENT
Start: 2019-02-14 | End: 2019-04-03 | Stop reason: SDUPTHER

## 2019-02-14 RX ORDER — INSULIN GLARGINE 100 [IU]/ML
INJECTION, SOLUTION SUBCUTANEOUS
Qty: 5 ADJUSTABLE DOSE PRE-FILLED PEN SYRINGE | Refills: 6 | Status: SHIPPED | OUTPATIENT
Start: 2019-02-14 | End: 2019-04-02

## 2019-02-14 NOTE — PROGRESS NOTES
Discussed former smoker 1 ppd for > 25 yrs.  Will order chest CT with contrast--discussed with patient

## 2019-02-14 NOTE — LETTER
2/14/2019 5:08 PM 
 
Mr. Francois Aguayo [address] [address] Dear Francois Aguayo: Please find your most recent results below. Resulted Orders XR CHEST PA LAT Narrative EXAM: XR CHEST PA LAT INDICATION: f/u pneumonia COMPARISON: 1/29/2019, 1/19/2019, 5/30/2014. FINDINGS: PA and lateral radiographs of the chest demonstrate patchy parenchymal 
densities in the periphery of both lungs with particular area of confluent 
densities at the right lung base posteriorly. The appearance is similar to 
1/19/2019. There may be an element of chronicity. Alessandra Loose Heart and mediastinal shadows 
are stable. Degenerative changes of the thoracic spine noted. Impression IMPRESSION: No significant interval change in the appearance of the lungs since 1/19/2019. CT of the thorax may be considered to further evaluate densities. RECOMMENDATIONS: 
Persistent densities both lungs--Chest CT has been ordered as we discussed for further evaluation. Please call me if you have any questions: [phone] Sincerely, 
 
 
Sy Hernandez MD

## 2019-02-14 NOTE — PROGRESS NOTES
Chief Complaint Patient presents with  
[facility] Follow Up  
  pneumonia, flu, stomach surgery

## 2019-02-14 NOTE — PROGRESS NOTES
HISTORY OF PRESENT ILLNESS Ede Shen is a 47 y.o. male. HPI Hospital f/u for respiratory failure, influenza  pneumonia and ARDS and esophageal obstruction and achalasia s/p dilation Admitted 1/19-1/31/19-was on high flow 02 and weaned off Went back to work this week and feeling better Not SOB walking now No f/c cough Eating better Swallowing ok now s/p esoph dilation-Dr Kathia Godoy a1c was 11.3 in hospital  
Has been off all medicines and insulin for year and not checking his glucose levels, denies excess thirst or urination Weight loss gradually with previous weight up to 250-260 lbs per pt 
 
C/o redness of left 3d toe and base of the toenail Has some chronic neuropathy in feet r/t diabetes DISCHARGE DIAGNOSIS: 
  
Acute hypoxic respiratory failure Sepsis secondary to Influenza A pneumonia Mild to moderate ARDS Achalasia Esophageal obstruction DMI, uncontrolled with hyperglycemia POA HLD  
KATJA 
  
CONSULTATIONS: 
IP CONSULT TO HOSPITALIST 
IP CONSULT TO INTENSIVIST 
IP CONSULT TO GASTROENTEROLOGY 
  
Excerpted HPI from H&P of Nilay Choudhary MD: 
CHIEF COMPLAINT: fever, chills, nausea, vomiting, decreased PO intake 
  
HISTORY OF PRESENT ILLNESS:    
Venus Singh a 47 y. o.   male who presents with CC listed above. Pt states that he has been feeling unwell since Sunday evening and has just progressively become worse. He states that he went to [facility] on Tuesday and was diagnosed with Influenza A and was given the option of starting Tamiflu. Pt states that he refused because he was not sure how it would interact with his DM. Pt states that he has been constantly febrile at home, around 101-102. Pt also states that he cannot remember the last time he had a full meal.  
Currently he states that he feels \"terrible. \" 
  
______________________________________________________________________ DISCHARGE SUMMARY/HOSPITAL COURSE:  for full details see H&P, daily progress notes, labs, consult notes.  
  
  
  
Acute hypoxic respiratory failure Sepsis secondary to Influenza A pneumonia Mild to moderate ARDS 
-CXR 1/21 Progression of bilateral airspace disease which may represent pulmonary edema versus diffuse pneumonia.  Repeat chest xray on 1/24:Slightly improved aeration relative to comparison which may be technical and 
-Refused Tamiflu  
-completed course of empiric levaquin 1/25 
-Echo EF 48-00%, diastolic dysfunction, no MR, no AS 
-continue efforts to wean high flow - down to 3L this AM.  He clinically looks better respiratory wise.  
-now off oxygen with RA >95% 
  
Nausea vomiting Achalasia Esophageal obstruction 
-\"it's 20x worse now\" -BS High-grade stricture at the gastroesophageal junction. Impaired esophageal motility 
-S/P Esophagogastroduodenoscopy with biopsy, esophageal dilation, foreign body removal 1/30 -Findings:   
-Aperistaltic esophagus suggestive of underlying achalasia 
-Distal esophageal food bolus and pills; removed with [employer]ne Net 
-Hypertensive LES zone, easily overcome with gentle endoscope pressure, but no stricture or hiatal hernia;  This reion is biopsied and then dilated with 18mm balloon held for 1min and revaluated. -Diffuse gastritis and decreased gastric motility; biopsied 
-Duodenal inflammation suggestive of duodenitis; biopsied Therapies:  esophageal dilation with 18mm sized balloon; biopsy of esophagus; biopsy of stomach;  biopsy of duodenal removal of foriegn body using lowery net 
  
-continue on full liquid diet. Follow up with GI to arrange for manometry as OP 
  
 
DMI, uncontrolled with hyperglycemia POA HLD  
- Hba1c 11%; pt reported not taking lantus or any medication for many years . - needs to follow with a PCP of his choice. Discussed using humalog insulin following a sliding scale until he follows up with his PCP in a couple of weeks.  
  
Addendum:  The patient left before I could give him his Rx for humalog. Patient does have a follow up with Dr Bia Real in about 2 weeks. He is currently just starting to tolerate liquids and his BG has been okay. He can just follow up with his PCP to discuss starting insulin or some other oral agent.    
  
KATJA 
- ordered NIV but patient refusing to use - \" the pressure is too strong\"   
  
  
_______________________________________________________________________ Patient seen and examined by me on discharge day. Pertinent Findings: 
Gen:    Not in distress Chest:  Clear lungs CVS:    Regular rhythm. No edema Abd:     Soft, not distended, not tender Neuro:  Alert, Oriented x 4, grossly non focal exam 
_______________________________________________________________________ DISCHARGE MEDICATIONS:  
    
Current Discharge Medication List  
   
    
STOP taking these medications  
   
  pravastatin (PRAVACHOL) 20 mg tablet Comments:  
Reason for Stopping:   
     
   
  
  
My Recommended Diet, Activity, Wound Care, and follow-up labs are listed in the patient's Discharge Insturctions which I have personally completed and reviewed. Risk of deterioration: Low 
  
Condition at Discharge:  Stable 
_____________________________________________________________________ 
  
Disposition Home with family, no needs 
____________________________________________________________________ 
  
Care Plan discussed with:  
Patient, Family, RN, Care Manager, Consultant 
  
____________________________________________________________________ 
  
Code Status: Full Code 
____________________________________________________________________ 
  
  
Condition at Discharge:  Stable 
_____________________________________________________________________ Follow up with: PCP : None Follow-up Information   
  Follow up With Specialties Details Why Contact Info  
  Michelle Neil MD Internal Medicine Go on 2/14/2019 Hospital follow-up scheduled at 2:15pm ( If you have questions or need to reschedule please call [address] [facility] 
[phone] 
   
  Willian Srivastava MD Gastroenterology In 3 weeks   [address] [facility] 
[phone] 
   
   
  
  
  
  
Total time in minutes spent coordinating this discharge (includes going over instructions, follow-up, prescriptions, and preparing report for sign off to her PCP) :  35 minutes Patient Active Problem List  
 Diagnosis Date Noted  Influenza 01/19/2019  Diabetes mellitus ([address] 10/01/2010  
 HTN (hypertension) 10/01/2010  Overweight(278.02) 10/01/2010  Pure hypercholesterolemia 10/01/2010  Pancreatitis 10/01/2010  GERD (gastroesophageal reflux disease) 10/01/2010  KATJA (obstructive sleep apnea) 10/01/2010  Achalasia 10/01/2010 Current Outpatient Medications Medication Sig Dispense Refill  insulin glargine (LANTUS,BASAGLAR) 100 unit/mL (3 mL) inpn 20 units qhs 5 Adjustable Dose Pre-filled Pen Syringe 6  
 insulin lispro (HUMALOG) 100 unit/mL kwikpen 4 units tid ac 1 Package 6  Blood-Glucose Meter monitoring kit fsbs bid dx 250.02 1 Kit 0  
 glucose blood VI test strips (ASCENSIA AUTODISC VI, ONE TOUCH ULTRA TEST VI) strip Check fsbs bid 250.02 100 Strip 11  
 lancets misc Check fsbs bid 1 Each 11  
 cephALEXin (KEFLEX) 500 mg capsule Take 1 Cap by mouth three (3) times daily. 28 Cap 0  
 insulin lispro (HUMALOG KWIKPEN INSULIN) 100 unit/mL kwikpen INITIATE CORRECTIVE INSULIN PROTOCOL (KATIE): 
RX KATIE Normal Sensitivity (Average weight) For Blood Sugar (mg/dl) of:             
111-150=0 units 151-200=2 units 201-250=4 units 251-300=6 units 301-350=8 units Over 350= 10 units 1 Package 2 Allergies Allergen Reactions  Lisinopril Anaphylaxis Lab Results Component Value Date/Time  WBC 11.0 01/28/2019 04:49 AM  
 HGB 12.7 01/28/2019 04:49 AM  
 HCT 38.4 01/28/2019 04:49 AM  
 PLATELET 943 (H) 84/47/3798 04:49 AM  
 MCV 90.8 01/28/2019 04:49 AM  
 
Lab Results Component Value Date/Time GFR est non-AA >60 01/30/2019 03:20 AM  
 GFR est AA >60 01/30/2019 03:20 AM  
 Creatinine 0.46 (L) 01/30/2019 03:20 AM  
 BUN 9 01/30/2019 03:20 AM  
 Sodium 139 01/30/2019 03:20 AM  
 Potassium 3.3 (L) 01/30/2019 03:20 AM  
 Chloride 101 01/30/2019 03:20 AM  
 CO2 29 01/30/2019 03:20 AM  
 Magnesium 2.2 01/29/2019 02:48 AM  
 Phosphorus 2.7 01/29/2019 02:48 AM  
  
Review of Systems Constitutional: Positive for weight loss. Negative for chills, fever and malaise/fatigue. HENT: Negative. Eyes: Negative for blurred vision and double vision. Respiratory: Negative for cough and shortness of breath. Cardiovascular: Negative for chest pain and palpitations. Gastrointestinal: Positive for heartburn. Negative for abdominal pain, blood in stool, constipation, diarrhea, melena, nausea and vomiting. Genitourinary: Negative for dysuria, frequency, hematuria and urgency. Musculoskeletal: Negative for back pain, falls, joint pain and myalgias. Skin: Negative. Neurological: Negative for dizziness, tremors and headaches. Physical Exam  
Constitutional: He appears well-developed and well-nourished. No distress. Appears stated age HENT:  
Head: Normocephalic. Mouth/Throat: Oropharynx is clear and moist.  
Eyes: Pupils are equal, round, and reactive to light. Neck: Normal range of motion. Neck supple. No JVD present. No tracheal deviation present. No thyromegaly present. Cardiovascular: Normal rate, regular rhythm and normal heart sounds. Exam reveals no gallop and no friction rub. No murmur heard. Pulmonary/Chest: Effort normal and breath sounds normal. He has no wheezes. He has no rales. He exhibits no tenderness. Abdominal: Soft. He exhibits no distension and no mass. There is no tenderness. There is no rebound and no guarding. Musculoskeletal: He exhibits no edema. Lymphadenopathy:  
  He has no cervical adenopathy. Neurological: He is alert. Skin: Thickened yellow dark left first toenail Calloused left foot 
sligth erythema of left 3rd toe near the toenail Psychiatric: He has a normal mood and affect. Judgment and thought content normal.  
Nursing note and vitals reviewed. ASSESSMENT and PLAN Diagnoses and all orders for this visit: 
 
1. Influenza and pneumonia -     XR CHEST PA LAT; Future-no change peripheral densities Flu and pneumonia symptoms have resolve 2. Achalasia 
-     REFERRAL TO GASTROENTEROLOGY--f/u for manometry On otc prilosec s/p dilation 3. Uncontrolled type 2 diabetes mellitus with hyperglycemia (HCC) 
-     REFERRAL TO ENDOCRINOLOGY 
 fsbs bid Restart lantus 20 units every day and humalog 4 units tid ac--uncontrolled 4. Cellulitis of left toe 
-     REFERRAL TO PODIATRY 5. Abnormal CXR 
-     CT CHEST W CONT; Future 6. Opacities of both lungs present on chest x-ray 
-     CT CHEST W CONT; Future 7. Weight loss Due to uncontrolled DM-2 most likely Check chest CT Other orders 
-     insulin glargine (LANTUS,BASAGLAR) 100 unit/mL (3 mL) inpn; 20 units qhs 
-     insulin lispro (HUMALOG) 100 unit/mL kwikpen; 4 units tid ac -     Blood-Glucose Meter monitoring kit; fsbs bid dx 250.02 
-     glucose blood VI test strips (ASCENSIA AUTODISC VI, ONE TOUCH ULTRA TEST VI) strip; Check fsbs bid 250.02 
-     lancets misc; Check fsbs bid 
-     cephALEXin (KEFLEX) 500 mg capsule; Take 1 Cap by mouth three (3) times daily. Follow-up Disposition: 
Return in about 3 months (around 5/14/2019) for dm-2 achalsia.

## 2019-02-15 RX ORDER — PEN NEEDLE, DIABETIC 30 GX3/16"
NEEDLE, DISPOSABLE MISCELLANEOUS
Qty: 500 PEN NEEDLE | Refills: 3 | Status: SHIPPED | OUTPATIENT
Start: 2019-02-15 | End: 2019-04-15 | Stop reason: SDUPTHER

## 2019-02-15 NOTE — TELEPHONE ENCOUNTER
Pt requesting a refill for Rx\"Bd ultra fine pen needle 5 mlx31g\" to be called into [facility] pharmacy at [phone]. Best contact:[phone]       Message received & copied from Milton Dacosta

## 2019-02-22 ENCOUNTER — TELEPHONE (OUTPATIENT)
Dept: INTERNAL MEDICINE CLINIC | Age: 54
End: 2019-02-22

## 2019-02-22 NOTE — TELEPHONE ENCOUNTER
Spoke with patient after 2 patient identifiers being note and advised per Dr. Alf Harmon that degenerative changes are just ageing changes and that nothing is going on with the patients back. Patient expressed understanding and has no further questions at this time.

## 2019-02-22 NOTE — TELEPHONE ENCOUNTER
Cisco Nicole, spouse, requesting a call back regarding receiving order for Ct scan but would like to discuss the notes for changes regarding spine. Ramone Melody stated the changes was not discuss with the pt.  Best contact: [phone]       Message received & copied from [facility]

## 2019-02-23 ENCOUNTER — HOSPITAL ENCOUNTER (OUTPATIENT)
Dept: CT IMAGING | Age: 54
Discharge: HOME | End: 2019-02-23
Attending: INTERNAL MEDICINE
Payer: COMMERCIAL

## 2019-02-23 DIAGNOSIS — R91.8 OPACITIES OF BOTH LUNGS PRESENT ON CHEST X-RAY: ICD-10-CM

## 2019-02-23 DIAGNOSIS — R93.89 ABNORMAL CXR: ICD-10-CM

## 2019-02-23 PROCEDURE — 74011636320 HC RX REV CODE- 636/320: Performed by: INTERNAL MEDICINE

## 2019-02-23 PROCEDURE — 71260 CT THORAX DX C+: CPT

## 2019-02-23 RX ORDER — SODIUM CHLORIDE 0.9 % (FLUSH) 0.9 %
10 SYRINGE (ML) INJECTION
Status: COMPLETED | OUTPATIENT
Start: 2019-02-23 | End: 2019-02-23

## 2019-02-23 RX ADMIN — IOPAMIDOL 100 ML: 755 INJECTION, SOLUTION INTRAVENOUS at 12:00

## 2019-02-23 RX ADMIN — Medication 10 ML: at 12:00

## 2019-03-05 NOTE — TELEPHONE ENCOUNTER
Ha Son, the pt's wife requesting a Rx for diabetic test strips to be sent into the pharmacy on file. Pt hs been having to test more than twice a day and has been running out before time to renew, Pt is requesting to have more than the usual twice per day usage.        Message received & copied from [facility]

## 2019-04-02 DIAGNOSIS — E08.00 DIABETES MELLITUS DUE TO UNDERLYING CONDITION WITH HYPEROSMOLARITY WITHOUT COMA, WITHOUT LONG-TERM CURRENT USE OF INSULIN (HCC): Primary | ICD-10-CM

## 2019-04-02 RX ORDER — LANCETS
EACH MISCELLANEOUS
Qty: 200 EACH | Refills: 11 | Status: SHIPPED | OUTPATIENT
Start: 2019-04-02 | End: 2019-04-03 | Stop reason: SDUPTHER

## 2019-04-02 RX ORDER — INSULIN LISPRO 100 [IU]/ML
INJECTION, SOLUTION INTRAVENOUS; SUBCUTANEOUS
Qty: 1 PACKAGE | Refills: 3 | Status: SHIPPED | OUTPATIENT
Start: 2019-04-02 | End: 2019-04-15 | Stop reason: SDUPTHER

## 2019-04-02 RX ORDER — INSULIN GLARGINE 100 [IU]/ML
INJECTION, SOLUTION SUBCUTANEOUS
Qty: 5 ADJUSTABLE DOSE PRE-FILLED PEN SYRINGE | Refills: 6 | Status: SHIPPED | OUTPATIENT
Start: 2019-04-02 | End: 2019-04-03 | Stop reason: SDUPTHER

## 2019-04-02 NOTE — TELEPHONE ENCOUNTER
Pt is calling for one touch ultra blue test strips and one touch lancets. Humalog 15 ml 3 times a day 90 day supply. Lantes long acting insulin increased to 40 ml once day. Each will 90 day. Pt's contact [phone].        [facility] [employer] received & copied from [address] [place] [address]

## 2019-04-02 NOTE — TELEPHONE ENCOUNTER
Requested Prescriptions     Pending Prescriptions Disp Refills    glucose blood VI test strips (ASCENSIA AUTODISC VI, ONE TOUCH ULTRA TEST VI) strip 300 Strip 3     Sig: Check fsbs tid 250.02    insulin glargine (LANTUS,BASAGLAR) 100 unit/mL (3 mL) inpn 5 Adjustable Dose Pre-filled Pen Syringe 6     Si units qhs  Indications: type 2 diabetes mellitus    insulin lispro (HUMALOG KWIKPEN INSULIN) 100 unit/mL kwikpen 1 Package 3     Sig: INITIATE CORRECTIVE INSULIN PROTOCOL (KATIE):  RX KATIE Normal Sensitivity (Average weight)  For Blood Sugar (mg/dl) of:              111-150=0 units  151-200=2 units  201-250=4 units  251-300=6 units  301-350=8 units  Over 350= 10 units  Indications: type 2 diabetes mellitus    lancets misc 200 Each 11     Sig: Check fsbs bid

## 2019-04-03 RX ORDER — LANCETS
EACH MISCELLANEOUS
Qty: 200 EACH | Refills: 11 | Status: SHIPPED | OUTPATIENT
Start: 2019-04-03 | End: 2022-03-23

## 2019-04-03 RX ORDER — INSULIN GLARGINE 100 [IU]/ML
INJECTION, SOLUTION SUBCUTANEOUS
Qty: 5 ADJUSTABLE DOSE PRE-FILLED PEN SYRINGE | Refills: 6 | Status: SHIPPED | OUTPATIENT
Start: 2019-04-03 | End: 2019-04-15 | Stop reason: SDUPTHER

## 2019-04-03 RX ORDER — INSULIN LISPRO 100 [IU]/ML
INJECTION, SOLUTION INTRAVENOUS; SUBCUTANEOUS
Qty: 1 PACKAGE | Refills: 6 | Status: SHIPPED | OUTPATIENT
Start: 2019-04-03 | End: 2019-04-17 | Stop reason: SDUPTHER

## 2019-04-03 NOTE — TELEPHONE ENCOUNTER
Pt is calling for one touch ultra blue test strips and one touch lancets. Humalog 15 ml 3 times a day 90 day supply. Lantes long acting insulin increased to 40 ml once day. Each will 90 day supply. Pt's contact [phone]. Pt is using [facility] through [employer] not his local pharmacy.        Message received & copied from [facility]

## 2019-04-10 ENCOUNTER — TELEPHONE (OUTPATIENT)
Dept: INTERNAL MEDICINE CLINIC | Age: 54
End: 2019-04-10

## 2019-04-10 NOTE — TELEPHONE ENCOUNTER
Pt's wife called to advise that [facility] is sending a new order for the correct dosages that the pt requested \"Lantus\" 40 units which pt takes   once a day and Humalog 15 units with pt takes 3 times a day.  Pt's Best Contact Number: [phone]       Message received & copied from [facility]

## 2019-04-15 DIAGNOSIS — E08.00 DIABETES MELLITUS DUE TO UNDERLYING CONDITION WITH HYPEROSMOLARITY WITHOUT COMA, WITHOUT LONG-TERM CURRENT USE OF INSULIN (HCC): ICD-10-CM

## 2019-04-15 PROBLEM — J96.01 ACUTE HYPOXEMIC RESPIRATORY FAILURE (HCC): Status: ACTIVE | Noted: 2019-04-15

## 2019-04-15 RX ORDER — INSULIN LISPRO 100 [IU]/ML
INJECTION, SOLUTION INTRAVENOUS; SUBCUTANEOUS
Qty: 1 PACKAGE | Refills: 3 | Status: SHIPPED | OUTPATIENT
Start: 2019-04-15 | End: 2021-01-28

## 2019-04-15 RX ORDER — PEN NEEDLE, DIABETIC 30 GX3/16"
NEEDLE, DISPOSABLE MISCELLANEOUS
Qty: 500 PEN NEEDLE | Refills: 3 | Status: SHIPPED | OUTPATIENT
Start: 2019-04-15 | End: 2019-09-03 | Stop reason: SDUPTHER

## 2019-04-15 RX ORDER — INSULIN GLARGINE 100 [IU]/ML
INJECTION, SOLUTION SUBCUTANEOUS
Qty: 20 ADJUSTABLE DOSE PRE-FILLED PEN SYRINGE | Refills: 6 | Status: SHIPPED | OUTPATIENT
Start: 2019-04-15 | End: 2019-09-03 | Stop reason: SDUPTHER

## 2019-04-15 NOTE — TELEPHONE ENCOUNTER
Patient's Wife, Shannon Kenny states she needs a call back Asap in reference to medication dosage correction that Still has not been done on Lantus & Humalog that she states she's been trying to get this corrected for going on 2 weeks now. Shannon Kenny also states this is to be done thru his Mail Order pharmacy & not local. Please call asap.

## 2019-04-17 DIAGNOSIS — E08.00 DIABETES MELLITUS DUE TO UNDERLYING CONDITION WITH HYPEROSMOLARITY WITHOUT COMA, WITHOUT LONG-TERM CURRENT USE OF INSULIN (HCC): ICD-10-CM

## 2019-04-17 RX ORDER — INSULIN LISPRO 100 [IU]/ML
INJECTION, SOLUTION INTRAVENOUS; SUBCUTANEOUS
Qty: 1 PACKAGE | Refills: 8 | Status: SHIPPED | OUTPATIENT
Start: 2019-04-17 | End: 2020-12-01 | Stop reason: SDUPTHER

## 2019-05-30 ENCOUNTER — OFFICE VISIT (OUTPATIENT)
Dept: INTERNAL MEDICINE CLINIC | Age: 54
End: 2019-05-30

## 2019-05-30 VITALS
DIASTOLIC BLOOD PRESSURE: 79 MMHG | TEMPERATURE: 98.2 F | RESPIRATION RATE: 16 BRPM | WEIGHT: 226 LBS | HEART RATE: 93 BPM | BODY MASS INDEX: 30.61 KG/M2 | SYSTOLIC BLOOD PRESSURE: 135 MMHG | OXYGEN SATURATION: 98 % | HEIGHT: 72 IN

## 2019-05-30 DIAGNOSIS — E10.8 TYPE 1 DIABETES MELLITUS WITH COMPLICATION (HCC): ICD-10-CM

## 2019-05-30 DIAGNOSIS — Z23 ENCOUNTER FOR IMMUNIZATION: ICD-10-CM

## 2019-05-30 DIAGNOSIS — R13.10 DYSPHAGIA, UNSPECIFIED TYPE: ICD-10-CM

## 2019-05-30 DIAGNOSIS — E11.65 UNCONTROLLED TYPE 2 DIABETES MELLITUS WITH HYPERGLYCEMIA (HCC): Primary | ICD-10-CM

## 2019-05-30 DIAGNOSIS — R63.4 WEIGHT LOSS: ICD-10-CM

## 2019-05-30 DIAGNOSIS — Z12.5 PROSTATE CANCER SCREENING: ICD-10-CM

## 2019-05-30 LAB — HBA1C MFR BLD HPLC: 8.6 % (ref 4.8–5.6)

## 2019-05-30 NOTE — PROGRESS NOTES
Chief Complaint   Patient presents with    Diabetes     3 month follow up  Poc A1c 8.6%    Medication Evaluation     Review

## 2019-05-30 NOTE — PATIENT INSTRUCTIONS
Office Policies    Phone calls/patient messages:            Please allow up to 24 hours for someone in the office to contact you about your call or message. Be mindful your provider may be out of the office or your message may require further review. We encourage you to use [employer] for your messages as this is a faster, more efficient way to communicate with our office                         Medication Refills:            Prescription medications require 48-72 business hours to process. We encourage you to use [employer] for your refills. For controlled medications: Please allow 72 business hours to process. Certain medications may require you to  a written prescription at our office. NO narcotic/controlled medications will be prescribed after 4pm Monday through Friday or on weekends              Form/Paperwork Completion:            Please note a $25 fee may incur for all paperwork for completed by our providers. We ask that you allow 7-10 business days. Pre-payment is due prior to picking up/faxing the completed form. You may also download your forms to [employer] to have your doctor print off.

## 2019-05-30 NOTE — PROGRESS NOTES
HISTORY OF PRESENT ILLNESS  Reina Quinn is a 49 y.o. male. HPI     3 month f/u DM-2, CAP, achalasia weight loss , toe cellultis left 3d toe  a1c was 11.3   4 months ago  a1c today os 8.6--taking insulin as prescribed  Diet is very good per pt  fsbs 130-210 in mornings  Weight up 40 lbs since starting back on insulin  Saw GI MD for dysphagia-manometry ordered-Dr Grimm  No swallowing problems now   chest CT --fibrosis periphery and left lower lobe bronchiectasis  Saw podiatrist Dr Ricky Huff and had foot exam and drainage of fluid from left 3rd toe which has healed    Last [facility] f/u for respiratory failure, influenza  pneumonia and ARDS and esophageal obstruction and achalasia s/p dilation  Admitted 1/19-1/31/19-was on high flow 02 and weaned off  Went back to work this week and feeling better  Not SOB walking now  No f/c cough  Eating better   Swallowing ok now s/p esoph dilation-Dr Grimm  a1c was 11.3 in hospital   Has been off all medicines and insulin for year and not checking his glucose levels, denies excess thirst or urination  Weight loss gradually with previous weight up to 250-260 lbs per pt     C/o redness of left 3d toe and base of the toenail  Has some chronic neuropathy in feet r/t diabetes                    Patient Active Problem List    Diagnosis Date Noted    [facility] 04/15/2019    Influenza 01/19/2019    Diabetes mellitus ([facility] 10/01/2010    HTN (hypertension) 10/01/2010    Overweight(278.02) 10/01/2010    Pure hypercholesterolemia 10/01/2010    Pancreatitis 10/01/2010    GERD (gastroesophageal reflux disease) 10/01/2010    KATJA (obstructive sleep apnea) 10/01/2010    Achalasia 10/01/2010     Current Outpatient Medications   Medication Sig Dispense Refill    insulin lispro (HUMALOG) 100 unit/mL kwikpen 15 units three times a day.  DX E11.9 1 Package 8    insulin glargine (LANTUS,BASAGLAR) 100 unit/mL (3 mL) inpn 40 units qhs  Indications: type 2 diabetes mellitus 20 Adjustable Dose Pre-filled Pen Syringe 6    Insulin Needles, Disposable, 31 gauge x 5/16\" ndle Use with insulin pen needle 5 times daily 500 Pen Needle 3    insulin lispro (HUMALOG KWIKPEN INSULIN) 100 unit/mL kwikpen INITIATE CORRECTIVE INSULIN PROTOCOL (KATIE):  RX KATIE Normal Sensitivity (Average weight)  For Blood Sugar (mg/dl) of:              111-150=0 units  151-200=2 units  201-250=4 units  251-300=6 units  301-350=8 units  Over 350= 10 units  Indications: type 2 diabetes mellitus 1 Package 3    glucose blood VI test strips (ASCENSIA AUTODISC VI, ONE TOUCH ULTRA TEST VI) strip Check fsbs tid 250.02 300 Strip 3    lancets misc Check fsbs bid 200 Each 11    Blood-Glucose Meter monitoring kit fsbs bid dx 250.02 1 Kit 0    cephALEXin (KEFLEX) 500 mg capsule Take 1 Cap by mouth three (3) times daily.  28 Cap 0     Allergies   Allergen Reactions    Lisinopril Anaphylaxis      Lab Results   Component Value Date/Time    WBC 11.0 01/28/2019 04:49 AM    HGB 12.7 01/28/2019 04:49 AM    HCT 38.4 01/28/2019 04:49 AM    PLATELET 907 (H) 30/16/1576 04:49 AM    MCV 90.8 01/28/2019 04:49 AM     Lab Results   Component Value Date/Time    Hemoglobin A1c 11.3 (H) 01/21/2019 02:54 AM    Hemoglobin A1c 11.5 (H) 05/30/2014 07:56 AM    Hemoglobin A1c 10.1 (H) 02/01/2011 09:00 AM    Glucose 110 (H) 01/30/2019 03:20 AM    Glucose (POC) 167 (H) 01/31/2019 11:22 AM    Microalb/Creat ratio (ug/mg creat.) 8.3 06/03/2014 10:30 AM    LDL, calculated 100.8 (H) 05/31/2014 02:52 AM    Creatinine 0.46 (L) 01/30/2019 03:20 AM      Lab Results   Component Value Date/Time    Cholesterol, total 161 05/31/2014 02:52 AM    HDL Cholesterol 35 05/31/2014 02:52 AM    LDL, calculated 100.8 (H) 05/31/2014 02:52 AM    Triglyceride 126 05/31/2014 02:52 AM    CHOL/HDL Ratio 4.6 05/31/2014 02:52 AM     Lab Results   Component Value Date/Time    GFR est non-AA >60 01/30/2019 03:20 AM    GFR est AA >60 01/30/2019 03:20 AM    Creatinine 0.46 (L) 01/30/2019 03:20 AM    BUN 9 01/30/2019 03:20 AM    Sodium 139 01/30/2019 03:20 AM    Potassium 3.3 (L) 01/30/2019 03:20 AM    Chloride 101 01/30/2019 03:20 AM    CO2 29 01/30/2019 03:20 AM    Magnesium 2.2 01/29/2019 02:48 AM    Phosphorus 2.7 01/29/2019 02:48 AM        ROS    Physical Exam   Constitutional: He appears well-developed and well-nourished. No distress. Appears stated age   HENT:   Head: Normocephalic. Cardiovascular: Normal rate, regular rhythm and normal heart sounds. Exam reveals no gallop and no friction rub. No murmur heard. Pulmonary/Chest: Effort normal and breath sounds normal. No respiratory distress. He has no wheezes. He has no rales. He exhibits no tenderness. Abdominal: Soft. Musculoskeletal: He exhibits no edema. Neurological: He is alert. Psychiatric: He has a normal mood and affect. Nursing note and vitals reviewed. ASSESSMENT and PLAN  Diagnoses and all orders for this visit:    1. Uncontrolled type 2 diabetes mellitus with hyperglycemia (HCC)  -     AMB POC HEMOGLOBIN A1C 8.6 improving  -     LIPID PANEL   Declines endocrine referall   Titrate up lantus by 3 units q3d until fasting bs --discusssed   UTD foot exam   Overdue eye exam--discussed  2. Weight loss   Resolved with restarting insulin  3. Dysphagia, unspecified type   resolved  4. Encounter for immunization  -     PNEUMOCOCCAL POLYSACCHARIDE VACCINE, 23-VALENT, ADULT OR IMMUNOSUPPRESSED PT DOSE,    5.  Type 1 diabetes mellitus with complication (HCC)  -     MICROALBUMIN, UR, RAND W/ MICROALB/CREAT RATIO  -     LIPID PANEL    6. Prostate cancer screening  -     PSA W/ REFLX FREE PSA

## 2019-05-31 LAB
ALBUMIN/CREAT UR: 12.7 MG/G CREAT (ref 0–30)
CHOLEST SERPL-MCNC: 197 MG/DL (ref 100–199)
CREAT UR-MCNC: 142.6 MG/DL
HDLC SERPL-MCNC: 27 MG/DL
LDLC SERPL CALC-MCNC: 113 MG/DL (ref 0–99)
MICROALBUMIN UR-MCNC: 18.1 UG/ML
PSA SERPL-MCNC: 0.3 NG/ML (ref 0–4)
REFLEX CRITERIA: NORMAL
TRIGL SERPL-MCNC: 286 MG/DL (ref 0–149)
VLDLC SERPL CALC-MCNC: 57 MG/DL (ref 5–40)

## 2019-09-03 DIAGNOSIS — E08.00 DIABETES MELLITUS DUE TO UNDERLYING CONDITION WITH HYPEROSMOLARITY WITHOUT COMA, WITHOUT LONG-TERM CURRENT USE OF INSULIN (HCC): ICD-10-CM

## 2019-09-03 RX ORDER — PEN NEEDLE, DIABETIC 30 GX3/16"
NEEDLE, DISPOSABLE MISCELLANEOUS
Qty: 500 PEN NEEDLE | Refills: 3 | Status: SHIPPED | OUTPATIENT
Start: 2019-09-03 | End: 2021-04-18 | Stop reason: SDUPTHER

## 2019-09-03 RX ORDER — INSULIN GLARGINE 100 [IU]/ML
INJECTION, SOLUTION SUBCUTANEOUS
Qty: 20 ADJUSTABLE DOSE PRE-FILLED PEN SYRINGE | Refills: 6 | Status: SHIPPED | OUTPATIENT
Start: 2019-09-03 | End: 2020-09-29

## 2019-09-03 NOTE — TELEPHONE ENCOUNTER
Taking 49 units of Lantus per day. Needs for 90 day supply. Go to [facility] on file in [place].  (needles as well)       Please change amount that pt gets of Lantus

## 2019-11-07 ENCOUNTER — OFFICE VISIT (OUTPATIENT)
Dept: INTERNAL MEDICINE CLINIC | Age: 54
End: 2019-11-07

## 2019-11-07 VITALS
DIASTOLIC BLOOD PRESSURE: 91 MMHG | OXYGEN SATURATION: 96 % | RESPIRATION RATE: 16 BRPM | TEMPERATURE: 98 F | WEIGHT: 235 LBS | SYSTOLIC BLOOD PRESSURE: 150 MMHG | HEIGHT: 76 IN | HEART RATE: 92 BPM | BODY MASS INDEX: 28.62 KG/M2

## 2019-11-07 DIAGNOSIS — Z23 ENCOUNTER FOR IMMUNIZATION: Primary | ICD-10-CM

## 2019-11-07 DIAGNOSIS — Z79.4 CONTROLLED TYPE 2 DIABETES MELLITUS WITHOUT COMPLICATION, WITH LONG-TERM CURRENT USE OF INSULIN (HCC): ICD-10-CM

## 2019-11-07 DIAGNOSIS — E78.00 PURE HYPERCHOLESTEROLEMIA: ICD-10-CM

## 2019-11-07 DIAGNOSIS — K22.0 ACHALASIA: ICD-10-CM

## 2019-11-07 DIAGNOSIS — Z00.00 ROUTINE GENERAL MEDICAL EXAMINATION AT A HEALTH CARE FACILITY: ICD-10-CM

## 2019-11-07 DIAGNOSIS — I10 ESSENTIAL HYPERTENSION: ICD-10-CM

## 2019-11-07 DIAGNOSIS — E11.9 CONTROLLED TYPE 2 DIABETES MELLITUS WITHOUT COMPLICATION, WITH LONG-TERM CURRENT USE OF INSULIN (HCC): ICD-10-CM

## 2019-11-07 RX ORDER — AMLODIPINE BESYLATE 5 MG/1
5 TABLET ORAL DAILY
Qty: 90 TAB | Refills: 3 | Status: SHIPPED | OUTPATIENT
Start: 2019-11-07 | End: 2020-12-24

## 2019-11-07 NOTE — PATIENT INSTRUCTIONS
Office Policies    Phone calls/patient messages:            Please allow up to 24 hours for someone in the office to contact you about your call or message. Be mindful your provider may be out of the office or your message may require further review. We encourage you to use [employer] for your messages as this is a faster, more efficient way to communicate with our office                         Medication Refills:            Prescription medications require 48-72 business hours to process. We encourage you to use [employer] for your refills. For controlled medications: Please allow 72 business hours to process. Certain medications may require you to  a written prescription at our office. NO narcotic/controlled medications will be prescribed after 4pm Monday through Friday or on weekends              Form/Paperwork Completion:            Please note a $25 fee may incur for all paperwork for completed by our providers. We ask that you allow 7-10 business days. Pre-payment is due prior to picking up/faxing the completed form. You may also download your forms to [employer] to have your doctor print off.

## 2019-11-07 NOTE — PROGRESS NOTES
HISTORY OF PRESENT ILLNESS  Mamie Moreno is a 49 y.o. male. HPI   F/u for CPE and DM-2 on insulin, weight loss, achalasia  Last a1c 8.6   fsbs around 130-150  lantus dose 52 units every day and humalog 15 units ac meals  Weight up above baseline now  Swallowing much better after LES dilation  No CP or SOB  Some tingling and numbness in feet  Saw eye MD this year--early cataracts only per pt     last OV  3 month f/u DM-2, CAP, achalasia weight loss , toe cellultis left 3d toe  a1c was 11.3   4 months ago  a1c today os 8.6--taking insulin as prescribed  Diet is very good per pt  fsbs 130-210 in mornings  Weight up 40 lbs since starting back on insulin  Saw GI MD for dysphagia-manometry ordered for possible aperistalis on EGD possible achalasia, hypertensive LES zone--dilated-Dr Jeronimo Whipple  No swallowing problems now   chest CT --fibrosis periphery and left lower lobe bronchiectasis  Saw podiatrist Dr Evy Pagan and had foot exam and drainage of fluid from left 3rd toe which has healed    Patient Active Problem List    Diagnosis Date Noted    Acute hypoxemic respiratory failure ([facility] 04/15/2019    Influenza 01/19/2019    Diabetes mellitus ([facility] [address] 10/01/2010    HTN (hypertension) 10/01/2010    Overweight(278.02) 10/01/2010    Pure hypercholesterolemia 10/01/2010    Pancreatitis 10/01/2010    GERD (gastroesophageal reflux disease) 10/01/2010    KATJA (obstructive sleep apnea) 10/01/2010    Achalasia 10/01/2010     Current Outpatient Medications   Medication Sig Dispense Refill    insulin glargine (LANTUS,BASAGLAR) 100 unit/mL (3 mL) inpn 49 units qhs  Indications: type 2 diabetes mellitus 20 Adjustable Dose Pre-filled Pen Syringe 6    Insulin Needles, Disposable, 31 gauge x 5/16\" ndle Use with insulin pen needle 5 times daily 500 Pen Needle 3    insulin lispro (HUMALOG) 100 unit/mL kwikpen 15 units three times a day.  DX E11.9 1 Package 8    insulin lispro (HUMALOG KWIKPEN INSULIN) 100 unit/mL kwikpen INITIATE CORRECTIVE INSULIN PROTOCOL (KATIE):  RX KATIE Normal Sensitivity (Average weight)  For Blood Sugar (mg/dl) of:              111-150=0 units  151-200=2 units  201-250=4 units  251-300=6 units  301-350=8 units  Over 350= 10 units  Indications: type 2 diabetes mellitus 1 Package 3    glucose blood VI test strips (ASCENSIA AUTODISC VI, ONE TOUCH ULTRA TEST VI) strip Check fsbs tid 250.02 300 Strip 3    lancets misc Check fsbs bid 200 Each 11    Blood-Glucose Meter monitoring kit fsbs bid dx 250.02 1 Kit 0    cephALEXin (KEFLEX) 500 mg capsule Take 1 Cap by mouth three (3) times daily.  28 Cap 0     Allergies   Allergen Reactions    Lisinopril Anaphylaxis      Lab Results   Component Value Date/Time    WBC 11.0 01/28/2019 04:49 AM    HGB 12.7 01/28/2019 04:49 AM    HCT 38.4 01/28/2019 04:49 AM    PLATELET 124 (H) 39/32/7826 04:49 AM    MCV 90.8 01/28/2019 04:49 AM     Lab Results   Component Value Date/Time    Hemoglobin A1c 11.3 (H) 01/21/2019 02:54 AM    Hemoglobin A1c 11.5 (H) 05/30/2014 07:56 AM    Hemoglobin A1c 10.1 (H) 02/01/2011 09:00 AM    Glucose 110 (H) 01/30/2019 03:20 AM    Glucose (POC) 167 (H) 01/31/2019 11:22 AM    Microalb/Creat ratio (ug/mg creat.) 12.7 05/30/2019 04:36 PM    LDL, calculated 113 (H) 05/30/2019 04:34 PM    Creatinine 0.46 (L) 01/30/2019 03:20 AM      Lab Results   Component Value Date/Time    Cholesterol, total 197 05/30/2019 04:34 PM    HDL Cholesterol 27 (L) 05/30/2019 04:34 PM    LDL, calculated 113 (H) 05/30/2019 04:34 PM    Triglyceride 286 (H) 05/30/2019 04:34 PM    CHOL/HDL Ratio 4.6 05/31/2014 02:52 AM     Lab Results   Component Value Date/Time    GFR est non-AA >60 01/30/2019 03:20 AM    GFR est AA >60 01/30/2019 03:20 AM    Creatinine 0.46 (L) 01/30/2019 03:20 AM    BUN 9 01/30/2019 03:20 AM    Sodium 139 01/30/2019 03:20 AM    Potassium 3.3 (L) 01/30/2019 03:20 AM    Chloride 101 01/30/2019 03:20 AM    CO2 29 01/30/2019 03:20 AM    Magnesium 2.2 01/29/2019 02:48 AM Phosphorus 2.7 01/29/2019 02:48 AM        ROS    Physical Exam   Constitutional: He appears well-developed and well-nourished. No distress. Appears stated age   HENT:   Head: Normocephalic. Cardiovascular: Normal rate, regular rhythm and normal heart sounds. Exam reveals no gallop and no friction rub. No murmur heard. Pulmonary/Chest: Effort normal and breath sounds normal. No respiratory distress. He has no wheezes. He has no rales. He exhibits no tenderness. Abdominal: Soft. Musculoskeletal: He exhibits no edema. Neurological: He is alert. Diabetic foot exam performed by Liza Funes MD       Measurement  Response Nurse Comment Physician Comment  Monofilament  R - normal sensation with micro filament  L - normal sensation with micro filament    Pulse DP R - 2+ (normal)  L - 2+ (normal)    Pulse TP R - 2+ (normal)  L - 2+ (normal)    Structural deformity R - None  L - None    Skin Integrity / Deformity R - None  L - None       Reviewed by:         Psychiatric: He has a normal mood and affect. Nursing note and vitals reviewed. ASSESSMENT and PLAN  Diagnoses and all orders for this visit:    1. Encounter for immunization  -     INFLUENZA VIRUS VAC QUAD,SPLIT,PRESV FREE SYRINGE IM    2. Achalasia   improveid s/p dilation  3. Controlled type 2 diabetes mellitus without complication, with long-term current use of insulin (HCC)  -     HEMOGLOBIN A1C WITH EAG   advised aspirin 81 mg qd  4. Essential hypertension   Elevated bp   Start norvasc 5 mg qd  5. Pure hypercholesterolemia  -     TSH 3RD GENERATION    Consider statin-discussed  6. Routine general medical examination at a health care facility  -     LIPID PANEL  -     HEMOGLOBIN A1C WITH EAG  -     METABOLIC PANEL, COMPREHENSIVE  -     OCCULT BLOOD IMMUNOASSAY,DIAGNOSTIC   Flu shot today   Work on diet , exercise and weight reductionm  Other orders  -     amLODIPine (NORVASC) 5 mg tablet; Take 1 Tab by mouth daily.       Follow-up and Dispositions    · Return in about 3 months (around 2/7/2020) for dm-2 htn hld.

## 2019-11-08 LAB
ALBUMIN SERPL-MCNC: 4 G/DL (ref 3.5–5.5)
ALBUMIN/GLOB SERPL: 1.3 {RATIO} (ref 1.2–2.2)
ALP SERPL-CCNC: 121 IU/L (ref 39–117)
ALT SERPL-CCNC: 23 IU/L (ref 0–44)
AST SERPL-CCNC: 21 IU/L (ref 0–40)
BILIRUB SERPL-MCNC: 0.3 MG/DL (ref 0–1.2)
BUN SERPL-MCNC: 13 MG/DL (ref 6–24)
BUN/CREAT SERPL: 13 (ref 9–20)
CALCIUM SERPL-MCNC: 9.5 MG/DL (ref 8.7–10.2)
CHLORIDE SERPL-SCNC: 102 MMOL/L (ref 96–106)
CHOLEST SERPL-MCNC: 203 MG/DL (ref 100–199)
CO2 SERPL-SCNC: 24 MMOL/L (ref 20–29)
CREAT SERPL-MCNC: 1.01 MG/DL (ref 0.76–1.27)
EST. AVERAGE GLUCOSE BLD GHB EST-MCNC: 243 MG/DL
GLOBULIN SER CALC-MCNC: 3.1 G/DL (ref 1.5–4.5)
GLUCOSE SERPL-MCNC: 196 MG/DL (ref 65–99)
HBA1C MFR BLD: 10.1 % (ref 4.8–5.6)
HDLC SERPL-MCNC: 25 MG/DL
LDLC SERPL CALC-MCNC: 114 MG/DL (ref 0–99)
POTASSIUM SERPL-SCNC: 4.6 MMOL/L (ref 3.5–5.2)
PROT SERPL-MCNC: 7.1 G/DL (ref 6–8.5)
SODIUM SERPL-SCNC: 144 MMOL/L (ref 134–144)
TRIGL SERPL-MCNC: 322 MG/DL (ref 0–149)
TSH SERPL DL<=0.005 MIU/L-ACNC: 1.3 UIU/ML (ref 0.45–4.5)
VLDLC SERPL CALC-MCNC: 64 MG/DL (ref 5–40)

## 2019-11-11 NOTE — PROGRESS NOTES
Tell pt cholesterol levels are above goal--LDL gaol is < 100. Advise to start on lipitor 10 mg every day and cna get repeat labs in 6 weeks. 3 mos bs avg is 243-too high--continue insulin, work on diet . Advise referral to [facility] -Dr Antolin Diaz or associate nad offer appt with Tim Shukla in our office  normal lytes kidney, liver, thyroid levels.

## 2019-11-15 RX ORDER — ATORVASTATIN CALCIUM 10 MG/1
10 TABLET, FILM COATED ORAL DAILY
Qty: 30 TAB | Refills: 11 | Status: SHIPPED | OUTPATIENT
Start: 2019-11-15 | End: 2020-12-24

## 2019-11-15 NOTE — PROGRESS NOTES
Called, spoke to pt. Two identifiers confirmed. Notified pt of lab results/recommendations per Dr. Lucy Swift. Called, spoke to pt. Two identifiers confirmed. [facility]- pt is interested in meeting with you for DM help/recommendations. Can you call him? Thank you!

## 2019-11-18 ENCOUNTER — TELEPHONE (OUTPATIENT)
Dept: INTERNAL MEDICINE CLINIC | Age: 54
End: 2019-11-18

## 2019-11-18 NOTE — TELEPHONE ENCOUNTER
Pharmacy Progress Note - Telephone Call    Mr. Morenita Tavares 49 y.o. was contacted via an outbound telephone call regarding his diabetes management today. A voicemail was left for patient to return my call.        Thank you,  Camille Rosario, PharmD, CDE

## 2020-10-25 DIAGNOSIS — E08.00 DIABETES MELLITUS DUE TO UNDERLYING CONDITION WITH HYPEROSMOLARITY WITHOUT COMA, WITHOUT LONG-TERM CURRENT USE OF INSULIN (HCC): ICD-10-CM

## 2020-10-25 RX ORDER — INSULIN GLARGINE 100 [IU]/ML
INJECTION, SOLUTION SUBCUTANEOUS
Qty: 15 ADJUSTABLE DOSE PRE-FILLED PEN SYRINGE | Refills: 0 | Status: SHIPPED | OUTPATIENT
Start: 2020-10-25 | End: 2020-12-01 | Stop reason: SDUPTHER

## 2020-12-01 DIAGNOSIS — E08.00 DIABETES MELLITUS DUE TO UNDERLYING CONDITION WITH HYPEROSMOLARITY WITHOUT COMA, WITHOUT LONG-TERM CURRENT USE OF INSULIN (HCC): ICD-10-CM

## 2020-12-01 RX ORDER — INSULIN LISPRO 100 [IU]/ML
INJECTION, SOLUTION INTRAVENOUS; SUBCUTANEOUS
Qty: 10 ADJUSTABLE DOSE PRE-FILLED PEN SYRINGE | Refills: 0 | Status: SHIPPED | OUTPATIENT
Start: 2020-12-01 | End: 2020-12-03 | Stop reason: SDUPTHER

## 2020-12-01 RX ORDER — INSULIN GLARGINE 100 [IU]/ML
INJECTION, SOLUTION SUBCUTANEOUS
Qty: 15 ADJUSTABLE DOSE PRE-FILLED PEN SYRINGE | Refills: 0 | Status: SHIPPED | OUTPATIENT
Start: 2020-12-01 | End: 2021-01-12

## 2020-12-01 NOTE — TELEPHONE ENCOUNTER
CPE scheduled for 1/19 @ 930 with Dr. Anastacio Storey. Lisetteluh Escobar verbalized understanding of information discussed w/ no further questions at this time.

## 2020-12-01 NOTE — TELEPHONE ENCOUNTER
Raymond Cardona    Phone Number:  [phone] (Call me)               Medication Refill     Caller (if not patient): Rani Herron       Relationship of caller (if not patient): wife       Best contact number(s): [phone]       Name of medication and dosage if known: Humalog Kwikpens (3 mL 5s 100) 15 units 3 times a day, requests 90-day supply;  Lantus solostar 49 units once a day, requests 90-day supply.         Is patient out of this medication (yes/no):       Pharmacy name: [address] listed in chart? (yes/no): Yes   Pharmacy phone number:  [phone]       Details to clarify the request:  n/a       Lupe

## 2020-12-03 ENCOUNTER — TELEPHONE (OUTPATIENT)
Dept: INTERNAL MEDICINE CLINIC | Age: 55
End: 2020-12-03

## 2020-12-03 DIAGNOSIS — E08.00 DIABETES MELLITUS DUE TO UNDERLYING CONDITION WITH HYPEROSMOLARITY WITHOUT COMA, WITHOUT LONG-TERM CURRENT USE OF INSULIN (HCC): ICD-10-CM

## 2020-12-03 RX ORDER — INSULIN LISPRO 100 [IU]/ML
INJECTION, SOLUTION INTRAVENOUS; SUBCUTANEOUS
Qty: 15 ADJUSTABLE DOSE PRE-FILLED PEN SYRINGE | Refills: 5 | Status: SHIPPED | OUTPATIENT
Start: 2020-12-03 | End: 2021-01-06 | Stop reason: ALTCHOICE

## 2020-12-03 NOTE — TELEPHONE ENCOUNTER
----- Message from Dino Berumen sent at 12/3/2020 11:11 AM EST -----  Regarding: Dr Martha Brumfield (if not patient):  Alma Frazier      Relationship of caller (if not patient): spouse      Best contact number(s):[phone]      Name of medication and dosage if known: Humalog Kiwk Pen, Lantus      Is patient out of this medication (yes/no): yes      Pharmacy name: [facility]    Pharmacy listed in chart? (yes/no): yes  Pharmacy phone number: [phone]    Details to clarify the request: Rx was sent over for a one month supply, should be 90 day. Box is prepacked so pharmacy needs confirmation. FDA does not allow the box to be opened for that amount.      Dino Berumen  Copy/paste [facility]

## 2020-12-21 ENCOUNTER — TELEPHONE (OUTPATIENT)
Dept: INTERNAL MEDICINE CLINIC | Age: 55
End: 2020-12-21

## 2020-12-21 ENCOUNTER — OFFICE VISIT (OUTPATIENT)
Dept: URGENT CARE | Age: 55
End: 2020-12-21
Payer: COMMERCIAL

## 2020-12-21 VITALS — OXYGEN SATURATION: 97 % | HEART RATE: 90 BPM | RESPIRATION RATE: 18 BRPM | TEMPERATURE: 98.1 F

## 2020-12-21 DIAGNOSIS — Z20.822 SUSPECTED COVID-19 VIRUS INFECTION: Primary | ICD-10-CM

## 2020-12-21 DIAGNOSIS — R11.0 NAUSEA: ICD-10-CM

## 2020-12-21 LAB
FLUAV+FLUBV AG NOSE QL IA.RAPID: NEGATIVE
FLUAV+FLUBV AG NOSE QL IA.RAPID: NEGATIVE
VALID INTERNAL CONTROL?: YES

## 2020-12-21 PROCEDURE — 99203 OFFICE O/P NEW LOW 30 MIN: CPT | Performed by: FAMILY MEDICINE

## 2020-12-21 PROCEDURE — 87804 INFLUENZA ASSAY W/OPTIC: CPT | Performed by: FAMILY MEDICINE

## 2020-12-21 NOTE — PROGRESS NOTES
This patient was seen at [address] [place] [address] Urgent Care while in their vehicle due to COVID-19 pandemic with PPE and focused examination in order to decrease community viral transmission. The patient/guardian gave verbal consent to treat. The history is provided by the patient. Nausea  This is a new problem. The current episode started more than 2 days ago (12/17/20). The problem occurs constantly. The problem has not changed since onset. Associated symptoms include abdominal pain (upper abdomen discomfort). Pertinent negatives include no chest pain, no headaches and no shortness of breath. Nothing aggravates the symptoms. Nothing relieves the symptoms. He has tried nothing for the symptoms.         Past Medical History:   Diagnosis Date    Diabetes ([address]     Nausea & vomiting     Pancreatitis     Unspecified sleep apnea         Past Surgical History:   Procedure Laterality Date    HX GI      upper endoscopies    HX HEENT      oral surgery    MA EGD FLEXIBLE FOREIGN BODY REMOVAL  1/30/2019         UPPER GI ENDOSCOPY,BALL DIL,30MM  1/30/2019         UPPER GI ENDOSCOPY,BIOPSY  1/30/2019              Family History   Problem Relation Age of Onset    Heart Disease Mother         s/p stent    Diabetes Father     Cancer Brother         stomach cancer        Social History     Socioeconomic History    Marital status:      Spouse name: Not on file    Number of children: Not on file    Years of education: Not on file    Highest education level: Not on file   Occupational History    Not on file   Social Needs    Financial resource strain: Not on file    Food insecurity     Worry: Not on file     Inability: Not on file   [employer] needs     Medical: Not on file     Non-medical: Not on file   Tobacco Use    Smoking status: Former Smoker     Years: 1.00     Types: Cigarettes    Smokeless tobacco: Never Used   Substance and Sexual Activity    Alcohol use: Yes     Frequency: Monthly or less     Drinks per session: 1 or 2    Drug use: Not Currently    Sexual activity: Yes     Partners: Female   Lifestyle    Physical activity     Days per week: Not on file     Minutes per session: Not on file    Stress: Not on file   Relationships    Social connections     Talks on phone: Not on file     Gets together: Not on file     Attends Zoroastrian service: Not on file     Active member of club or organization: Not on file     Attends meetings of clubs or organizations: Not on file     Relationship status: Not on file    Intimate partner violence     Fear of current or ex partner: Not on file     Emotionally abused: Not on file     Physically abused: Not on file     Forced sexual activity: Not on file   Other Topics Concern    Not on file   Social History Narrative    Not on file                ALLERGIES: Lisinopril    Review of Systems   Constitutional: Positive for appetite change and fatigue. Negative for chills and fever. Respiratory: Negative for cough, chest tightness, shortness of breath and wheezing. Cardiovascular: Negative for chest pain. Gastrointestinal: Positive for abdominal pain (upper abdomen discomfort) and nausea. Negative for vomiting. Neurological: Negative for headaches. All other systems reviewed and are negative. Vitals:    12/21/20 1500   Pulse: 90   Resp: 18   Temp: 98.1 °F (36.7 °C)   SpO2: 97%       Physical Exam  Vitals signs and nursing note reviewed. Constitutional:       General: He is not in acute distress. Appearance: He is not ill-appearing. Pulmonary:      Effort: Pulmonary effort is normal. No respiratory distress. Breath sounds: Normal breath sounds. Abdominal:      General: There is distension. Tenderness: There is abdominal tenderness (uncomfortable in RUQ) in the right upper quadrant. There is no right CVA tenderness, left CVA tenderness or guarding. MDM    Procedures        ICD-10-CM ICD-9-CM    1.  Suspected COVID-19 virus infection  Z20.828 V01.79 NOVEL CORONAVIRUS (COVID-19)      AMB POC TRACY INFLUENZA A/B TEST   2. Nausea  R11.0 787.02      No orders of the defined types were placed in this encounter. No results found for any visits on 12/21/20. The patients condition was discussed with the patient and they understand. The patient is to follow up with primary care doctor. If signs and symptoms become worse the pt is to go to the ER. The patient is to take medications as prescribed.

## 2020-12-21 NOTE — TELEPHONE ENCOUNTER
Patient's wife, Armida Baez, states patient went to [facility] & a Regular COVID-19 test was done but results not for 3-4 days but was advised by Doctor that they feel something else is going on as Armida Baez reports Rodolfo Carrillo were advised that Liver was swollen & additional testing would need to be done if symptoms do not improve in the next 24 hrs\". Armida Baez states she needs a call back to be advised what Dr. Lizzeth Farooq advises for patient to do or if testing can be ordered. Please call.  Thank you

## 2020-12-22 ENCOUNTER — APPOINTMENT (OUTPATIENT)
Dept: CT IMAGING | Age: 55
End: 2020-12-22
Attending: STUDENT IN AN ORGANIZED HEALTH CARE EDUCATION/TRAINING PROGRAM
Payer: COMMERCIAL

## 2020-12-22 ENCOUNTER — HOSPITAL ENCOUNTER (OUTPATIENT)
Age: 55
Setting detail: OBSERVATION
Discharge: HOME | End: 2020-12-24
Attending: EMERGENCY MEDICINE | Admitting: GENERAL ACUTE CARE HOSPITAL
Payer: COMMERCIAL

## 2020-12-22 ENCOUNTER — APPOINTMENT (OUTPATIENT)
Dept: MRI IMAGING | Age: 55
End: 2020-12-22
Attending: GENERAL ACUTE CARE HOSPITAL
Payer: COMMERCIAL

## 2020-12-22 DIAGNOSIS — I65.02 VERTEBRAL ARTERY STENOSIS, LEFT: Primary | ICD-10-CM

## 2020-12-22 DIAGNOSIS — E86.0 DEHYDRATION: ICD-10-CM

## 2020-12-22 DIAGNOSIS — Z79.4 TYPE 2 DIABETES MELLITUS WITH DIABETIC POLYNEUROPATHY, WITH LONG-TERM CURRENT USE OF INSULIN (HCC): ICD-10-CM

## 2020-12-22 DIAGNOSIS — R73.9 HYPERGLYCEMIA: ICD-10-CM

## 2020-12-22 DIAGNOSIS — R27.0 ATAXIA: ICD-10-CM

## 2020-12-22 DIAGNOSIS — E11.42 TYPE 2 DIABETES MELLITUS WITH DIABETIC POLYNEUROPATHY, WITH LONG-TERM CURRENT USE OF INSULIN (HCC): ICD-10-CM

## 2020-12-22 DIAGNOSIS — R42 DIZZINESS: ICD-10-CM

## 2020-12-22 LAB
ALBUMIN SERPL-MCNC: 3.9 G/DL (ref 3.5–5)
ALBUMIN/GLOB SERPL: 0.9 {RATIO} (ref 1.1–2.2)
ALP SERPL-CCNC: 127 U/L (ref 45–117)
ALT SERPL-CCNC: 26 U/L (ref 12–78)
ANION GAP SERPL CALC-SCNC: 11 MMOL/L (ref 5–15)
APPEARANCE UR: CLEAR
AST SERPL-CCNC: 12 U/L (ref 15–37)
BACTERIA URNS QL MICRO: NEGATIVE /HPF
BASOPHILS # BLD: 0.1 K/UL (ref 0–0.1)
BASOPHILS NFR BLD: 1 % (ref 0–1)
BILIRUB SERPL-MCNC: 0.9 MG/DL (ref 0.2–1)
BILIRUB UR QL: NEGATIVE
BUN SERPL-MCNC: 28 MG/DL (ref 6–20)
BUN/CREAT SERPL: 25 (ref 12–20)
CALCIUM SERPL-MCNC: 9.2 MG/DL (ref 8.5–10.1)
CHLORIDE SERPL-SCNC: 97 MMOL/L (ref 97–108)
CO2 SERPL-SCNC: 23 MMOL/L (ref 21–32)
COLOR UR: ABNORMAL
CREAT SERPL-MCNC: 1.13 MG/DL (ref 0.7–1.3)
DIFFERENTIAL METHOD BLD: ABNORMAL
EOSINOPHIL # BLD: 0.1 K/UL (ref 0–0.4)
EOSINOPHIL NFR BLD: 0 % (ref 0–7)
EPITH CASTS URNS QL MICRO: ABNORMAL /LPF
ERYTHROCYTE [DISTWIDTH] IN BLOOD BY AUTOMATED COUNT: 12.2 % (ref 11.5–14.5)
GLOBULIN SER CALC-MCNC: 4.4 G/DL (ref 2–4)
GLUCOSE BLD STRIP.AUTO-MCNC: 280 MG/DL (ref 65–100)
GLUCOSE BLD STRIP.AUTO-MCNC: 283 MG/DL (ref 65–100)
GLUCOSE SERPL-MCNC: 359 MG/DL (ref 65–100)
GLUCOSE UR STRIP.AUTO-MCNC: >1000 MG/DL
HCT VFR BLD AUTO: 53.5 % (ref 36.6–50.3)
HGB BLD-MCNC: 18.5 G/DL (ref 12.1–17)
HGB UR QL STRIP: NEGATIVE
HYALINE CASTS URNS QL MICRO: ABNORMAL /LPF (ref 0–5)
IMM GRANULOCYTES # BLD AUTO: 0.1 K/UL (ref 0–0.04)
IMM GRANULOCYTES NFR BLD AUTO: 1 % (ref 0–0.5)
KETONES UR QL STRIP.AUTO: 80 MG/DL
LEUKOCYTE ESTERASE UR QL STRIP.AUTO: NEGATIVE
LIPASE SERPL-CCNC: 60 U/L (ref 73–393)
LYMPHOCYTES # BLD: 2.6 K/UL (ref 0.8–3.5)
LYMPHOCYTES NFR BLD: 18 % (ref 12–49)
MCH RBC QN AUTO: 31 PG (ref 26–34)
MCHC RBC AUTO-ENTMCNC: 34.6 G/DL (ref 30–36.5)
MCV RBC AUTO: 89.6 FL (ref 80–99)
MONOCYTES # BLD: 1.1 K/UL (ref 0–1)
MONOCYTES NFR BLD: 7 % (ref 5–13)
NEUTS SEG # BLD: 10.9 K/UL (ref 1.8–8)
NEUTS SEG NFR BLD: 73 % (ref 32–75)
NITRITE UR QL STRIP.AUTO: NEGATIVE
NRBC # BLD: 0 K/UL (ref 0–0.01)
NRBC BLD-RTO: 0 PER 100 WBC
PH UR STRIP: 5 [PH] (ref 5–8)
PLATELET # BLD AUTO: 371 K/UL (ref 150–400)
PMV BLD AUTO: 10.3 FL (ref 8.9–12.9)
POTASSIUM SERPL-SCNC: 4.1 MMOL/L (ref 3.5–5.1)
PROT SERPL-MCNC: 8.3 G/DL (ref 6.4–8.2)
PROT UR STRIP-MCNC: NEGATIVE MG/DL
RBC # BLD AUTO: 5.97 M/UL (ref 4.1–5.7)
RBC #/AREA URNS HPF: ABNORMAL /HPF (ref 0–5)
SERVICE CMNT-IMP: ABNORMAL
SERVICE CMNT-IMP: ABNORMAL
SODIUM SERPL-SCNC: 131 MMOL/L (ref 136–145)
SP GR UR REFRACTOMETRY: 1.02 (ref 1–1.03)
UA: UC IF INDICATED,UAUC: ABNORMAL
UROBILINOGEN UR QL STRIP.AUTO: 0.2 EU/DL (ref 0.2–1)
WBC # BLD AUTO: 14.8 K/UL (ref 4.1–11.1)
WBC URNS QL MICRO: ABNORMAL /HPF (ref 0–4)

## 2020-12-22 PROCEDURE — 99285 EMERGENCY DEPT VISIT HI MDM: CPT

## 2020-12-22 PROCEDURE — 99254 IP/OBS CNSLTJ NEW/EST MOD 60: CPT | Performed by: PSYCHIATRY & NEUROLOGY

## 2020-12-22 PROCEDURE — 74011000636 HC RX REV CODE- 636: Performed by: EMERGENCY MEDICINE

## 2020-12-22 PROCEDURE — 80053 COMPREHEN METABOLIC PANEL: CPT

## 2020-12-22 PROCEDURE — 99218 HC RM OBSERVATION: CPT

## 2020-12-22 PROCEDURE — 74011250636 HC RX REV CODE- 250/636: Performed by: STUDENT IN AN ORGANIZED HEALTH CARE EDUCATION/TRAINING PROGRAM

## 2020-12-22 PROCEDURE — 82962 GLUCOSE BLOOD TEST: CPT

## 2020-12-22 PROCEDURE — 74011636637 HC RX REV CODE- 636/637: Performed by: GENERAL ACUTE CARE HOSPITAL

## 2020-12-22 PROCEDURE — 96360 HYDRATION IV INFUSION INIT: CPT

## 2020-12-22 PROCEDURE — 70551 MRI BRAIN STEM W/O DYE: CPT

## 2020-12-22 PROCEDURE — 96361 HYDRATE IV INFUSION ADD-ON: CPT

## 2020-12-22 PROCEDURE — 74011250637 HC RX REV CODE- 250/637: Performed by: NURSE PRACTITIONER

## 2020-12-22 PROCEDURE — 85025 COMPLETE CBC W/AUTO DIFF WBC: CPT

## 2020-12-22 PROCEDURE — 36415 COLL VENOUS BLD VENIPUNCTURE: CPT

## 2020-12-22 PROCEDURE — 81001 URINALYSIS AUTO W/SCOPE: CPT

## 2020-12-22 PROCEDURE — 70450 CT HEAD/BRAIN W/O DYE: CPT

## 2020-12-22 PROCEDURE — 83690 ASSAY OF LIPASE: CPT

## 2020-12-22 PROCEDURE — 65270000029 HC RM PRIVATE

## 2020-12-22 PROCEDURE — 70498 CT ANGIOGRAPHY NECK: CPT

## 2020-12-22 RX ORDER — INSULIN GLARGINE 100 [IU]/ML
35 INJECTION, SOLUTION SUBCUTANEOUS
Status: DISCONTINUED | OUTPATIENT
Start: 2020-12-22 | End: 2020-12-24 | Stop reason: HOSPADM

## 2020-12-22 RX ORDER — INSULIN LISPRO 100 [IU]/ML
INJECTION, SOLUTION INTRAVENOUS; SUBCUTANEOUS
Status: DISCONTINUED | OUTPATIENT
Start: 2020-12-22 | End: 2020-12-24 | Stop reason: HOSPADM

## 2020-12-22 RX ORDER — POLYETHYLENE GLYCOL 3350 17 G/17G
17 POWDER, FOR SOLUTION ORAL DAILY PRN
Status: DISCONTINUED | OUTPATIENT
Start: 2020-12-22 | End: 2020-12-24 | Stop reason: HOSPADM

## 2020-12-22 RX ORDER — ATORVASTATIN CALCIUM 10 MG/1
10 TABLET, FILM COATED ORAL DAILY
Status: DISCONTINUED | OUTPATIENT
Start: 2020-12-23 | End: 2020-12-23

## 2020-12-22 RX ORDER — SODIUM CHLORIDE 0.9 % (FLUSH) 0.9 %
5-40 SYRINGE (ML) INJECTION EVERY 8 HOURS
Status: DISCONTINUED | OUTPATIENT
Start: 2020-12-22 | End: 2020-12-24 | Stop reason: HOSPADM

## 2020-12-22 RX ORDER — FOLIC ACID 1 MG/1
1 TABLET ORAL DAILY
Status: DISCONTINUED | OUTPATIENT
Start: 2020-12-23 | End: 2020-12-24 | Stop reason: HOSPADM

## 2020-12-22 RX ORDER — AMLODIPINE BESYLATE 5 MG/1
5 TABLET ORAL DAILY
Status: DISCONTINUED | OUTPATIENT
Start: 2020-12-23 | End: 2020-12-22

## 2020-12-22 RX ORDER — ENOXAPARIN SODIUM 100 MG/ML
40 INJECTION SUBCUTANEOUS DAILY
Status: DISCONTINUED | OUTPATIENT
Start: 2020-12-23 | End: 2020-12-24 | Stop reason: HOSPADM

## 2020-12-22 RX ORDER — ASPIRIN 325 MG/1
100 TABLET, FILM COATED ORAL DAILY
Status: DISCONTINUED | OUTPATIENT
Start: 2020-12-23 | End: 2020-12-24 | Stop reason: HOSPADM

## 2020-12-22 RX ORDER — ACETAMINOPHEN 650 MG/1
650 SUPPOSITORY RECTAL
Status: DISCONTINUED | OUTPATIENT
Start: 2020-12-22 | End: 2020-12-24 | Stop reason: HOSPADM

## 2020-12-22 RX ORDER — MAGNESIUM SULFATE 100 %
4 CRYSTALS MISCELLANEOUS AS NEEDED
Status: DISCONTINUED | OUTPATIENT
Start: 2020-12-22 | End: 2020-12-24 | Stop reason: HOSPADM

## 2020-12-22 RX ORDER — AMLODIPINE BESYLATE 5 MG/1
5 TABLET ORAL DAILY
Status: DISCONTINUED | OUTPATIENT
Start: 2020-12-23 | End: 2020-12-23

## 2020-12-22 RX ORDER — LANOLIN ALCOHOL/MO/W.PET/CERES
100 CREAM (GRAM) TOPICAL DAILY
Status: DISCONTINUED | OUTPATIENT
Start: 2020-12-23 | End: 2020-12-22

## 2020-12-22 RX ORDER — DEXTROSE 50 % IN WATER (D50W) INTRAVENOUS SYRINGE
12.5-25 AS NEEDED
Status: DISCONTINUED | OUTPATIENT
Start: 2020-12-22 | End: 2020-12-24 | Stop reason: HOSPADM

## 2020-12-22 RX ORDER — ATORVASTATIN CALCIUM 10 MG/1
10 TABLET, FILM COATED ORAL DAILY
Status: DISCONTINUED | OUTPATIENT
Start: 2020-12-23 | End: 2020-12-22

## 2020-12-22 RX ORDER — ACETAMINOPHEN 325 MG/1
650 TABLET ORAL
Status: DISCONTINUED | OUTPATIENT
Start: 2020-12-22 | End: 2020-12-24 | Stop reason: HOSPADM

## 2020-12-22 RX ORDER — PROMETHAZINE HYDROCHLORIDE 25 MG/1
12.5 TABLET ORAL
Status: DISCONTINUED | OUTPATIENT
Start: 2020-12-22 | End: 2020-12-24 | Stop reason: HOSPADM

## 2020-12-22 RX ORDER — SODIUM CHLORIDE 0.9 % (FLUSH) 0.9 %
5-40 SYRINGE (ML) INJECTION AS NEEDED
Status: DISCONTINUED | OUTPATIENT
Start: 2020-12-22 | End: 2020-12-24 | Stop reason: HOSPADM

## 2020-12-22 RX ORDER — ONDANSETRON 2 MG/ML
4 INJECTION INTRAMUSCULAR; INTRAVENOUS
Status: DISCONTINUED | OUTPATIENT
Start: 2020-12-22 | End: 2020-12-24 | Stop reason: HOSPADM

## 2020-12-22 RX ORDER — LABETALOL HYDROCHLORIDE 5 MG/ML
10 INJECTION, SOLUTION INTRAVENOUS
Status: DISCONTINUED | OUTPATIENT
Start: 2020-12-22 | End: 2020-12-24 | Stop reason: HOSPADM

## 2020-12-22 RX ADMIN — Medication 10 ML: at 23:16

## 2020-12-22 RX ADMIN — INSULIN LISPRO 3 UNITS: 100 INJECTION, SOLUTION INTRAVENOUS; SUBCUTANEOUS at 23:12

## 2020-12-22 RX ADMIN — AMLODIPINE BESYLATE 5 MG: 5 TABLET ORAL at 23:16

## 2020-12-22 RX ADMIN — INSULIN GLARGINE 35 UNITS: 100 INJECTION, SOLUTION SUBCUTANEOUS at 23:12

## 2020-12-22 RX ADMIN — IOPAMIDOL 100 ML: 755 INJECTION, SOLUTION INTRAVENOUS at 14:28

## 2020-12-22 RX ADMIN — SODIUM CHLORIDE 1000 ML: 9 INJECTION, SOLUTION INTRAVENOUS at 13:44

## 2020-12-22 NOTE — H&P
Hospitalist Admission Note    NAME: Chantelle Messina   :  1965   MRN:  [id number]     Date/Time:  2020 5:11 PM    Patient PCP: Dante Dykes MD  ______________________________________________________________________  Given the patient's current clinical presentation, I have a high level of concern for decompensation if discharged from the emergency department. Complex decision making was performed, which includes reviewing the patient's available past medical records, laboratory results, and x-ray films. My assessment of this patient's clinical condition and my plan of care is as follows. Assessment / Plan:  Ataxia and dizziness  Uncontrolled DMII  Admit to Telemetry  MRI Brain   Neurology Consult  TSH/A1c/Lipid Panel  FA, Thiamine  FS monitoring, SS, Lantus 35 units  Head CT: IMPRESSION: No acute intracranial hemorrhage, mass or infarct. CTA Head and Neck: CTA Head:  1. 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 No significant stenosis of the dominant right vertebral artery. 2. Remaining atherosclerotic disease as above.     CTA Neck:  1. No evidence of flow-limiting stenosis. Right dominant vertebrobasilar system  with markedly diminutive left vertebral artery. 2. Mild stenosis (less than 50% by NASCET criteria) of the proximal right  internal carotid artery. 3. Mild stenosis of the mid to distal left common carotid artery. Nausea, vomiting  Etiology unclear   LFTs essentially WNL  Lipase negative  No pain upon palpation during PE  Continue to monitor  Anti-emetics PRN   Would consider imaging if it were to worsen    Code Status: Full  Surrogate Decision Maker: Wife  DVT Prophylaxis: Lovenox  GI Prophylaxis: not indicated  Baseline: Independent      Subjective:   CHIEF COMPLAINT: falls, abdominal pain    HISTORY OF PRESENT ILLNESS:     Chantelle Messina is a 48 y.o.  male who presents with CC listed above.  He is a poor historian but endorses dizziness and difficulty keeping his balance for at least the past 1 week. He also endorses some abdominal pain, and nausea. He endorses decreased PO intake during this time. He denies any fever or cchills. He denies any history of stroke. He denies any focal weakness. We were asked to admit for work up and evaluation of the above problems. Past Medical History:   Diagnosis Date    Diabetes ([address]     Nausea & vomiting     Pancreatitis     Unspecified sleep apnea         Past Surgical History:   Procedure Laterality Date    HX GI      upper endoscopies    HX HEENT      oral surgery    [facility] EGD FLEXIBLE FOREIGN BODY REMOVAL  1/30/2019         UPPER GI ENDOSCOPY,BALL DIL,30MM  1/30/2019         UPPER GI ENDOSCOPY,BIOPSY  1/30/2019            Social History     Tobacco Use    Smoking status: Former Smoker     Years: 1.00     Types: Cigarettes    Smokeless tobacco: Never Used   Substance Use Topics    Alcohol use: Not Currently     Frequency: Monthly or less     Drinks per session: 1 or 2        Family History   Problem Relation Age of Onset    Heart Disease Mother         s/p stent    Diabetes Father     Cancer Brother         stomach cancer     Allergies   Allergen Reactions    Lisinopril Anaphylaxis        Prior to Admission medications    Medication Sig Start Date End Date Taking? Authorizing Provider   insulin lispro (HUMALOG) 100 unit/mL kwikpen 15 units three times a day. DX E11.9 12/3/20  Yes Raven Cannon MD   insulin glargine (Lantus Solostar U-100 Insulin) 100 unit/mL (3 mL) inpn INJECT 49 UNITS UNDER THE SKIN EVERY NIGHT AT BEDTIME  Indications: type 2 diabetes mellitus 12/1/20  Yes Raven Cannon MD   atorvastatin (LIPITOR) 10 mg tablet Take 1 Tab by mouth daily. 11/15/19  Yes Raven Cannon MD   amLODIPine (NORVASC) 5 mg tablet Take 1 Tab by mouth daily.  11/7/19  Yes Raven Cannon MD   Insulin Needles, Disposable, 31 gauge x 5/16\" ndle Use with insulin pen needle 5 times daily 9/3/19  Yes Tony Angeles MD   insulin lispro (HUMALOG KWIKPEN INSULIN) 100 unit/mL kwikpen INITIATE CORRECTIVE INSULIN PROTOCOL (KATIE):  RX KATIE Normal Sensitivity (Average weight)  For Blood Sugar (mg/dl) of:              111-150=0 units  151-200=2 units  201-250=4 units  251-300=6 units  301-350=8 units  Over 350= 10 units  Indications: type 2 diabetes mellitus 4/15/19  Yes Jovita Johansen MD   glucose blood VI test strips (ASCENSIA AUTODISC VI, ONE TOUCH ULTRA TEST VI) strip Check fsbs tid 250.02 4/3/19  Yes Joselito Monge III, DO   lancets misc Check fsbs bid 4/3/19  Yes Jorge Cedeño, DO   Blood-Glucose Meter monitoring kit fsbs bid dx 250.02 2/14/19  Yes Jovita Johansen MD       REVIEW OF SYSTEMS:     I am not able to complete the review of systems because:    The patient is intubated and sedated    The patient has altered mental status due to his acute medical problems    The patient has baseline aphasia from prior stroke(s)    The patient has baseline dementia and is not reliable historian    The patient is in acute medical distress and unable to provide information           Total of 12 systems reviewed as follows:       POSITIVE= underlined text  Negative = text not underlined  General:  fever, chills, sweats, generalized weakness, weight loss/gain,      loss of appetite   Eyes:    blurred vision, eye pain, loss of vision, double vision  ENT:    rhinorrhea, pharyngitis   Respiratory:   cough, sputum production, SOB, FABIAN, wheezing, pleuritic pain   Cardiology:   chest pain, palpitations, orthopnea, PND, edema, syncope   Gastrointestinal:  abdominal pain , N/V, diarrhea, dysphagia, constipation, bleeding   Genitourinary:  frequency, urgency, dysuria, hematuria, incontinence   Muskuloskeletal :  arthralgia, myalgia, back pain  Hematology:  easy bruising, nose or gum bleeding, lymphadenopathy   Dermatological: rash, ulceration, pruritis, color change / jaundice  Endocrine:   hot flashes or polydipsia   Neurological:  headache, dizziness, confusion, focal weakness, paresthesia,     Speech difficulties, memory loss, gait difficulty  Psychological: Feelings of anxiety, depression, agitation    Objective:   VITALS:    Visit Vitals  BP (!) 147/94   Pulse 97   Temp 98.1 °F (36.7 °C)   Resp 16   Ht 6' 4\" (1.93 m)   Wt 93.9 kg (207 lb 0.2 oz)   SpO2 98%   BMI 25.20 kg/m²       PHYSICAL EXAM:    General:    Alert, cooperative, no distress, appears stated age. HEENT: Atraumatic, anicteric sclerae, pink conjunctivae     No oral ulcers, mucosa moist, throat clear, dentition fair  Neck:  Supple, symmetrical,  thyroid: non tender  Lungs:   Clear to auscultation bilaterally. No Wheezing or Rhonchi. No rales. Chest wall:  No tenderness  No Accessory muscle use. Heart:   Regular  rhythm,  No  murmur   No edema  Abdomen:   Soft, non-tender. Not distended. Bowel sounds normal  Extremities: No cyanosis. No clubbing,      Skin turgor normal, Capillary refill normal, Radial dial pulse 2+  Skin:     Not pale. Not Jaundiced  No rashes   Psych:  Good insight. Not depressed. Not anxious or agitated. Neurologic: EOMs intact. No facial asymmetry. No aphasia or slurred speech. Symmetrical strength, Sensation grossly intact. Alert and oriented X 4.  Unable to point to nose with his left hand    _______________________________________________________________________  Care Plan discussed with:    Comments   Patient x    Family      RN x    Care Manager                    Consultant:      _______________________________________________________________________  Expected  Disposition:   Home with Family    HH/PT/OT/RN x   SNF/LTC    LUIGI    ________________________________________________________________________  TOTAL TIME:  54 Minutes    Critical Care Provided     Minutes non procedure based      Comments     Reviewed previous records   >50% of visit spent in counseling and coordination of care  Discussion with patient and/or family and questions answered       ________________________________________________________________________  Signed: Dennis Morales MD    Procedures: see electronic medical records for all procedures/Xrays and details which were not copied into this note but were reviewed prior to creation of Plan. LAB DATA REVIEWED:    Recent Results (from the past 24 hour(s))   CBC WITH AUTOMATED DIFF    Collection Time: 12/22/20 11:52 AM   Result Value Ref Range    WBC 14.8 (H) 4.1 - 11.1 K/uL    RBC 5.97 (H) 4.10 - 5.70 M/uL    HGB 18.5 (H) 12.1 - 17.0 g/dL    HCT 53.5 (H) 36.6 - 50.3 %    MCV 89.6 80.0 - 99.0 FL    MCH 31.0 26.0 - 34.0 PG    MCHC 34.6 30.0 - 36.5 g/dL    RDW 12.2 11.5 - 14.5 %    PLATELET 455 244 - 477 K/uL    MPV 10.3 8.9 - 12.9 FL    NRBC 0.0 0  WBC    ABSOLUTE NRBC 0.00 0.00 - 0.01 K/uL    NEUTROPHILS 73 32 - 75 %    LYMPHOCYTES 18 12 - 49 %    MONOCYTES 7 5 - 13 %    EOSINOPHILS 0 0 - 7 %    BASOPHILS 1 0 - 1 %    IMMATURE GRANULOCYTES 1 (H) 0.0 - 0.5 %    ABS. NEUTROPHILS 10.9 (H) 1.8 - 8.0 K/UL    ABS. LYMPHOCYTES 2.6 0.8 - 3.5 K/UL    ABS. MONOCYTES 1.1 (H) 0.0 - 1.0 K/UL    ABS. EOSINOPHILS 0.1 0.0 - 0.4 K/UL    ABS. BASOPHILS 0.1 0.0 - 0.1 K/UL    ABS. IMM. GRANS. 0.1 (H) 0.00 - 0.04 K/UL    DF AUTOMATED     METABOLIC PANEL, COMPREHENSIVE    Collection Time: 12/22/20 11:52 AM   Result Value Ref Range    Sodium 131 (L) 136 - 145 mmol/L    Potassium 4.1 3.5 - 5.1 mmol/L    Chloride 97 97 - 108 mmol/L    CO2 23 21 - 32 mmol/L    Anion gap 11 5 - 15 mmol/L    Glucose 359 (H) 65 - 100 mg/dL    BUN 28 (H) 6 - 20 MG/DL    Creatinine 1.13 0.70 - 1.30 MG/DL    BUN/Creatinine ratio 25 (H) 12 - 20      GFR est AA >60 >60 ml/min/1.73m2    GFR est non-AA >60 >60 ml/min/1.73m2    Calcium 9.2 8.5 - 10.1 MG/DL    Bilirubin, total 0.9 0.2 - 1.0 MG/DL    ALT (SGPT) 26 12 - 78 U/L    AST (SGOT) 12 (L) 15 - 37 U/L    Alk.  phosphatase 127 (H) 45 - 117 U/L    Protein, total 8.3 (H) 6.4 - 8.2 g/dL Albumin 3.9 3.5 - 5.0 g/dL    Globulin 4.4 (H) 2.0 - 4.0 g/dL    A-G Ratio 0.9 (L) 1.1 - 2.2     LIPASE    Collection Time: 12/22/20 11:52 AM   Result Value Ref Range    Lipase 60 (L) 73 - 393 U/L

## 2020-12-22 NOTE — ROUTINE PROCESS
-Please complete MRI History and Safety Screening Form for this patient using [facility] only under Orders Requiring a Screening Form: 
 

## 2020-12-22 NOTE — ED PROVIDER NOTES
EMERGENCY DEPARTMENT HISTORY AND PHYSICAL EXAM          Date: 12/22/2020  Patient Name: Checo Blackwell  Attending of Record: Dr. Rudi Terry    Please note that this dictation was completed with [employer] (formerly [email or web address]), the computer voice recognition software. Quite often unanticipated grammatical, syntax, homophones, and other interpretive errors are inadvertently transcribed by the computer software. Please disregard these errors. Please excuse any errors that have escaped final proofreading. History of Presenting Illness     Chief Complaint   Patient presents with    Abdominal Pain     Pain at RUQ for 2-3 days with nausea. seen urgent care yesterday, told Liver/gallbladder inflamed. History Provided By: Patient and Patient's Wife    HPI: Checo Blackwell is a 48 y.o. male w/PMHx of DMII, achalasia, HTN, HLD who presents for ataxia and dizziness x5d. He also had severe nausea, vomiting, decreased appetite x3d at onset of sx, which have spontaneously resolved. He denies HA, vision changes, vertigo, change in hearing, facial droop, numbness, or weakness. He was referred to be seen for RUQ pain at an Urgent Care yesterday, and was told his \"liver and gallbladder\" may be inflamed due to TTP. No labs or imaging were done. He denies abdominal pain when it is not being palpated and has not had nausea in several days. He states that he fell due to the ataxia this week without hitting his head or LOC and that he has been holding onto walls to keep himself from falling. He endorses improvement to this ataxia over the last 2 days as his p.o. intake has improved. He denies obvious inciting factors, such as trauma or infection, and has not had fevers, chill, rhinorrhea, cough, CP, SOB, significant abd pain, diarrhea, constipation. He has had very poor appetite and states he has hardly eaten anything in a week but has been trying to drink fluids.  He states he is a \"bad diabetic\" and takes his daily glargine 55U but does not take daily blood sugars and does not take mealtime insulin. He endorses significant polydipsia for \"a long time\". Denies hx of similar sx in the past.    PCP: Rocky Sharp MD    There are no other complaints, changes, or physical findings at this time. Past History     Past Medical History:  Past Medical History:   Diagnosis Date    Diabetes ([address]     Nausea & vomiting     Pancreatitis     Unspecified sleep apnea        Past Surgical History:  Past Surgical History:   Procedure Laterality Date    HX GI      upper endoscopies    HX HEENT      oral surgery    NM EGD FLEXIBLE FOREIGN BODY REMOVAL  1/30/2019         UPPER GI ENDOSCOPY,BALL DIL,30MM  1/30/2019         UPPER GI ENDOSCOPY,BIOPSY  1/30/2019            Family History:  Family History   Problem Relation Age of Onset    Heart Disease Mother         s/p stent    Diabetes Father     Cancer Brother         stomach cancer       Social History:  Social History     Tobacco Use    Smoking status: Former Smoker     Years: 1.00     Types: Cigarettes    Smokeless tobacco: Never Used   Substance Use Topics    Alcohol use: Not Currently     Frequency: Monthly or less     Drinks per session: 1 or 2    Drug use: Not Currently       Allergies: Allergies   Allergen Reactions    Lisinopril Anaphylaxis         Review of Systems   Review of Systems   Constitutional: Positive for appetite change. Negative for chills and fever. HENT: Negative for congestion and sore throat. Eyes: Negative for visual disturbance. Respiratory: Negative for cough and shortness of breath. Cardiovascular: Negative for chest pain and leg swelling. Gastrointestinal: Negative for abdominal pain, blood in stool, diarrhea and nausea. Endocrine: Positive for polyuria. Genitourinary: Negative for dysuria and testicular pain. Musculoskeletal: Negative for arthralgias, joint swelling and myalgias. Skin: Negative for rash.    Allergic/Immunologic: Negative for immunocompromised state. Neurological: Positive for dizziness. Negative for syncope, facial asymmetry, speech difficulty, weakness, numbness and headaches. Difficulty walking   Hematological: Does not bruise/bleed easily. Psychiatric/Behavioral: Negative for confusion. Physical Exam   Physical Exam  Vitals signs and nursing note reviewed. Constitutional:       General: He is not in acute distress. Appearance: Normal appearance. He is well-developed. HENT:      Head: Normocephalic and atraumatic. Nose: Nose normal. No congestion or rhinorrhea. Mouth/Throat:      Mouth: Mucous membranes are dry. Pharynx: Oropharynx is clear. Eyes:      Extraocular Movements: Extraocular movements intact. Conjunctiva/sclera: Conjunctivae normal.      Pupils: Pupils are equal, round, and reactive to light. Neck:      Musculoskeletal: Normal range of motion and neck supple. No muscular tenderness. Cardiovascular:      Rate and Rhythm: Normal rate and regular rhythm. Pulses: Normal pulses. Pulmonary:      Effort: Pulmonary effort is normal.      Breath sounds: Normal breath sounds. Abdominal:      General: Abdomen is flat. Palpations: Abdomen is soft. Tenderness: There is abdominal tenderness (Mild on deep palpation) in the right upper quadrant. There is no right CVA tenderness, left CVA tenderness, guarding or rebound. Negative signs include Ronquillo's sign. Musculoskeletal: Normal range of motion. General: No swelling, tenderness, deformity or signs of injury. Skin:     General: Skin is warm and dry. Capillary Refill: Capillary refill takes less than 2 seconds. Findings: No lesion. Neurological:      General: No focal deficit present. Mental Status: He is alert and oriented to person, place, and time. Mental status is at baseline. Cranial Nerves: Cranial nerves are intact. Sensory: Sensation is intact.       Motor: Motor function is intact. No weakness or abnormal muscle tone. Coordination: Romberg sign positive. Finger-Nose-Finger Test and Heel to [facility] Test normal.      Gait: Gait abnormal.      Comments: On attempt at ambulation, patient severely ataxic and falls largely to the left   Psychiatric:         Mood and Affect: Mood normal.         Behavior: Behavior normal.         Diagnostic Study Results     Labs      Recent Results (from the past 12 hour(s))   CBC WITH AUTOMATED DIFF    Collection Time: 12/22/20 11:52 AM   Result Value Ref Range    WBC 14.8 (H) 4.1 - 11.1 K/uL    RBC 5.97 (H) 4.10 - 5.70 M/uL    HGB 18.5 (H) 12.1 - 17.0 g/dL    HCT 53.5 (H) 36.6 - 50.3 %    MCV 89.6 80.0 - 99.0 FL    MCH 31.0 26.0 - 34.0 PG    MCHC 34.6 30.0 - 36.5 g/dL    RDW 12.2 11.5 - 14.5 %    PLATELET 691 497 - 246 K/uL    MPV 10.3 8.9 - 12.9 FL    NRBC 0.0 0  WBC    ABSOLUTE NRBC 0.00 0.00 - 0.01 K/uL    NEUTROPHILS 73 32 - 75 %    LYMPHOCYTES 18 12 - 49 %    MONOCYTES 7 5 - 13 %    EOSINOPHILS 0 0 - 7 %    BASOPHILS 1 0 - 1 %    IMMATURE GRANULOCYTES 1 (H) 0.0 - 0.5 %    ABS. NEUTROPHILS 10.9 (H) 1.8 - 8.0 K/UL    ABS. LYMPHOCYTES 2.6 0.8 - 3.5 K/UL    ABS. MONOCYTES 1.1 (H) 0.0 - 1.0 K/UL    ABS. EOSINOPHILS 0.1 0.0 - 0.4 K/UL    ABS. BASOPHILS 0.1 0.0 - 0.1 K/UL    ABS. IMM. GRANS. 0.1 (H) 0.00 - 0.04 K/UL    DF AUTOMATED     METABOLIC PANEL, COMPREHENSIVE    Collection Time: 12/22/20 11:52 AM   Result Value Ref Range    Sodium 131 (L) 136 - 145 mmol/L    Potassium 4.1 3.5 - 5.1 mmol/L    Chloride 97 97 - 108 mmol/L    CO2 23 21 - 32 mmol/L    Anion gap 11 5 - 15 mmol/L    Glucose 359 (H) 65 - 100 mg/dL    BUN 28 (H) 6 - 20 MG/DL    Creatinine 1.13 0.70 - 1.30 MG/DL    BUN/Creatinine ratio 25 (H) 12 - 20      GFR est AA >60 >60 ml/min/1.73m2    GFR est non-AA >60 >60 ml/min/1.73m2    Calcium 9.2 8.5 - 10.1 MG/DL    Bilirubin, total 0.9 0.2 - 1.0 MG/DL    ALT (SGPT) 26 12 - 78 U/L    AST (SGOT) 12 (L) 15 - 37 U/L    Alk.  phosphatase 127 (H) 45 - 117 U/L    Protein, total 8.3 (H) 6.4 - 8.2 g/dL    Albumin 3.9 3.5 - 5.0 g/dL    Globulin 4.4 (H) 2.0 - 4.0 g/dL    A-G Ratio 0.9 (L) 1.1 - 2.2     LIPASE    Collection Time: 12/22/20 11:52 AM   Result Value Ref Range    Lipase 60 (L) 73 - 393 U/L       Radiologic Studies -   CT HEAD WO CONT   Final Result   IMPRESSION: No acute intracranial hemorrhage, mass or infarct. CTA HEAD NECK W CONT   Final Result   IMPRESSION:    CTA Head:   1. Multifocal severe stenoses of the left V4 segment, which is congenitally   diminutive. No significant stenosis of the dominant right vertebral artery. 2. Remaining atherosclerotic disease as above. CTA Neck:   1. No evidence of flow-limiting stenosis. Right dominant vertebrobasilar system   with markedly diminutive left vertebral artery. 2. Mild stenosis (less than 50% by NASCET criteria) of the proximal right   internal carotid artery. 3. Mild stenosis of the mid to distal left common carotid artery. CT Results  (Last 48 hours)               12/22/20 1428  CT HEAD WO CONT Final result    Impression:  IMPRESSION: No acute intracranial hemorrhage, mass or infarct. Narrative:  INDICATION: sudden onset ataxia x5d, assess for ICH, lesion        Exam: Noncontrast CT of the brain is performed with 5 mm collimation. CT dose reduction was achieved with the use of the standardized protocol   tailored for this examination and automatic exposure control for dose   modulation. FINDINGS: There is no acute intracranial hemorrhage, mass, mass effect or   herniation. Ventricular system is normal. There are white matter hypodensities   consistent with chronic microvascular ischemic changes. There is no evidence for   acute territorial infarct. The mastoid air cells are well pneumatized.  The   visualized paranasal sinuses are normal.           12/22/20 1428  CTA HEAD NECK W CONT Final result    Impression:  IMPRESSION:    CTA Head: 1. Multifocal severe stenoses of the left V4 segment, which is congenitally   diminutive. No significant stenosis of the dominant right vertebral artery. 2. Remaining atherosclerotic disease as above. CTA Neck:   1. No evidence of flow-limiting stenosis. Right dominant vertebrobasilar system   with markedly diminutive left vertebral artery. 2. Mild stenosis (less than 50% by NASCET criteria) of the proximal right   internal carotid artery. 3. Mild stenosis of the mid to distal left common carotid artery. Narrative:  EXAM:  CTA HEAD NECK W CONT       INDICATION:   ataxia       COMPARISON:  CT head 12/22/2020. CONTRAST:  100 mL of Isovue-370. TECHNIQUE:  Unenhanced  images were obtained to localize the volume for   acquisition. Multislice helical axial CT angiography was performed from the   aortic arch to the top of the head during uneventful rapid bolus intravenous   contrast administration. Coronal and sagittal reformations and 3D post   processing was performed. CT dose reduction was achieved through use of a   standardized protocol tailored for this examination and automatic exposure   control for dose modulation. FINDINGS:       CTA Head:   There is no evidence of large vessel occlusion or flow-limiting stenosis of the   intracranial internal carotid, anterior cerebral, and middle cerebral arteries. Calcific atherosclerosis of the right carotid siphon with mild to moderate focal   stenosis of the right paraclinoid internal carotid artery. Mild calcific   atherosclerosis of the left carotid siphon without significant stenosis. The   anterior communicating artery is patent with fenestration. There is no evidence of large vessel occlusion or flow-limiting stenosis of the   intracranial vertebral arteries, basilar artery, or posterior cerebral arteries. Multifocal severe stenoses of the left V4 segment.  Fetal origin of the right   posterior cerebral artery. There is no evidence of aneurysm or vascular malformation. The dural venous   sinuses and deep cerebral venous system are patent. No evidence of abnormal   enhancement on delayed phase images. CTA NECK:   NASCET method was utilized for calculating stenosis. Mild calcific atherosclerosis of the aortic arch. Mild stenosis of the mid to   distal left common carotid artery from eccentric noncalcified plaque. No   significant stenosis of the right common carotid artery. Calcific   atherosclerosis of the right carotid bifurcation with mild (less than 50%)   stenosis of the proximal right internal carotid artery. Mild calcific   atherosclerosis of the left carotid bifurcation without significant stenosis. There is a right dominant vertebrobasilar arterial system with markedly   diminutive left vertebral artery. The cervical vertebral arteries are normal in   course, size and contour without significant stenosis. Visualized soft tissues of the neck are unremarkable. Visualized lung apices are   clear. No acute fracture or aggressive osseous lesion. Reversal of the cervical   lordosis with multilevel degenerative disc disease most advanced at C5-C6 and   C6-C7. Multilevel severe facet arthropathy scattered throughout the cervical   spine, with grade 1 anterolisthesis of C3 on C4 and C4 on C5. CXR Results  (Last 48 hours)    None            Medical Decision Making   I am the first provider for this patient. I reviewed the vital signs, available nursing notes, past medical history, past surgical history, family history and social history. Vital Signs-Reviewed the patient's vital signs.   Patient Vitals for the past 12 hrs:   Temp Pulse Resp BP SpO2   12/22/20 1704 98.1 °F (36.7 °C) 97 16 (!) 147/94 98 %   12/22/20 1400    (!) 144/89 98 %   12/22/20 1300    (!) 167/110 95 %   12/22/20 1230    (!) 139/101 97 %   12/22/20 1133 98.1 °F (36.7 °C) (!) 106 16 (!) 162/91 98 %       Records Reviewed: Nursing Notes, Old Medical Records, Previous electrocardiograms, Previous Radiology Studies and Previous Laboratory Studies     I reviewed our electronic medical record system for any past medical records that were available that may contribute to the patient's current condition, the nursing notes and vital signs from today's visit. Constantin Hurd MD     Provider Notes (Medical Decision Making):   DDx: Cerebellar CVA, vertebral artery stenosis, Hyperglycemia, dehydration     Isaiah Zapien is a 48 y.o. male w/PMHx of DMII, achalasia, HTN, HLD who presents for ataxia and dizziness x5d. He also had severe nausea, vomiting, decreased appetite x3d at onset of sx, which have spontaneously resolved. He denies HA, vision changes, vertigo, change in hearing, facial droop, numbness, or weakness. VSS, hypertensive to 160s/90s. Physical exam as above with normal strength, no nystagmus, no FND and denies, HA, vertigo, FND, numbness, but is severely ataxic on ambulation and falls to the left. Abd is mildly TTP in RUQ on deep palpation w/nrl LFTs. CBC with elevated WBC and Hgb consistent with hemoconcentration. Patient is likely significantly dehydrated due to months of uncontrolled diabetes, found to be hyperglycemic to 360s here, and frequent polyuria and polydipsia. Sx may be due to dehydration, but given severe ataxia, will obtain CTH, CTA head and neck. Will provide 1L IVF bolus. Pt is far out of stroke alert window given 5 days of sx. Will continue to monitor and reassess symptoms. ED Course and Progress Notes:   Initial assessment performed. The patients presenting problems have been discussed, and they are in agreement with the care plan formulated and outlined with them. I have encouraged them to ask questions as they arise throughout their visit. 12/22/2020  1530: CTA Head demonstrates severe stenoses in multiple areas of L V4 artery, which provides some supply to cerebellum.  Pt is likely suffering from vertebrobasilar insufficiency, exacerbated by his hyperglycemia-induced polyuria and dehydration. This is further supported by his worsened symptoms when he sits up that resolve when he lays flat as this would improve perfusion to his cerebellum. Will consult Neurosurgery for recommendations. ED Course as of Dec 22 1709   Tue Dec 22, 2020   1539 Attempted to consult Neurosurgery, who recommended Neuro IR consult. Then called Neuro IR, who recommended Neurology consult. Neurology paged and awaiting callback. Pt feels symptomatic improvement after 1L IVF. [CD]   1600 Attempted to ambulate patient again now that he feels improved. He had mild improvement to gait but still continued to fall into me towards the left. Will assess orthostatics and potentially provide additional bolus of IVF. [address] Neurology callback.    [CD]   [phone] Dr. Gui Ayala recommends admission. Hospitalist aware and will admit. Dr. Gui Ayala en route to speak with patient.    [CD]      ED Course User Index  [CD] Aydee Dumas MD       Diagnosis     Clinical Impression:   1. Vertebral artery stenosis, left    2. Ataxia    3. Dizziness    4. Hyperglycemia    5.  Dehydration      Disposition:  Admit to hospital medicine at [phone] with Neurology consult        Resident Signature: Charline Wise MD Emergency Medicine PGY 2

## 2020-12-22 NOTE — ED NOTES
TRANSFER - OUT REPORT:    Verbal report given to Kareem(name) on Jose Antonio Arguelles  being transferred to Oncology(unit) for routine progression of care       Report consisted of patients Situation, Background, Assessment and   Recommendations(SBAR). Information from the following report(s) SBAR was reviewed with the receiving nurse. Lines:   Peripheral IV 12/22/20 Left Forearm (Active)   Site Assessment Clean, dry, & intact 12/22/20 1155   Phlebitis Assessment 0 12/22/20 1155   Infiltration Assessment 0 12/22/20 1155   Dressing Status Clean, dry, & intact 12/22/20 1155   Dressing Type Transparent 12/22/20 1155   Hub Color/Line Status Capped;Flushed 12/22/20 1155        Opportunity for questions and clarification was provided.       Patient transported with:   Kitsy Lane

## 2020-12-22 NOTE — TELEPHONE ENCOUNTER
Hany Hanna, per Dr. Analisa Bhatia, pt should go to the ED. Juan Keane verbalized understanding of information discussed w/ no further questions at this time.

## 2020-12-23 ENCOUNTER — DOCUMENTATION ONLY (OUTPATIENT)
Dept: INTERNAL MEDICINE CLINIC | Age: 55
End: 2020-12-23

## 2020-12-23 ENCOUNTER — APPOINTMENT (OUTPATIENT)
Dept: NON INVASIVE DIAGNOSTICS | Age: 55
End: 2020-12-23
Attending: PSYCHIATRY & NEUROLOGY
Payer: COMMERCIAL

## 2020-12-23 LAB
CHOLEST SERPL-MCNC: 196 MG/DL
ECHO AO ROOT DIAM: 3.71 CM
ECHO AV AREA PEAK VELOCITY: 3.02 CM2
ECHO AV AREA/BSA PEAK VELOCITY: 1.3 CM2/M2
ECHO AV CUSP MM: 2.12 CM
ECHO AV PEAK GRADIENT: 2.84 MMHG
ECHO AV PEAK VELOCITY: 84.19 CM/S
ECHO LA MAJOR AXIS: 3.32 CM
ECHO LA MINOR AXIS: 1.48 CM
ECHO LA TO AORTIC ROOT RATIO: 0.94
ECHO LA TO AORTIC ROOT RATIO: 0.94
ECHO LV E' LATERAL VELOCITY: 5.31 CM/S
ECHO LV E' SEPTAL VELOCITY: 8.13 CM/S
ECHO LV INTERNAL DIMENSION DIASTOLIC MMODE: 5.44 CM
ECHO LV INTERNAL DIMENSION DIASTOLIC: 5.04 CM (ref 4.2–5.9)
ECHO LV INTERNAL DIMENSION SYSTOLIC MMODE: 4.28 CM
ECHO LV INTERNAL DIMENSION SYSTOLIC: 4.02 CM
ECHO LV IVSD MMODE: 1.3 CM
ECHO LV IVSD: 1.83 CM (ref 0.6–1)
ECHO LV IVSS MMODE: 1.78 CM
ECHO LV IVSS: 1.92 CM
ECHO LV MASS 2D: 428.8 G (ref 88–224)
ECHO LV MASS INDEX 2D: 190.7 G/M2 (ref 49–115)
ECHO LV POSTERIOR WALL DIASTOLIC MMODE: 1.78 CM
ECHO LV POSTERIOR WALL DIASTOLIC: 1.76 CM (ref 0.6–1)
ECHO LV POSTERIOR WALL SYSTOLIC: 2.04 CM
ECHO LVOT DIAM: 2.11 CM
ECHO LVOT PEAK GRADIENT: 2.12 MMHG
ECHO LVOT PEAK VELOCITY: 72.79 CM/S
ECHO MV AREA PHT: 5.01 CM2
ECHO MV E DECELERATION TIME (DT): 105.91 MS
ECHO MV E VELOCITY: 81.03 CM/S
ECHO MV E/E' LATERAL: 15.26
ECHO MV E/E' RATIO (AVERAGED): 12.61
ECHO MV E/E' SEPTAL: 9.97
ECHO MV PRESSURE HALF TIME (PHT): 43.89 MS
ECHO PV MAX VELOCITY: 55.9 CM/S
ECHO PV PEAK INSTANTANEOUS GRADIENT SYSTOLIC: 1.25 MMHG
ECHO RV INTERNAL DIMENSION: 2.61 CM
EST. AVERAGE GLUCOSE BLD GHB EST-MCNC: 255 MG/DL
GLUCOSE BLD STRIP.AUTO-MCNC: 199 MG/DL (ref 65–100)
GLUCOSE BLD STRIP.AUTO-MCNC: 245 MG/DL (ref 65–100)
GLUCOSE BLD STRIP.AUTO-MCNC: 257 MG/DL (ref 65–100)
GLUCOSE BLD STRIP.AUTO-MCNC: 292 MG/DL (ref 65–100)
GLUCOSE BLD STRIP.AUTO-MCNC: 307 MG/DL (ref 65–100)
HBA1C MFR BLD: 10.5 % (ref 4–5.6)
HDLC SERPL-MCNC: 23 MG/DL
HDLC SERPL: 8.5 {RATIO} (ref 0–5)
LDLC SERPL CALC-MCNC: 121.6 MG/DL (ref 0–100)
LIPID PROFILE,FLP: ABNORMAL
SARS-COV-2, NAA: NOT DETECTED
SERVICE CMNT-IMP: ABNORMAL
TRIGL SERPL-MCNC: 257 MG/DL (ref ?–150)
TSH SERPL DL<=0.05 MIU/L-ACNC: 1.32 UIU/ML (ref 0.36–3.74)
VLDLC SERPL CALC-MCNC: 51.4 MG/DL

## 2020-12-23 PROCEDURE — 74011636637 HC RX REV CODE- 636/637: Performed by: GENERAL ACUTE CARE HOSPITAL

## 2020-12-23 PROCEDURE — 99233 SBSQ HOSP IP/OBS HIGH 50: CPT | Performed by: PSYCHIATRY & NEUROLOGY

## 2020-12-23 PROCEDURE — 74011250636 HC RX REV CODE- 250/636: Performed by: GENERAL ACUTE CARE HOSPITAL

## 2020-12-23 PROCEDURE — 74011250637 HC RX REV CODE- 250/637: Performed by: INTERNAL MEDICINE

## 2020-12-23 PROCEDURE — 97116 GAIT TRAINING THERAPY: CPT

## 2020-12-23 PROCEDURE — 97161 PT EVAL LOW COMPLEX 20 MIN: CPT

## 2020-12-23 PROCEDURE — 80061 LIPID PANEL: CPT

## 2020-12-23 PROCEDURE — 82962 GLUCOSE BLOOD TEST: CPT

## 2020-12-23 PROCEDURE — 74011250637 HC RX REV CODE- 250/637: Performed by: NURSE PRACTITIONER

## 2020-12-23 PROCEDURE — 99218 HC RM OBSERVATION: CPT

## 2020-12-23 PROCEDURE — 36415 COLL VENOUS BLD VENIPUNCTURE: CPT

## 2020-12-23 PROCEDURE — 74011636637 HC RX REV CODE- 636/637: Performed by: NURSE PRACTITIONER

## 2020-12-23 PROCEDURE — 97112 NEUROMUSCULAR REEDUCATION: CPT

## 2020-12-23 PROCEDURE — 65270000029 HC RM PRIVATE

## 2020-12-23 PROCEDURE — C8929 TTE W OR WO FOL WCON,DOPPLER: HCPCS

## 2020-12-23 PROCEDURE — 84443 ASSAY THYROID STIM HORMONE: CPT

## 2020-12-23 PROCEDURE — 96372 THER/PROPH/DIAG INJ SC/IM: CPT

## 2020-12-23 PROCEDURE — 97165 OT EVAL LOW COMPLEX 30 MIN: CPT

## 2020-12-23 PROCEDURE — 97530 THERAPEUTIC ACTIVITIES: CPT

## 2020-12-23 PROCEDURE — 93306 TTE W/DOPPLER COMPLETE: CPT | Performed by: INTERNAL MEDICINE

## 2020-12-23 PROCEDURE — 99356 PR PROLONGED SVC I/P OR OBS SETTING 1ST HOUR: CPT | Performed by: PSYCHIATRY & NEUROLOGY

## 2020-12-23 PROCEDURE — 74011250637 HC RX REV CODE- 250/637: Performed by: GENERAL ACUTE CARE HOSPITAL

## 2020-12-23 PROCEDURE — 83036 HEMOGLOBIN GLYCOSYLATED A1C: CPT

## 2020-12-23 RX ORDER — ATORVASTATIN CALCIUM 20 MG/1
20 TABLET, FILM COATED ORAL DAILY
Status: DISCONTINUED | OUTPATIENT
Start: 2020-12-24 | End: 2020-12-24 | Stop reason: HOSPADM

## 2020-12-23 RX ORDER — GUAIFENESIN 100 MG/5ML
81 LIQUID (ML) ORAL
Status: COMPLETED | OUTPATIENT
Start: 2020-12-23 | End: 2020-12-23

## 2020-12-23 RX ORDER — AMLODIPINE BESYLATE 5 MG/1
5 TABLET ORAL DAILY
Status: COMPLETED | OUTPATIENT
Start: 2020-12-23 | End: 2020-12-23

## 2020-12-23 RX ORDER — AMLODIPINE BESYLATE 5 MG/1
10 TABLET ORAL DAILY
Status: DISCONTINUED | OUTPATIENT
Start: 2020-12-24 | End: 2020-12-24 | Stop reason: HOSPADM

## 2020-12-23 RX ORDER — INSULIN LISPRO 100 [IU]/ML
5 INJECTION, SOLUTION INTRAVENOUS; SUBCUTANEOUS
Status: DISCONTINUED | OUTPATIENT
Start: 2020-12-23 | End: 2020-12-24 | Stop reason: HOSPADM

## 2020-12-23 RX ORDER — GUAIFENESIN 100 MG/5ML
81 LIQUID (ML) ORAL DAILY
Status: DISCONTINUED | OUTPATIENT
Start: 2020-12-24 | End: 2020-12-24 | Stop reason: HOSPADM

## 2020-12-23 RX ADMIN — Medication 10 ML: at 21:23

## 2020-12-23 RX ADMIN — INSULIN LISPRO 7 UNITS: 100 INJECTION, SOLUTION INTRAVENOUS; SUBCUTANEOUS at 17:41

## 2020-12-23 RX ADMIN — INSULIN GLARGINE 35 UNITS: 100 INJECTION, SOLUTION SUBCUTANEOUS at 21:22

## 2020-12-23 RX ADMIN — ENOXAPARIN SODIUM 40 MG: 40 INJECTION SUBCUTANEOUS at 08:34

## 2020-12-23 RX ADMIN — AMLODIPINE BESYLATE 5 MG: 5 TABLET ORAL at 09:47

## 2020-12-23 RX ADMIN — INSULIN LISPRO 5 UNITS: 100 INJECTION, SOLUTION INTRAVENOUS; SUBCUTANEOUS at 12:16

## 2020-12-23 RX ADMIN — INSULIN LISPRO 3 UNITS: 100 INJECTION, SOLUTION INTRAVENOUS; SUBCUTANEOUS at 08:33

## 2020-12-23 RX ADMIN — INSULIN LISPRO 5 UNITS: 100 INJECTION, SOLUTION INTRAVENOUS; SUBCUTANEOUS at 17:40

## 2020-12-23 RX ADMIN — Medication 100 MG: at 08:31

## 2020-12-23 RX ADMIN — Medication 10 ML: at 06:00

## 2020-12-23 RX ADMIN — ATORVASTATIN CALCIUM 10 MG: 10 TABLET, FILM COATED ORAL at 00:00

## 2020-12-23 RX ADMIN — FOLIC ACID 1 MG: 1 TABLET ORAL at 08:33

## 2020-12-23 RX ADMIN — ASPIRIN 81 MG: 81 TABLET, CHEWABLE ORAL at 14:18

## 2020-12-23 RX ADMIN — Medication 10 ML: at 17:42

## 2020-12-23 NOTE — PROGRESS NOTES
Hospitalist Progress Note    NAME: Pito Akhtar   :  1965   MRN:  [id number]     I reviewed pertinent labs and imaging, and discussed /agreed on the plan of care with Dr. Lawyer Meneses / Plan:  Ataxia and dizziness POA  Appreciate Neurology input  MRI Brain 2020: IMPRESSION:  1. Small acute left medial cerebellar peduncle infarct. 2. Possible acute/subacute minimal ischemic injury left parasagittal frontal  lobe versus T2 shine through. 3. Multiple chronic foci T2 hyperintensity in nonspecific pattern possibly all  related to chronic small vessel disease  CTA head : 1. 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 No significant stenosis of the dominant right vertebral artery. 2. Remaining atherosclerotic disease as above.  CTA Neck:  1. No evidence of flow-limiting stenosis. Right dominant vertebrobasilar system  with markedly diminutive left vertebral artery. 2. Mild stenosis (less than 50% by NASCET criteria) of the proximal right  internal carotid artery. 3. Mild stenosis of the mid to distal left common carotid artery. TSH 1.32  Triglycerided 25, Total cholesterol 196, .6  PT/OT  Type 2 Diabetes (Uncontrolled) POA     Lantus 35 Units at bedtime     Lispro 5 units before meals     Sliding scale coverage      Diabetic diet      Monitor  Hypertension    Norvasc 10 mg daily  Hyperlipidemia    Statin therapy daily  Nausea/dry heaves POA ( Resolved)     Patient states he felt better after IV hydration in the ER     Anti-emetics as needed      25.0 - 29.9 Overweight / Body mass index is 25.2 kg/m². Code status: Full  Prophylaxis: Lovenox  Recommended Disposition: Home w/Family     Subjective:     Chief Complaint / Reason for Physician Visit  Patient states he is feeling better and is hungry. Wife at the bedside. Discussed with RN events overnight.      Review of Systems:  Symptom Y/N Comments  Symptom Y/N Comments   Fever/Chills n Chest Pain n    Poor Appetite n   Edema n    Cough n   Abdominal Pain     Sputum n   Joint Pain     SOB/FABIAN n   Pruritis/Rash     Nausea/vomit n   Tolerating PT/OT y    Diarrhea n   Tolerating Diet y    Constipation n   Other       Could NOT obtain due to:      Objective:     VITALS:   Last 24hrs VS reviewed since prior progress note. Most recent are:  Patient Vitals for the past 24 hrs:   Temp Pulse Resp BP SpO2   12/23/20 1648 98.2 °F (36.8 °C) 97 18 (!) 131/92 96 %   12/23/20 1116 98.5 °F (36.9 °C) 92 18 138/72 96 %   12/23/20 0940  100  (!) 177/98    12/23/20 0936  (!) 109  (!) 170/110    12/23/20 0934  (!) 103  (!) 160/101    12/23/20 0931  90  (!) 162/105    12/23/20 0809 98.2 °F (36.8 °C) 77 18 (!) 155/95 98 %   12/23/20 0415 97.5 °F (36.4 °C) 86 18 (!) 137/95 98 %   12/22/20 2321 98.1 °F (36.7 °C) 84 18 (!) 160/91 97 %   12/22/20 2015 98 °F (36.7 °C) 98 18 (!) 150/110 97 %   12/22/20 1704 98.1 °F (36.7 °C) 97 16 (!) 147/94 98 %     No intake or output data in the 24 hours ending 12/23/20 1655     I had a face to face encounter and independently examined this patient on 12/23/2020, as outlined below:  PHYSICAL EXAM:  General: Alert, cooperative, no acute distress    EENT:   Anicteric sclerae. normocephalic  Resp:  CTA bilaterally, no wheezing or rales. No accessory muscle use  CV:  Regular  rhythm,  No edema  GI:  Soft, Non distended, Non tender. +Bowel sounds  Neurologic:  Alert and oriented X 3, normal speech,   Psych:   Good insight. Not anxious nor agitated  Skin:  No rashes.   No jaundice    Reviewed most current lab test results and cultures  YES  Reviewed most current radiology test results   YES  Review and summation of old records today    NO  Reviewed patient's current orders and MAR    YES  PMH/SH reviewed - no change compared to H&P  ________________________________________________________________________  Care Plan discussed with:    Comments   Patient y    Family  y wife   RN y Care Manager     Consultant                        Multidiciplinary team rounds were held today with , nursing, pharmacist and clinical coordinator. Patient's plan of care was discussed; medications were reviewed and discharge planning was addressed. ________________________________________________________________________    ________________________________________________________________________  Tiffanie Tapia NP     Procedures: see electronic medical records for all procedures/Xrays and details which were not copied into this note but were reviewed prior to creation of Plan. LABS:  I reviewed today's most current labs and imaging studies.   Pertinent labs include:  Recent Labs     12/22/20  1152   WBC 14.8*   HGB 18.5*   HCT 53.5*        Recent Labs     12/22/20  1152   *   K 4.1   CL 97   CO2 23   *   BUN 28*   CREA 1.13   CA 9.2   ALB 3.9   TBILI 0.9   ALT 26       Signed: Tiffanie Tapia NP

## 2020-12-23 NOTE — PROGRESS NOTES
Chief Complaint: ataxia    Dizziness improved with hydration. Ataxia has improved as well. Discussed MRI results, lab results Discussed the risk factors of stroke and how they relate to him. Discussed ways to address these risks in detail including optimal a1c, ldl and blood pressure parameters in addition to antiplatelet agents. Assesment and Plan  1. Ataxia  High risk for stroke on account of DM, HTN, HCOL    MRI: reviewed  CTA: Mild calcific atherosclerosis of the aortic arch. Mild stenosis of the mid to distal left common carotid artery from eccentric noncalcified plaque. No significant stenosis of the right common carotid artery. Calcific atherosclerosis of the right carotid bifurcation with mild (less than 50%) stenosis of the proximal right internal carotid artery  A1C : 10.5  TSH: 1.32  LDL: 121.6  Echocardiogram: EF 45%   May discharge to home when medically stable follow up with neurology 2-3 weeks. 2. Dizziness  hydration     3. Dehydration  Hydration     5.  DMII  Continue insulin    D/w wife         Allergies  Lisinopril     Medications  Current Facility-Administered Medications   Medication Dose Route Frequency    [START ON 12/24/2020] amLODIPine (NORVASC) tablet 10 mg  10 mg Oral DAILY    [START ON 12/24/2020] atorvastatin (LIPITOR) tablet 20 mg  20 mg Oral DAILY    [START ON 12/24/2020] aspirin chewable tablet 81 mg  81 mg Oral DAILY    insulin lispro (HUMALOG) injection 5 Units  5 Units SubCUTAneous TIDAC    insulin glargine (LANTUS) injection 35 Units  35 Units SubCUTAneous QHS    sodium chloride (NS) flush 5-40 mL  5-40 mL IntraVENous Q8H    sodium chloride (NS) flush 5-40 mL  5-40 mL IntraVENous PRN    acetaminophen (TYLENOL) tablet 650 mg  650 mg Oral Q6H PRN    Or    acetaminophen (TYLENOL) suppository 650 mg  650 mg Rectal Q6H PRN    polyethylene glycol (MIRALAX) packet 17 g  17 g Oral DAILY PRN    promethazine (PHENERGAN) tablet 12.5 mg  12.5 mg Oral Q6H PRN    Or    ondansetron (ZOFRAN) injection 4 mg  4 mg IntraVENous Q6H PRN    enoxaparin (LOVENOX) injection 40 mg  40 mg SubCUTAneous DAILY    insulin lispro (HUMALOG) injection   SubCUTAneous AC&HS    glucose chewable tablet 16 g  4 Tab Oral PRN    dextrose (D50W) injection syrg 12.5-25 g  12.5-25 g IntraVENous PRN    glucagon (GLUCAGEN) injection 1 mg  1 mg IntraMUSCular PRN    folic acid (FOLVITE) tablet 1 mg  1 mg Oral DAILY    thiamine mononitrate (B-1) tablet 100 mg  100 mg Oral DAILY    labetaloL (NORMODYNE;TRANDATE) injection 10 mg  10 mg IntraVENous Q4H PRN        Medical History  Past Medical History:   Diagnosis Date    Diabetes ([facility] [address]     Nausea & vomiting     Pancreatitis     Unspecified sleep apnea      Review of Systems   Eyes: Negative for blurred vision and double vision. Respiratory: Negative for cough and shortness of breath. Cardiovascular: Negative for chest pain, palpitations and orthopnea. Gastrointestinal: Negative for nausea and vomiting. Neurological: Negative for dizziness and headaches. Psychiatric/Behavioral: Negative for depression. The patient is not nervous/anxious. Exam:    Visit Vitals  BP (!) 131/92   Pulse 97   Temp 98.2 °F (36.8 °C)   Resp 18   Ht 6' 4\" (1.93 m)   Wt 207 lb 0.2 oz (93.9 kg)   SpO2 96%   BMI 25.20 kg/m²      General: Well developed, well nourished. Patient in no distress   Head: Normocephalic, atraumatic, anicteric sclera   Neck Normal ROM, No thyromegally   Lungs:  Clear to auscultation bilaterally   Cardiac: Regular rate and rhythm with no murmurs. Abd: Bowel sounds were audible. Ext: No pedal edema   Skin: Supple no rash      NeurologicExam:  Mental Status: Alert and oriented to person place and time   Speech: Fluent no aphasia or dysarthria. Cranial Nerves:  II - XII Intact   Motor:  Full and symmetric strength of upper and lower extremities  Normal bulk and tone.     Reflexes:   +1/4 over the proximal tendons of the upper and lower extremities. +0/4 over distal tendons. Sensory:   Symmetric distal reduction in sensory perception affecting all modalities    Gait:  deferred   Tremor:   No tremor noted. Cerebellar:  Past pointing on the left improved   Neurovascular: No carotid bruits.  No JVD       Lab Review  Lab Results   Component Value Date/Time    WBC 14.8 (H) 12/22/2020 11:52 AM    HCT 53.5 (H) 12/22/2020 11:52 AM    HGB 18.5 (H) 12/22/2020 11:52 AM    PLATELET 328 43/25/3824 11:52 AM       Lab Results   Component Value Date/Time    Sodium 131 (L) 12/22/2020 11:52 AM    Potassium 4.1 12/22/2020 11:52 AM    Chloride 97 12/22/2020 11:52 AM    CO2 23 12/22/2020 11:52 AM    Glucose 359 (H) 12/22/2020 11:52 AM    BUN 28 (H) 12/22/2020 11:52 AM    Creatinine 1.13 12/22/2020 11:52 AM    Calcium 9.2 12/22/2020 11:52 AM       Lab Results   Component Value Date/Time    Hemoglobin A1c 10.5 (H) 12/23/2020 02:38 AM    Hemoglobin A1c (POC) 8.6 (A) 05/30/2019 04:13 PM        Lab Results   Component Value Date/Time    Cholesterol, total 196 12/23/2020 02:38 AM    HDL Cholesterol 23 12/23/2020 02:38 AM    LDL, calculated 121.6 (H) 12/23/2020 02:38 AM    VLDL, calculated 51.4 12/23/2020 02:38 AM    Triglyceride 257 (H) 12/23/2020 02:38 AM    CHOL/HDL Ratio 8.5 (H) 12/23/2020 02:38 AM     90  minutes spent more than 50% spent in chart review and face to face  examination, discussion of treatment options and approach

## 2020-12-23 NOTE — PROGRESS NOTES
Problem: Self Care Deficits Care Plan (Adult)  Goal: *Acute Goals and Plan of Care (Insert Text)  Description: FUNCTIONAL STATUS PRIOR TO ADMISSION: Patient was independent and active without use of DME.    HOME SUPPORT: The patient lived with significant other but did not require assist.    Occupational Therapy Goals  Initiated 12/23/2020   1. Patient will perform standing grooming with supervision/set-up and no LOB within 7 day(s). 2.  Patient will perform all aspects of lower body dressing with supervision/set-up and no LOB within 7 day(s). 3.  Patient will perform toilet transfers with independence within 7 day(s). 4.  Patient will perform all aspects of toileting with independence within 7 day(s). 5.  Patient will demonstrate intact fine motor coordination during functional tasks with supervision/set-up within 7 day(s). 6.  Patient will utilize fall prevention techniques during functional activities without cues within 7 day(s). Outcome: Progressing Towards Goal   OCCUPATIONAL THERAPY EVALUATION  Patient: Enrico Bell (81 y.o. male)  Date: 12/23/2020  Primary Diagnosis: Ataxia [R27.0]       Precautions:        ASSESSMENT  Based on the objective data described below, the patient presents with impaired functional mobility and balance, increased fall risk, hypertension, ataxia, decreased activity tolerance, and decreased coordination and proprioception of LUE. 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 This date, received in bed and agreeable to session, significant other in room. Activity and mobility this session limited by hypertension (RN notified and aware) - during brief ambulation, patient with lateral LOB requiring minimal assistance x1-2 persons to correct. Also note dysmetria with brief LUE coordination testing.  Deferred Becky Robin until later today 2/2 patient breakfast tray arriving and patient reporting being extremely hungry, eager to eat. Returned for PM session in order to complete Fly Mass with patient agreeable. Per updated MRI results, patient with small acute left medial cerebellar peduncle infarct and possible acute/subacute minimal ischemic injury left parasagittal frontal lobe. During testing, patient with increased time and effort required for fine and gross motor coordination of LUE (fine>gross). Provided education on neuro re-ed principles and neuroplasticity, encouraging LUE coordination activities and exercises with emphasis on increased repetition and visual attention to facilitate functional return. Given patient occupation and valued roles, anticipate patient would benefit from OP OT to address higher level neuro re-education of LUE fine motor skills. Current Level of Function Impacting Discharge (ADLs/self-care): up to min A x2 for mobility; increased time/effort for fine motor tasks (bilateral or unilateral with L)    Functional Outcome Measure: The patient scored Total A-D  Total A-D (Motor Function): 63/66 on the Fugl-Mosquera Assessment which is indicative of mild impairment in upper extremity functional status. Other factors to consider for discharge: works in [employer]     Patient will benefit from skilled therapy intervention to address the above noted impairments. PLAN :  Recommendations and Planned Interventions: self care training, functional mobility training, therapeutic exercise, balance training, therapeutic activities, endurance activities, neuromuscular re-education, patient education, home safety training and family training/education    Frequency/Duration: Patient will be followed by occupational therapy 4 times a week to address goals.     Recommendation for discharge: (in order for the patient to meet his/her long term goals)  Outpatient occupational therapy follow up recommended for LUE dysmetria and proprioceptive deficts    This discharge recommendation:  Has not yet been discussed the attending provider and/or case management    IF patient discharges home will need the following DME: none       SUBJECTIVE:   Patient stated Man, having your eyes closed really makes a big difference\" during coordination testing. OBJECTIVE DATA SUMMARY:   HISTORY:   Past Medical History:   Diagnosis Date    Diabetes ([address]     Nausea & vomiting     Pancreatitis     Unspecified sleep apnea      Past Surgical History:   Procedure Laterality Date    HX GI      upper endoscopies    HX HEENT      oral surgery    ME EGD FLEXIBLE FOREIGN BODY REMOVAL  1/30/2019         UPPER GI ENDOSCOPY,BALL DIL,30MM  1/30/2019         UPPER GI ENDOSCOPY,BIOPSY  1/30/2019          Expanded or extensive additional review of patient history:     Home Situation  Home Environment: Private residence  # Steps to Enter: 2  Rails to Enter: Yes  One/Two Story Residence: One story  Living Alone: No  Support Systems: Spouse/Significant Other/Partner  Patient Expects to be Discharged to[de-identified] Private residence  Current DME Used/Available at Home: Crutches  Tub or Shower Type: Shower    Hand dominance: Right    EXAMINATION OF PERFORMANCE DEFICITS:  Cognitive/Behavioral Status:  Neurologic State: Alert  Orientation Level: Oriented X4  Cognition: Follows commands; Appropriate for age attention/concentration  Perception: Appears intact  Perseveration: No perseveration noted  Safety/Judgement: Decreased insight into deficits    Hearing: Auditory  Auditory Impairment: None    Vision/Perceptual:    Tracking: Able to track stimulus in all quadrants w/o difficulty    Diplopia: No  Acuity: WDL    Range of Motion:  AROM: Within functional limits    Strength:  Strength: Within functional limits    Coordination:  Coordination: Generally decreased, functional  Fine Motor Skills-Upper: Right Intact; Left Intact(increased time and effort for L )    Gross Motor Skills-Upper: Left Intact; Right Intact    Tone & Sensation:  Sensation: Intact    Balance:  Sitting: Intact  Standing: Impaired  Standing - Static: Good  Standing - Dynamic : Fair    Functional Mobility and Transfers for ADLs:  Bed Mobility:  Scooting: Stand-by assistance    Transfers:  Sit to Stand: Contact guard assistance;Assist x2  Stand to Sit: Minimum assistance;Assist x2(for controlled descent)  Bed to Chair: Minimum assistance;Contact guard assistance;Assist x2  Toilet Transfer : Minimum assistance;Contact guard assistance;Assist x2(infer 2/2 mobility, balance, coordination, insight)    ADL Assessment:  Feeding: Setup    Oral Facial Hygiene/Grooming: Setup    Upper Body Dressing: Independent    Lower Body Dressing: Contact guard assistance    Toileting: Contact guard assistance    ADL Intervention and task modifications:  Cognitive Retraining  Safety/Judgement: Decreased insight into deficits    Therapeutic Exercise:  Educated on neuro re-education principles as well as LUE coordination activities/exercises to facilitate return to baseline levels.      Functional Measure:  Fugl-Mosquera Assessment of Motor Recovery after Stroke:   Reflex Activity  Flexors/Biceps/Fingers: Can be elicited  Extensors/Triceps: Can be elicited  Reflex Subtotal: 4    Volitional Movement Within Synergies  Shoulder Retraction: Full  Shoulder Elevation: Full  Shoulder Abduction (90 degrees): Full  Shoulder External Rotation: Full  Elbow Flexion: Full  Forearm Supination: Full  Shoulder Adduction/Internal Rotation: Full  Elbow Extension: Full  Forearm Pronation: Full  Subtotal: 18    Volitional Movement Mixing Synergies  Hand to Lumbar Spine: Full  Shoulder Flexion (0-90 degrees): Full  Pronation-Supination: Full  Subtotal: 6    Volitional Movement With Little or No Synergy  Shoulder Abduction (0-90 degrees): Full  Shoulder Flexion ( degrees): Full  Pronation/Supination: Full  Subtotal : 6    Normal Reflex Activity  Biceps, Triceps, Finger Flexors: Full  Subtotal : 2    Upper Extremity Total   Upper Extremity Total: 36    Wrist  Stability at 15 Degree Dorsiflexion: Full  Repeated Dorsiflexion/ Volar Flexion: Full  Stability at 15 Degree Dorsiflexion: Full  Repeated Dorsiflexion/ Volar Flexion: Full  Circumduction: Full  Wrist Total: 10    Hand  Mass Flexion: Full  Mass Extension: Full  Grasp A: Full  Grasp B: Full  Grasp C: Full  Grasp D: Full  Grasp E: Full  Hand Total: 14    Coordination/Speed  Tremor: Slight  Dysmetria: Slight  Time: 2-5s  Coordination/Speed Total : 3    Total A-D  Total A-D (Motor Function): 63/66     This is a reliable/valid measure of arm function after a neurological event. It has established value to characterize functional status and for measuring spontaneous and therapy-induced recovery; tests proximal and distal motor functions. Fugl-Mosquera Assessment  UE scores recorded between five and 30 days post neurologic event can be used to predict UE recovery at six months post neurologic event. Severe = 0-21 points   Moderately Severe = 22-33 points   Moderate = 34-47 points   Mild = 48-66 points  VINCENT Woodward, RASHARD Isidro, & FABRICIO Landin (1992). Measurement of motor recovery after stroke: Outcome assessment and sample size requirements.  Stroke, 23, pp. 9029-9423.   ------------------------------------------------------------------------------------------------------------------------------------------------------------------  MCID:  Stroke:   Radha Chavarria, 2001; n = 171; mean age 79 (5) years; assessed within 16 (12) days of stroke, Acute Stroke)  FMA Motor Scores from Admission to Discharge    10 point increase in FMA Upper Extremity = 1.5 change in discharge FIM    10 point increase in FMA Lower Extremity = 1.9 change in discharge FIM  MDC:   Stroke:   Veto Cummings et al, 2008, n = 14, mean age = 59.9 (14.6) years, assessed on average 14 (6.5) months post stroke, Chronic Stroke)    FMA = 5.2 points for the Upper Extremity portion of the assessment     Occupational Therapy Evaluation Charge Determination   History Examination Decision-Making   LOW Complexity : Brief history review  LOW Complexity : 1-3 performance deficits relating to physical, cognitive , or psychosocial skils that result in activity limitations and / or participation restrictions  LOW Complexity : No comorbidities that affect functional and no verbal or physical assistance needed to complete eval tasks       Based on the above components, the patient evaluation is determined to be of the following complexity level: LOW   Pain Rating:  No reports    Activity Tolerance:   Fair, requires rest breaks and signs and symptoms of orthostatic hypotension    After treatment patient left in no apparent distress:    Supine in bed, Call bell within reach, Caregiver / family present and Side rails x 3    COMMUNICATION/EDUCATION:   The patients plan of care was discussed with: Physical therapist and Registered nurse. Patient was educated regarding his deficit(s) of dysmetria, ataxia, and decreased activity tolerance as this relates to his diagnosis of \"small acute left medial cerebellar peduncle infarct\" and \"possible acute/subacute minimal ischemic injury left parasagittal frontal  lobe versus T2 shine through. \" He demonstrated Good understanding as evidenced by verbalization. Patient and/or family was verbally educated on the BE FAST acronym for signs/symptoms of CVA and TIA. Home safety education was provided and the patient/caregiver indicated understanding., Patient/family have participated as able in goal setting and plan of care. and Patient/family agree to work toward stated goals and plan of care.     Thank you for this referral.  Dianna Dc OT  Time Calculation: 19 mins (AM) + 14 mins (PM)

## 2020-12-23 NOTE — PROGRESS NOTES
Problem: Falls - Risk of  Goal: *Absence of Falls  Description: Document Omayra Sterling Fall Risk and appropriate interventions in the flowsheet. Outcome: Progressing Towards Goal  Note: Fall Risk Interventions:  Mobility Interventions: Patient to call before getting OOB         Medication Interventions: Patient to call before getting OOB, Teach patient to arise slowly         History of Falls Interventions: Door open when patient unattended, Room close to nurse's station         Problem: Diabetes Self-Management  Goal: *Disease process and treatment process  Description: Define diabetes and identify own type of diabetes; list 3 options for treating diabetes. Outcome: Progressing Towards Goal  Goal: *Prevention, detection, treatment of acute complications  Description: List symptoms of hyper- and hypoglycemia; describe how to treat low blood sugar and actions for lowering  high blood glucose level.   Outcome: Progressing Towards Goal

## 2020-12-23 NOTE — PROGRESS NOTES
Received notification from bedside RN about patient with regards to: elevated BP, home meds for hypertension scheduled to start in AM  VS: /100, HR 98, RR 18, O2 sat 97% on RA    Intervention given: AM Norvasc schedule changed to start now

## 2020-12-23 NOTE — PROGRESS NOTES
Problem: Falls - Risk of  Goal: *Absence of Falls  Description: Document Artie Curry Fall Risk and appropriate interventions in the flowsheet.   Outcome: Progressing Towards Goal  Note: Fall Risk Interventions:  Mobility Interventions: Bed/chair exit alarm         Medication Interventions: Bed/chair exit alarm         History of Falls Interventions: Bed/chair exit alarm         Problem: Patient Education: Go to Patient Education Activity  Goal: Patient/Family Education  Outcome: Progressing Towards Goal

## 2020-12-23 NOTE — PROGRESS NOTES
Occupational Therapy    Orders received, chart reviewed and patient evaluated by occupational therapy. Pending progression with skilled acute occupational therapy, recommend: To be determined: none vs HH pending progress     Recommend with nursing patient to complete as able in order to maintain strength, endurance and independence: OOB to chair 3x/day with min A and functional mobility to the bathroom with min A. Thank you for your assistance. Full evaluation to follow.      Cristina Boudreaux OTR/L

## 2020-12-23 NOTE — PROGRESS NOTES
Problem: Mobility Impaired (Adult and Pediatric)  Goal: *Acute Goals and Plan of Care (Insert Text)  Description: FUNCTIONAL STATUS PRIOR TO ADMISSION: Patient was independent and active without use of DME. Reports history of 1 fall since onset of symptoms last Thursday. Works full-time in IT. Still driving. HOME SUPPORT PRIOR TO ADMISSION: The patient lived with significant other who is able to assist at discharge. Physical Therapy Goals  Initiated 12/23/2020  1. Patient will move from supine to sit and sit to supine , scoot up and down, and roll side to side in bed with independence within 7 day(s). 2.  Patient will transfer from bed to chair and chair to bed with independence using the least restrictive device within 7 day(s). 3.  Patient will perform sit to stand with independence within 7 day(s). 4.  Patient will ambulate with independence for 300 feet with the least restrictive device within 7 day(s). 5.  Patient will ascend/descend 2 stairs with 1 handrail(s) with independence within 7 day(s). Outcome: Progressing Towards Goal   PHYSICAL THERAPY EVALUATION  Patient: Chantelle Messina (38 y.o. male)  Date: 12/23/2020  Primary Diagnosis: Ataxia [R27.0]        Precautions: MRI pending       ASSESSMENT  Based on the objective data described below, the patient presents with hypertension, dizziness during mobility, decreased LUE and LLE coordination, impaired balance, decreased activity tolerance, and overall impaired functional mobility from baseline independent level. BP elevated to 170s/110s therefore participation with therapy limited. Pt ambulated 20ft w/ minAx2 exhibiting unsteady gait secondary to increased trunk sway with L lateral lean. Pt reaching out for support of furniture/walls for stability throughout. MMT performed of BLEs with 5/5 strength and no deficits in sensation noted.  Anticipate that once BP is better controlled, pt will continue to progress well with therapy and be appropriate to return home w/ assist of significant other and HHPT at discharge. Current Level of Function Impacting Discharge (mobility/balance): minAx2 during ambulation without device    Functional Outcome Measure: The patient scored 60/100 on the Barthel Index outcome measure which is indicative of moderate impairment in ADLs and functional mobility. Other factors to consider for discharge: dizziness, hypertension     Patient will benefit from skilled therapy intervention to address the above noted impairments. PLAN :  Recommendations and Planned Interventions: bed mobility training, transfer training, gait training, therapeutic exercises, patient and family training/education, and therapeutic activities      Frequency/Duration: Patient will be followed by physical therapy:  4 times a week to address goals. Recommendation for discharge: (in order for the patient to meet his/her long term goals)  Physical therapy at least 2 days/week in the home w/ assist of significant other    This discharge recommendation:  Has been made in collaboration with the attending provider and/or case management    IF patient discharges home will need the following DME: none         SUBJECTIVE:   Patient stated I just need to eat some food, I haven't eaten in days.     OBJECTIVE DATA SUMMARY:   HISTORY:    Past Medical History:   Diagnosis Date    Diabetes ([address]     Nausea & vomiting     Pancreatitis     Unspecified sleep apnea      Past Surgical History:   Procedure Laterality Date    HX GI      upper endoscopies    HX HEENT      oral surgery    [place] EGD FLEXIBLE FOREIGN BODY REMOVAL  1/30/2019         UPPER GI ENDOSCOPY,BALL DIL,30MM  1/30/2019         UPPER GI ENDOSCOPY,BIOPSY  1/30/2019            Personal factors and/or comorbidities impacting plan of care:     Home Situation  Home Environment: Private residence  # Steps to Enter: 2  Rails to Enter: Yes  One/Two Story Residence: One story  Living Alone: No  Support Systems: Spouse/Significant Other/Partner  Patient Expects to be Discharged to[de-identified] Private residence  Current DME Used/Available at Home: Crutches  Tub or Shower Type: Shower    EXAMINATION/PRESENTATION/DECISION MAKING:   Critical Behavior:  Neurologic State: Alert  Orientation Level: Oriented X4  Cognition: Follows commands, Appropriate for age attention/concentration  Safety/Judgement: Decreased insight into deficits  Hearing: Auditory  Auditory Impairment: None  Skin:  intact  Edema: none noted   Range Of Motion:  AROM: Within functional limits                       Strength:    Strength: Within functional limits                    Tone & Sensation:                  Sensation: Intact               Coordination:  Coordination: Generally decreased, functional  Vision:   Tracking: Able to track stimulus in all quadrants w/o difficulty  Functional Mobility:  Bed Mobility:           Scooting: Stand-by assistance  Transfers:  Sit to Stand: Contact guard assistance;Assist x2  Stand to Sit: Minimum assistance;Assist x2(for controlled descent)        Bed to Chair: Minimum assistance;Contact guard assistance;Assist x2              Balance:   Sitting: Intact  Standing: Impaired  Standing - Static: Good  Standing - Dynamic : Fair  Ambulation/Gait Training:  Distance (ft): 20 Feet (ft)  Assistive Device: Gait belt  Ambulation - Level of Assistance: Minimal assistance;Assist x2        Gait Abnormalities: Decreased step clearance;Shuffling gait;Trunk sway increased(L lateral lean)        Base of Support: Widened     Speed/Yvonne: Pace decreased (<100 feet/min); Shuffled                     Functional Measure:  Barthel Index:    Bathin  Bladder: 10  Bowels: 10  Groomin  Dressin  Feeding: 10  Mobility: 5  Stairs: 0  Toilet Use: 5  Transfer (Bed to Chair and Back): 10  Total: 60/100       The Barthel ADL Index: Guidelines  1.  The index should be used as a record of what a patient does, not as a record of what a patient could do. 2. The main aim is to establish degree of independence from any help, physical or verbal, however minor and for whatever reason. 3. The need for supervision renders the patient not independent. 4. A patient's performance should be established using the best available evidence. Asking the patient, friends/relatives and nurses are the usual sources, but direct observation and common sense are also important. However direct testing is not needed. 5. Usually the patient's performance over the preceding 24-48 hours is important, but occasionally longer periods will be relevant. 6. Middle categories imply that the patient supplies over 50 per cent of the effort. 7. Use of aids to be independent is allowed. Zuhair Hutchison., Barthel, D.W. (4296). Functional evaluation: the Barthel Index. [address] [facility] (14)2. CHUCK Arredondo, Hector Valdez., Clark Rhoades., Hannah, [facility] (1999). Measuring the change indisability after inpatient rehabilitation; comparison of the responsiveness of the Barthel Index and Functional Carroll Measure. Journal of Neurology, Neurosurgery, and Psychiatry, 66(4), 803-589. Rikki Guidry, N.J.A, VIKRAM Busch, & Seema Jackson, M.A. (2004.) Assessment of post-stroke quality of life in cost-effectiveness studies: The usefulness of the Barthel Index and the EuroQoL-5D.  Quality of Life Research, 15, 329-35           Physical Therapy Evaluation Charge Determination   History Examination Presentation Decision-Making   MEDIUM  Complexity : 1-2 comorbidities / personal factors will impact the outcome/ POC  MEDIUM Complexity : 3 Standardized tests and measures addressing body structure, function, activity limitation and / or participation in recreation  MEDIUM Complexity : Evolving with changing characteristics  MEDIUM Complexity : FOTO score of 26-74      Based on the above components, the patient evaluation is determined to be of the following complexity level: MEDIUM    Pain Rating:  Denied complaints of pain    Activity Tolerance:   Hypertension     After treatment patient left in no apparent distress:   Sitting EOB, call bell within reach, significant other present, RN notified    COMMUNICATION/EDUCATION:   The patients plan of care was discussed with: Occupational therapist and Registered nurse. Fall prevention education was provided and the patient/caregiver indicated understanding., Patient/family have participated as able in goal setting and plan of care. , and Patient/family agree to work toward stated goals and plan of care.     Thank you for this referral.  Nini Steen, PT, DPT   Time Calculation: 16 mins

## 2020-12-23 NOTE — PROGRESS NOTES
End of Shift Note    Bedside shift change report given to Radha (oncoming nurse) by Jimmie Plascencia RN (offgoing nurse). Report included the following information SBAR, Kardex and ED Summary    Shift worked:  7a-7p     Shift summary and any significant changes:     Pt arrived late in shift from ED. Pt stable, family at bedside. Concerns for physician to address:       Zone phone for oncoming shift:          Activity:  Activity Level: Up with Assistance  Number times ambulated in hallways past shift: 0  Number of times OOB to chair past shift: 2    Cardiac:   Cardiac Monitoring: Yes      Cardiac Rhythm: Sinus tachycardia    Access:   Current line(s): PIV     Genitourinary:   Urinary status: voiding    Respiratory:   O2 Device: Room air  Chronic home O2 use?: NO  Incentive spirometer at bedside: NO     GI:     Current diet:  DIET DIABETIC CONSISTENT CARB Regular  Passing flatus: YES  Tolerating current diet: YES       Pain Management:   Patient states pain is manageable on current regimen: YES    Skin:  Connor Score: 20  Interventions:     Patient Safety:  Fall Score:  Total Score: 3  Interventions:   High Fall Risk: Yes    Length of Stay:  Expected LOS: - - -  Actual LOS: 0      Jimmie Plascencia RN

## 2020-12-23 NOTE — CONSULTS
IP CONSULT TO NEUROLOGY  Consult performed by: Tom Stapleton MD  Consult ordered by: Antonio Latif MD            NEUROLOGY CONSULT    NAME Ash Dasilva AGE 48 y.o. MRN [id number]  1965     REQUESTING PHYSICIAN: Antonio Latif MD      CHIEF COMPLAINT:  dizziness     This is a 48 y.o. right handed male with a medical history of diabetes. He comes to the ER today with a complaint of dizziness that has been ongoing for several days. It is worse when sitting up and resolves on laying down. He denies headaches and double vision. He has had decreased oral intake on account of his achalasia, he can't swallow while laying down and can't sit up on account of his dizziness. ASSESSMENT AND PLAN     1. Ataxia  High risk for stroke on account of DM, HTN, HCOL  Will need stroke work up   MRI: Pending  CTA: Mild calcific atherosclerosis of the aortic arch. Mild stenosis of the mid to distal left common carotid artery from eccentric noncalcified plaque. No significant stenosis of the right common carotid artery. Calcific atherosclerosis of the right carotid bifurcation with mild (less than 50%) stenosis of the proximal right internal carotid artery  A1C : Pending  TSH: Pending  LDL: Pending  Echocardiogram: Pending    2. Dizziness  hydration    3. Dehydration  Hydration    5.  DMII  Continue insulin           ALLERGIES:  Lisinopril     Current Facility-Administered Medications   Medication Dose Route Frequency    [START ON 2020] amLODIPine (NORVASC) tablet 5 mg  5 mg Oral DAILY    [START ON 2020] atorvastatin (LIPITOR) tablet 10 mg  10 mg Oral DAILY    insulin glargine (LANTUS) injection 35 Units  35 Units SubCUTAneous QHS    sodium chloride (NS) flush 5-40 mL  5-40 mL IntraVENous Q8H    sodium chloride (NS) flush 5-40 mL  5-40 mL IntraVENous PRN    acetaminophen (TYLENOL) tablet 650 mg  650 mg Oral Q6H PRN    Or    acetaminophen (TYLENOL) suppository 650 mg  650 mg Rectal Q6H PRN    polyethylene glycol (MIRALAX) packet 17 g  17 g Oral DAILY PRN    promethazine (PHENERGAN) tablet 12.5 mg  12.5 mg Oral Q6H PRN    Or    ondansetron (ZOFRAN) injection 4 mg  4 mg IntraVENous Q6H PRN    [START ON 12/23/2020] enoxaparin (LOVENOX) injection 40 mg  40 mg SubCUTAneous DAILY    insulin lispro (HUMALOG) injection   SubCUTAneous AC&HS    glucose chewable tablet 16 g  4 Tab Oral PRN    dextrose (D50W) injection syrg 12.5-25 g  12.5-25 g IntraVENous PRN    glucagon (GLUCAGEN) injection 1 mg  1 mg IntraMUSCular PRN    [START ON 24/34/9862] folic acid (FOLVITE) tablet 1 mg  1 mg Oral DAILY    [START ON 12/23/2020] thiamine mononitrate (B-1) tablet 100 mg  100 mg Oral DAILY       Past Medical History:   Diagnosis Date    [address] [address]     Nausea & vomiting     Pancreatitis     Unspecified sleep apnea        Social History     Tobacco Use    Smoking status: Former Smoker     Years: 1.00     Types: Cigarettes    Smokeless tobacco: Never Used   Substance Use Topics    Alcohol use: Not Currently     Frequency: Monthly or less     Drinks per session: 1 or 2       Family History   Problem Relation Age of Onset    Heart Disease Mother         s/p stent    Diabetes Father     Cancer Brother         stomach cancer     Review of Systems   Constitutional: Negative for chills and fever. HENT: Negative for ear pain. Eyes: Negative for pain and discharge. Respiratory: Negative for cough and hemoptysis. Cardiovascular: Negative for chest pain and claudication. Gastrointestinal: Negative for constipation and diarrhea. Genitourinary: Negative for flank pain and hematuria. Musculoskeletal: Positive for myalgias. Negative for back pain. Skin: Negative for itching and rash. Neurological: Positive for dizziness and headaches. Endo/Heme/Allergies: Negative for environmental allergies. Does not bruise/bleed easily. Psychiatric/Behavioral: Negative for depression and hallucinations. Visit Vitals  BP (!) 147/94   Pulse 97   Temp 98.1 °F (36.7 °C)   Resp 16   Ht 6' 4\" (1.93 m)   Wt 207 lb 0.2 oz (93.9 kg)   SpO2 98%   BMI 25.20 kg/m²      General: Well developed, well nourished. Patient in no distress   Head: Normocephalic, atraumatic, anicteric sclera   Neck Normal ROM, No thyromegally   Lungs:  Clear to auscultation bilaterally   Cardiac: Regular rate and rhythm with no murmurs. Abd: Bowel sounds were audible. Ext: No pedal edema   Skin: Supple no rash     NeurologicExam:  Mental Status: Alert and oriented to person place and time   Speech: Fluent no aphasia or dysarthria. Cranial Nerves:  II - XII Intact   Motor:  Full and symmetric strength of upper and lower extremities  Normal bulk and tone. Reflexes:   +1/4 over the proximal tendons of the upper and lower extremities. +0/4 over distal tendons. Sensory:   Symmetric distal reduction in sensory perception affecting all modalities    Gait:  deferred   Tremor:   No tremor noted. Cerebellar:  Past pointing on the left    Neurovascular: No carotid bruits. No JVD       REVIEWED IMAGING:    MRI :  No results found for this or any previous visit. CT:  Results from Hospital Encounter encounter on 12/22/20   CTA HEAD NECK W CONT    Narrative EXAM:  CTA HEAD NECK W CONT    INDICATION:   ataxia    COMPARISON:  CT head 12/22/2020. CONTRAST:  100 mL of Isovue-370. TECHNIQUE:  Unenhanced  images were obtained to localize the volume for  acquisition. Multislice helical axial CT angiography was performed from the  aortic arch to the top of the head during uneventful rapid bolus intravenous  contrast administration. Coronal and sagittal reformations and 3D post  processing was performed. CT dose reduction was achieved through use of a  standardized protocol tailored for this examination and automatic exposure  control for dose modulation.     FINDINGS:    CTA Head:  There is no evidence of large vessel occlusion or flow-limiting stenosis of the  intracranial internal carotid, anterior cerebral, and middle cerebral arteries. Calcific atherosclerosis of the right carotid siphon with mild to moderate focal  stenosis of the right paraclinoid internal carotid artery. Mild calcific  atherosclerosis of the left carotid siphon without significant stenosis. The  anterior communicating artery is patent with fenestration. There is no evidence of large vessel occlusion or flow-limiting stenosis of the  intracranial vertebral arteries, basilar artery, or posterior cerebral arteries. Multifocal severe stenoses of the left V4 segment. Fetal origin of the right  posterior cerebral artery. There is no evidence of aneurysm or vascular malformation. The dural venous  sinuses and deep cerebral venous system are patent. No evidence of abnormal  enhancement on delayed phase images. CTA NECK:  NASCET method was utilized for calculating stenosis. Mild calcific atherosclerosis of the aortic arch. Mild stenosis of the mid to  distal left common carotid artery from eccentric noncalcified plaque. No  significant stenosis of the right common carotid artery. Calcific  atherosclerosis of the right carotid bifurcation with mild (less than 50%)  stenosis of the proximal right internal carotid artery. Mild calcific  atherosclerosis of the left carotid bifurcation without significant stenosis. There is a right dominant vertebrobasilar arterial system with markedly  diminutive left vertebral artery. The cervical vertebral arteries are normal in  course, size and contour without significant stenosis. Visualized soft tissues of the neck are unremarkable. Visualized lung apices are  clear. No acute fracture or aggressive osseous lesion. Reversal of the cervical  lordosis with multilevel degenerative disc disease most advanced at C5-C6 and  C6-C7.  Multilevel severe facet arthropathy scattered throughout the cervical  spine, with grade 1 anterolisthesis of C3 on C4 and C4 on C5. Impression IMPRESSION:   CTA Head:  1. 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 No significant stenosis of the dominant right vertebral artery. 2. Remaining atherosclerotic disease as above. CTA Neck:  1. No evidence of flow-limiting stenosis. Right dominant vertebrobasilar system  with markedly diminutive left vertebral artery. 2. Mild stenosis (less than 50% by NASCET criteria) of the proximal right  internal carotid artery. 3. Mild stenosis of the mid to distal left common carotid artery.            REVIEWED LABS:  Lab Results   Component Value Date/Time    WBC 14.8 (H) 12/22/2020 11:52 AM    HCT 53.5 (H) 12/22/2020 11:52 AM    HGB 18.5 (H) 12/22/2020 11:52 AM    PLATELET 657 62/30/9548 11:52 AM     Lab Results   Component Value Date/Time    Sodium 131 (L) 12/22/2020 11:52 AM    Potassium 4.1 12/22/2020 11:52 AM    Chloride 97 12/22/2020 11:52 AM    CO2 23 12/22/2020 11:52 AM    Glucose 359 (H) 12/22/2020 11:52 AM    BUN 28 (H) 12/22/2020 11:52 AM    Creatinine 1.13 12/22/2020 11:52 AM    Calcium 9.2 12/22/2020 11:52 AM     No results found for: B12LT, FOL, RBCF  Lab Results   Component Value Date/Time    LDL, calculated 114 (H) 11/07/2019 03:59 PM     Lab Results   Component Value Date/Time    Hemoglobin A1c 10.1 (H) 11/07/2019 03:59 PM    Hemoglobin A1c (POC) 8.6 (A) 05/30/2019 04:13 PM     No components found for: TROPQUANT  No results found for: LEAH

## 2020-12-23 NOTE — PROGRESS NOTES
End of Shift Note    Bedside shift change report given to Mundo Cline (oncoming nurse) by Abelardo Keller (offgoing nurse). Report included the following information SBAR, Kardex, Intake/Output and MAR    Shift worked:  7a-7p     Shift summary and any significant changes:    Pt remained stable throughout shift. Scheduled meds given, no PRN meds given, education provided. Hourly rounding completed, pt up ad laly to the bathroom, pt turns self. Pt was seen by neuro today & had ECHO done that was ordered by neurologist.  Dr. Suly Goodson informed via SavySwap that ECHO was read. Concerns for physician to address:       Zone phone for oncoming shift:          Activity:  Activity Level: Bath Room Privileges  Number times ambulated in hallways past shift: 0  Number of times OOB to chair past shift: 4    Cardiac:   Cardiac Monitoring: Yes      Cardiac Rhythm: Normal sinus rhythm    Access:   Current line(s): PIV     Genitourinary:   Urinary status: voiding    Respiratory:   O2 Device: Room air  Chronic home O2 use?: NO  Incentive spirometer at bedside: NO     GI:     Current diet:  DIET DIABETIC CONSISTENT CARB Regular  Passing flatus: YES  Tolerating current diet: YES       Pain Management:   Patient states pain is manageable on current regimen: YES    Skin:  Connor Score: 21  Interventions: float heels and increase time out of bed    Patient Safety:  Fall Score:  Total Score: 3  Interventions: pt to call before getting OOB  High Fall Risk: Yes    Length of Stay:  Expected LOS: - - -  Actual LOS: 1      Abelardo Keller

## 2020-12-24 VITALS
WEIGHT: 207.01 LBS | RESPIRATION RATE: 16 BRPM | BODY MASS INDEX: 25.21 KG/M2 | SYSTOLIC BLOOD PRESSURE: 146 MMHG | DIASTOLIC BLOOD PRESSURE: 94 MMHG | TEMPERATURE: 98 F | HEIGHT: 76 IN | HEART RATE: 95 BPM | OXYGEN SATURATION: 97 %

## 2020-12-24 LAB
ERYTHROCYTE [DISTWIDTH] IN BLOOD BY AUTOMATED COUNT: 12.2 % (ref 11.5–14.5)
HCT VFR BLD AUTO: 50.1 % (ref 36.6–50.3)
HGB BLD-MCNC: 16.4 G/DL (ref 12.1–17)
MCH RBC QN AUTO: 29.8 PG (ref 26–34)
MCHC RBC AUTO-ENTMCNC: 32.7 G/DL (ref 30–36.5)
MCV RBC AUTO: 91.1 FL (ref 80–99)
NRBC # BLD: 0 K/UL (ref 0–0.01)
NRBC BLD-RTO: 0 PER 100 WBC
PLATELET # BLD AUTO: 256 K/UL (ref 150–400)
PMV BLD AUTO: 10.8 FL (ref 8.9–12.9)
RBC # BLD AUTO: 5.5 M/UL (ref 4.1–5.7)
WBC # BLD AUTO: 12.3 K/UL (ref 4.1–11.1)

## 2020-12-24 PROCEDURE — 99218 HC RM OBSERVATION: CPT

## 2020-12-24 PROCEDURE — 74011250637 HC RX REV CODE- 250/637: Performed by: PSYCHIATRY & NEUROLOGY

## 2020-12-24 PROCEDURE — 36415 COLL VENOUS BLD VENIPUNCTURE: CPT

## 2020-12-24 PROCEDURE — 74011250637 HC RX REV CODE- 250/637: Performed by: NURSE PRACTITIONER

## 2020-12-24 PROCEDURE — 85027 COMPLETE CBC AUTOMATED: CPT

## 2020-12-24 RX ORDER — ATORVASTATIN CALCIUM 20 MG/1
20 TABLET, FILM COATED ORAL DAILY
Qty: 30 TAB | Refills: 0 | Status: SHIPPED | OUTPATIENT
Start: 2020-12-25 | End: 2021-01-06 | Stop reason: SDUPTHER

## 2020-12-24 RX ORDER — GUAIFENESIN 100 MG/5ML
81 LIQUID (ML) ORAL DAILY
Qty: 100 TAB | Refills: 0 | Status: SHIPPED | OUTPATIENT
Start: 2020-12-25 | End: 2021-01-12 | Stop reason: DRUGHIGH

## 2020-12-24 RX ORDER — AMLODIPINE BESYLATE 10 MG/1
10 TABLET ORAL DAILY
Qty: 30 TAB | Refills: 0 | Status: SHIPPED | OUTPATIENT
Start: 2020-12-25 | End: 2021-01-06 | Stop reason: SDUPTHER

## 2020-12-24 RX ADMIN — ATORVASTATIN CALCIUM 20 MG: 20 TABLET, FILM COATED ORAL at 08:39

## 2020-12-24 RX ADMIN — AMLODIPINE BESYLATE 10 MG: 5 TABLET ORAL at 08:39

## 2020-12-24 RX ADMIN — ASPIRIN 81 MG: 81 TABLET, CHEWABLE ORAL at 08:39

## 2020-12-24 NOTE — PROGRESS NOTES
Pt was given discharge paperwork, given medication prescriptions, pt & wife verbalized understanding that they will have to make follow up appts with Neuro & PCP. Pts IV removed. Pt wheeled to the front of the hospital and discharged home with wife.

## 2020-12-24 NOTE — DISCHARGE SUMMARY
Hospitalist Discharge Summary     Patient ID:  Romeo Davis  [id number]  27 y.o.  1965 12/22/2020    PCP on record: Gretchen Soliz MD    Admit date: 12/22/2020  Discharge date and time: 12/24/2020    DISCHARGE DIAGNOSIS:    Ataxia POA  Hypertension POA  Hypercholesterolemia POA  Type 2 Diabetes POA      CONSULTATIONS:  IP CONSULT TO NEUROLOGY  IP CONSULT TO HOSPITALIST    Excerpted HPI from H&P of Suresh Dumont MD:    Romeo Davis is a 48 y.o.  male who presents with CC listed above. He is a poor historian but endorses dizziness and difficulty keeping his balance for at least the past 1 week. He also endorses some abdominal pain, and nausea. He endorses decreased PO intake during this time. He denies any fever or cchills. He denies any history of stroke. He denies any focal weakness. He was started on aspirin, increased statin therapy and continued blood pressure medication. ______________________________________________________________________  DISCHARGE SUMMARY/HOSPITAL COURSE:  for full details see H&P, daily progress notes, labs, consult notes. The Patient is a 48year old male patient with a significant medical history of Type 2 diabetes uncontrolled, hyperlipidemia, and hypertension. The patient presented to the ED with complaints of dizziness and difficulty walking for about the last five days. He did state he was having \"dry heaves\". He was started on IV hydration in the ED and stated he did feel better following the fluids. He was consulted by Neurology for stroke work up. Patient has been cleared by Neurology for discharge home with follow up as out patient. Discussed with patient and spouse. Patient declined [address] at this time, states he did not feel like he needed it. MRI 12/22/2020: IMPRESSION:  1. Small acute left medial cerebellar peduncle infarct.   2. Possible acute/subacute minimal ischemic injury left parasagittal frontal  lobe versus T2 shine through. 3. Multiple chronic foci T2 hyperintensity in nonspecific pattern possibly all  related to chronic small vessel disease. ECHO 12/23/2020:   Result status: Final result   · Image quality for this study was suboptimal.  · LV: Estimated LVEF is 45 - 50%. Normal cavity size. Mild to moderate concentric hypertrophy. Age-appropriate left ventricular diastolic function. · Contrast used: DEFINITY. Ataxia POA: Resolved, follow up with Neurology    Hypertension POA: Norvasc 10 mg daily, follow up with pcp for monitoring, ASA daily    Hypercholesterolemia POA: Statin therapy  Daily    Type 2 Diabetes POA: continue home hypo-glycemic medications, follow up with pcp for monitoring.    _______________________________________________________________________  Patient seen and examined by me on discharge day. Pertinent Findings:  Gen:    Not in distress, pleasant, cooperative  Chest: Clear lungs, no wheeze, no rales noted  CVS:   Regular rhythm. No edema  Abd:  Soft, not distended, not tender  Neuro:  Alert, not anxious  _______________________________________________________________________  DISCHARGE MEDICATIONS:   Current Discharge Medication List      START taking these medications    Details   aspirin 81 mg chewable tablet Take 1 Tab by mouth daily. Qty: 100 Tab, Refills: 0         CONTINUE these medications which have CHANGED    Details   atorvastatin (LIPITOR) 20 mg tablet Take 1 Tab by mouth daily. Qty: 30 Tab, Refills: 0      amLODIPine (NORVASC) 10 mg tablet Take 1 Tab by mouth daily. Qty: 30 Tab, Refills: 0         CONTINUE these medications which have NOT CHANGED    Details   !! insulin lispro (HUMALOG) 100 unit/mL kwikpen 15 units three times a day.  DX E11.9  Qty: 15 Adjustable Dose Pre-filled Pen Syringe, Refills: 5    Associated Diagnoses: Diabetes mellitus due to underlying condition with hyperosmolarity without coma, without long-term current use of insulin (HCC)      insulin glargine (Lantus Solostar U-100 Insulin) 100 unit/mL (3 mL) inpn INJECT 49 UNITS UNDER THE SKIN EVERY NIGHT AT BEDTIME  Indications: type 2 diabetes mellitus  Qty: 15 Adjustable Dose Pre-filled Pen Syringe, Refills: 0    Associated Diagnoses: Diabetes mellitus due to underlying condition with hyperosmolarity without coma, without long-term current use of insulin ([facility])      Insulin Needles, Disposable, 31 gauge x 5/16\" ndle Use with insulin pen needle 5 times daily  Qty: 500 Pen Needle, Refills: 3    Comments: Please dispense Bd ultra fine pen needle 5 mlx31g per patient request  Associated Diagnoses: Diabetes mellitus due to underlying condition with hyperosmolarity without coma, without long-term current use of insulin ([address]      ! ! insulin lispro (HUMALOG KWIKPEN INSULIN) 100 unit/mL kwikpen INITIATE CORRECTIVE INSULIN PROTOCOL (KATIE):  RX KATIE Normal Sensitivity (Average weight)  For Blood Sugar (mg/dl) of:              111-150=0 units  151-200=2 units  201-250=4 units  251-300=6 units  301-350=8 units  Over 350= 10 units  Indications: type 2 diabetes mellitus  Qty: 1 Package, Refills: 3    Associated Diagnoses: Diabetes mellitus due to underlying condition with hyperosmolarity without coma, without long-term current use of insulin ([facility])      glucose blood VI test strips (ASCENSIA AUTODISC VI, ONE TOUCH ULTRA TEST VI) strip Check fsbs tid 250.02  Qty: 300 Strip, Refills: 3    Associated Diagnoses: Diabetes mellitus due to underlying condition with hyperosmolarity without coma, without long-term current use of insulin ([facility])      lancets misc Check fsbs bid  Qty: 200 Each, Refills: 11    Associated Diagnoses: Diabetes mellitus due to underlying condition with hyperosmolarity without coma, without long-term current use of insulin ([facility])      Blood-Glucose Meter monitoring kit fsbs bid dx 250.02  Qty: 1 Kit, Refills: 0       !! - Potential duplicate medications found. Please discuss with provider.             Patient Follow Up Instructions: Activity: Activity as tolerated  Diet: Diabetic Diet  Wound Care: None needed    Follow-up with Neurology in 1 week.   Follow-up tests/labs PCP recommendation    Follow-up Information     Follow up With Specialties Details Why Contact Info    Ksiha Price MD Neurology In 1 week hospital follow up for ataxia [address]  [phone]      Dawit Cook MD Internal Medicine In 1 week hospital follow up [address] [facility]  [phone]          ________________________________________________________________    Risk of deterioration: Low    Condition at Discharge:  Stable  __________________________________________________________________    Disposition  Home with family, no needs    ____________________________________________________________________    Code Status: Full Code  ___________________________________________________________________      Total time in minutes spent coordinating this discharge (includes going over instructions, follow-up, prescriptions, and preparing report for sign off to her PCP) :  >30 minutes    Signed:  Simona Dutton NP

## 2020-12-24 NOTE — PROGRESS NOTES
Bedside shift change report given to ELIZABETH Goldberg (oncoming nurse) by Mundo Cline RN (offgoing nurse). Report included the following information SBAR, Kardex, ED Summary, Procedure Summary, Intake/Output, MAR and Recent Results.

## 2020-12-24 NOTE — PROGRESS NOTES
12/24/2020      RE: Tanner Morales      To Whom it May Concern: This is to certify that Tanner Morales may may return to work on Jan. 4, 2021. Please feel free to contact my office if you have any questions or concerns. Thank you for your assistance in this matter.     Sincerely,      Miller Mccormick NP

## 2020-12-24 NOTE — PROGRESS NOTES
NIGEL Plan:  Home with family  Spouse will provide transport home at d/c, wants to leave ASAP   Pt/spouse declined [address]  CM to secure PCP follow up prior to discharge     Reason for Admission:  Ataxia                    RUR Score: 10%                    Plan for utilizing home health: no, pt/spouse declined      PCP: First and Last name: Dmitriy Hargrove    Name of Practice: [facility]    Are you a current patient: Yes/No: yes    Approximate date of last visit: prior to pandemic (Spring 2020)   Can you participate in a virtual visit with your PCP: yes                    Current Advanced Directive/Advance Care Plan: no ACP on file, spouse is legal ROBERTO Bradley Paula; [phone])                         Transition of Care Plan:  Return home with family     Update 9:08 AM  CM aware of discharge order. Pt prefers to schedule own follow up appointments. Work note provided by hospitalist NP. No barriers to discharge identified. Wife transporting home and is present at bedside for discharge. Initial Note: Chart reviewed. Advised by staff that pt is requesting to be discharged today as soon as possible. Pt cleared by Neurology and will need follow up within 1-4 weeks. Met with pt and spouse at the bedside this AM to introduce self and role. Pt alert and oriented at time of visit, however resting. Assessment completed primarily with spouse at bedside Tanner Mitchell). Verified contact information and demographics. Prior to hospitalization, pt was independent with ADLs and ambulatory without a device. Pt does work full time in the [employer] and spouse is requesting a work note from physician indicating when he can return to work. Pt resides in a one level home with 2 steps to enter with his spouse. Spouse reports no DME use in the home. Pt known to be active with PCP, Dr. Dmitriy Hargrove, with last visit earlier this year prior to pandemic. Preferred pharmacy is [facility].  Spouse will provide transport home today at d/c.     CM discussed recommendation for [facility] PT/OT at discharge. Pt and spouse have declined [facility] at this time. Spouse shared that therapy came by again yesterday afternoon and stated that pt did not need it. Spouse confirmed that she will be with the pt upon his return home and available to assist if needed. CM to schedule follow up visit with PCP, Neurology prior to d/c. No further CM needs identified at this time. Pt will return home with family independently. Care Management Interventions  PCP Verified by CM: Yes(Dr. Lucian Schilling )  Last Visit to PCP: 04/24/20  Palliative Care Criteria Met (RRAT>21 & CHF Dx)?: No  Mode of Transport at Discharge:  Other (see comment)(spouse )  Transition of Care Consult (CM Consult): Discharge Planning  Discharge Durable Medical Equipment: No  Physical Therapy Consult: Yes  Occupational Therapy Consult: Yes  Speech Therapy Consult: No  Current Support Network: Lives with Spouse, Own Home  Confirm Follow Up Transport: Family  [place] Resource Information Provided?: No  Discharge Location  Discharge Placement: Home with family assistance    Ines Yao, [address], [address] [place] [facility]  [phone]

## 2020-12-24 NOTE — DISCHARGE INSTRUCTIONS
Patient Education   Patient Education   Patient Education        Dizziness: Care Instructions  Your Care Instructions  Dizziness is the feeling of unsteadiness or fuzziness in your head. It is different than having vertigo, which is a feeling that the room is spinning or that you are moving or falling. It is also different from lightheadedness, which is the feeling that you are about to faint. It can be hard to know what causes dizziness. Some people feel dizzy when they have migraine headaches. Sometimes bouts of flu can make you feel dizzy. Some medical conditions, such as heart problems or high blood pressure, can make you feel dizzy. Many medicines can cause dizziness, including medicines for high blood pressure, pain, or anxiety. If a medicine causes your symptoms, your doctor may recommend that you stop or change the medicine. If it is a problem with your heart, you may need medicine to help your heart work better. If there is no clear reason for your symptoms, your doctor may suggest watching and waiting for a while to see if the dizziness goes away on its own. Follow-up care is a key part of your treatment and safety. Be sure to make and go to all appointments, and call your doctor if you are having problems. It's also a good idea to know your test results and keep a list of the medicines you take. How can you care for yourself at home? · If your doctor recommends or prescribes medicine, take it exactly as directed. Call your doctor if you think you are having a problem with your medicine. · Do not drive while you feel dizzy. · Try to prevent falls. Steps you can take include:  ? Using nonskid mats, adding grab bars near the tub, and using night-lights. ? Clearing your home so that walkways are free of anything you might trip on.  ? Letting family and friends know that you have been feeling dizzy. This will help them know how to help you. When should you call for help?    Call 911 anytime you think you may need emergency care. For example, call if:    · You passed out (lost consciousness).     · You have dizziness along with symptoms of a heart attack. These may include:  ? Chest pain or pressure, or a strange feeling in the chest.  ? Sweating. ? Shortness of breath. ? Nausea or vomiting. ? Pain, pressure, or a strange feeling in the back, neck, jaw, or upper belly or in one or both shoulders or arms. ? Lightheadedness or sudden weakness. ? A fast or irregular heartbeat.     · You have symptoms of a stroke. These may include:  ? Sudden numbness, tingling, weakness, or loss of movement in your face, arm, or leg, especially on only one side of your body. ? Sudden vision changes. ? Sudden trouble speaking. ? Sudden confusion or trouble understanding simple statements. ? Sudden problems with walking or balance. ? A sudden, severe headache that is different from past headaches. Call your doctor now or seek immediate medical care if:    · You feel dizzy and have a fever, headache, or ringing in your ears.     · You have new or increased nausea and vomiting.     · Your dizziness does not go away or comes back. Watch closely for changes in your health, and be sure to contact your doctor if:    · You do not get better as expected. Where can you learn more? Go to http://www.gray.com/  Enter Q823 in the search box to learn more about \"Dizziness: Care Instructions. \"  Current as of: June 26, 2019               Content Version: 12.6  © 8273-1959 Healthwise, Incorporated. Care instructions adapted under license by [facility] (which disclaims liability or warranty for this information). If you have questions about a medical condition or this instruction, always ask your healthcare professional. [place] [address] any warranty or liability for your use of this information.             Dehydration: Care Instructions  Your Care Instructions  Dehydration happens when your body loses too much fluid. This might happen when you do not drink enough water or you lose large amounts of fluids from your body because of diarrhea, vomiting, or sweating. Severe dehydration can be life-threatening. Water and minerals called electrolytes help put your body fluids back in balance. Learn the early signs of fluid loss, and drink more fluids to prevent dehydration. Follow-up care is a key part of your treatment and safety. Be sure to make and go to all appointments, and call your doctor if you are having problems. It's also a good idea to know your test results and keep a list of the medicines you take. How can you care for yourself at home? · To prevent dehydration, drink plenty of fluids, enough so that your urine is light yellow or clear like water. Choose water and other caffeine-free clear liquids until you feel better. If you have kidney, heart, or liver disease and have to limit fluids, talk with your doctor before you increase the amount of fluids you drink. · If you do not feel like eating or drinking, try taking small sips of water, sports drinks, or other rehydration drinks. · Get plenty of rest.  To prevent dehydration  · Add more fluids to your diet and daily routine, unless your doctor has told you not to. · During hot weather, drink more fluids. Drink even more fluids if you exercise a lot. Stay away from drinks with alcohol or caffeine. · Watch for the symptoms of dehydration. These include:  ? A dry, sticky mouth. ? Dark yellow urine, and not much of it. ? Dry and sunken eyes. ? Feeling very tired. · Learn what problems can lead to dehydration. These include:  ? Diarrhea, fever, and vomiting. ? Any illness with a fever, such as pneumonia or the flu. ? Activities that cause heavy sweating, such as endurance races and heavy outdoor work in hot or humid weather. ? Alcohol or drug use or problems caused by quitting their use (withdrawal). ?  Certain medicines, such as cold and allergy pills (antihistamines), diet pills (diuretics), and laxatives. ? Certain diseases, such as diabetes, cancer, and heart or kidney disease. When should you call for help? Call 911 anytime you think you may need emergency care. For example, call if:    · You passed out (lost consciousness). Call your doctor now or seek immediate medical care if:    · You are confused and cannot think clearly.     · You are dizzy or lightheaded, or you feel like you may faint.     · You have signs of needing more fluids. You have sunken eyes and a dry mouth, and you pass only a little dark urine.     · You cannot keep fluids down. Watch closely for changes in your health, and be sure to contact your doctor if:    · You are not making tears.     · Your skin is very dry and sags slowly back into place after you pinch it.     · Your mouth and eyes are very dry. Where can you learn more? Go to http://www.gray.com/  Enter Q814 in the search box to learn more about \"Dehydration: Care Instructions. \"  Current as of: June 26, 2019               Content Version: 12.6  © 8694-6844 [employer]. Care instructions adapted under license by [employer] (which disclaims liability or warranty for this information). If you have questions about a medical condition or this instruction, always ask your healthcare professional. [address] any warranty or liability for your use of this information. High Blood Pressure: Care Instructions  Overview     It's normal for blood pressure to go up and down throughout the day. But if it stays up, you have high blood pressure. Another name for high blood pressure is hypertension. Despite what a lot of people think, high blood pressure usually doesn't cause headaches or make you feel dizzy or lightheaded. It usually has no symptoms.  But it does increase your risk of stroke, heart attack, and other problems. You and your doctor will talk about your risks of these problems based on your blood pressure. Your doctor will give you a goal for your blood pressure. Your goal will be based on your health and your age. Lifestyle changes, such as eating healthy and being active, are always important to help lower blood pressure. You might also take medicine to reach your blood pressure goal.  Follow-up care is a key part of your treatment and safety. Be sure to make and go to all appointments, and call your doctor if you are having problems. It's also a good idea to know your test results and keep a list of the medicines you take. How can you care for yourself at home? Medical treatment  · If you stop taking your medicine, your blood pressure will go back up. You may take one or more types of medicine to lower your blood pressure. Be safe with medicines. Take your medicine exactly as prescribed. Call your doctor if you think you are having a problem with your medicine. · Talk to your doctor before you start taking aspirin every day. Aspirin can help certain people lower their risk of a heart attack or stroke. But taking aspirin isn't right for everyone, because it can cause serious bleeding. · See your doctor regularly. You may need to see the doctor more often at first or until your blood pressure comes down. · If you are taking blood pressure medicine, talk to your doctor before you take decongestants or anti-inflammatory medicine, such as ibuprofen. Some of these medicines can raise blood pressure. · Learn how to check your blood pressure at home. Lifestyle changes  · Stay at a healthy weight. This is especially important if you put on weight around the waist. Losing even 10 pounds can help you lower your blood pressure. · If your doctor recommends it, get more exercise. Walking is a good choice. Bit by bit, increase the amount you walk every day.  Try for at least 30 minutes on most days of the week. You also may want to swim, bike, or do other activities. · Avoid or limit alcohol. Talk to your doctor about whether you can drink any alcohol. · Try to limit how much sodium you eat to less than 2,300 milligrams (mg) a day. Your doctor may ask you to try to eat less than 1,500 mg a day. · Eat plenty of fruits (such as bananas and oranges), vegetables, legumes, whole grains, and low-fat dairy products. · Lower the amount of saturated fat in your diet. Saturated fat is found in animal products such as milk, cheese, and meat. Limiting these foods may help you lose weight and also lower your risk for heart disease. · Do not smoke. Smoking increases your risk for heart attack and stroke. If you need help quitting, talk to your doctor about stop-smoking programs and medicines. These can increase your chances of quitting for good. When should you call for help? Call  911 anytime you think you may need emergency care. This may mean having symptoms that suggest that your blood pressure is causing a serious heart or blood vessel problem. Your blood pressure may be over 180/120. For example, call 911 if:    · You have symptoms of a heart attack. These may include:  ? Chest pain or pressure, or a strange feeling in the chest.  ? Sweating. ? Shortness of breath. ? Nausea or vomiting. ? Pain, pressure, or a strange feeling in the back, neck, jaw, or upper belly or in one or both shoulders or arms. ? Lightheadedness or sudden weakness. ? A fast or irregular heartbeat.     · You have symptoms of a stroke. These may include:  ? Sudden numbness, tingling, weakness, or loss of movement in your face, arm, or leg, especially on only one side of your body. ? Sudden vision changes. ? Sudden trouble speaking. ? Sudden confusion or trouble understanding simple statements. ? Sudden problems with walking or balance.   ? A sudden, severe headache that is different from past headaches.     · You have severe back or belly pain.   Do not wait until your blood pressure comes down on its own. Get help right away. Call your doctor now or seek immediate care if:    · Your blood pressure is much higher than normal (such as 180/120 or higher), but you don't have symptoms.     · You think high blood pressure is causing symptoms, such as:  ? Severe headache.  ? Blurry vision. Watch closely for changes in your health, and be sure to contact your doctor if:    · Your blood pressure measures higher than your doctor recommends at least 2 times. That means the top number is higher or the bottom number is higher, or both.     · You think you may be having side effects from your blood pressure medicine. Where can you learn more? Go to http://www.gray.com/  Enter [id number] in the search box to learn more about \"High Blood Pressure: Care Instructions. \"  Current as of: December 16, 2019               Content Version: 12.6  © 1810-1105 [employer]. Care instructions adapted under license by [employer] (which disclaims liability or warranty for this information). If you have questions about a medical condition or this instruction, always ask your healthcare professional. [place] [address] any warranty or liability for your use of this information. Patient Discharge Instructions     Pt Name  Kassi Borges   Date of Birth 1965   Age  48 y.o.    Medical Record Number  [id number]   PCP Ashu Cruz MD    Admit date:  12/22/2020 @    [place] [address]    Room Number  0551/90   Date of Discharge 12/24/2020     Admission Diagnoses:     <principal problem not specified>          Allergies   Allergen Reactions    Lisinopril Anaphylaxis        You were admitted to [address] for  <principal problem not specified>    YOUR OTHER MEDICAL DIAGNOSES INCLUDE (BUT NOT LIMITED TO ):  Present on Admission:   Ataxia   HTN (hypertension)   Pure hypercholesterolemia   Diabetes mellitus ([facility] [address]      DIET:  Diabetic Diet   Oral Nutritional Supplements: No Oral Supplement prescribed  Supplement Frequency: As needed    Recommended activity: Activity as tolerated and no driving for today  Follow up : Follow-up Information     Follow up With Specialties Details Why Contact Info    Seymour Lopez MD Neurology In 1 week hospital follow up for ataxia [address]  [address]      Unknown MD [facility] In 1 week hospital follow up [address] [facility]  [phone]                  · It is important that you take the medication exactly as they are prescribed. · Keep your medication in the bottles provided by the pharmacist and keep a list of the medication names, dosages, and times to be taken in your wallet. · Do not take other medications without consulting your doctor. ADDITIONAL INFORMATION: If you experience any of the following symptoms or have any health problem not listed below, then please call your primary care physician or return to the emergency room if you cannot get hold of your doctor: Fever, chills, nausea, vomiting, diarrhea, change in mentation, falling, bleeding, shortness of breath. I understand that if any problems occur once I am discharged, I am supposed to call my Primary care physician for further care or seek help in the Emergency Department at the nearest Healthcare facility. I have had an opportunity to discuss my clinical issues with my doctor and nursing staff. I understand and acknowledge receipt of the above instructions.                                                                                                                                            Physician's or R.N.'s Signature                                                            Date/Time Patient or Representative Signature                                                 Date/Time

## 2020-12-28 ENCOUNTER — PATIENT OUTREACH (OUTPATIENT)
Dept: CASE MANAGEMENT | Age: 55
End: 2020-12-28

## 2020-12-28 NOTE — PROGRESS NOTES
Patient was admitted to [facility] on 2020 and discharged on 2020 for dizziness, vomiting and abdominal pain . Outreach made within 2 business days of discharge: Yes    Top Discharge Challenges to be reviewed by the provider   -[address] [employer]" offer for CM to schedule follow up appointments  -Hgb A1C 10.5  Discussed COVID-19 related testing which was not done at this time. Method of communication with provider : chart routing       Advance Care Planning:   Does patient have an Advance Directive:  CTN not discussed with wife on this call. Inpatient Readmission Risk score: 8  Was this a readmission? no   Patient stated reason for the admission: wife reports \"dizziness and viral symptoms. \"  Patients top risk factors for readmission: none noted at this time. Interventions to address risk factors: n/a    Care Transition Nurse (CTN) contacted the family by telephone to perform post hospital discharge assessment. Verified name and  with family as identifiers. Provided introduction to self, and explanation of the CTN role. CTN reviewed discharge instructions, medical action plan and red flags with family who verbalized understanding. Family given an opportunity to ask questions and does not have any further questions or concerns at this time. The family agrees to contact the PCP office for questions related to their healthcare. Medication reconciliation was performed with family, who verbalizes understanding of administration of home medications. Advised obtaining a 90-day supply of all daily and as-needed medications. Referral to Pharm D needed: no     Home Health/Outpatient orders at discharge: refused  *    Durable Medical Equipment ordered at discharge: none      Covid Risk Education    Patient has following risk factors of: diabetes.  Education provided regarding infection prevention, and signs and symptoms of COVID-19 and when to seek medical attention with patient who verbalized understanding. Discussed exposure protocols and quarantine From CDC: Are you at higher risk for severe illness?  and given an opportunity for questions and concerns. The family agrees to contact the COVID-19 hotline 134-606-7433 or PCP office for questions related to COVID-19. For more information on steps you can take to protect yourself, see CDC's How to Protect Yourself     Patient/family/caregiver given information for [employer] and agrees to enroll no  Patient's preferred e-mail: declines  Patient's preferred phone number: declines    Discussed follow-up appointments. If no appointment was previously scheduled, appointment scheduling offered: yes  [address] follow up appointment(s):   Future Appointments   Date Time Provider Caity Castillo   1/19/2021  9:30 AM Chanelle Chandler MD [facility]     Non-BS follow up appointment(s): none  Plan for follow-up call in 7-10 days based on severity of symptoms and risk factors. CTN provided contact information for future needs. Goals Addressed                 This Visit's Progress     Attends follow-up appointments as directed. 12/28/2020  -declines for inpatient CM and outpatient CTN to schedule follow up appointments  -wife reports she will call Neurology and PCP to schedule appointments. Contact information reviewed. Wife to discuss with PCP possibility of patient seeing a endocrinologist for uncontrolled diabetes.    -CTN will follow up in 1 week  Lulú DAVIS

## 2021-01-05 ENCOUNTER — TELEPHONE (OUTPATIENT)
Dept: INTERNAL MEDICINE CLINIC | Age: 56
End: 2021-01-05

## 2021-01-05 NOTE — TELEPHONE ENCOUNTER
General Message/Vendor Calls     Caller's first and last name: Tamica Ibanez wife       Reason for call: Patient was released from [facility] on 12/24/20 with a mild stroke. He has an appt 01/19/21, but they wanted him to follow up within a week. She called last week and has not heard anything regarding a sooner appt. Callback required yes/no and why: Yes, discuss sooner appt time.        Best contact number(s):[phone]       Details to clarify the request:N/A       Adal Ansari

## 2021-01-05 NOTE — LETTER
2021 4:45 PM 
 
Mr. Tisha Cedillo [address] [place] [address] [employer] Thank you for requesting access to [employer]. Please follow the instructions below to securely access and download your online medical record. [employer] allows you to send messages to your doctor, view your test results, renew your prescriptions, schedule appointments, and more. How Do I Sign Up? 1. In your internet browser, go to [email or web address] 
2. Click on the First Time User? Click Here link in the Sign In box. You will be redirect to the New Member Sign Up page. 3. Enter your [employer] Access Code exactly as it appears below. You will not need to use this code after youve completed the sign-up process. If you do not sign up before the expiration date, you must request a new code. [employer] Access Code: [email or web address] Expires: 2021  4:42 PM (This is the date your [employer] access code will ) 4. Enter the last four digits of your Social Security Number (xxxx) and Date of Birth (mm/dd/yyyy) as indicated and click Submit. You will be taken to the next sign-up page. 5. Create a [employer] ID. This will be your [employer] login ID and cannot be changed, so think of one that is secure and easy to remember. 6. Create a [employer] password. You can change your password at any time. 7. Enter your Password Reset Question and Answer. This can be used at a later time if you forget your password. 8. Enter your e-mail address. You will receive e-mail notification when new information is available in [address] 9. Click Sign Up. You can now view and download portions of your medical record. 10. Click the Download Summary menu link to download a portable copy of your medical information. Additional Information If you have questions, please visit the Frequently Asked Questions section of the [email or web address] website at [email or web address] Remember, [employer] is NOT to be used for urgent needs. For medical emergencies, dial 911.  
 
 
 
 
 
 
 
Sincerely, 
 
 
Gwendolyn Bedolla MD

## 2021-01-05 NOTE — TELEPHONE ENCOUNTER
Raven Cannon MD  You 33 minutes ago (4:07 PM)     [phone] tomorrow please--wednesday    Message text      VV scheduled for 1/6 @ [phone] with Dr. Justin Rivera. Pt verbalized understanding of information discussed w/ no further questions at this time.

## 2021-01-06 ENCOUNTER — VIRTUAL VISIT (OUTPATIENT)
Dept: INTERNAL MEDICINE CLINIC | Age: 56
End: 2021-01-06
Payer: COMMERCIAL

## 2021-01-06 ENCOUNTER — TELEPHONE (OUTPATIENT)
Dept: INTERNAL MEDICINE CLINIC | Age: 56
End: 2021-01-06

## 2021-01-06 DIAGNOSIS — R26.0 ATAXIC GAIT: ICD-10-CM

## 2021-01-06 DIAGNOSIS — E78.00 PURE HYPERCHOLESTEROLEMIA: ICD-10-CM

## 2021-01-06 DIAGNOSIS — I63.9 ACUTE CVA (CEREBROVASCULAR ACCIDENT) (HCC): Primary | ICD-10-CM

## 2021-01-06 DIAGNOSIS — E11.65 UNCONTROLLED TYPE 2 DIABETES MELLITUS WITH HYPERGLYCEMIA (HCC): ICD-10-CM

## 2021-01-06 DIAGNOSIS — I50.22 SYSTOLIC CHF, CHRONIC (HCC): ICD-10-CM

## 2021-01-06 DIAGNOSIS — I10 ESSENTIAL HYPERTENSION: ICD-10-CM

## 2021-01-06 PROCEDURE — 99214 OFFICE O/P EST MOD 30 MIN: CPT | Performed by: INTERNAL MEDICINE

## 2021-01-06 RX ORDER — ATORVASTATIN CALCIUM 20 MG/1
20 TABLET, FILM COATED ORAL DAILY
Qty: 90 TAB | Refills: 0 | Status: SHIPPED | OUTPATIENT
Start: 2021-01-06 | End: 2021-04-13 | Stop reason: SDUPTHER

## 2021-01-06 RX ORDER — AMLODIPINE BESYLATE 10 MG/1
10 TABLET ORAL DAILY
Qty: 90 TAB | Refills: 3 | Status: SHIPPED | OUTPATIENT
Start: 2021-01-06 | End: 2022-01-05

## 2021-01-06 RX ORDER — METOPROLOL SUCCINATE 25 MG/1
25 TABLET, EXTENDED RELEASE ORAL DAILY
Qty: 90 TAB | Refills: 1 | Status: SHIPPED | OUTPATIENT
Start: 2021-01-06 | End: 2021-07-05 | Stop reason: SDUPTHER

## 2021-01-06 NOTE — LETTER
NOTIFICATION RETURN TO WORK / SCHOOL 
 
1/6/2021 12:35 PM 
 
Mr. Cale Mcclain [address] [place] [address] To Whom It May Concern: 
 
Cale Mcclain is currently under the care of [facility]. He will return to work/school on:1/13/21 Unable to work 12/22/20---1/12/21 due to medical illness. If there are questions or concerns please have the patient contact our office.  
 
 
 
Sincerely, 
 
 
Froylan Montoya MD

## 2021-01-06 NOTE — Clinical Note
Edgardo Farris,    Can you reach out to this pt interested in gordon Ch and help with Dm-2--isaiah Alberto

## 2021-01-06 NOTE — PROGRESS NOTES
HISTORY OF PRESENT ILLNESS  Enrico Bell is a 48 y.o. male. HPI   Enrico Bell is a 48 y.o. male being evaluated by a Virtual Visit (video visit) encounter to address concerns as mentioned above. A caregiver was present when appropriate. Due to this being a TeleHealth encounter (During [facility] public health emergency), evaluation of the following organ systems was limited: Vitals/Constitutional/EENT/Resp/CV/GI//MS/Neuro/Skin/Heme-Lymph-Imm. Pursuant to the emergency declaration under the [address] [place] [address], [address] [place] [address] authority and the [employer] Act, this Virtual Visit was conducted with patient's (and/or legal guardian's) consent, to reduce the risk of exposure to COVID-19 and provide necessary medical care. Services were provided through a video synchronous discussion virtually to substitute for in-person encounter. --Darnell Whitney MD on 1/6/2021 at 8:57 AM    An electronic signature was used to authenticate this note. Here for ERNESTINE AMOR    Admitted to [facility] recently for dizziness d/t CVA and dehydration  MRI--small left cerebellar peduncle infarct and ? Acute/subacute Ischemia left parasagital frontal lobe  Mild carotid stenoss  Ef 45-50%  A1c10.5   Started aspirin 81 mgf every day  lipitor and norvasc increased  Home bp 130/80 but some higher  fsbs around 100-150 in AM but 300 in PM  Still dizzy but able to stand and walk but will home on the the wall    Admit date: 12/22/2020  Discharge date and time: 12/24/2020     DISCHARGE DIAGNOSIS:     Ataxia POA  Hypertension POA  Hypercholesterolemia POA  Type 2 Diabetes POA        CONSULTATIONS:  IP CONSULT TO NEUROLOGY  IP CONSULT TO HOSPITALIST     Excerpted HPI from H&P of Alejandro Casas MD:     Katelyn Avila a 48 y. o.   male who presents with CC listed above.  He is a poor historian but endorses dizziness and difficulty keeping his balance for at least the past 1 week. He also endorses some abdominal pain, and nausea. He endorses decreased PO intake during this time. He denies any fever or cchills. He denies any history of stroke. He denies any focal weakness. He was started on aspirin, increased statin therapy and continued blood pressure medication.     ______________________________________________________________________  DISCHARGE SUMMARY/HOSPITAL COURSE:  for full details see H&P, daily progress notes, labs, consult notes.      The Patient is a 48year old male patient with a significant medical history of Type 2 diabetes uncontrolled, hyperlipidemia, and hypertension. The patient presented to the ED with complaints of dizziness and difficulty walking for about the last five days. He did state he was having \"dry heaves\". He was started on IV hydration in the ED and stated he did feel better following the fluids. He was consulted by Neurology for stroke work up. Patient has been cleared by Neurology for discharge home with follow up as out patient. Discussed with patient and spouse. Patient declined Home Health at this time, states he did not feel like he needed it.        MRI 12/22/2020: IMPRESSION:  1. Small acute left medial cerebellar peduncle infarct. 2. Possible acute/subacute minimal ischemic injury left parasagittal frontal  lobe versus T2 shine through. 3. Multiple chronic foci T2 hyperintensity in nonspecific pattern possibly all  related to chronic small vessel disease.     ECHO 12/23/2020:   Result status: Final result   · Image quality for this study was suboptimal.  · LV: Estimated LVEF is 45 - 50%. Normal cavity size. Mild to moderate concentric hypertrophy. Age-appropriate left ventricular diastolic function.   · Contrast used: DEFINITY.      Ataxia POA: Resolved, follow up with Neurology     Hypertension POA: Norvasc 10 mg daily, follow up with pcp for monitoring, ASA daily     Hypercholesterolemia POA: Statin therapy  Daily     Type 2 Diabetes POA: continue home hypo-glycemic medications, follow up with pcp for monitoring.     _______________________________________________________________________  Patient seen and examined by me on discharge day. Pertinent Findings:  Gen:    Not in distress, pleasant, cooperative  Chest:  Clear lungs, no wheeze, no rales noted  CVS:    Regular rhythm. No edema  Abd:     Soft, not distended, not tender  Neuro:  Alert, not anxious  _______________________________________________________________________  DISCHARGE MEDICATIONS:        Current Discharge Medication List            START taking these medications     Details   aspirin 81 mg chewable tablet Take 1 Tab by mouth daily. Qty: 100 Tab, Refills: 0                CONTINUE these medications which have CHANGED     Details   atorvastatin (LIPITOR) 20 mg tablet Take 1 Tab by mouth daily. Qty: 30 Tab, Refills: 0       amLODIPine (NORVASC) 10 mg tablet Take 1 Tab by mouth daily. Qty: 30 Tab, Refills: 0                CONTINUE these medications which have NOT CHANGED     Details   !! insulin lispro (HUMALOG) 100 unit/mL kwikpen 15 units three times a day.  DX E11.9  Qty: 15 Adjustable Dose Pre-filled Pen Syringe, Refills: 5     Associated Diagnoses: Diabetes mellitus due to underlying condition with hyperosmolarity without coma, without long-term current use of insulin (HCC)       insulin glargine (Lantus Solostar U-100 Insulin) 100 unit/mL (3 mL) inpn INJECT 49 UNITS UNDER THE SKIN EVERY NIGHT AT BEDTIME  Indications: type 2 diabetes mellitus  Qty: 15 Adjustable Dose Pre-filled Pen Syringe, Refills: 0     Associated Diagnoses: Diabetes mellitus due to underlying condition with hyperosmolarity without coma, without long-term current use of insulin (HCC)       Insulin Needles, Disposable, 31 gauge x 5/16\" ndle Use with insulin pen needle 5 times daily  Qty: 500 Pen Needle, Refills: 3     Comments: Please dispense Bd ultra fine pen needle 5 mlx31g per patient request  Associated Diagnoses: Diabetes mellitus due to underlying condition with hyperosmolarity without coma, without long-term current use of insulin ([facility])       !! insulin lispro (HUMALOG KWIKPEN INSULIN) 100 unit/mL kwikpen INITIATE CORRECTIVE INSULIN PROTOCOL (KATIE):  RX KATIE Normal Sensitivity (Average weight)  For Blood Sugar (mg/dl) of:              111-150=0 units  151-200=2 units  201-250=4 units  251-300=6 units  301-350=8 units  Over 350= 10 units  Indications: type 2 diabetes mellitus  Qty: 1 Package, Refills: 3     Associated Diagnoses: Diabetes mellitus due to underlying condition with hyperosmolarity without coma, without long-term current use of insulin ([facility])       glucose blood VI test strips (ASCENSIA AUTODISC VI, ONE TOUCH ULTRA TEST VI) strip Check fsbs tid 250.02  Qty: 300 Strip, Refills: 3     Associated Diagnoses: Diabetes mellitus due to underlying condition with hyperosmolarity without coma, without long-term current use of insulin ([facility])       lancets misc Check fsbs bid  Qty: 200 Each, Refills: 11     Associated Diagnoses: Diabetes mellitus due to underlying condition with hyperosmolarity without coma, without long-term current use of insulin ([facility])       Blood-Glucose Meter monitoring kit fsbs bid dx 250.02  Qty: 1 Kit, Refills: 0        !! - Potential duplicate medications found. Please discuss with provider.                Patient Follow Up Instructions: Activity: Activity as tolerated  Diet: Diabetic Diet  Wound Care: None needed     Follow-up with Neurology in 1 week.   Follow-up tests/labs PCP recommendation              Follow-up Information      Follow up With Specialties Details Why Contact Info     Medhat Stein MD Neurology In 1 week hospital follow up for ataxia [address]  [address] [facility]  [phone]        Amie Vaughan MD Internal Medicine In 1 week hospital follow up [address] [facility] [address]  [phone]                 Patient Active Problem List    Diagnosis Date Noted    Ataxia 12/22/2020    Acute hypoxemic respiratory failure ([facility] 04/15/2019    Influenza 01/19/2019    Diabetes mellitus ([facility] 10/01/2010    HTN (hypertension) 10/01/2010    Overweight(278.02) 10/01/2010    Pure hypercholesterolemia 10/01/2010    Pancreatitis 10/01/2010    GERD (gastroesophageal reflux disease) 10/01/2010    KATJA (obstructive sleep apnea) 10/01/2010    Achalasia 10/01/2010     Current Outpatient Medications   Medication Sig Dispense Refill    atorvastatin (LIPITOR) 20 mg tablet Take 1 Tab by mouth daily. 30 Tab 0    aspirin 81 mg chewable tablet Take 1 Tab by mouth daily. 100 Tab 0    amLODIPine (NORVASC) 10 mg tablet Take 1 Tab by mouth daily. 30 Tab 0    insulin lispro (HUMALOG) 100 unit/mL kwikpen 15 units three times a day.  DX E11.9 15 Adjustable Dose Pre-filled Pen Syringe 5    insulin glargine (Lantus Solostar U-100 Insulin) 100 unit/mL (3 mL) inpn INJECT 49 UNITS UNDER THE SKIN EVERY NIGHT AT BEDTIME  Indications: type 2 diabetes mellitus 15 Adjustable Dose Pre-filled Pen Syringe 0    Insulin Needles, Disposable, 31 gauge x 5/16\" ndle Use with insulin pen needle 5 times daily 500 Pen Needle 3    insulin lispro (HUMALOG KWIKPEN INSULIN) 100 unit/mL kwikpen INITIATE CORRECTIVE INSULIN PROTOCOL (KATIE):  RX KATIE Normal Sensitivity (Average weight)  For Blood Sugar (mg/dl) of:              111-150=0 units  151-200=2 units  201-250=4 units  251-300=6 units  301-350=8 units  Over 350= 10 units  Indications: type 2 diabetes mellitus 1 Package 3    glucose blood VI test strips (ASCENSIA AUTODISC VI, ONE TOUCH ULTRA TEST VI) strip Check fsbs tid 250.02 300 Strip 3    lancets misc Check fsbs bid 200 Each 11    Blood-Glucose Meter monitoring kit fsbs bid dx 250.02 1 Kit 0     Allergies   Allergen Reactions    Lisinopril Anaphylaxis     Social History     Tobacco Use    Smoking status: Former Smoker     Years: 1.00     Types: Cigarettes    Smokeless tobacco: Never Used   Substance Use Topics    Alcohol use: Not Currently     Frequency: Monthly or less     Drinks per session: 1 or 2      Lab Results   Component Value Date/Time    WBC 12.3 (H) 12/24/2020 04:47 AM    HGB 16.4 12/24/2020 04:47 AM    HCT 50.1 12/24/2020 04:47 AM    PLATELET 333 72/90/2396 04:47 AM    MCV 91.1 12/24/2020 04:47 AM     Lab Results   Component Value Date/Time    Hemoglobin A1c 10.5 (H) 12/23/2020 02:38 AM    Hemoglobin A1c 10.1 (H) 11/07/2019 03:59 PM    Hemoglobin A1c 11.3 (H) 01/21/2019 02:54 AM    Glucose 359 (H) 12/22/2020 11:52 AM    Glucose (POC) 199 (H) 12/23/2020 09:10 PM    Microalb/Creat ratio (ug/mg creat.) 12.7 05/30/2019 04:36 PM    LDL, calculated 121.6 (H) 12/23/2020 02:38 AM    Creatinine 1.13 12/22/2020 11:52 AM      Lab Results   Component Value Date/Time    Cholesterol, total 196 12/23/2020 02:38 AM    HDL Cholesterol 23 12/23/2020 02:38 AM    LDL, calculated 121.6 (H) 12/23/2020 02:38 AM    Triglyceride 257 (H) 12/23/2020 02:38 AM    CHOL/HDL Ratio 8.5 (H) 12/23/2020 02:38 AM     Lab Results   Component Value Date/Time    GFR est non-AA >60 12/22/2020 11:52 AM    GFR est AA >60 12/22/2020 11:52 AM    Creatinine 1.13 12/22/2020 11:52 AM    BUN 28 (H) 12/22/2020 11:52 AM    Sodium 131 (L) 12/22/2020 11:52 AM    Potassium 4.1 12/22/2020 11:52 AM    Chloride 97 12/22/2020 11:52 AM    CO2 23 12/22/2020 11:52 AM    Magnesium 2.2 01/29/2019 02:48 AM    Phosphorus 2.7 01/29/2019 02:48 AM        ROS    Physical Exam  Constitutional:       Appearance: Normal appearance. Pulmonary:      Effort: Pulmonary effort is normal.   Neurological:      General: No focal deficit present. Mental Status: He is alert. Psychiatric:         Mood and Affect: Mood normal.         Behavior: Behavior normal.         Thought Content:  Thought content normal.         Judgment: Judgment normal.         ASSESSMENT and PLAN  Diagnoses and all orders for this visit:    1. Acute CVA (cerebrovascular accident) (HCC)  -     REFERRAL TO PHYSICAL THERAPY   Fu neurologist next week   No driving for now--discssed [place]   On aspirin   Work letter  2. Ataxic gait  -     REFERRAL TO PHYSICAL THERAPY    3. Essential hypertension  -     metoprolol succinate (TOPROL-XL) 25 mg XL tablet; Take 1 Tab by mouth daily.  -     amLODIPine (NORVASC) 10 mg tablet; Take 1 Tab by mouth daily.   Refill norvasc  4. Uncontrolled type 2 diabetes mellitus with hyperglycemia (HCC)  -     REFERRAL TO ENDOCRINOLOGY    Refer ot pharm D in office for  assistance and freestyle suly  5. Pure hypercholesterolemia  -     LIPID PANEL  -     atorvastatin (LIPITOR) 20 mg tablet; Take 1 Tab by mouth daily.    6. Systolic CHF, chronic (HCC)  -     REFERRAL TO CARDIOLOGY   asymptomatic      RTC 4 months

## 2021-01-06 NOTE — PATIENT INSTRUCTIONS
This is an established visit conducted via telemedicine. The patient has been instructed that this meets HIPAA criteria and acknowledges and agrees to this method of visitation.  
 
Jess Marroquin 
43/04/30 
30:58 AM

## 2021-01-06 NOTE — TELEPHONE ENCOUNTER
----- Message from Alhaji Olson sent at 1/6/2021  1:13 PM EST -----  Regarding: Appointment with Kaleigh Argueta  Contact: [phone]  Appointment not available    Caller's first and last name and relationship to patient (if not the patient): Maria Ines Thomas      Best contact [phone]        Preferred date and time: Anytime/date      Scheduled appointment date and time: Not currently scheduled.      Reason for appointment: Dr. Hanna Referral.      Details to clarify the request: Patient wife Maria Ines, advisregla had a referral for pharmacist from  on 01/06/21. Patient was referred to Kaleigh Argueta by MD Hanna for prescriptions, MD Hanna is to contact as behalf of patient also.      Message from BRIAN

## 2021-01-11 ENCOUNTER — HOSPITAL ENCOUNTER (OUTPATIENT)
Dept: PHYSICAL THERAPY | Age: 56
Discharge: HOME | End: 2021-01-11
Payer: COMMERCIAL

## 2021-01-11 PROCEDURE — 97116 GAIT TRAINING THERAPY: CPT

## 2021-01-11 PROCEDURE — 97162 PT EVAL MOD COMPLEX 30 MIN: CPT

## 2021-01-11 NOTE — PROGRESS NOTES
[facility]  [address] ([address] [place] Zam[place]  Phone: [phone] Fax: [phone]    Plan of Care/Statement of Necessity for Physical Therapy Services  2-15    Patient name: Tj Perez  : 1965  Provider#: [id number]  Referral source: Dawit Cook MD      Medical/Treatment Diagnosis: Ataxic gait [R26.0]     Prior Hospitalization: see medical history     Comorbidities: Diabetes, hypertension, acute CVA  Prior Level of Function: Independent, full-time employee at [employer])  Medications: Verified on Patient Summary List    Start of Care: 21      Onset Date: 20       The Plan of Care and following information is based on the information from the initial evaluation. Assessment/ key information:  The patient is a 48year old male who presents with his wife to physical therapy services due to acute CVA with L hemiparesis 20. He demonstrates mild L LE strength deficits, static and dynamic postural control impairments (CLAYTON Balance Scale 33/56; TUG 12.2 seconds with close (S)-CGA), and gait deviations which limit his safety with functional ambulation without assistive device at this time. Recommended patient purchase and trial/practice use of SPC in home environment over varied surfaces as practiced during physical therapy to improve safety with ambulation upon return to work, for which patient reports referring physician has cleared him and he plans to return 21. The patient would benefit from continued skilled physical therapy services to increase LE strength and static/dynamic balance toward improved safety with functional tasks, however patient and his wife do not plan to pursue further physical therapy services despite education regarding assessment outcomes and safety risks with return to work tasks present at this time.     Evaluation Complexity History HIGH Complexity :3+ comorbidities / personal factors will impact the outcome/ POC ; Examination HIGH Complexity : 4+ Standardized tests and measures addressing body structure, function, activity limitation and / or participation in recreation  ;Presentation MEDIUM Complexity : Evolving with changing characteristics  ; Clinical Decision Making Other outcome measures CLAYTON  MEDIUM  Overall Complexity Rating: MEDIUM    Problem List: decrease strength, impaired gait/ balance, decrease ADL/ functional abilitiies, decrease activity tolerance, decrease flexibility/ joint mobility and decrease transfer abilities   Treatment Plan may include any combination of the following: Therapeutic exercise, Therapeutic activities, Neuromuscular re-education, Physical agent/modality, Gait/balance training, Manual therapy, Patient education, Self Care training, Functional mobility training, Home safety training and Stair training  Patient / Family readiness to learn indicated by: asking questions, trying to perform skills and interest  Persons(s) to be included in education: patient (P) and family support person (FSP); wife  Barriers to Learning/Limitations: None  Patient Goal (s): To return to [facility]  Patient Self Reported Health Status: good  Rehabilitation Potential: good    Short Term Goals: To be accomplished in 2-4 treatments: The patient will demonstrate independence with updated HEP to demonstrate improved functional independence. The patient will demonstrate appropriate gait mechanics over even and uneven surfaces using SPC with three-point gait pattern toward improved safety with ambulation. Long Term Goals: To be accomplished in 6-8 treatments: The patient will score >= 45/56 on CLAYTON Balance Scale to demonstrate decreased risk of fall with functional activity.               The patient will perform TUG in <= 11 seconds to demonstrate improved community ambulation potential.  Frequency / Duration: Patient to be seen 1-2 times per week for 4-6 weeks. Patient/ Caregiver education and instruction: self care, activity modification and exercises    [x]  Plan of care has been reviewed with LUC Bishop PT, DPT 1/11/2021     ________________________________________________________________________    I certify that the above Therapy Services are being furnished while the patient is under my care. I agree with the treatment plan and certify that this therapy is necessary.     [de-identified] Signature:____________________  Date:____________Time: _________

## 2021-01-11 NOTE — PROGRESS NOTES
Pharmacy Progress Note - Diabetes Management    S/O: Mr. Dale Solis is a 48 y.o. male, referred by Dr. Eugenia Martínez a PMH of HTN, CVA (Dec 2020),  T2DM, Hx of pancreatitis, GERD, KATJA, was seen today for diabetes management. Patient's last A1c was 10.5% (Dec 2020), 10.1% (Nov 2019). Accompanied by his wife today. Ambulates w/ a cane. Recent [facility] hospitalization for CVA and dehydration (Dec 2020)  - Started on ASA 81 mg daily.   ---> today - reports to taking 325 mg daily. Has Neurology follow up later this week. Denies any issues w/ bleeding bruising.   - Hyper-TG. . Atorvastatin increased from 10 mg daily to 20 mg daily. Norvasc incrased from 5 mg daily to 10 mg daily.   - On Toprol 25 mg daily. ---- > today - reports to picking med up yesterday. Has not started. Monitors BP at home - reports SBP: 128. SHx:   -  Works in IT support. Expected to return to work later this week. Medication Adherence/Access:  - [employer].   - [facility] [place]. Diabetes Management:  Diabetes History:  - Endorses  20+ yr hx of T2DM  - Family hx:  Brother recently dx - on metformin.   - Previous anti-hyperglycemic agents includes:  - Glipizide, metformin, Levemir, Novolog, 75/25 insulin, Actos. - Did not tolerate metformin - \"constant diarrhea\"     Current anti-hyperglycemic regimen includes:    - Lantus Solarstar  60 units HS  - Humalog SSI:  Reports:   - >130-150: \"may give 6 units\"   - 150-200:  8 units   - > 200: 10 units. Typically avg between 8-10 units two-three times/day    - Reports sx hypo-BG when BG < 120.   - Does not like to give Humalog when he's at work (lunch: ~2-3PM)  - Current giving insulins on his \"side hip\"  Frustrated about lantus pens \"jamming up\"     - On ASA: YES  - On a moderate/high intensity statin: YES -  Lipitor 20 mg ; LDL: 121 ; HDL: 23 ; non-HDL:  173 (Dec 2020).   TG = 257.   - On ACE/ARB therapy: allergic to ACEI.   - Nicotine dependence?: No    ROS:  Today, Pt endorses:  - Symptoms of Hyperglycemia: fatigue  - Symptoms of Hypoglycemia: none    Self Monitoring Blood Glucose (SMBG) or CGM:  Does not like to monitor SMBG. Inquires about CGM    Vitals: Wt Readings from Last 3 Encounters:   01/12/21 218 lb (98.9 kg)   12/22/20 207 lb 0.2 oz (93.9 kg)   11/07/19 235 lb (106.6 kg)     BP Readings from Last 3 Encounters:   01/12/21 132/77   12/24/20 (!) 146/94   11/07/19 (!) 150/91     Pulse Readings from Last 3 Encounters:   01/12/21 96   12/24/20 95   12/21/20 90       Past Medical History:   Diagnosis Date    Diabetes (HCC)     Nausea & vomiting     Pancreatitis     Unspecified sleep apnea      Allergies   Allergen Reactions    Lisinopril Anaphylaxis       Current Outpatient Medications   Medication Sig    metoprolol succinate (TOPROL-XL) 25 mg XL tablet Take 1 Tab by mouth daily.  atorvastatin (LIPITOR) 20 mg tablet Take 1 Tab by mouth daily.  amLODIPine (NORVASC) 10 mg tablet Take 1 Tab by mouth daily.  aspirin 81 mg chewable tablet Take 1 Tab by mouth daily.  (Patient taking differently: Take 325 mg by mouth daily.)    insulin glargine (Lantus Solostar U-100 Insulin) 100 unit/mL (3 mL) inpn INJECT 49 UNITS UNDER THE SKIN EVERY NIGHT AT BEDTIME  Indications: type 2 diabetes mellitus (Patient taking differently: INJECT 60 UNITS UNDER THE SKIN EVERY NIGHT AT BEDTIME  Indications: type 2 diabetes mellitus)    Insulin Needles, Disposable, 31 gauge x 5/16\" ndle Use with insulin pen needle 5 times daily    insulin lispro (HUMALOG KWIKPEN INSULIN) 100 unit/mL kwikpen INITIATE CORRECTIVE INSULIN PROTOCOL (KATIE):  RX KATIE Normal Sensitivity (Average weight)  For Blood Sugar (mg/dl) of:              111-150=0 units  151-200=2 units  201-250=4 units  251-300=6 units  301-350=8 units  Over 350= 10 units  Indications: type 2 diabetes mellitus    glucose blood VI test strips (ASCENSIA AUTODISC VI, ONE TOUCH ULTRA TEST VI) strip Check fsbs tid 250.02  lancets misc Check fsbs bid    Blood-Glucose Meter monitoring kit fsbs bid dx 250.02     No current facility-administered medications for this visit. Lab Results   Component Value Date/Time    Sodium 131 (L) 12/22/2020 11:52 AM    Potassium 4.1 12/22/2020 11:52 AM    Chloride 97 12/22/2020 11:52 AM    CO2 23 12/22/2020 11:52 AM    Anion gap 11 12/22/2020 11:52 AM    Glucose 359 (H) 12/22/2020 11:52 AM    BUN 28 (H) 12/22/2020 11:52 AM    Creatinine 1.13 12/22/2020 11:52 AM    BUN/Creatinine ratio 25 (H) 12/22/2020 11:52 AM    GFR est AA >60 12/22/2020 11:52 AM    GFR est non-AA >60 12/22/2020 11:52 AM    Calcium 9.2 12/22/2020 11:52 AM    Bilirubin, total 0.9 12/22/2020 11:52 AM    Alk.  phosphatase 127 (H) 12/22/2020 11:52 AM    Protein, total 8.3 (H) 12/22/2020 11:52 AM    Albumin 3.9 12/22/2020 11:52 AM    Globulin 4.4 (H) 12/22/2020 11:52 AM    A-G Ratio 0.9 (L) 12/22/2020 11:52 AM    ALT (SGPT) 26 12/22/2020 11:52 AM     Lab Results   Component Value Date/Time    Cholesterol, total 196 12/23/2020 02:38 AM    HDL Cholesterol 23 12/23/2020 02:38 AM    LDL, calculated 121.6 (H) 12/23/2020 02:38 AM    VLDL, calculated 51.4 12/23/2020 02:38 AM    Triglyceride 257 (H) 12/23/2020 02:38 AM    CHOL/HDL Ratio 8.5 (H) 12/23/2020 02:38 AM     Lab Results   Component Value Date/Time    WBC 12.3 (H) 12/24/2020 04:47 AM    HGB 16.4 12/24/2020 04:47 AM    HCT 50.1 12/24/2020 04:47 AM    PLATELET 383 87/47/0588 04:47 AM    MCV 91.1 12/24/2020 04:47 AM       Lab Results   Component Value Date/Time    Microalb/Creat ratio (ug/mg creat.) 12.7 05/30/2019 04:36 PM    Microalbumin urine (POC) 30 02/04/2011 03:50 PM       Lab Results   Component Value Date/Time    Hemoglobin A1c 10.5 (H) 12/23/2020 02:38 AM    Hemoglobin A1c 10.1 (H) 11/07/2019 03:59 PM    Hemoglobin A1c 11.3 (H) 01/21/2019 02:54 AM     Hemoglobin A1c (POC)   Date Value Ref Range Status   05/30/2019 8.6 (A) 4.8 - 5.6 % Final        Estimated Creatinine Clearance: 91.8 mL/min (by C-G formula based on SCr of 1.13 mg/dL). A/P:    Medication Adherence/Access:  - Pt is not adherent to current medication regimen. Diabetes Management:  - Per ADA guidelines, Pt's A1c is not at goal of < 7%. - BP slightly above goal <130/80. Recommend Pt start Toprol 25 mg daily. Monitor BP at home. - Discuss concern regarding uncontrolled DM.    - Continue Lantus 60 units daily. Discuss injecting insulins in abdominal area rather than hip area d/t adiposity. If Lantus pens continue to jam - will switch to Movie Mouth. - For Humalog - keep dose consistent --- 10 units before each meal twice daily.    - Discuss CGM vs traditional glucometer.    - Will see if we can get Alyx CGM covered. Pt to use his phone for the reader.   - Recommend Pt discuss ASA dose with neurologist later this. Consider enteric coated version to minimize GI SE. Patient's Immunization History:    Immunizations by Immunization family     Influenza Vaccine 2019 10/14/2020      Pneumococcal Polysaccharide (PPSV-23) 2019             Medication reconciliation was completed during the visit. Medications Discontinued During This Encounter   Medication Reason    aspirin 81 mg chewable tablet DOSE ADJUSTMENT     Orders Placed This Encounter    aspirin (ASPIRIN) 325 mg tablet     Sig: Take 325 mg by mouth daily.  flash glucose sensor (FreeStyle Alyx 14 Day Sensor) kit     Si Each by Does Not Apply route See Admin Instructions. Apply and replace sensor every 2 weeks. Scan at least 3 times a day. E11.65     Dispense:  2 Kit     Refill:  11     Patient verbalized understanding of the information presented and all of the patients questions were answered. AVS was handed to the patient. Patient advised to call the office with any additional questions or concerns. Notifications of recommendations will be sent to Dr. Jensen Oliver MD for review. VV follow up in 2 weeks. Thank you for the consult,  Camille Yu, PharmD, BCACP, [facility]        CLINICAL PHARMACY CONSULT: MED RECONCILIATION/REVIEW ADDENDUM    For Pharmacy Admin Tracking Only    PHSO: Eric Melo [id number] Patient?: Yes  Total # of Interventions Recommended: Count: 3  - Increased Dose #: 1  - New Order #: 1 New Medication Order Reason(s): Needs Additional Medication Therapy  - Updated Order #: 1 Updated Order Reason(s): OTC    Time Spent (min): 60

## 2021-01-11 NOTE — PROGRESS NOTES
PT INITIAL EVALUATION NOTE 2-15    Patient Name: Chantelle Messina  Date:2021  : 1965  [x]  Patient  Verified  Payor: Patria Jacobson / Plan: Jovita Kan / Product Type: HMO /    In time:1012  Out time: 1115  Total Treatment Time (min): 61  Visit #: 1     *Patient arrived with his wife 10 minutes late for therapy visit today*    Treatment Area: Ataxic gait [R26.0]    SUBJECTIVE  Pain Level (0-10 scale): Patient notes no pain at this time  Any medication changes, allergies to medications, adverse drug reactions, diagnosis change, or new procedure performed?: [] No    [x] Yes (see summary sheet for update)  Subjective: The patient and his wife report he awoke on 20 and was unable to rise, noting significant dizziness and imbalance which limited from walking. Initially, they suspected COVID-19 virus, vertigo, or diabetes-related complications; he obtained COVID-19 test that  (), which returned (-), however provider indicated patient had a swollen gall bladder. PCP indicated patient should go immediately to ED, where testing revealed patient had suffered a L-sided cerebellar peduncle infarct with resultant damage to parasagittal frontal lobe. He was given TPA and IV fluids due to significant dehydration. Since discharge from the hospital, his walking and balance have improved, however he continues to experience significant fatigue after any exertion; he has been cleared by referring physician to return to work this Wednesday (21). 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 Functional goals include: return to work. OBJECTIVE/EXAMINATION    BP: 132/82    Posture:  Wide base of support, forward trunk lean, UEs abducted for balance, forward head posture    Gait: Patient ambulated with wide base of support, ataxic gait, L trunk lean, decreased step length bilaterally, decreased knee extension at terminal stance L > R, shuffling gait    MMT: 5/5 throughout bilateral LEs with the following exceptions -- L hip flexion 4+/5; L ankle dorsiflexion 4-/5; L ankle plantar flexion 4/5; L hip abduction 4+/5; L hip adduction 4+/5    Dermatomes: WNL throughout bilateral LEs except L toes (L4-S1, suspect due to diabetic neuropathy); patient able to feel only very dull pressure with pressure testing along plantar surface of bilateral feet  Reflexes: 2+ bilateral patellar/1+ bilateral Achilles  Screening: (+) pushing to L in standing and with ambulation; clonus < 2 beats at ankle; (-) Babinski; (-) proprioception at L ankle/great toe; (-) pronator drift; visual fields grossly intact to R and L; (-) disdiadochokinesia    TU.2 seconds first trial/11.2 seconds second trial; average = 12.2 seconds with close (S) - CGA for safety  CLAYTON Balance Scale: 33/56      15 min Gait Trainin feet with single point cane on level surfaces with close (S) - Min A level of assist. Educated patient on appropriate three-point gait using SPC in R and L UEs; patient demonstrating significantly improved postural control and ability to self-recover from postural disturbances with SPC in L hand and recommended to practice this technique in home environment. Rationale: increase ROM, increase strength, improve coordination, improve balance and increase proprioception  to improve the patients ability to ambulate safely and complete work tasks          With   [] TE   [] TA   [] neuro   [x] other: gait training Patient Education: [x] Review HEP    [] Progressed/Changed HEP based on:   [] positioning   [] body mechanics   [] transfers   [] heat/ice application    [x] other: Discussed potential use of SPC to ambulate and complete work tasks due to presence of significant static and dynamic balance impairments limiting safety at this time.  Discussed appropriate SPC fitting, as well as rational for use. Educated patient and his wife on appropriate signs and symptoms to monitor to ensure continued progress, and on potential benefits of continuing to pursue skilled physical therapy intervention to ensure maximal functional returns. Other Objective/Functional Measures: Patient initially reticent to try [facility] due to social perception concerns, however amenable to trial after discussion and agreeing that he felt safer and more stable with use of SPC pre-post gait training today.     Pain Level (0-10 scale) post treatment: 0      ASSESSMENT:      [x]  See Plan of Kali 68, PT, DPT 1/11/2021

## 2021-01-12 ENCOUNTER — OFFICE VISIT (OUTPATIENT)
Dept: INTERNAL MEDICINE CLINIC | Age: 56
End: 2021-01-12

## 2021-01-12 VITALS
HEIGHT: 76 IN | WEIGHT: 218 LBS | HEART RATE: 96 BPM | BODY MASS INDEX: 26.55 KG/M2 | DIASTOLIC BLOOD PRESSURE: 77 MMHG | OXYGEN SATURATION: 96 % | SYSTOLIC BLOOD PRESSURE: 132 MMHG

## 2021-01-12 DIAGNOSIS — E08.00 DIABETES MELLITUS DUE TO UNDERLYING CONDITION WITH HYPEROSMOLARITY WITHOUT COMA, WITHOUT LONG-TERM CURRENT USE OF INSULIN (HCC): ICD-10-CM

## 2021-01-12 RX ORDER — ASPIRIN 325 MG
325 TABLET ORAL DAILY
COMMUNITY
End: 2021-05-04

## 2021-01-12 RX ORDER — INSULIN GLARGINE 100 [IU]/ML
INJECTION, SOLUTION SUBCUTANEOUS
Qty: 15 ADJUSTABLE DOSE PRE-FILLED PEN SYRINGE | Refills: 0
Start: 2021-01-12 | End: 2021-03-11 | Stop reason: SDUPTHER

## 2021-01-12 RX ORDER — FLASH GLUCOSE SENSOR
1 KIT MISCELLANEOUS SEE ADMIN INSTRUCTIONS
Qty: 2 KIT | Refills: 11 | Status: SHIPPED | OUTPATIENT
Start: 2021-01-12 | End: 2021-07-08 | Stop reason: SDUPTHER

## 2021-01-12 NOTE — PATIENT INSTRUCTIONS
· Continue Lantus 60 units daily · For your Humalog - give 10 units before each meal twice daily. · Start Metoprolol 25 mg daily. · Monitor your blood pressure at home. Let us know if your top reading drops below 110 or  If you have symptoms of low blood pressure. · Talk to your neurologist tomorrow about aspirin dose - 325 mg daily or 81 mg daily · Let's see if we can get your Ac Sesarmith.

## 2021-01-12 NOTE — Clinical Note
Alyx CGM now approved for Mr. Farley Madonna.   Hopefully we will be able to get some BG readings to guide therapy.    [place]

## 2021-01-13 NOTE — PROGRESS NOTES
Pharmacy Progress Note     Confirmation of Alyx CG coverage.  $0 copay. Pt to  sensor today.      Thank you for the consult,  Camille Harrison, PharmD, [facility]

## 2021-01-14 ENCOUNTER — VIRTUAL VISIT (OUTPATIENT)
Dept: NEUROLOGY | Age: 56
End: 2021-01-14
Payer: COMMERCIAL

## 2021-01-14 DIAGNOSIS — I69.993 CVA, OLD, ATAXIA: ICD-10-CM

## 2021-01-14 DIAGNOSIS — I63.342 STROKE DUE TO THROMBOSIS OF LEFT CEREBELLAR ARTERY (HCC): Primary | ICD-10-CM

## 2021-01-14 DIAGNOSIS — Z71.85 VACCINE COUNSELING: ICD-10-CM

## 2021-01-14 DIAGNOSIS — Z09 HOSPITAL DISCHARGE FOLLOW-UP: ICD-10-CM

## 2021-01-14 DIAGNOSIS — Z71.89 COUNSELED ABOUT COVID-19 VIRUS INFECTION: ICD-10-CM

## 2021-01-14 PROBLEM — J11.1 INFLUENZA: Status: RESOLVED | Noted: 2019-01-19 | Resolved: 2021-01-14

## 2021-01-14 PROBLEM — J96.01 ACUTE HYPOXEMIC RESPIRATORY FAILURE (HCC): Status: RESOLVED | Noted: 2019-04-15 | Resolved: 2021-01-14

## 2021-01-14 PROCEDURE — 99215 OFFICE O/P EST HI 40 MIN: CPT | Performed by: PSYCHIATRY & NEUROLOGY

## 2021-01-14 PROCEDURE — 1111F DSCHRG MED/CURRENT MED MERGE: CPT | Performed by: PSYCHIATRY & NEUROLOGY

## 2021-01-14 NOTE — PATIENT INSTRUCTIONS
Per our discussion Continue on 325 mg aspirin a day Extremely important that you continue to keep your comorbid conditions of blood pressure cholesterol and blood sugars under good control these are all high risk factors for stroke. Work with your primary care regarding the management of these Additionally these other conditions can affect other organ systems in your body as well so it really is imperative that you keep these under best control possible to continue to have best quality of life over time If you have any other signs or symptoms that would appear to be stroke or strokelike event please go to the emergency room immediately for further evaluation I strongly encourage you to use my chart if you need to contact us. Presently with a high utilization of telehealth it is very difficult to get through on her phone lines. If you have not already activated my chart or you forget your password their instructions in this packet to get my chart activated. Per discussion I do strongly recommend that you get the Covid vaccination when it becomes available to you there are no contraindications from a neurologic perspective or from a medical perspective based on our medical history review. But follow-up with your primary care doctor to be sure there there are no other concerns that I am unaware of 
 
Office Policies 
 
o Phone calls/patient messages: Please allow up to 24 hours for someone in the office to contact you about your call or message. Be mindful your provider may be out of the office or your message may require further review. We encourage you to use [employer] for your messages as this is a faster, more efficient way to communicate with our office 
 
o Medication Refills: 
Prescription medications require up to 48 business hours to process. We encourage you to use [employer] for your refills. For controlled medications: Please allow up to 72 business hours to process. Certain medications may require you to  a written prescription at our office. NO narcotic/controlled medications will be prescribed after 4pm Monday through Friday or on weekends 
 
o Form/Paperwork Completion: We ask that you allow 7-14 business days. You may also download your forms to [employer] to have your doctor print off. Learning About FAST: Stroke Warning Signs What is FAST? FAST is a simple way to remember the main symptoms of stroke. Recognizing these symptoms helps you know when to call for medical help. FAST stands for: · F ace drooping. · A rm weakness. · S peech difficulty. · T steven to call 911. Other stroke symptoms can include sudden confusion, a severe headache, and problems with vision or balance. What happens when you have a stroke? A stroke occurs when a blood vessel to the brain bursts or is blocked by a blood clot. Within minutes, the nerve cells in that part of the brain die. As a result, the part of the body controlled by those cells cannot work properly. The effects of a stroke may range from mild to severe. They may get better, or they may last the rest of your life. A stroke can affect vision, speech, behavior, thought processes, and your ability to move. It can cause symptoms that may include: 
· Sudden numbness, tingling, weakness, or loss of movement in your face, arm, or leg, especially on only one side of your body. · Sudden vision changes. · Sudden trouble speaking. · Sudden confusion or trouble understanding simple statements. · Sudden problems with walking or balance. · A sudden, severe headache that is different from past headaches. Why is it important to get help FAST? Quick treatment may save your life. And it may reduce the damage in your brain so that you have fewer problems after the stroke. When you know stroke symptoms, you will know when it's important to call for medical help. Where can you learn more? Go to http://www.gray.com/ Enter E849 in the search box to learn more about \"Learning About FAST: Stroke Warning Signs. \" Current as of: March 4, 2020               Content Version: 12.6 © 1707-6404 [employer], [employer]. Care instructions adapted under license by [employer] (which disclaims liability or warranty for this information). If you have questions about a medical condition or this instruction, always ask your healthcare professional. [place] [address] any warranty or liability for your use of this information.

## 2021-01-14 NOTE — PROGRESS NOTES
[facility]  TELEHEALTH Virtual FOLLOW-UP VISIT        Meliza Gallego is a 48 y.o. male who was seen by synchronous (real-time) audio-video technology on 1/14/2021. Meliza Gallego is a 48 y.o. male who presents today for the following:  Chief Complaint   Patient presents with   [facility] Follow Up    Stroke     \" no new symtoms or complaints\"         ASSESSMENT AND PLAN  Patient is known to this practice. This is my first time seeing the patient. Chart and history reviewed in detail at today's office visit. Status post CVA left cerebellar artery: Ataxia  Stable continue on baby aspirin a day  Continue to keep all comorbid conditions under good control  Continue to modify risk factors and we discussed these    Ataxia  Related to CVA  Was initially evaluated by PT but only needs to do home exercises which she states he continues to do  Patient has reported significant improvement although not back to baseline    Counseling regarding Covid vaccine encourage patient to get the vaccine when available there is no contraindication from a neurologic perspective  Patient also brought up shingles vaccine. I also recommended that he get that completed but the Covid vaccine should be the priority and once recovered from that can proceed with the shingles vaccine            ICD-10-CM ICD-9-CM    1. 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 Counseled about COVID-19 virus infection  Z71.89 V65.49    3. Vaccine counseling  Z71.89 V65.49    4. CVA, old, ataxia  F44.867 18.80          I attest that 40 minutes was spent on today's visit reviewing medical records and diagnostic testing deemed pertinent to this patient's care, along with direct time spent at patient's visit including the history, physical assessment and plan, discussing diagnosis and management along with documentation.       HPI  Historical Data  Patient is known to the practice and was previously seen by Dr. Dashawn gordon  Patient was initially consulted while in the hospital  Patient was admitted December 22, 2020 through December 24, 2020  Presented with dizziness  Neurologic diagnosis ataxia with stroke:    MRI: reviewed  CTA: Mild calcific atherosclerosis of the aortic arch. Mild stenosis of the mid to distal left common carotid artery from eccentric noncalcified plaque. No significant stenosis of the right common carotid artery. Calcific atherosclerosis of the right carotid bifurcation with mild (less than 50%) stenosis of the proximal right internal carotid artery  A1C : 10.5  TSH: 1.32  LDL: 121.6  Echocardiogram: EF 45%    Neurologic diagnosis  Stroke December 2020: Left medial cerebellar pontine peduncle infarct and possible left parasagittal frontal lobe infarct  Chronic small vessel cerebrovascular disease    Other significant comorbid conditions/concerns  Diabetes: Poorly controlled  Dyslipidemia  Hypertension    Pertinent diagnostic data  CTA head neck: No significant hemodynamically significant stenosis    MRI 12/22/2020: IMPRESSION:  1. Small acute left medial cerebellar peduncle infarct. 2. Possible acute/subacute minimal ischemic injury left parasagittal frontal  lobe versus T2 shine through. 3. Multiple chronic foci T2 hyperintensity in nonspecific pattern possibly all  related to chronic small vessel disease.     ECHO 12/23/2020:   Result status: Final result   · Image quality for this study was suboptimal.  · LV: Estimated LVEF is 45 - 50%. Normal cavity size. Mild to moderate concentric hypertrophy. Age-appropriate left ventricular diastolic function. · Contrast used: DEFINITY.          Interim Data:     Status post CVA presented with dizziness which resolved with hydration persisted with ataxia found to have small cerebellar stroke and multiple uncontrolled risk factors  Discharged home on a baby aspirin a day    Ataxia: reports much improved  Able to walk independently but still \"carries a cane\"; had PT evaluation and does not need PT and was instructed     Denies signs or symptoms of breakthrough cerebrovascular insufficiency in the anterior posterior circulation    BP and BS under better control and remains adherent to treatment protocols    Continues on 325mg /day  Denies side effects or problems    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  flash glucose sensor (FreeStyle Alyx 14 Day Sensor) kit 1 Each by Does Not Apply route See Golden Garcia. Apply and replace sensor every 2 weeks. Scan at least 3 times a day. E11.65    insulin glargine (Lantus Solostar U-100 Insulin) 100 unit/mL (3 mL) inpn INJECT 60 UNITS UNDER THE SKIN EVERY NIGHT AT BEDTIME  Indications: type 2 diabetes mellitus    metoprolol succinate (TOPROL-XL) 25 mg XL tablet Take 1 Tab by mouth daily.  atorvastatin (LIPITOR) 20 mg tablet Take 1 Tab by mouth daily.  amLODIPine (NORVASC) 10 mg tablet Take 1 Tab by mouth daily.  insulin lispro (HUMALOG KWIKPEN INSULIN) 100 unit/mL kwikpen INITIATE CORRECTIVE INSULIN PROTOCOL (KATIE):  RX KATIE Normal Sensitivity (Average weight)  For Blood Sugar (mg/dl) of:              111-150=0 units  151-200=2 units  201-250=4 units  251-300=6 units  301-350=8 units  Over 350= 10 units  Indications: type 2 diabetes mellitus (Patient taking differently: 130-150: 6 units; 150-199: 8 units; 200 : 10 units.   Indications: type 2 diabetes mellitus)    glucose blood VI test strips (ASCENSIA AUTODISC VI, ONE TOUCH ULTRA TEST VI) strip Check fsbs tid 250.02    lancets misc Check fsbs bid    Blood-Glucose Meter monitoring kit fsbs bid dx 250.02    Insulin Needles, Disposable, 31 gauge x 5/16\" ndle Use with insulin pen needle 5 times daily No current facility-administered medications for this visit. Past medical history/surgical history, family history, and social history have been reviewed for today's visit      ROS    A ten system review of constitutional, cardiovascular, respiratory, musculoskeletal, endocrine, skin, SHEENT, genitourinary, psychiatric and neurologic systems was obtained and is unremarkable except as mentioned under HPI          EXAMINATION: Within the context of virtual telehealth visit:    General appearance: Patient is well-developed and well-nourished in no apparent distress and well groomed. Psych/mental health:  Affect: Appropriate    PHQ  3 most recent PHQ Screens 11/7/2019   Little interest or pleasure in doing things Not at all   Feeling down, depressed, irritable, or hopeless Not at all   Total Score PHQ 2 0       HEENT:   Normocephalic  With evidence of trauma: No  Full range of motion head neck: Yes  Tenderness to palpation of the head neck region: N/A      Cardiovascular:     Extremities warm to touch: N/A  Extremity swelling: No  Discoloration: No  Evidence of PVD: No    Respiratory:   Dyspnea on exertion: No   Abnormal effort on casual observation: No   Use of portable oxygen: No   Evidence of cyanosis: No     Musculoskeletal:   Evidence of significant bone deformities: No   Spinal curvature: No     Integumentary:    Obvious bruising: No   Lacerations or discoloration on casual observation: No       Neurological Examination:   Mental Status:        MMSE  No flowsheet data found. Formal testing was not completed    there was nothing concerning on general observation and discussion.    Alert oriented and appropriate to general conversation  Normal processing on general observation  Followed conversation and responded seemingly appropriate throughout the office visit  No word finding difficulties noted on casual observation  Able to follow directions without difficulty     Cranial Nerves:      Grossly intact    Motor:   Normal bulk  No tremor appreciated on today's exam  No abnormal movements appreciated on today's exam  Moves extremities spontaneously and with purpose      Sensation: Not tested    Coordination/Cerebellar:   FTN: Mild terminal tremor bilaterally LT more noticeable than RT but both still mild    Gait: Not tested    Fall risk assessment  No flowsheet data found. Due to this being a TeleHealth evaluation, many elements of the physical examination are unable to be assessed. Pursuant to the emergency declaration under the [address] [place] [address], [facility] waiver authority and the [employer] Act, this Virtual  Visit was conducted, with patient's consent, to reduce the patient's risk of exposure to COVID-19 and provide continuity of care for an established patient. Services were provided through a video synchronous discussion virtually to substitute for in-person clinic visit.           Nathaly Boyd MS, ANP-BC, [facility]

## 2021-01-19 ENCOUNTER — PATIENT OUTREACH (OUTPATIENT)
Dept: CASE MANAGEMENT | Age: 56
End: 2021-01-19

## 2021-01-28 ENCOUNTER — VIRTUAL VISIT (OUTPATIENT)
Dept: INTERNAL MEDICINE CLINIC | Age: 56
End: 2021-01-28

## 2021-01-28 DIAGNOSIS — E08.00 DIABETES MELLITUS DUE TO UNDERLYING CONDITION WITH HYPEROSMOLARITY WITHOUT COMA, WITHOUT LONG-TERM CURRENT USE OF INSULIN (HCC): ICD-10-CM

## 2021-01-28 RX ORDER — INSULIN LISPRO 100 [IU]/ML
INJECTION, SOLUTION INTRAVENOUS; SUBCUTANEOUS
Qty: 1 PACKAGE | Refills: 3
Start: 2021-01-28 | End: 2021-03-31 | Stop reason: SDUPTHER

## 2021-01-28 NOTE — PROGRESS NOTES
Pharmacy Progress Note - Diabetes Management    S/O: Mr. Griselda Vick is a 48 y.o. male, referred by Dr. Lian Chávez a PMH of HTN, CVA (Dec 2020),  T2DM, Hx of pancreatitis, GERD, KATJA, was seen virtually today for diabetes management follow up. Patient's last A1c was 10.5% (Dec 2020), 10.1% (2019). PHI verified. Interim update:   Saw neurology ---  mg daily is ok. Now taking Toprol 25 mg daily. Denies any issues. Checking BP at home. 138/84 this morning. HR: 87. SBP usually running 130-140s. Reports Alyx CGM now needs a prior authorization for continued coverage. Back to working now. Current anti-hyperglycemic regimen include(s):    - Lantus Solarstar  60 units HS  - Humalog 10 units before meals BID ---> Generally skips Humalog when he's at work. Will give Humalog 10 units at lunch during the weekend. ROS:  Today, Pt endorses:  - Symptoms of Hyperglycemia: none  - Symptoms of Hypoglycemia: none    Self Monitoring Blood Glucose (SMBG) or CGM:  - Freestyle Alyx CGM started -  21  - Scanning 5x/day   - 14 day av  - Fasting today: 159  - Post-lunch today: 217   - Lowest reading was a 78                      Nutrition/Lifestyle Modifications:  - Eats 3 meals/day ;  Breakfast: sausage + egg + cheese biscuit [place] + water  Snack: none   Lunch: [place] and chips + water  Dinner: varies    Vitals:   Wt Readings from Last 3 Encounters:   21 218 lb (98.9 kg)   20 207 lb 0.2 oz (93.9 kg)   19 235 lb (106.6 kg)     BP Readings from Last 3 Encounters:   21 132/77   20 (!) 146/94   19 (!) 150/91     Pulse Readings from Last 3 Encounters:   21 96   20 95   20 90       Past Medical History:   Diagnosis Date    Acute hypoxemic respiratory failure ([address] 4/15/2019    [address] [address]     GERD (gastroesophageal reflux disease) 10/1/2010    Influenza 2019    Nausea & vomiting     KATJA (obstructive sleep apnea) 10/1/2010    Overweight(278.02) 10/1/2010    Pancreatitis     Unspecified sleep apnea      Allergies   Allergen Reactions    Lisinopril Anaphylaxis     Angioedema       Current Outpatient Medications   Medication Sig    aspirin (ASPIRIN) 325 mg tablet Take 325 mg by mouth daily.  flash glucose sensor (FreeStyle Alyx 14 Day Sensor) kit 1 Each by Does Not Apply route See Golden Garcia. Apply and replace sensor every 2 weeks. Scan at least 3 times a day. E11.65    insulin glargine (Lantus Solostar U-100 Insulin) 100 unit/mL (3 mL) inpn INJECT 60 UNITS UNDER THE SKIN EVERY NIGHT AT BEDTIME  Indications: type 2 diabetes mellitus    metoprolol succinate (TOPROL-XL) 25 mg XL tablet Take 1 Tab by mouth daily.  atorvastatin (LIPITOR) 20 mg tablet Take 1 Tab by mouth daily.  amLODIPine (NORVASC) 10 mg tablet Take 1 Tab by mouth daily.  Insulin Needles, Disposable, 31 gauge x 5/16\" ndle Use with insulin pen needle 5 times daily    insulin lispro (HUMALOG KWIKPEN INSULIN) 100 unit/mL kwikpen INITIATE CORRECTIVE INSULIN PROTOCOL (KATIE):  RX KATIE Normal Sensitivity (Average weight)  For Blood Sugar (mg/dl) of:              111-150=0 units  151-200=2 units  201-250=4 units  251-300=6 units  301-350=8 units  Over 350= 10 units  Indications: type 2 diabetes mellitus (Patient taking differently: 130-150: 6 units; 150-199: 8 units; 200 : 10 units. Indications: type 2 diabetes mellitus)    glucose blood VI test strips (ASCENSIA AUTODISC VI, ONE TOUCH ULTRA TEST VI) strip Check fsbs tid 250.02    lancets misc Check fsbs bid    Blood-Glucose Meter monitoring kit fsbs bid dx 250.02     No current facility-administered medications for this visit.         Lab Results   Component Value Date/Time    Sodium 131 (L) 12/22/2020 11:52 AM    Potassium 4.1 12/22/2020 11:52 AM    Chloride 97 12/22/2020 11:52 AM    CO2 23 12/22/2020 11:52 AM    Anion gap 11 12/22/2020 11:52 AM    Glucose 359 (H) 12/22/2020 11:52 AM BUN 28 (H) 12/22/2020 11:52 AM    Creatinine 1.13 12/22/2020 11:52 AM    BUN/Creatinine ratio 25 (H) 12/22/2020 11:52 AM    GFR est AA >60 12/22/2020 11:52 AM    GFR est non-AA >60 12/22/2020 11:52 AM    Calcium 9.2 12/22/2020 11:52 AM    Bilirubin, total 0.9 12/22/2020 11:52 AM    Alk. phosphatase 127 (H) 12/22/2020 11:52 AM    Protein, total 8.3 (H) 12/22/2020 11:52 AM    Albumin 3.9 12/22/2020 11:52 AM    Globulin 4.4 (H) 12/22/2020 11:52 AM    A-G Ratio 0.9 (L) 12/22/2020 11:52 AM    ALT (SGPT) 26 12/22/2020 11:52 AM       Lab Results   Component Value Date/Time    Cholesterol, total 196 12/23/2020 02:38 AM    HDL Cholesterol 23 12/23/2020 02:38 AM    LDL, calculated 121.6 (H) 12/23/2020 02:38 AM    VLDL, calculated 51.4 12/23/2020 02:38 AM    Triglyceride 257 (H) 12/23/2020 02:38 AM    CHOL/HDL Ratio 8.5 (H) 12/23/2020 02:38 AM       Lab Results   Component Value Date/Time    WBC 12.3 (H) 12/24/2020 04:47 AM    HGB 16.4 12/24/2020 04:47 AM    HCT 50.1 12/24/2020 04:47 AM    PLATELET 996 86/73/6615 04:47 AM    MCV 91.1 12/24/2020 04:47 AM       Lab Results   Component Value Date/Time    Microalb/Creat ratio (ug/mg creat.) 12.7 05/30/2019 04:36 PM    Microalbumin urine (POC) 30 02/04/2011 03:50 PM       HbA1c:  Lab Results   Component Value Date/Time    Hemoglobin A1c 10.5 (H) 12/23/2020 02:38 AM    Hemoglobin A1c (POC) 8.6 (A) 05/30/2019 04:13 PM     Last Point of Care HGB A1C  Hemoglobin A1c (POC)   Date Value Ref Range Status   05/30/2019 8.6 (A) 4.8 - 5.6 % Final        CrCl cannot be calculated (Unknown ideal weight. ). A/P:    Diabetes Management:  - Per ADA guidelines, Pt's A1c is not at goal of < 7%.   Making progress   - Fasting readings remain above goal.    - Will look into CGM prior authorization coverage  - Encourage patient to give Humalog with his lunch  - Adjust Humalog to 10 units before breakfast, 14 units before lunch and 18 units before dinner  - Continue Lantus at 60 units daily    Medication reconciliation was completed during the visit. Medications Discontinued During This Encounter   Medication Reason    insulin lispro (HUMALOG KWIKPEN INSULIN) 100 unit/mL kwikpen      Orders Placed This Encounter    insulin lispro (HumaLOG KwikPen Insulin) 100 unit/mL kwikpen     Sig: 10 units with breakfast, 14 units with lunch, and 18 units with dinner  Indications: type 2 diabetes mellitus     Dispense:  1 Package     Refill:  3     Patient verbalized understanding of the information presented and all of the patients questions were answered. Notifications of recommendations will be sent to Dr. Donna Kohler MD for review. Patient will return to clinic in 6 week(s) for follow up. Thank you for the consult,  Camille Alejo, PharmD, BCACP, CDCES              CLINICAL PHARMACY CONSULT: MED RECONCILIATION/REVIEW ADDENDUM    For Pharmacy Admin Tracking Only    PHSO: PHSO Patient?: Yes  Total # of Interventions Recommended: Count: 2  - Increased Dose #: 1  - Updated Order #: 1 Updated Order Reason(s):  Other    Time Spent (min): 20

## 2021-02-02 ENCOUNTER — TELEPHONE (OUTPATIENT)
Dept: INTERNAL MEDICINE CLINIC | Age: 56
End: 2021-02-02

## 2021-02-02 NOTE — TELEPHONE ENCOUNTER
----- Message from [facility] sent at 2/2/2021  3:27 PM EST -----  Regarding: Dr. Jaswant Hunt Message/Vendor Calls    Caller's first and last name: Ko Mayfield, spouse      Reason for call: FMLA paperwork from pt workplace was supposed to be mailed out 01/15/21. Pt spouse checking on status of this paperwork.       Callback required yes/no and why: Yes, confirmation      Best contact number(s): [phone]      Details to clarify the request: N/A      Message from [facility]

## 2021-02-03 ENCOUNTER — TELEPHONE (OUTPATIENT)
Dept: INTERNAL MEDICINE CLINIC | Age: 56
End: 2021-02-03

## 2021-02-03 ENCOUNTER — DOCUMENTATION ONLY (OUTPATIENT)
Dept: INTERNAL MEDICINE CLINIC | Age: 56
End: 2021-02-03

## 2021-02-03 NOTE — PROGRESS NOTES
Pharmacy Progress Note     Prior authorization for Mr. Jose Antonio Arguelles 48 y.o. Alyx JUAN submitted. [address] pending.        Thank you for the consult,  Camille Cam, PharmD, BCACP, [facility]                CLINICAL PHARMACY CONSULT: MED RECONCILIATION/REVIEW ADDENDUM    For Pharmacy Admin Tracking Only    PHSO: PHSO Patient?: Yes  Total # of Interventions Recommended: Count: 2    Time Spent (min): 15

## 2021-02-03 NOTE — TELEPHONE ENCOUNTER
Pharmacy Progress Note - Telephone Encounter    S/O: Mr. Merry Poole 48 y.o. male, referred by Dr. Shanna Whitt MD, was contacted via an outbound telephone call to discuss his Alyx CGM today. Verified patients identifiers (name & ) per HIPAA policy.     - Shared with patient. Alyx CGM now approved until 2022  - Patient endorses understanding to the provided information. All questions answered at this time.      Thank you,  Camille Scott Che, PharmD, [facility]

## 2021-02-04 ENCOUNTER — DOCUMENTATION ONLY (OUTPATIENT)
Dept: INTERNAL MEDICINE CLINIC | Age: 56
End: 2021-02-04

## 2021-02-04 NOTE — PROGRESS NOTES
Received prior authorization approval from [facility]. Notices states that approval s effective from 02/03/2021 to 02/03/2022.

## 2021-02-10 NOTE — ANCILLARY DISCHARGE INSTRUCTIONS
[facility]. Physical Therapy [address] ([facility] [address] [place] De Los Santos Phone: [phone] Fax: [phone] Discharge Summary  2-15 Patient name: Meliza Gallego  : 1965  Provider#: [id number] Referral source: Rocky Sharp MD     
Medical/Treatment Diagnosis: Ataxic gait [R26.0] Prior Hospitalization: see medical history Comorbidities: See Plan of Care Prior Level of Function:See Plan of Care Medications: Verified on Patient Summary List 
 
Start of Care: 21      Onset Date: 20 Visits from Start of Care: 1     Missed Visits: 0 Reporting Period : 21 to 21 ASSESSMENT/SUMMARY OF CARE: Pt is discharged today, 2/10/2021, as they have stopped attending therapy. Final objective data and outcomes were unable to be obtained. RECOMMENDATIONS: 
[x]Discontinue therapy: []Patient has reached or is progressing toward set goals [x]Patient is non-compliant or has abdicated 
    []Due to lack of appreciable progress towards set goals []Other Greg Brito, PT, DPT 2/10/2021

## 2021-03-11 ENCOUNTER — VIRTUAL VISIT (OUTPATIENT)
Dept: INTERNAL MEDICINE CLINIC | Age: 56
End: 2021-03-11

## 2021-03-11 DIAGNOSIS — E08.00 DIABETES MELLITUS DUE TO UNDERLYING CONDITION WITH HYPEROSMOLARITY WITHOUT COMA, WITHOUT LONG-TERM CURRENT USE OF INSULIN (HCC): ICD-10-CM

## 2021-03-11 RX ORDER — INSULIN GLARGINE 100 [IU]/ML
INJECTION, SOLUTION SUBCUTANEOUS
Qty: 60 ML | Refills: 0 | Status: SHIPPED | OUTPATIENT
Start: 2021-03-11 | End: 2021-06-10 | Stop reason: ALTCHOICE

## 2021-03-11 NOTE — PROGRESS NOTES
Pharmacy Progress Note - Diabetes Management    S/O: Mr. Luca Thomas is a 55 y. o. male, referred by Neel Perez a PMH of HTN, CVA (Dec 2020),  T2DM, Hx of pancreatitis, GERD, KATJA, was seen virtually today for diabetes management follow up.  Patient's last A1c was 10.5% (Dec 2020), 10.1% (Nov 2019).     PHI verified. Current anti-hyperglycemic regimen include(s):    - Lantus Solarstar 60 units HS  - Humalog 10 units before breakfast, 14 units before lunch, and 18 units daily --- reports to giving 16 units at lunch    Recall he only gives Humalog lunch dose during the weekend. Requesting for refill on Lantus - requests 90 day supply rx to [facility]    ROS:  Today, Pt endorses:  - Symptoms of Hyperglycemia: none  - Symptoms of Hypoglycemia: none    Self Monitoring Blood Glucose (SMBG) or CGM:  Alyx CGM  Fasting today: 79.  HS was 125.    3 low blood sugar episodes <70 in the past 2 weeks. Nutrition/Lifestyle Modifications:  No changes from previous visit  No changes to physical activity   Band practice weekly - Thursday/Friday --- later dinner (9-10 PM)    Vitals:   Wt Readings from Last 3 Encounters:   01/12/21 218 lb (98.9 kg)   12/22/20 207 lb 0.2 oz (93.9 kg)   11/07/19 235 lb (106.6 kg)     BP Readings from Last 3 Encounters:   01/12/21 132/77   12/24/20 (!) 146/94   11/07/19 (!) 150/91     Pulse Readings from Last 3 Encounters:   01/12/21 96   12/24/20 95   12/21/20 90       Past Medical History:   Diagnosis Date    Acute hypoxemic respiratory failure ([address] 4/15/2019    [facility] [address] [address]     GERD (gastroesophageal reflux disease) 10/1/2010    Influenza 1/19/2019    Nausea & vomiting     KATJA (obstructive sleep apnea) 10/1/2010    Overweight(278.02) 10/1/2010    Pancreatitis     Unspecified sleep apnea      Allergies   Allergen Reactions    Lisinopril Anaphylaxis     Angioedema       Current Outpatient Medications   Medication Sig    insulin lispro (HumaLOG KwikPen Insulin) 100 unit/mL kwikpen 10 units with breakfast, 14 units with lunch, and 18 units with dinner  Indications: type 2 diabetes mellitus    aspirin (ASPIRIN) 325 mg tablet Take 325 mg by mouth daily.  flash glucose sensor (FreeStyle Alyx 14 Day Sensor) kit 1 Each by Does Not Apply route See Golden Garcia. Apply and replace sensor every 2 weeks. Scan at least 3 times a day. E11.65    insulin glargine (Lantus Solostar U-100 Insulin) 100 unit/mL (3 mL) inpn INJECT 60 UNITS UNDER THE SKIN EVERY NIGHT AT BEDTIME  Indications: type 2 diabetes mellitus    metoprolol succinate (TOPROL-XL) 25 mg XL tablet Take 1 Tab by mouth daily.  atorvastatin (LIPITOR) 20 mg tablet Take 1 Tab by mouth daily.  amLODIPine (NORVASC) 10 mg tablet Take 1 Tab by mouth daily.  Insulin Needles, Disposable, 31 gauge x 5/16\" ndle Use with insulin pen needle 5 times daily    glucose blood VI test strips (ASCENSIA AUTODISC VI, ONE TOUCH ULTRA TEST VI) strip Check fsbs tid 250.02    lancets misc Check fsbs bid    Blood-Glucose Meter monitoring kit fsbs bid dx 250.02     No current facility-administered medications for this visit. Lab Results   Component Value Date/Time    Sodium 131 (L) 12/22/2020 11:52 AM    Potassium 4.1 12/22/2020 11:52 AM    Chloride 97 12/22/2020 11:52 AM    CO2 23 12/22/2020 11:52 AM    Anion gap 11 12/22/2020 11:52 AM    Glucose 359 (H) 12/22/2020 11:52 AM    BUN 28 (H) 12/22/2020 11:52 AM    Creatinine 1.13 12/22/2020 11:52 AM    BUN/Creatinine ratio 25 (H) 12/22/2020 11:52 AM    GFR est AA >60 12/22/2020 11:52 AM    GFR est non-AA >60 12/22/2020 11:52 AM    Calcium 9.2 12/22/2020 11:52 AM    Bilirubin, total 0.9 12/22/2020 11:52 AM    Alk.  phosphatase 127 (H) 12/22/2020 11:52 AM    Protein, total 8.3 (H) 12/22/2020 11:52 AM    Albumin 3.9 12/22/2020 11:52 AM    Globulin 4.4 (H) 12/22/2020 11:52 AM    A-G Ratio 0.9 (L) 12/22/2020 11:52 AM    ALT (SGPT) 26 12/22/2020 11:52 AM       Lab Results Component Value Date/Time    Cholesterol, total 196 12/23/2020 02:38 AM    HDL Cholesterol 23 12/23/2020 02:38 AM    LDL, calculated 121.6 (H) 12/23/2020 02:38 AM    VLDL, calculated 51.4 12/23/2020 02:38 AM    Triglyceride 257 (H) 12/23/2020 02:38 AM    CHOL/HDL Ratio 8.5 (H) 12/23/2020 02:38 AM       Lab Results   Component Value Date/Time    WBC 12.3 (H) 12/24/2020 04:47 AM    HGB 16.4 12/24/2020 04:47 AM    HCT 50.1 12/24/2020 04:47 AM    PLATELET 106 33/65/5110 04:47 AM    MCV 91.1 12/24/2020 04:47 AM       Lab Results   Component Value Date/Time    Microalb/Creat ratio (ug/mg creat.) 12.7 05/30/2019 04:36 PM    Microalbumin urine (POC) 30 02/04/2011 03:50 PM       HbA1c:  Lab Results   Component Value Date/Time    Hemoglobin A1c 10.5 (H) 12/23/2020 02:38 AM    Hemoglobin A1c (POC) 8.6 (A) 05/30/2019 04:13 PM     No components found for: 2     Last Point of Care HGB A1C  Hemoglobin A1c (POC)   Date Value Ref Range Status   05/30/2019 8.6 (A) 4.8 - 5.6 % Final        CrCl cannot be calculated (Unknown ideal weight. ). A/P:    Diabetes Management:  - Per ADA guidelines, Pt's A1c is not at goal of < 7%.   - CGM improving - closer to fasting and post prandial goals  - Decrease Lantus to 56 units daily. If BG continues to drop <80 by end of next week, decrease this dose to 54 units.  - Continue Humalog - 10 units before breakfast, 14 units before lunch, and 18 units before dinner. Check: Vitals, Weight and HbA1c at the next visit. Medication reconciliation was completed during the visit.     Medications Discontinued During This Encounter   Medication Reason    insulin glargine (Lantus Solostar U-100 Insulin) 100 unit/mL (3 mL) inpn REORDER     Orders Placed This Encounter    insulin glargine (Lantus Solostar U-100 Insulin) 100 unit/mL (3 mL) inpn     Sig: INJECT 60 UNITS UNDER THE SKIN EVERY NIGHT AT BEDTIME  Indications: type 2 diabetes mellitus     Dispense:  60 mL     Refill:  0     Patient verbalized understanding of the information presented and all of the patients questions were answered. Patient advised to call the office with any additional questions or concerns. Notifications of recommendations will be sent to Dr. Rachelle White MD for review. Patient will return to clinic in 3 week(s) for follow up.        Thank you for the consult,  Camille Vera, PharmD, BCACP, CDCES      CLINICAL PHARMACY CONSULT: MED RECONCILIATION/REVIEW ADDENDUM    For Pharmacy Admin Tracking Only    PHSO: PHSO Patient?: Yes  Total # of Interventions Recommended: Count: 2  - Decreased Dose #: 1  - Refills Provided #: 1    Time Spent (min): 30

## 2021-03-28 NOTE — PROGRESS NOTES
Pharmacy Progress Note - Diabetes Management    S/O: Mr. Luca Thomas is a 55 y. o. male, referred by Mahesh Camarena a PMH of HTN, CVA (Dec 2020),  T2DM, Hx of pancreatitis, GERD, KATJA, was seen today for diabetes management follow up.  Patient's last A1c was 10.5% (Dec 2020), 10.1% (Nov 2019).       Interim update:   Received COVID 19 vaccine (Sang Tucker) this past Saturday  Lantus dose was decreased from 60 units to 56 units two weeks ago d/t hypoglycemia frequency. Current anti-hyperglycemic regimen include(s):    - Lantus Solarstar 56 units HS  - Humalog 10 units before breakfast, 14 units before lunch, and 18 units daily     Admits to missing lunch dose during work week. 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 Wt up 13 lbs since January. Vitals:   Wt Readings from Last 3 Encounters:   03/31/21 231 lb (104.8 kg)   01/12/21 218 lb (98.9 kg)   12/22/20 207 lb 0.2 oz (93.9 kg)     BP Readings from Last 3 Encounters:   03/31/21 (!) 145/90   01/12/21 132/77   12/24/20 (!) 146/94     Pulse Readings from Last 3 Encounters:   03/31/21 92   01/12/21 96   12/24/20 95       Past Medical History:   Diagnosis Date    Acute hypoxemic respiratory failure ([address] 4/15/2019    Diabetes [address] [address]     GERD (gastroesophageal reflux disease) 10/1/2010    Influenza 1/19/2019    Nausea & vomiting     KATJA (obstructive sleep apnea) 10/1/2010    Overweight(278.02) 10/1/2010    Pancreatitis     Unspecified sleep apnea      Allergies   Allergen Reactions    Lisinopril Anaphylaxis     Angioedema       Current Outpatient Medications   Medication Sig    insulin glargine (Lantus Solostar U-100 Insulin) 100 unit/mL (3 mL) inpn INJECT 60 UNITS UNDER THE SKIN EVERY NIGHT AT BEDTIME  Indications: type 2 diabetes mellitus    insulin lispro (HumaLOG KwikPen Insulin) 100 unit/mL kwikpen 10 units with breakfast, 14 units with lunch, and 18 units with dinner  Indications: type 2 diabetes mellitus    aspirin (ASPIRIN) 325 mg tablet Take 325 mg by mouth daily.  flash glucose sensor (FreeStyle Alyx 14 Day Sensor) kit 1 Each by Does Not Apply route See Golden Garcia. Apply and replace sensor every 2 weeks. Scan at least 3 times a day. E11.65    metoprolol succinate (TOPROL-XL) 25 mg XL tablet Take 1 Tab by mouth daily.  atorvastatin (LIPITOR) 20 mg tablet Take 1 Tab by mouth daily.  amLODIPine (NORVASC) 10 mg tablet Take 1 Tab by mouth daily.  Insulin Needles, Disposable, 31 gauge x 5/16\" ndle Use with insulin pen needle 5 times daily    glucose blood VI test strips (ASCENSIA AUTODISC VI, ONE TOUCH ULTRA TEST VI) strip Check fsbs tid 250.02    lancets misc Check fsbs bid    Blood-Glucose Meter monitoring kit fsbs bid dx 250.02     No current facility-administered medications for this visit. Lab Results   Component Value Date/Time    Sodium 131 (L) 12/22/2020 11:52 AM    Potassium 4.1 12/22/2020 11:52 AM    Chloride 97 12/22/2020 11:52 AM    CO2 23 12/22/2020 11:52 AM    Anion gap 11 12/22/2020 11:52 AM    Glucose 359 (H) 12/22/2020 11:52 AM    BUN 28 (H) 12/22/2020 11:52 AM    Creatinine 1.13 12/22/2020 11:52 AM    BUN/Creatinine ratio 25 (H) 12/22/2020 11:52 AM    GFR est AA >60 12/22/2020 11:52 AM    GFR est non-AA >60 12/22/2020 11:52 AM    Calcium 9.2 12/22/2020 11:52 AM    Bilirubin, total 0.9 12/22/2020 11:52 AM    Alk.  phosphatase 127 (H) 12/22/2020 11:52 AM    Protein, total 8.3 (H) 12/22/2020 11:52 AM    Albumin 3.9 12/22/2020 11:52 AM    Globulin 4.4 (H) 12/22/2020 11:52 AM    A-G Ratio 0.9 (L) 12/22/2020 11:52 AM    ALT (SGPT) 26 12/22/2020 11:52 AM       Lab Results   Component Value Date/Time Cholesterol, total 196 12/23/2020 02:38 AM    HDL Cholesterol 23 12/23/2020 02:38 AM    LDL, calculated 121.6 (H) 12/23/2020 02:38 AM    VLDL, calculated 51.4 12/23/2020 02:38 AM    Triglyceride 257 (H) 12/23/2020 02:38 AM    CHOL/HDL Ratio 8.5 (H) 12/23/2020 02:38 AM       Lab Results   Component Value Date/Time    WBC 12.3 (H) 12/24/2020 04:47 AM    HGB 16.4 12/24/2020 04:47 AM    HCT 50.1 12/24/2020 04:47 AM    PLATELET 047 00/16/7775 04:47 AM    MCV 91.1 12/24/2020 04:47 AM       Lab Results   Component Value Date/Time    Microalb/Creat ratio (ug/mg creat.) 12.7 05/30/2019 04:36 PM    Microalbumin urine (POC) 30 02/04/2011 03:50 PM     HbA1c:  Lab Results   Component Value Date/Time    Hemoglobin A1c 10.5 (H) 12/23/2020 02:38 AM    Hemoglobin A1c (POC) 8.6 (A) 05/30/2019 04:13 PM     Last Point of Care HGB A1C  Hemoglobin A1c (POC)   Date Value Ref Range Status   05/30/2019 8.6 (A) 4.8 - 5.6 % Final        Estimated Creatinine Clearance: 98.2 mL/min (by C-G formula based on SCr of 1.13 mg/dL). A/P:    Diabetes Management:  - Per ADA guidelines, Pt's POC A1c today (8.6%) is not at goal of < 7%. Making progress. - BP elevated for goal <130/80. Recommend for Pt to monitor home BP at least twice daily. He is willing to do this. - Based on recent elevated fasting readings, will increase Lantus to 58 units daily  - Emphasize importance of giving Humalog 10 units before breakfast, 14 units before lunch and 18 units before dinner    Check: Vitals, Weight and Medication Adherence at the next visit. Medication reconciliation was completed during the visit.     Medications Discontinued During This Encounter   Medication Reason    insulin lispro (HumaLOG KwikPen Insulin) 100 unit/mL kwikpen REORDER     Orders Placed This Encounter    AMB POC HEMOGLOBIN A1C    insulin lispro (HumaLOG KwikPen Insulin) 100 unit/mL kwikpen     Sig: 10 units with breakfast, 14 units with lunch, and 18 units with dinner Indications: type 2 diabetes mellitus     Dispense:  45 mL     Refill:  2     Patient's Immunization History:    Immunizations by Immunization family     Influenza Vaccine 11/7/2019 10/14/2020      Pneumococcal Polysaccharide (PPSV-23) 5/30/2019       Sars-cov-2 (Covid-19) Vaccine, Unspecified  3/27/2021           Patient verbalized understanding of the information presented and all of the patients questions were answered. AVS was handed to the patient. Patient advised to call the office with any additional questions or concerns. Notifications of recommendations will be sent to Dr. Neil Crowder MD for review. Patient will return to clinic in 6 week(s) for follow up. Thank you for the consult,  Camille Newton, PharmD, BCACP, [facility]    CLINICAL PHARMACY CONSULT: MED RECONCILIATION/REVIEW ADDENDUM    For Pharmacy Admin Tracking Only    PHSO: PHSO Patient?: Yes  Total # of Interventions Recommended: Count: 4  - Increased Dose #: 1  - Refills Provided #: 1  - Updated Order #: 1 Updated Order Reason(s):  Other  - Maintenance Safety Lab Monitoring #: 1    Time Spent (min): 40

## 2021-03-31 ENCOUNTER — OFFICE VISIT (OUTPATIENT)
Dept: INTERNAL MEDICINE CLINIC | Age: 56
End: 2021-03-31
Payer: COMMERCIAL

## 2021-03-31 VITALS
HEIGHT: 76 IN | SYSTOLIC BLOOD PRESSURE: 145 MMHG | WEIGHT: 231 LBS | HEART RATE: 92 BPM | DIASTOLIC BLOOD PRESSURE: 90 MMHG | BODY MASS INDEX: 28.13 KG/M2

## 2021-03-31 DIAGNOSIS — E08.00 DIABETES MELLITUS DUE TO UNDERLYING CONDITION WITH HYPEROSMOLARITY WITHOUT COMA, WITHOUT LONG-TERM CURRENT USE OF INSULIN (HCC): ICD-10-CM

## 2021-03-31 DIAGNOSIS — E11.65 UNCONTROLLED TYPE 2 DIABETES MELLITUS WITH HYPERGLYCEMIA (HCC): Primary | ICD-10-CM

## 2021-03-31 LAB — HBA1C MFR BLD HPLC: 8.6 % (ref 4.8–5.6)

## 2021-03-31 PROCEDURE — 83036 HEMOGLOBIN GLYCOSYLATED A1C: CPT | Performed by: PHARMACIST

## 2021-03-31 RX ORDER — INSULIN LISPRO 100 [IU]/ML
INJECTION, SOLUTION INTRAVENOUS; SUBCUTANEOUS
Qty: 45 ML | Refills: 2 | Status: SHIPPED | OUTPATIENT
Start: 2021-03-31 | End: 2021-06-10

## 2021-03-31 NOTE — PATIENT INSTRUCTIONS
· Your A1c today was 8.6%. Your goal is to be below 7%. Keep up the good work! · Increase Lantus to 58 units daily · Continue Humalog 10 units before breakfast, 14 units before lunch and 18 units before dinner. · Remember to give 14 units before lunch/sonal

## 2021-03-31 NOTE — PROGRESS NOTES
Pharmacy Progress Note - Diabetes Management S/O: Mr. Maurice Angelo is a 55 y.o. male, referred by Dr. Jonh Carrillo MD, with a PMH of T2DM, HTN, hypercholesterolemia, GERD, CVA (Dec 2020), Hx of pancreatitis, and KATJA, was seen today for diabetes management follow up. Patient's last A1c was 10.5% (12/23/2020), 10.1% (11/7/2019). Interim update: *** Current anti-hyperglycemic regimen include(s):   
- Humalog 10 units with breakfast, 14 units with lunch, 18 units with dinner - Lantus 56 units daily Current anti-hypertensive regimen include(s):   
- Amlodipine 10 mg daily - Aspirin 325 mg daily - Metoprolol succinate 25 mg daily Current anti-hypercholesterolemia regimen include(s):  
- Atorvastatin 20 mg daily ROS: 
Today, Pt endorses: 
- {Symptoms of Hyperglycemia:11817:::1} 
- {Symptoms of Hypoglycemia:56868:::1} Self Monitoring Blood Glucose (SMBG) or CGM: 
- Brought in home glucometer/blood glucose log/CGM reader today:  {yes no:06745} 
- Conducts/scans: ***  
- Fasting SMBG today: *** 
- Post-prandial: - SMBG range: 
- CGM range: 
 
 
Nutrition/Lifestyle Modifications: 
- Eats {Numbers; 1-4 :03858692} meals/day ; 
- Breakfast: 
- Lunch: 
- Dinner: - Snack(s):  
- Beverage(s): 
- Alcohol consumption? {YES (DEF) - NO:28292::\"Yes\"} - Physical Activity:  
 
 
 
 
Vitals: Wt Readings from Last 3 Encounters:  
01/12/21 218 lb (98.9 kg) 12/22/20 207 lb 0.2 oz (93.9 kg) 11/07/19 235 lb (106.6 kg) BP Readings from Last 3 Encounters:  
01/12/21 132/77  
12/24/20 (!) 146/94  
11/07/19 (!) 150/91 Pulse Readings from Last 3 Encounters:  
01/12/21 96  
12/24/20 95  
12/21/20 90 Past Medical History:  
Diagnosis Date  Acute hypoxemic respiratory failure ([address] 4/15/2019  Diabetes ([address]  GERD (gastroesophageal reflux disease) 10/1/2010  Influenza 1/19/2019  Nausea & vomiting  KATJA (obstructive sleep apnea) 10/1/2010  Overweight(278.02) 10/1/2010  Pancreatitis  Unspecified sleep apnea Allergies Allergen Reactions  Lisinopril Anaphylaxis Angioedema Current Outpatient Medications Medication Sig  
 insulin glargine (Lantus Solostar U-100 Insulin) 100 unit/mL (3 mL) inpn INJECT 60 UNITS UNDER THE SKIN EVERY NIGHT AT BEDTIME  Indications: type 2 diabetes mellitus  insulin lispro (HumaLOG KwikPen Insulin) 100 unit/mL kwikpen 10 units with breakfast, 14 units with lunch, and 18 units with dinner  Indications: type 2 diabetes mellitus  aspirin (ASPIRIN) 325 mg tablet Take 325 mg by mouth daily.  flash glucose sensor (FreeStyle Alyx 14 Day Sensor) kit 1 Each by Does Not Apply route See Golden Garcia. Apply and replace sensor every 2 weeks. Scan at least 3 times a day. E11.65  
 metoprolol succinate (TOPROL-XL) 25 mg XL tablet Take 1 Tab by mouth daily.  atorvastatin (LIPITOR) 20 mg tablet Take 1 Tab by mouth daily.  amLODIPine (NORVASC) 10 mg tablet Take 1 Tab by mouth daily.  Insulin Needles, Disposable, 31 gauge x 5/16\" ndle Use with insulin pen needle 5 times daily  glucose blood VI test strips (ASCENSIA AUTODISC VI, ONE TOUCH ULTRA TEST VI) strip Check fsbs tid 250.02  
 lancets misc Check fsbs bid  Blood-Glucose Meter monitoring kit fsbs bid dx 250.02 No current facility-administered medications for this visit. Lab Results Component Value Date/Time Sodium 131 (L) 12/22/2020 11:52 AM  
 Potassium 4.1 12/22/2020 11:52 AM  
 Chloride 97 12/22/2020 11:52 AM  
 CO2 23 12/22/2020 11:52 AM  
 Anion gap 11 12/22/2020 11:52 AM  
 Glucose 359 (H) 12/22/2020 11:52 AM  
 BUN 28 (H) 12/22/2020 11:52 AM  
 Creatinine 1.13 12/22/2020 11:52 AM  
 BUN/Creatinine ratio 25 (H) 12/22/2020 11:52 AM  
 GFR est AA >60 12/22/2020 11:52 AM  
 GFR est non-AA >60 12/22/2020 11:52 AM  
 Calcium 9.2 12/22/2020 11:52 AM  
 Bilirubin, total 0.9 12/22/2020 11:52 AM  
 Alk.  phosphatase 127 (H) 12/22/2020 11:52 AM  
 Protein, total 8.3 (H) 12/22/2020 11:52 AM  
 Albumin 3.9 12/22/2020 11:52 AM  
 Globulin 4.4 (H) 12/22/2020 11:52 AM  
 A-G Ratio 0.9 (L) 12/22/2020 11:52 AM  
 ALT (SGPT) 26 12/22/2020 11:52 AM  
 
 
Lab Results Component Value Date/Time Cholesterol, total 196 12/23/2020 02:38 AM  
 HDL Cholesterol 23 12/23/2020 02:38 AM  
 LDL, calculated 121.6 (H) 12/23/2020 02:38 AM  
 VLDL, calculated 51.4 12/23/2020 02:38 AM  
 Triglyceride 257 (H) 12/23/2020 02:38 AM  
 CHOL/HDL Ratio 8.5 (H) 12/23/2020 02:38 AM  
 
 
Lab Results Component Value Date/Time WBC 12.3 (H) 12/24/2020 04:47 AM  
 HGB 16.4 12/24/2020 04:47 AM  
 HCT 50.1 12/24/2020 04:47 AM  
 PLATELET 575 10/87/0368 04:47 AM  
 MCV 91.1 12/24/2020 04:47 AM  
 
 
Lab Results Component Value Date/Time Microalb/Creat ratio (ug/mg creat.) 12.7 05/30/2019 04:36 PM  
 Microalbumin urine (POC) 30 02/04/2011 03:50 PM  
 
 
HbA1c: 
Lab Results Component Value Date/Time Hemoglobin A1c 10.5 (H) 12/23/2020 02:38 AM  
 Hemoglobin A1c (POC) 8.6 (A) 05/30/2019 04:13 PM  
 
No components found for: 2 Last Point of Care HGB A1C Hemoglobin A1c (POC) Date Value Ref Range Status 05/30/2019 8.6 (A) 4.8 - 5.6 % Final  
  
 
CrCl cannot be calculated (Unknown ideal weight. ). A/P: 
 
Diabetes Management: - Per ADA guidelines, Pt's A1c {IS/IS NOT:48659} at goal of < ***.  
- Current SMBG(s)/CGM trend - Recommend checking/scanning - Reviewed and provided resource discussing s/sx of hypoglycemia and management - Bring glucometer/log/ CGM reader to all future visits - Recommend - Continue Nutrition/Lifestyle Modifications: 
- Pt is 
- Recommend - Recommend ~30 minutes consistent, moderately intensive, exercise/day or ~150 minutes/week. Start small, stay consistent, and increase length and types of exercise, as tolerated. - Discuss goal is to reduce at least 7% of current total body weight.  Based on current weight, this would be *** lbs.  
- Educate pt about reading nutrition labels w/ a focus on carbohydrate/protein/sugar/fiber/fat content. 
- Recommend moderating and diversifying carbohydrate intake to max of~45-60 grams/meal and 15 grams/snack ; at least 1 serving of protein/meal.  Reviewed low-carb alternatives to existing food choices. - Provided ADA resources on 1500 calories diet, nutrition label, food groups to assist Pt w/ decision making/meal planning. Established patient-centered goal(s) to follow up on at the next visit:   
 
Resources provided: 
- Check: {Plan-TT:11356} at the next visit. Give: {Plan-TT:23502} at the next visit. Medication reconciliation was completed during the visit. There are no discontinued medications. Patient verbalized understanding of the information presented and all of the patients questions were answered. AVS was handed to the patient. Patient advised to call the office with any additional questions or concerns. Notifications of recommendations will be sent to Dr. Janusz Card MD for review. Patient will return to clinic in {numbers 1-12:77431} week(s) for follow up. Thank you for the consult, Rox Gomez, PharmD PGY1 Resident [facility]

## 2021-04-13 DIAGNOSIS — E78.00 PURE HYPERCHOLESTEROLEMIA: ICD-10-CM

## 2021-04-14 RX ORDER — ATORVASTATIN CALCIUM 20 MG/1
20 TABLET, FILM COATED ORAL DAILY
Qty: 90 TAB | Refills: 0 | Status: SHIPPED | OUTPATIENT
Start: 2021-04-14 | End: 2021-07-05 | Stop reason: SDUPTHER

## 2021-04-18 DIAGNOSIS — E08.00 DIABETES MELLITUS DUE TO UNDERLYING CONDITION WITH HYPEROSMOLARITY WITHOUT COMA, WITHOUT LONG-TERM CURRENT USE OF INSULIN (HCC): ICD-10-CM

## 2021-04-19 RX ORDER — PEN NEEDLE, DIABETIC 30 GX3/16"
NEEDLE, DISPOSABLE MISCELLANEOUS
Qty: 500 PEN NEEDLE | Refills: 3 | Status: SHIPPED | OUTPATIENT
Start: 2021-04-19 | End: 2021-07-01 | Stop reason: SDUPTHER

## 2021-04-19 NOTE — TELEPHONE ENCOUNTER
Future Appointments:  Future Appointments   Date Time Provider Caity Anna   5/4/2021 11:15 AM Ivelisse Donovan MD [facility] BS AMB   6/10/2021  3:30 PM GÓMEZ JoyceD [facility] BS AMB        Last Appointment With Me:  Visit date not found     Requested Prescriptions     Pending Prescriptions Disp Refills    Insulin Needles, Disposable, 31 gauge x 5/16\" ndle 500 Pen Needle 3     Sig: Use with insulin pen needle 5 times daily

## 2021-05-04 ENCOUNTER — VIRTUAL VISIT (OUTPATIENT)
Dept: INTERNAL MEDICINE CLINIC | Age: 56
End: 2021-05-04
Payer: COMMERCIAL

## 2021-05-04 DIAGNOSIS — Z00.00 ROUTINE GENERAL MEDICAL EXAMINATION AT A HEALTH CARE FACILITY: Primary | ICD-10-CM

## 2021-05-04 DIAGNOSIS — I10 ESSENTIAL HYPERTENSION: ICD-10-CM

## 2021-05-04 DIAGNOSIS — Z79.4 TYPE 2 DIABETES MELLITUS WITH OTHER SPECIFIED COMPLICATION, WITH LONG-TERM CURRENT USE OF INSULIN (HCC): ICD-10-CM

## 2021-05-04 DIAGNOSIS — E78.00 PURE HYPERCHOLESTEROLEMIA: ICD-10-CM

## 2021-05-04 DIAGNOSIS — Z86.73 HISTORY OF CVA (CEREBROVASCULAR ACCIDENT): ICD-10-CM

## 2021-05-04 DIAGNOSIS — I50.22 SYSTOLIC CHF, CHRONIC (HCC): ICD-10-CM

## 2021-05-04 DIAGNOSIS — E11.69 TYPE 2 DIABETES MELLITUS WITH OTHER SPECIFIED COMPLICATION, WITH LONG-TERM CURRENT USE OF INSULIN (HCC): ICD-10-CM

## 2021-05-04 PROCEDURE — 99396 PREV VISIT EST AGE 40-64: CPT | Performed by: INTERNAL MEDICINE

## 2021-05-04 RX ORDER — GUAIFENESIN 100 MG/5ML
81 LIQUID (ML) ORAL DAILY
COMMUNITY

## 2021-05-04 NOTE — PROGRESS NOTES
HISTORY OF PRESENT ILLNESS  Wes Duffy is a 55 y.o. male. 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 Acute/subacute Ischemia left parasagital frontal lobe  Mild carotid stenoss  Ef 45-50%  A1c10.5   Started aspirin 81 mgf every day  lipitor and norvasc increased  Home bp 130/80 but some higher  fsbs around 100-150 in AM but 300 in PM  Still dizzy but able to stand and walk but will home on the the wall       Patient Active Problem List    Diagnosis Date Noted    Ataxia 12/22/2020    Diabetes mellitus ([facility] [address] 10/01/2010    HTN (hypertension) 10/01/2010    Pure hypercholesterolemia 10/01/2010    Achalasia 10/01/2010     Current Outpatient Medications   Medication Sig Dispense Refill    Insulin Needles, Disposable, 31 gauge x 5/16\" ndle Use with insulin pen needle 5 times daily 500 Pen Needle 3    atorvastatin (LIPITOR) 20 mg tablet Take 1 Tab by mouth daily. 90 Tab 0    insulin lispro (HumaLOG KwikPen Insulin) 100 unit/mL kwikpen 10 units with breakfast, 14 units with lunch, and 18 units with dinner  Indications: type 2 diabetes mellitus 45 mL 2    insulin glargine (Lantus Solostar U-100 Insulin) 100 unit/mL (3 mL) inpn INJECT 60 UNITS UNDER THE SKIN EVERY NIGHT AT BEDTIME  Indications: type 2 diabetes mellitus 60 mL 0    aspirin (ASPIRIN) 325 mg tablet Take 325 mg by mouth daily.  flash glucose sensor (FreeStyle Alyx 14 Day Sensor) kit 1 Each by Does Not Apply route See Golden Garcia. Apply and replace sensor every 2 weeks. Scan at least 3 times a day.   E11.65 2 Kit 11    metoprolol succinate (TOPROL-XL) 25 mg XL tablet Take 1 Tab by mouth daily. 90 Tab 1    amLODIPine (NORVASC) 10 mg tablet Take 1 Tab by mouth daily.  90 Tab 3    glucose blood VI test strips (ASCENSIA AUTODISC VI, ONE TOUCH ULTRA TEST VI) strip Check fsbs tid 250.02 300 Strip 3    lancets misc Check fsbs bid 200 Each 11    Blood-Glucose Meter monitoring kit fsbs bid dx 250.02 1 Kit 0     Allergies   Allergen Reactions    Lisinopril Anaphylaxis     Angioedema     Social History     Tobacco Use    Smoking status: Former Smoker     Years: 1.00     Types: Cigarettes    Smokeless tobacco: Never Used   Substance Use Topics    Alcohol use: Not Currently     Frequency: Monthly or less     Drinks per session: 1 or 2      Lab Results   Component Value Date/Time    WBC 12.3 (H) 12/24/2020 04:47 AM    HGB 16.4 12/24/2020 04:47 AM    HCT 50.1 12/24/2020 04:47 AM    PLATELET 422 27/49/2796 04:47 AM    MCV 91.1 12/24/2020 04:47 AM     Lab Results   Component Value Date/Time    Hemoglobin A1c 10.5 (H) 12/23/2020 02:38 AM    Hemoglobin A1c 10.1 (H) 11/07/2019 03:59 PM    Hemoglobin A1c 11.3 (H) 01/21/2019 02:54 AM    Glucose 359 (H) 12/22/2020 11:52 AM    Glucose (POC) 199 (H) 12/23/2020 09:10 PM    Microalb/Creat ratio (ug/mg creat.) 12.7 05/30/2019 04:36 PM    LDL, calculated 121.6 (H) 12/23/2020 02:38 AM    Creatinine 1.13 12/22/2020 11:52 AM      Lab Results   Component Value Date/Time    Cholesterol, total 196 12/23/2020 02:38 AM    HDL Cholesterol 23 12/23/2020 02:38 AM    LDL, calculated 121.6 (H) 12/23/2020 02:38 AM    Triglyceride 257 (H) 12/23/2020 02:38 AM    CHOL/HDL Ratio 8.5 (H) 12/23/2020 02:38 AM     Lab Results   Component Value Date/Time    GFR est non-AA >60 12/22/2020 11:52 AM    GFR est AA >60 12/22/2020 11:52 AM    Creatinine 1.13 12/22/2020 11:52 AM    BUN 28 (H) 12/22/2020 11:52 AM    Sodium 131 (L) 12/22/2020 11:52 AM    Potassium 4.1 12/22/2020 11:52 AM    Chloride 97 12/22/2020 11:52 AM    CO2 23 12/22/2020 11:52 AM    Magnesium 2.2 01/29/2019 02:48 AM    Phosphorus 2.7 01/29/2019 02:48 AM     Lab Results   Component Value Date/Time    TSH 1.32 12/23/2020 02:38 AM         ROS    Physical Exam  Constitutional:       Appearance: Normal appearance. Pulmonary:      Effort: Pulmonary effort is normal.   Neurological:      General: No focal deficit present. Mental Status: He is alert. ASSESSMENT and PLAN  Diagnoses and all orders for this visit:    1. Routine general medical examination at a health care facility   recommended tdap and shingrix   Labs ordered    PSA, FIT   Will see eye MD for exam and  cataracts  2. Type 2 diabetes mellitus with other specified complication, with long-term current use of insulin ([address]   Improving control   Fu PharmD  3. Essential hypertension   controlled  4. Pure hypercholesterolemia   LDl goal <70  5. History of CVA (cerebrovascular accident)   On aspirin   Control risk factors-discussee  6.  Systolic CHF, chronic ([address]   Asymptomatic but may need echo and  stress test-discsused   Refer to RCA  rtc 6 months

## 2021-05-04 NOTE — PATIENT INSTRUCTIONS
This is an established visit conducted via telemedicine. The patient has been instructed that this meets HIPAA criteria and acknowledges and agrees to this method of visitation.     Jess Moreau  79/29/91  90:87 AM

## 2021-06-10 ENCOUNTER — VIRTUAL VISIT (OUTPATIENT)
Dept: INTERNAL MEDICINE CLINIC | Age: 56
End: 2021-06-10

## 2021-06-10 DIAGNOSIS — E11.65 UNCONTROLLED TYPE 2 DIABETES MELLITUS WITH HYPERGLYCEMIA (HCC): Primary | ICD-10-CM

## 2021-06-10 DIAGNOSIS — E08.00 DIABETES MELLITUS DUE TO UNDERLYING CONDITION WITH HYPEROSMOLARITY WITHOUT COMA, WITHOUT LONG-TERM CURRENT USE OF INSULIN (HCC): ICD-10-CM

## 2021-06-10 RX ORDER — INSULIN GLARGINE 300 U/ML
58 INJECTION, SOLUTION SUBCUTANEOUS DAILY
Qty: 9 ML | Refills: 2 | Status: SHIPPED | OUTPATIENT
Start: 2021-06-10 | End: 2021-07-08 | Stop reason: SDUPTHER

## 2021-06-10 RX ORDER — INSULIN LISPRO 100 [IU]/ML
INJECTION, SOLUTION INTRAVENOUS; SUBCUTANEOUS
Qty: 45 ML | Refills: 2
Start: 2021-06-10 | End: 2021-12-12 | Stop reason: SDUPTHER

## 2021-06-10 NOTE — PROGRESS NOTES
Pharmacy Progress Note - Diabetes Management    S/O: Mr. Luca Thomas is a 56 y. o. male, referred by Earlene Desai a PMH of HTN, CVA (Dec 2020),  T2DM, Hx of pancreatitis, GERD, KATJA, was seen virtually today for diabetes management follow up.  Patient's last A1c was 8.6% (2021), 10.5% (Dec 2020), 10.1% (2019).  PHI verified. Interim update: Has right eye cataract surgery scheduled for 21. Current anti-hyperglycemic regimen include(s):    - Lantus 58 units daily  - Humalog 10 units before breakfast, 14 units before lunch and 18 units before dinner    Atorvastatin 20 mg daily, ASA 81 mg daily,     ROS:  Today, Pt endorses:  - Symptoms of Hyperglycemia: none  - Symptoms of Hypoglycemia: none    Self Monitoring Blood Glucose (SMBG) or CGM:  Alyx CGM  60 day av. No low BG  Scanning 3x/day                                Nutrition/Lifestyle Modifications:  Denies any changes to nutrition  Feeling more stable. Hoping to get back to using his treadmill. Reports recent wt was 220 lbs      Vitals: Wt Readings from Last 3 Encounters:   21 231 lb (104.8 kg)   21 218 lb (98.9 kg)   20 207 lb 0.2 oz (93.9 kg)     BP Readings from Last 3 Encounters:   21 (!) 145/90   21 132/77   20 (!) 146/94     Pulse Readings from Last 3 Encounters:   21 92   21 96   20 95       Past Medical History:   Diagnosis Date    Acute hypoxemic respiratory failure ([address] 4/15/2019    Diabetes ([facility] [address]     GERD (gastroesophageal reflux disease) 10/1/2010    Influenza 2019    Nausea & vomiting     KATJA (obstructive sleep apnea) 10/1/2010    Overweight(278.02) 10/1/2010    Pancreatitis     Unspecified sleep apnea      Allergies   Allergen Reactions    Lisinopril Anaphylaxis     Angioedema       Current Outpatient Medications   Medication Sig    aspirin 81 mg chewable tablet Take 81 mg by mouth daily.     Insulin Needles, Disposable, 31 gauge x 5/16\" ndle Use with insulin pen needle 5 times daily    atorvastatin (LIPITOR) 20 mg tablet Take 1 Tab by mouth daily.  insulin lispro (HumaLOG KwikPen Insulin) 100 unit/mL kwikpen 10 units with breakfast, 14 units with lunch, and 18 units with dinner  Indications: type 2 diabetes mellitus    insulin glargine (Lantus Solostar U-100 Insulin) 100 unit/mL (3 mL) inpn INJECT 60 UNITS UNDER THE SKIN EVERY NIGHT AT BEDTIME  Indications: type 2 diabetes mellitus (Patient taking differently: 58 Units. INJECT 58 UNITS UNDER THE SKIN EVERY NIGHT AT BEDTIME  Indications: type 2 diabetes mellitus)    flash glucose sensor (FreeStyle Alyx 14 Day Sensor) kit 1 Each by Does Not Apply route See Golden Garcia. Apply and replace sensor every 2 weeks. Scan at least 3 times a day. E11.65    metoprolol succinate (TOPROL-XL) 25 mg XL tablet Take 1 Tab by mouth daily.  amLODIPine (NORVASC) 10 mg tablet Take 1 Tab by mouth daily.  glucose blood VI test strips (ASCENSIA AUTODISC VI, ONE TOUCH ULTRA TEST VI) strip Check fsbs tid 250.02    lancets misc Check fsbs bid    Blood-Glucose Meter monitoring kit fsbs bid dx 250.02     No current facility-administered medications for this visit. Lab Results   Component Value Date/Time    Sodium 131 (L) 12/22/2020 11:52 AM    Potassium 4.1 12/22/2020 11:52 AM    Chloride 97 12/22/2020 11:52 AM    CO2 23 12/22/2020 11:52 AM    Anion gap 11 12/22/2020 11:52 AM    Glucose 359 (H) 12/22/2020 11:52 AM    BUN 28 (H) 12/22/2020 11:52 AM    Creatinine 1.13 12/22/2020 11:52 AM    BUN/Creatinine ratio 25 (H) 12/22/2020 11:52 AM    GFR est AA >60 12/22/2020 11:52 AM    GFR est non-AA >60 12/22/2020 11:52 AM    Calcium 9.2 12/22/2020 11:52 AM    Bilirubin, total 0.9 12/22/2020 11:52 AM    Alk.  phosphatase 127 (H) 12/22/2020 11:52 AM    Protein, total 8.3 (H) 12/22/2020 11:52 AM    Albumin 3.9 12/22/2020 11:52 AM    Globulin 4.4 (H) 12/22/2020 11:52 AM    A-G Ratio 0.9 (L) 12/22/2020 11:52 AM    ALT (SGPT) 26 12/22/2020 11:52 AM       Lab Results   Component Value Date/Time    Cholesterol, total 196 12/23/2020 02:38 AM    HDL Cholesterol 23 12/23/2020 02:38 AM    LDL, calculated 121.6 (H) 12/23/2020 02:38 AM    VLDL, calculated 51.4 12/23/2020 02:38 AM    Triglyceride 257 (H) 12/23/2020 02:38 AM    CHOL/HDL Ratio 8.5 (H) 12/23/2020 02:38 AM       Lab Results   Component Value Date/Time    WBC 12.3 (H) 12/24/2020 04:47 AM    HGB 16.4 12/24/2020 04:47 AM    HCT 50.1 12/24/2020 04:47 AM    PLATELET 214 99/86/4237 04:47 AM    MCV 91.1 12/24/2020 04:47 AM       Lab Results   Component Value Date/Time    Microalb/Creat ratio (ug/mg creat.) 12.7 05/30/2019 04:36 PM    Microalbumin urine (POC) 30 02/04/2011 03:50 PM       HbA1c:  Lab Results   Component Value Date/Time    Hemoglobin A1c 10.5 (H) 12/23/2020 02:38 AM    Hemoglobin A1c (POC) 8.6 (A) 03/31/2021 04:01 PM     No components found for: 2     Last Point of Care HGB A1C  Hemoglobin A1c (POC)   Date Value Ref Range Status   03/31/2021 8.6 (A) 4.8 - 5.6 % Final        CrCl cannot be calculated (Unknown ideal weight. ). A/P:    Diabetes Management:  - Per ADA guidelines, Pt's A1c is not at goal of < 7%. - Recent CGM trending above fasting and post prandial goals. - Change Lantus to Toujeo. Continue with 58 units daily for now  - Increase Humalog to 14 units before breakfast, lunch, and 18 units before dinner. Medication reconciliation was completed during the visit.     Medications Discontinued During This Encounter   Medication Reason    glucose blood VI test strips (ASCENSIA AUTODISC VI, ONE TOUCH ULTRA TEST VI) strip Not A Current Medication    Blood-Glucose Meter monitoring kit Not A Current Medication    insulin glargine (Lantus Solostar U-100 Insulin) 100 unit/mL (3 mL) inpn Alternate Therapy    insulin lispro (HumaLOG KwikPen Insulin) 100 unit/mL kwikpen      Orders Placed This Encounter    insulin glargine U-300 conc (Toujeo SoloStar U-300 Insulin) 300 unit/mL (1.5 mL) inpn pen     Si Units by SubCUTAneous route daily. Indications: type 2 diabetes mellitus     Dispense:  9 mL     Refill:  2    insulin lispro (HumaLOG KwikPen Insulin) 100 unit/mL kwikpen     Si units with breakfast, 14 units with lunch, and 18 units with dinner  Indications: type 2 diabetes mellitus     Dispense:  45 mL     Refill:  2     Patient verbalized understanding of the information presented and all of the patients questions were answered. Patient advised to call the office with any additional questions or concerns. Notifications of recommendations will be sent to Dr. Byron Florez MD for review. Patient will return to clinic in 4 week(s) for follow up. Thank you for the consult,  Michael ObrienD, BCACP, [facility] in place:  Yes   Recommendation Provided To: Patient/Caregiver: 1 via Virtual Visit   Intervention Detail: Discontinued Rx: 4, reason: Duplicate Therapy and Therapy Complete, Dose Adjustment: 2, reason: Therapy Optimization and New Rx: 1, reason: Needs Additional Therapy   Total # of Interventions Accepted: 7   Time Spent (min): 25

## 2021-06-28 RX ORDER — PEN NEEDLE, DIABETIC 30 GX3/16"
NEEDLE, DISPOSABLE MISCELLANEOUS
Qty: 500 PEN NEEDLE | Refills: 11 | Status: SHIPPED | OUTPATIENT
Start: 2021-06-28 | End: 2021-07-01 | Stop reason: SDUPTHER

## 2021-06-28 NOTE — TELEPHONE ENCOUNTER
Patient's wife, Felicity Edwards, states patient needs New Prescription for BS Ultra Fine [place] & Per Vika Ornelas patient needs to be getting 31 gauge 4/16 (4mm) size. Please call Maria Ines/wife or see notes/ask [facility] for more information if needed.  Thank you

## 2021-07-01 RX ORDER — PEN NEEDLE, DIABETIC 31 GX3/16"
1 NEEDLE, DISPOSABLE MISCELLANEOUS SEE ADMIN INSTRUCTIONS
Qty: 500 PEN NEEDLE | Refills: 11 | Status: SHIPPED | OUTPATIENT
Start: 2021-07-01 | End: 2022-03-23 | Stop reason: SDUPTHER

## 2021-07-01 NOTE — TELEPHONE ENCOUNTER
Pharmacy Progress Note - Telephone Encounter    S/O: Mr. Laquita Rey 55 y.o. male, referred by Dr. Maxine Johnson MD, was contacted via an outbound telephone call to discuss patient's pen needles today. Verified patients identifiers (name & ) per HIPAA policy. - 8 mm length is crossing over to pharmacy. Pt uses the 4 mm short pen needle length. A/P:  - Will resend new rx to [facility] pharmacy. - Patient endorses understanding to the provided information. All questions answered at this time. Medications Discontinued During This Encounter   Medication Reason    Insulin Needles, Disposable, 31 gauge x 5/16\" ndle REORDER    Insulin Needles, Disposable, 31 gauge x 5/16\" ndle REORDER     Orders Placed This Encounter    DISCONTD: Insulin Needles, Disposable, 31 gauge x 5/16\" ndle     Sig: Use 5 times per day with insulin pen 250.0     Dispense:  500 Pen Needle     Refill:  11    Insulin Needles, Disposable, 32 gauge x 5/32\" ndle     Si Pen Needle by SubCUTAneous route See Admin Instructions. Use to give insulin 5 times daily. Please dispense 4 mm length. E11.65     Dispense:  500 Pen Needle     Refill:  11     Thank you,  Camille Gonzalez, PharmD, BCACP, [facility] [address] in place:  Yes   Recommendation Provided To: Patient/Caregiver: 1 via Telephone   Intervention Detail: Discontinued Rx: 2, reason: Patient Preference and New Rx: 1, reason: Patient Preference   Total # of Interventions Accepted: 3   Time Spent (min): 10

## 2021-07-01 NOTE — TELEPHONE ENCOUNTER
----- Message from [facility] sent at 7/1/2021  4:05 PM EDT -----  Regarding: Dr. Solange Sepulveda Message/Vendor Calls    Caller's first and last name: Shena Edwards, spouse      Reason for call: Checking on status of prescription clarification for the pin needles. Pharmacy called and has gotten no answer back.  Pt only has 2 needles left so he will not be able to take his insulin tomorrow      Callback required yes/no and why: Yes      Best contact number(s): [phone]      Details to clarify the request: N/A      Message from [facility]

## 2021-07-01 NOTE — TELEPHONE ENCOUNTER
----- Message from Candace Herron sent at 7/1/2021  1:08 PM EDT -----  Regarding: Dr. Pruett [facility] Message/Vendor Calls    Caller's first and last name: Qing Rivas        Reason for call: Needs to discuss wrong prescription being sent into pharmacy. Callback required yes/no and why: yes, to discuss       Best contact number(s): [phone]      Details to clarify the request: Needs to discuss today as patient is almost out of medication. If Dr. Jacqueline Bueno is in the office she can call.        Candace Herron  [employer]

## 2021-07-05 DIAGNOSIS — I10 ESSENTIAL HYPERTENSION: ICD-10-CM

## 2021-07-05 DIAGNOSIS — E78.00 PURE HYPERCHOLESTEROLEMIA: ICD-10-CM

## 2021-07-06 RX ORDER — ATORVASTATIN CALCIUM 20 MG/1
20 TABLET, FILM COATED ORAL DAILY
Qty: 90 TABLET | Refills: 0 | Status: SHIPPED | OUTPATIENT
Start: 2021-07-06 | End: 2021-07-08 | Stop reason: SDUPTHER

## 2021-07-06 RX ORDER — METOPROLOL SUCCINATE 25 MG/1
25 TABLET, EXTENDED RELEASE ORAL DAILY
Qty: 90 TABLET | Refills: 1 | Status: SHIPPED | OUTPATIENT
Start: 2021-07-06 | End: 2022-01-05

## 2021-07-06 NOTE — TELEPHONE ENCOUNTER
PCP: Elsa Lucas MD    Last appt: 6/10/2021     Future Appointments   Date Time Provider Caity Mcintoshi   7/8/2021  3:30 PM [facility] AMB   12/1/2021 10:00 AM Elsa Lucas MD [facility] AMB       Requested Prescriptions     Pending Prescriptions Disp Refills    metoprolol succinate (TOPROL-XL) 25 mg XL tablet 90 Tablet 1     Sig: Take 1 Tablet by mouth daily.  atorvastatin (LIPITOR) 20 mg tablet 90 Tablet 0     Sig: Take 1 Tablet by mouth daily.        Prior labs and Blood pressures:  BP Readings from Last 3 Encounters:   03/31/21 (!) 145/90   01/12/21 132/77   12/24/20 (!) 146/94     Lab Results   Component Value Date/Time    Sodium 131 (L) 12/22/2020 11:52 AM    Potassium 4.1 12/22/2020 11:52 AM    Chloride 97 12/22/2020 11:52 AM    CO2 23 12/22/2020 11:52 AM    Anion gap 11 12/22/2020 11:52 AM    Glucose 359 (H) 12/22/2020 11:52 AM    BUN 28 (H) 12/22/2020 11:52 AM    Creatinine 1.13 12/22/2020 11:52 AM    BUN/Creatinine ratio 25 (H) 12/22/2020 11:52 AM    GFR est AA >60 12/22/2020 11:52 AM    GFR est non-AA >60 12/22/2020 11:52 AM    Calcium 9.2 12/22/2020 11:52 AM     Lab Results   Component Value Date/Time    Hemoglobin A1c 10.5 (H) 12/23/2020 02:38 AM    Hemoglobin A1c (POC) 8.6 (A) 03/31/2021 04:01 PM     Lab Results   Component Value Date/Time    Cholesterol, total 196 12/23/2020 02:38 AM    HDL Cholesterol 23 12/23/2020 02:38 AM    LDL, calculated 121.6 (H) 12/23/2020 02:38 AM    VLDL, calculated 51.4 12/23/2020 02:38 AM    Triglyceride 257 (H) 12/23/2020 02:38 AM    CHOL/HDL Ratio 8.5 (H) 12/23/2020 02:38 AM     No results found for: LUCINDA Gomes    Lab Results   Component Value Date/Time    TSH 1.32 12/23/2020 02:38 AM

## 2021-07-08 ENCOUNTER — VIRTUAL VISIT (OUTPATIENT)
Dept: INTERNAL MEDICINE CLINIC | Age: 56
End: 2021-07-08

## 2021-07-08 DIAGNOSIS — E11.65 UNCONTROLLED TYPE 2 DIABETES MELLITUS WITH HYPERGLYCEMIA (HCC): Primary | ICD-10-CM

## 2021-07-08 DIAGNOSIS — E78.00 PURE HYPERCHOLESTEROLEMIA: ICD-10-CM

## 2021-07-08 RX ORDER — INSULIN GLARGINE 300 U/ML
58 INJECTION, SOLUTION SUBCUTANEOUS DAILY
Qty: 13.5 ML | Refills: 2 | Status: SHIPPED | OUTPATIENT
Start: 2021-07-08 | End: 2022-01-15

## 2021-07-08 RX ORDER — ATORVASTATIN CALCIUM 20 MG/1
20 TABLET, FILM COATED ORAL DAILY
Qty: 90 TABLET | Refills: 2 | Status: SHIPPED | OUTPATIENT
Start: 2021-07-08 | End: 2022-07-12 | Stop reason: SDUPTHER

## 2021-07-08 RX ORDER — FLASH GLUCOSE SENSOR
1 KIT MISCELLANEOUS SEE ADMIN INSTRUCTIONS
Qty: 6 KIT | Refills: 11 | Status: SHIPPED | OUTPATIENT
Start: 2021-07-08 | End: 2021-12-12 | Stop reason: SDUPTHER

## 2021-07-08 NOTE — PROGRESS NOTES
Pharmacy Progress Note - Diabetes Management    S/O: Mr. Luca Thomas is a 56 y. o. male, referred by Peri Wyatt a PMH of HTN, CVA (Dec 2020),  T2DM, Hx of pancreatitis, GERD, KATJA, was seen virtually today for diabetes management follow up.  Patient's last A1c was 8.6% (2021), 10.5% (Dec 2020), 10.1% (2019).  PHI verified.      Interim update: Toujeo replaced Lantus at last appt  - 6/10/21. S/p right eye cataract surgery 21. Still using steroid eye drops. Has about 2 weeks left. Will have f/u on 21. Current anti-hyperglycemic regimen include(s):    - Toujeo 58 units daily  - Humalog 14 units before breakfast, 14 units before lunch and 18 units before dinner     Atorvastatin 20 mg daily, ASA 81 mg daily  Requests for 90 ds refills. ROS:  Today, Pt endorses:  - Symptoms of Hyperglycemia: none  - Symptoms of Hypoglycemia: none    Self Monitoring Blood Glucose (SMBG) or CGM:  [employer] CGM ; Scanning 3x/day  Av  Fastin today    4 weeks:          2 weeks:                     Note glycemic change when he started steroid eye drops:          Nutrition/Lifestyle Modifications:  Denies any nutrition changes  Hoping to start using his treadmill soon. Reports home BP -- 134/87   HR runs in the mid 80s. Vitals:   Wt Readings from Last 3 Encounters:   21 231 lb (104.8 kg)   21 218 lb (98.9 kg)   20 207 lb 0.2 oz (93.9 kg)     BP Readings from Last 3 Encounters:   21 (!) 145/90   21 132/77   20 (!) 146/94     Pulse Readings from Last 3 Encounters:   21 92   21 96   20 95       Past Medical History:   Diagnosis Date    Acute hypoxemic respiratory failure ([address] 4/15/2019    Diabetes [address] [address]     GERD (gastroesophageal reflux disease) 10/1/2010    Influenza 2019    Nausea & vomiting     KATJA (obstructive sleep apnea) 10/1/2010    Overweight(278.02) 10/1/2010    Pancreatitis     Unspecified sleep apnea Allergies   Allergen Reactions    Lisinopril Anaphylaxis     Angioedema       Current Outpatient Medications   Medication Sig    metoprolol succinate (TOPROL-XL) 25 mg XL tablet Take 1 Tablet by mouth daily.  atorvastatin (LIPITOR) 20 mg tablet Take 1 Tablet by mouth daily.  Insulin Needles, Disposable, 32 gauge x 5/32\" ndle 1 Pen Needle by SubCUTAneous route See Admin Instructions. Use to give insulin 5 times daily. Please dispense 4 mm length. E11.65    insulin glargine U-300 conc (Toujeo SoloStar U-300 Insulin) 300 unit/mL (1.5 mL) inpn pen 58 Units by SubCUTAneous route daily. Indications: type 2 diabetes mellitus    insulin lispro (HumaLOG KwikPen Insulin) 100 unit/mL kwikpen 14 units with breakfast, 14 units with lunch, and 18 units with dinner  Indications: type 2 diabetes mellitus    aspirin 81 mg chewable tablet Take 81 mg by mouth daily.  flash glucose sensor (FreeStyle Alyx 14 Day Sensor) kit 1 Each by Does Not Apply route See Golden Garcia. Apply and replace sensor every 2 weeks. Scan at least 3 times a day. E11.65    amLODIPine (NORVASC) 10 mg tablet Take 1 Tab by mouth daily.  lancets misc Check fsbs bid     No current facility-administered medications for this visit. Lab Results   Component Value Date/Time    Sodium 131 (L) 12/22/2020 11:52 AM    Potassium 4.1 12/22/2020 11:52 AM    Chloride 97 12/22/2020 11:52 AM    CO2 23 12/22/2020 11:52 AM    Anion gap 11 12/22/2020 11:52 AM    Glucose 359 (H) 12/22/2020 11:52 AM    BUN 28 (H) 12/22/2020 11:52 AM    Creatinine 1.13 12/22/2020 11:52 AM    BUN/Creatinine ratio 25 (H) 12/22/2020 11:52 AM    GFR est AA >60 12/22/2020 11:52 AM    GFR est non-AA >60 12/22/2020 11:52 AM    Calcium 9.2 12/22/2020 11:52 AM    Bilirubin, total 0.9 12/22/2020 11:52 AM    Alk.  phosphatase 127 (H) 12/22/2020 11:52 AM    Protein, total 8.3 (H) 12/22/2020 11:52 AM    Albumin 3.9 12/22/2020 11:52 AM    Globulin 4.4 (H) 12/22/2020 11:52 AM A-G Ratio 0.9 (L) 12/22/2020 11:52 AM    ALT (SGPT) 26 12/22/2020 11:52 AM       Lab Results   Component Value Date/Time    Cholesterol, total 196 12/23/2020 02:38 AM    HDL Cholesterol 23 12/23/2020 02:38 AM    LDL, calculated 121.6 (H) 12/23/2020 02:38 AM    VLDL, calculated 51.4 12/23/2020 02:38 AM    Triglyceride 257 (H) 12/23/2020 02:38 AM    CHOL/HDL Ratio 8.5 (H) 12/23/2020 02:38 AM       Lab Results   Component Value Date/Time    WBC 12.3 (H) 12/24/2020 04:47 AM    HGB 16.4 12/24/2020 04:47 AM    HCT 50.1 12/24/2020 04:47 AM    PLATELET 841 35/39/8393 04:47 AM    MCV 91.1 12/24/2020 04:47 AM       Lab Results   Component Value Date/Time    Microalb/Creat ratio (ug/mg creat.) 12.7 05/30/2019 04:36 PM    Microalbumin urine (POC) 30 02/04/2011 03:50 PM       HbA1c:  Lab Results   Component Value Date/Time    Hemoglobin A1c 10.5 (H) 12/23/2020 02:38 AM    Hemoglobin A1c (POC) 8.6 (A) 03/31/2021 04:01 PM     No components found for: 2     Last Point of Care HGB A1C  Hemoglobin A1c (POC)   Date Value Ref Range Status   03/31/2021 8.6 (A) 4.8 - 5.6 % Final        CrCl cannot be calculated (Patient's most recent lab result is older than the maximum 180 days allowed. ). A/P:    Diabetes Management:  - Per ADA guidelines, Pt's A1c is not at goal of < 7%.     - CGM readings are improving. He is showing some systemic hyperglycemia effects from ophthalmic steroids  - Continue with Toujeo 58 units daily  - Continue Humalog 14 units before breakfast, lunch and 18 units before dinner  - Will continue to follow CGM progress. - Will order A1c. Pt to complete lab w/ other previously ordered labs by Dr. Ailyn Julian    Medication reconciliation was completed during the visit.     Medications Discontinued During This Encounter   Medication Reason    flash glucose sensor (FreeStyle Alyx 14 Day Sensor) kit REORDER    insulin glargine U-300 conc (Toujeo SoloStar U-300 Insulin) 300 unit/mL (1.5 mL) inpn pen REORDER    atorvastatin (LIPITOR) 20 mg tablet REORDER     Orders Placed This Encounter    HEMOGLOBIN A1C WITH EAG     Standing Status:   Future     Number of Occurrences:   1     Standing Expiration Date:   2022    atorvastatin (LIPITOR) 20 mg tablet     Sig: Take 1 Tablet by mouth daily. Dispense:  90 Tablet     Refill:  2    insulin glargine U-300 conc (Toujeo SoloStar U-300 Insulin) 300 unit/mL (1.5 mL) inpn pen     Si Units by SubCUTAneous route daily. Please dispense 3 boxes. Indications: type 2 diabetes mellitus     Dispense:  13.5 mL     Refill:  2    flash glucose sensor (FreeStyle Alyx 14 Day Sensor) kit     Si Each by Does Not Apply route See Golden Garcia. Apply and replace sensor every 2 weeks. Scan at least 3 times a day. E11.65     Dispense:  6 Kit     Refill:  11     Patient verbalized understanding of the information presented and all of the patients questions were answered. Patient advised to call the office with any additional questions or concerns. Notifications of recommendations will be sent to Dr. Blaise Pihllips MD for review. Will wait until lab results return to determine f/u interval.     Thank you for the consult,  Camille Hdez, PharmD, BCACP, [address] [facility] in place:  Yes   Recommendation Provided To: Patient/Caregiver: 1 via Virtual Visit   Intervention Detail: Lab(s) Ordered and Refill(s) Provided   Total # of Interventions Accepted: 2   Intervention Accepted By:   Anna Ly Time Spent (min): 20

## 2021-07-28 ENCOUNTER — TELEPHONE (OUTPATIENT)
Dept: INTERNAL MEDICINE CLINIC | Age: 56
End: 2021-07-28

## 2021-07-28 NOTE — TELEPHONE ENCOUNTER
----- Message from Raven Radha sent at 7/28/2021  4:36 PM EDT -----  Regarding: Dr. Alexander Wolff first and last name: Amber Sender( Wife)   Reason for call: Update PA   Best contact number(s): [phone]  Details to clarify the request: Caller stated that she needs a call back in regards to a PA for 3 months supply for [employer] . Caller stated that she needs a call back as soon as possible.     Message from [facility]

## 2021-07-31 NOTE — TELEPHONE ENCOUNTER
Called pharmacy to receive information to initiate PA via CoverMyMeds. Information submitted at this time. Awaiting response at this time. Called patient to notify of PA submission, unable to reach patient at this time. Left generic message to return call to office at earliest convenience.

## 2021-10-12 ENCOUNTER — TELEPHONE (OUTPATIENT)
Dept: INTERNAL MEDICINE CLINIC | Age: 56
End: 2021-10-12

## 2021-10-12 ENCOUNTER — VIRTUAL VISIT (OUTPATIENT)
Dept: INTERNAL MEDICINE CLINIC | Age: 56
End: 2021-10-12
Payer: COMMERCIAL

## 2021-10-12 DIAGNOSIS — L03.116 CELLULITIS OF LEFT ANKLE: ICD-10-CM

## 2021-10-12 DIAGNOSIS — Z00.00 ROUTINE GENERAL MEDICAL EXAMINATION AT A HEALTH CARE FACILITY: ICD-10-CM

## 2021-10-12 DIAGNOSIS — Z00.00 LABORATORY TESTS ORDERED AS PART OF A COMPLETE PHYSICAL EXAM (CPE): ICD-10-CM

## 2021-10-12 DIAGNOSIS — E11.69 TYPE 2 DIABETES MELLITUS WITH OTHER SPECIFIED COMPLICATION, WITH LONG-TERM CURRENT USE OF INSULIN (HCC): Primary | ICD-10-CM

## 2021-10-12 DIAGNOSIS — Z79.4 TYPE 2 DIABETES MELLITUS WITH OTHER SPECIFIED COMPLICATION, WITH LONG-TERM CURRENT USE OF INSULIN (HCC): Primary | ICD-10-CM

## 2021-10-12 PROCEDURE — 99213 OFFICE O/P EST LOW 20 MIN: CPT | Performed by: INTERNAL MEDICINE

## 2021-10-12 RX ORDER — CEPHALEXIN 500 MG/1
500 CAPSULE ORAL 3 TIMES DAILY
Qty: 21 CAPSULE | Refills: 0 | Status: SHIPPED | OUTPATIENT
Start: 2021-10-12 | End: 2021-12-01

## 2021-10-12 NOTE — TELEPHONE ENCOUNTER
Pt scraped leg 2 weeks ago, left foot on ankle bone area. Thinks it was concrete, doesn't remember it being metal.    Used neosporin    States yesterday: area was swollen and is infected. Is requesting antibiotic used previously:  Sulfamethoxazole    Also does pt need tetnus and can pt get flu shot while on antibiotics (was gonna get this weekend)      Please call pt to advise.     [phone] pt   [phone] Sylvia Bull (home line)

## 2021-10-12 NOTE — PATIENT INSTRUCTIONS
This is an established visit conducted via telemedicine. The patient has been instructed that this meets HIPAA criteria and acknowledges and agrees to this method of visitation.     Jess Valladares  68/06/29  4:89 PM

## 2021-10-12 NOTE — PROGRESS NOTES
HISTORY OF PRESENT ILLNESS  Merry Poole is a 55 y.o. male. HPI   Merry Poole, was evaluated through a synchronous (real-time) audio-video encounter. The patient (or guardian if applicable) is aware that this is a billable service. Verbal consent to proceed has been obtained within the past 12 months. The visit was conducted pursuant to the emergency declaration under the [facility] [address] and the [employer] and [employer] General Act. Patient identification was verified, and a caregiver was present when appropriate. The patient was located in a state where the provider was credentialed to provide care. C/o abrasion to left lateral ankle > 10 d ago while working on his car in garage. Scraped ankle. Noted some clear drainage yesterday and mild erythema around the scab  No f/c    --Joanna Koo MD on 10/12/2021 at 3:08 PM    An electronic signature was used to authenticate this note. 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), HTN HLD DM-2 uncontrolled and systolic CHF ef 94-65% with LVH  Seeing pharm D to help with DM-2 management and insulin titration  Recent a1c down to 8.6 last month down from 10.5 a few months ago     Last     Patient Active Problem List    Diagnosis Date Noted    Ataxia 12/22/2020    Diabetes mellitus ([facility] [address] 10/01/2010    HTN (hypertension) 10/01/2010    Pure hypercholesterolemia 10/01/2010    Achalasia 10/01/2010     Current Outpatient Medications   Medication Sig Dispense Refill    cephALEXin (KEFLEX) 500 mg capsule Take 1 Capsule by mouth three (3) times daily. 21 Capsule 0    atorvastatin (LIPITOR) 20 mg tablet Take 1 Tablet by mouth daily. 90 Tablet 2    insulin glargine U-300 conc (Toujeo SoloStar U-300 Insulin) 300 unit/mL (1.5 mL) inpn pen 58 Units by SubCUTAneous route daily. Please dispense 3 boxes.   Indications: type 2 diabetes mellitus 13.5 mL 2    flash glucose sensor (FreeStyle Alyx 14 Day Sensor) kit 1 Each by Does Not Apply route See Golden Garcia. Apply and replace sensor every 2 weeks. Scan at least 3 times a day. E11.65 6 Kit 11    metoprolol succinate (TOPROL-XL) 25 mg XL tablet Take 1 Tablet by mouth daily. 90 Tablet 1    Insulin Needles, Disposable, 32 gauge x 5/32\" ndle 1 Pen Needle by SubCUTAneous route See Admin Instructions. Use to give insulin 5 times daily. Please dispense 4 mm length. E11.65 500 Pen Needle 11    insulin lispro (HumaLOG KwikPen Insulin) 100 unit/mL kwikpen 14 units with breakfast, 14 units with lunch, and 18 units with dinner  Indications: type 2 diabetes mellitus 45 mL 2    aspirin 81 mg chewable tablet Take 81 mg by mouth daily.  amLODIPine (NORVASC) 10 mg tablet Take 1 Tab by mouth daily.  90 Tab 3    lancets misc Check fsbs bid 200 Each 11     Allergies   Allergen Reactions    Lisinopril Anaphylaxis     Angioedema      Lab Results   Component Value Date/Time    WBC 12.3 (H) 12/24/2020 04:47 AM    HGB 16.4 12/24/2020 04:47 AM    HCT 50.1 12/24/2020 04:47 AM    PLATELET 117 96/46/2157 04:47 AM    MCV 91.1 12/24/2020 04:47 AM     Lab Results   Component Value Date/Time    Hemoglobin A1c 10.5 (H) 12/23/2020 02:38 AM    Hemoglobin A1c 10.1 (H) 11/07/2019 03:59 PM    Hemoglobin A1c 11.3 (H) 01/21/2019 02:54 AM    Glucose 359 (H) 12/22/2020 11:52 AM    Glucose (POC) 199 (H) 12/23/2020 09:10 PM    Microalb/Creat ratio (ug/mg creat.) 12.7 05/30/2019 04:36 PM    LDL, calculated 121.6 (H) 12/23/2020 02:38 AM    Creatinine 1.13 12/22/2020 11:52 AM      Lab Results   Component Value Date/Time    Cholesterol, total 196 12/23/2020 02:38 AM    HDL Cholesterol 23 12/23/2020 02:38 AM    LDL, calculated 121.6 (H) 12/23/2020 02:38 AM    Triglyceride 257 (H) 12/23/2020 02:38 AM    CHOL/HDL Ratio 8.5 (H) 12/23/2020 02:38 AM     Lab Results   Component Value Date/Time    GFR est non-AA >60 12/22/2020 11:52 AM    GFR est AA >60 12/22/2020 11:52 AM    Creatinine 1.13 12/22/2020 11:52 AM    BUN 28 (H) 12/22/2020 11:52 AM    Sodium 131 (L) 12/22/2020 11:52 AM    Potassium 4.1 12/22/2020 11:52 AM    Chloride 97 12/22/2020 11:52 AM    CO2 23 12/22/2020 11:52 AM    Magnesium 2.2 01/29/2019 02:48 AM    Phosphorus 2.7 01/29/2019 02:48 AM        ROS    Physical Exam  Vitals and nursing note reviewed. Constitutional:       General: He is not in acute distress. Appearance: He is well-developed. Comments: Appears stated age   HENT:      Head: Normocephalic. Cardiovascular:      Rate and Rhythm: Normal rate and regular rhythm. Heart sounds: Normal heart sounds. Pulmonary:      Effort: Pulmonary effort is normal.      Breath sounds: Normal breath sounds. Abdominal:      Palpations: Abdomen is soft. Skin:     Findings: Erythema present. Neurological:      Mental Status: He is alert. ASSESSMENT and PLAN  Diagnoses and all orders for this visit:    1. Type 2 diabetes mellitus with other specified complication, with long-term current use of insulin (HCC)  -     HEMOGLOBIN A1C WITH EAG; Future  -     MICROALBUMIN, UR, RAND W/ MICROALB/CREAT RATIO; Future    2. Cellulitis of left ankle   Scab formed at site of abrasion with very  mild erythema around the scab   Keflex x 7 days  3. Laboratory tests ordered as part of a complete physical exam (CPE)    4. Routine general medical examination at a health care facility  -     METABOLIC PANEL, COMPREHENSIVE; Future  -     PSA W/ REFLX FREE PSA; Future  -     LIPID PANEL; Future  -     HEPATITIS C AB; Future  -     CBC W/O DIFF; Future   Labs not done last OV for CPE  Other orders  -     cephALEXin (KEFLEX) 500 mg capsule; Take 1 Capsule by mouth three (3) times daily.     Fu in 2 months as scheduled

## 2021-11-13 LAB
ALBUMIN SERPL-MCNC: 4.2 G/DL (ref 3.8–4.9)
ALBUMIN/CREAT UR: 13 MG/G CREAT (ref 0–29)
ALBUMIN/GLOB SERPL: 1.4 {RATIO} (ref 1.2–2.2)
ALP SERPL-CCNC: 134 IU/L (ref 44–121)
ALT SERPL-CCNC: 24 IU/L (ref 0–44)
AST SERPL-CCNC: 18 IU/L (ref 0–40)
BILIRUB SERPL-MCNC: 0.3 MG/DL (ref 0–1.2)
BUN SERPL-MCNC: 14 MG/DL (ref 6–24)
BUN/CREAT SERPL: 15 (ref 9–20)
CALCIUM SERPL-MCNC: 9.4 MG/DL (ref 8.7–10.2)
CHLORIDE SERPL-SCNC: 100 MMOL/L (ref 96–106)
CHOLEST SERPL-MCNC: 143 MG/DL (ref 100–199)
CO2 SERPL-SCNC: 22 MMOL/L (ref 20–29)
CREAT SERPL-MCNC: 0.96 MG/DL (ref 0.76–1.27)
CREAT UR-MCNC: 111 MG/DL
ERYTHROCYTE [DISTWIDTH] IN BLOOD BY AUTOMATED COUNT: 12.3 % (ref 11.6–15.4)
EST. AVERAGE GLUCOSE BLD GHB EST-MCNC: 252 MG/DL
GLOBULIN SER CALC-MCNC: 2.9 G/DL (ref 1.5–4.5)
GLUCOSE SERPL-MCNC: 329 MG/DL (ref 65–99)
HBA1C MFR BLD: 10.4 % (ref 4.8–5.6)
HCT VFR BLD AUTO: 47.8 % (ref 37.5–51)
HCV AB S/CO SERPL IA: <0.1 S/CO RATIO (ref 0–0.9)
HDLC SERPL-MCNC: 23 MG/DL
HGB BLD-MCNC: 16.2 G/DL (ref 13–17.7)
LDLC SERPL CALC-MCNC: 75 MG/DL (ref 0–99)
MCH RBC QN AUTO: 31.6 PG (ref 26.6–33)
MCHC RBC AUTO-ENTMCNC: 33.9 G/DL (ref 31.5–35.7)
MCV RBC AUTO: 93 FL (ref 79–97)
MICROALBUMIN UR-MCNC: 14.1 UG/ML
PLATELET # BLD AUTO: 304 X10E3/UL (ref 150–450)
POTASSIUM SERPL-SCNC: 4.4 MMOL/L (ref 3.5–5.2)
PROT SERPL-MCNC: 7.1 G/DL (ref 6–8.5)
PSA SERPL-MCNC: 0.3 NG/ML (ref 0–4)
RBC # BLD AUTO: 5.12 X10E6/UL (ref 4.14–5.8)
REFLEX CRITERIA: NORMAL
SODIUM SERPL-SCNC: 137 MMOL/L (ref 134–144)
TRIGL SERPL-MCNC: 274 MG/DL (ref 0–149)
VLDLC SERPL CALC-MCNC: 45 MG/DL (ref 5–40)
WBC # BLD AUTO: 11.3 X10E3/UL (ref 3.4–10.8)

## 2021-11-14 NOTE — PROGRESS NOTES
Mehrdad pt a1c up to 10.4--avg glucose 252--see it pt will fu again with Pharm D-Kaleigh for insulin management. Normal PSA lytes kidney, liver urine protein and blood cell counts  LDL chol at goal on statin-continue .  HDL is low--regular exercise

## 2021-11-15 NOTE — PROGRESS NOTES
Spoke with patient using 2 identifiers. Patient was informed of recent labs  and Dr. Ravi Miranda recommendations. Patient is open to see Jesenia Dangelo PHARMD follow up concerning diabetes.

## 2021-11-17 ENCOUNTER — TELEPHONE (OUTPATIENT)
Dept: INTERNAL MEDICINE CLINIC | Age: 56
End: 2021-11-17

## 2021-11-17 NOTE — TELEPHONE ENCOUNTER
Left message for patient to return call to office to schedule 30 min DM f/up per Kaleigh.  Can be in office or virtual.

## 2021-11-24 ENCOUNTER — TELEPHONE (OUTPATIENT)
Dept: INTERNAL MEDICINE CLINIC | Age: 56
End: 2021-11-24

## 2021-11-24 NOTE — TELEPHONE ENCOUNTER
----- Message from [facility] sent at 11/24/2021  3:55 PM EST -----  Subject: Message to Provider    QUESTIONS  Information for Provider? Pt called in to return a call from a \"Jaida\"   at Dr Melba Campa. Explained I would transfer him right over to her, and   while getting through the line, the pt disconnected. please return call  ---------------------------------------------------------------------------  --------------  CALL BACK INFO  What is the best way for the office to contact you? OK to leave message on   voicemail  Preferred Call Back Phone Number? [phone]  ---------------------------------------------------------------------------  --------------  SCRIPT ANSWERS  Relationship to Patient?  Self

## 2021-12-01 ENCOUNTER — OFFICE VISIT (OUTPATIENT)
Dept: INTERNAL MEDICINE CLINIC | Age: 56
End: 2021-12-01
Payer: COMMERCIAL

## 2021-12-01 VITALS
HEIGHT: 76 IN | OXYGEN SATURATION: 100 % | BODY MASS INDEX: 29.83 KG/M2 | HEART RATE: 97 BPM | TEMPERATURE: 96.9 F | SYSTOLIC BLOOD PRESSURE: 124 MMHG | WEIGHT: 245 LBS | DIASTOLIC BLOOD PRESSURE: 80 MMHG | RESPIRATION RATE: 16 BRPM

## 2021-12-01 DIAGNOSIS — E11.69 TYPE 2 DIABETES MELLITUS WITH OTHER SPECIFIED COMPLICATION, WITH LONG-TERM CURRENT USE OF INSULIN (HCC): ICD-10-CM

## 2021-12-01 DIAGNOSIS — E78.5 DYSLIPIDEMIA: ICD-10-CM

## 2021-12-01 DIAGNOSIS — Z79.4 TYPE 2 DIABETES MELLITUS WITH OTHER SPECIFIED COMPLICATION, WITH LONG-TERM CURRENT USE OF INSULIN (HCC): ICD-10-CM

## 2021-12-01 DIAGNOSIS — Z86.73 HISTORY OF CVA (CEREBROVASCULAR ACCIDENT): ICD-10-CM

## 2021-12-01 DIAGNOSIS — Z00.00 ROUTINE GENERAL MEDICAL EXAMINATION AT A HEALTH CARE FACILITY: Primary | ICD-10-CM

## 2021-12-01 DIAGNOSIS — Z23 ENCOUNTER FOR IMMUNIZATION: ICD-10-CM

## 2021-12-01 DIAGNOSIS — I10 HYPERTENSION, UNSPECIFIED TYPE: ICD-10-CM

## 2021-12-01 DIAGNOSIS — E11.42 DIABETIC POLYNEUROPATHY ASSOCIATED WITH TYPE 2 DIABETES MELLITUS (HCC): ICD-10-CM

## 2021-12-01 PROCEDURE — 99396 PREV VISIT EST AGE 40-64: CPT | Performed by: INTERNAL MEDICINE

## 2021-12-01 PROCEDURE — 90471 IMMUNIZATION ADMIN: CPT | Performed by: INTERNAL MEDICINE

## 2021-12-01 PROCEDURE — 90715 TDAP VACCINE 7 YRS/> IM: CPT | Performed by: INTERNAL MEDICINE

## 2021-12-01 NOTE — PROGRESS NOTES
Rhiannon Castro is a 55 y.o. male who presents for routine immunizations. He denies any symptoms , reactions or allergies that would exclude them from being immunized today. Risks and adverse reactions were discussed and the VIS was given to them. All questions were addressed. He was observed for 10 min post injection. There were no reactions observed.     Rowan Cohn LPN

## 2021-12-01 NOTE — PATIENT INSTRUCTIONS
Office Policies    Phone calls/patient messages:            Please allow up to 24 hours for someone in the office to contact you about your call or message. Be mindful your provider may be out of the office or your message may require further review. We encourage you to use [employer] for your messages as this is a faster, more efficient way to communicate with our office                         Medication Refills:            Prescription medications require 48-72 business hours to process. We encourage you to use [employer] for your refills. For controlled medications: Please allow 72 business hours to process. Certain medications may require you to  a written prescription at our office. NO narcotic/controlled medications will be prescribed after 4pm Monday through Friday or on weekends              Form/Paperwork Completion:            Please note a $25 fee may incur for all paperwork for completed by our providers. We ask that you allow 7-10 business days. Pre-payment is due prior to picking up/faxing the completed form. You may also download your forms to [employer] to have your doctor print off.

## 2021-12-02 ENCOUNTER — VIRTUAL VISIT (OUTPATIENT)
Dept: INTERNAL MEDICINE CLINIC | Age: 56
End: 2021-12-02

## 2021-12-02 DIAGNOSIS — E11.69 TYPE 2 DIABETES MELLITUS WITH OTHER SPECIFIED COMPLICATION, WITH LONG-TERM CURRENT USE OF INSULIN (HCC): Primary | ICD-10-CM

## 2021-12-02 DIAGNOSIS — E11.65 UNCONTROLLED TYPE 2 DIABETES MELLITUS WITH HYPERGLYCEMIA (HCC): ICD-10-CM

## 2021-12-02 DIAGNOSIS — Z79.4 TYPE 2 DIABETES MELLITUS WITH OTHER SPECIFIED COMPLICATION, WITH LONG-TERM CURRENT USE OF INSULIN (HCC): Primary | ICD-10-CM

## 2021-12-02 NOTE — PROGRESS NOTES
Pharmacy Progress Note - Diabetes Management    Assessment / Plan:   Diabetes Management:  - Per ADA guidelines, Pt's A1c is not at goal of < 7%. - Hyperglycemia influenced significantly by frequent missed mid-day prandial coverage.   - Review role of GLP1 RA and current antihyperglycemic therapies, MOA, and SE profile. - Based on current formulary, it appears Ozempic is preferred. Will wait for confirmation  - Continue with Toujeo 58 units daily for now. - Continue with Humalog 14 units before breakfast, lunch, and 18 units before dinner.   - Scan sensor at least 4x/daily. S/O: Mr. Luca Thomas is a 56 y. o. male, referred by Mic Barron MD with a PMH of HTN, CVA (Dec 2020),  T2DM, Hx of pancreatitis, GERD, KATJA, was seen virtually today for diabetes management follow up.  Patient's last A1c was 10.4% (Nov 2021), 8.6% (March 2021), 10.5% (Dec 2020), 10.1% (Nov 2019).  PHI verified.     Last seen by me in July 2021. Interim update:   Saw Dr Cesar Stanley for routine f/u 12/1/21. Attributes hyperglycemia to recent LE abrasion/cellulitis and COVID vaccinations. Current anti-hyperglycemic regimen include(s):    - Toujeo 58 units daily  - Humalog 14 units before breakfast, 14 units before lunch and 18 units before dinner  ---> usually misses lunch dose 4-5x/week; more consistent during weekend days     Atorvastatin 20 mg daily, ASA 81 mg daily    ROS:  Today, Pt endorses:  - Symptoms of Hyperglycemia: none  - Symptoms of Hypoglycemia: none    Self Monitoring Blood Glucose (SMBG) or CGM:  Alyx 2 CGM  Scanning 1-2x/day                        The ASCVD Risk score (Mansi Cedillo, et al., 2013) failed to calculate for the following reasons: The patient has a prior MI or stroke diagnosis     Vitals:   Wt Readings from Last 3 Encounters:   12/01/21 245 lb (111.1 kg)   03/31/21 231 lb (104.8 kg)   01/12/21 218 lb (98.9 kg)     BP Readings from Last 3 Encounters:   12/01/21 124/80   03/31/21 (!) 145/90 01/12/21 132/77     Pulse Readings from Last 3 Encounters:   12/01/21 97   03/31/21 92   01/12/21 96       Past Medical History:   Diagnosis Date    Acute hypoxemic respiratory failure (HCC) 4/15/2019    Diabetes (HCC)     GERD (gastroesophageal reflux disease) 10/1/2010    Influenza 1/19/2019    Nausea & vomiting     KATJA (obstructive sleep apnea) 10/1/2010    Overweight(278.02) 10/1/2010    Pancreatitis     Unspecified sleep apnea      Allergies   Allergen Reactions    Lisinopril Anaphylaxis     Angioedema       Current Outpatient Medications   Medication Sig    atorvastatin (LIPITOR) 20 mg tablet Take 1 Tablet by mouth daily.  insulin glargine U-300 conc (Toujeo SoloStar U-300 Insulin) 300 unit/mL (1.5 mL) inpn pen 58 Units by SubCUTAneous route daily. Please dispense 3 boxes. Indications: type 2 diabetes mellitus    flash glucose sensor (FreeStyle Alyx 14 Day Sensor) kit 1 Each by Does Not Apply route See Golden Garcia. Apply and replace sensor every 2 weeks. Scan at least 3 times a day. E11.65    metoprolol succinate (TOPROL-XL) 25 mg XL tablet Take 1 Tablet by mouth daily.  Insulin Needles, Disposable, 32 gauge x 5/32\" ndle 1 Pen Needle by SubCUTAneous route See Admin Instructions. Use to give insulin 5 times daily. Please dispense 4 mm length. E11.65    insulin lispro (HumaLOG KwikPen Insulin) 100 unit/mL kwikpen 14 units with breakfast, 14 units with lunch, and 18 units with dinner  Indications: type 2 diabetes mellitus    aspirin 81 mg chewable tablet Take 81 mg by mouth daily.  amLODIPine (NORVASC) 10 mg tablet Take 1 Tab by mouth daily.  lancets misc Check fsbs bid (Patient not taking: Reported on 12/1/2021)     No current facility-administered medications for this visit.        Lab Results   Component Value Date/Time    Sodium 137 11/12/2021 08:48 AM    Potassium 4.4 11/12/2021 08:48 AM    Chloride 100 11/12/2021 08:48 AM    CO2 22 11/12/2021 08:48 AM    Anion gap 11 12/22/2020 11:52 AM    Glucose 329 (H) 11/12/2021 08:48 AM    BUN 14 11/12/2021 08:48 AM    Creatinine 0.96 11/12/2021 08:48 AM    BUN/Creatinine ratio 15 11/12/2021 08:48 AM    GFR est  11/12/2021 08:48 AM    GFR est non-AA 89 11/12/2021 08:48 AM    Calcium 9.4 11/12/2021 08:48 AM    Bilirubin, total 0.3 11/12/2021 08:48 AM    Alk. phosphatase 134 (H) 11/12/2021 08:48 AM    Protein, total 7.1 11/12/2021 08:48 AM    Albumin 4.2 11/12/2021 08:48 AM    Globulin 4.4 (H) 12/22/2020 11:52 AM    A-G Ratio 1.4 11/12/2021 08:48 AM    ALT (SGPT) 24 11/12/2021 08:48 AM       Lab Results   Component Value Date/Time    Cholesterol, total 143 11/12/2021 08:48 AM    HDL Cholesterol 23 (L) 11/12/2021 08:48 AM    LDL, calculated 75 11/12/2021 08:48 AM    LDL, calculated 121.6 (H) 12/23/2020 02:38 AM    VLDL, calculated 45 (H) 11/12/2021 08:48 AM    VLDL, calculated 51.4 12/23/2020 02:38 AM    Triglyceride 274 (H) 11/12/2021 08:48 AM    CHOL/HDL Ratio 8.5 (H) 12/23/2020 02:38 AM       Lab Results   Component Value Date/Time    WBC 11.3 (H) 11/12/2021 08:48 AM    HGB 16.2 11/12/2021 08:48 AM    HCT 47.8 11/12/2021 08:48 AM    PLATELET 650 60/74/1148 08:48 AM    MCV 93 11/12/2021 08:48 AM       Lab Results   Component Value Date/Time    Microalb/Creat ratio (ug/mg creat.) 13 11/12/2021 08:48 AM    Microalbumin urine (POC) 30 02/04/2011 03:50 PM       HbA1c:  Lab Results   Component Value Date/Time    Hemoglobin A1c 10.4 (H) 11/12/2021 08:48 AM    Hemoglobin A1c (POC) 8.6 (A) 03/31/2021 04:01 PM     No components found for: 2     Last Point of Care HGB A1C  Hemoglobin A1c (POC)   Date Value Ref Range Status   03/31/2021 8.6 (A) 4.8 - 5.6 % Final        Estimated Creatinine Clearance: 118.7 mL/min (by C-G formula based on SCr of 0.96 mg/dL). Medication reconciliation was completed during the visit. There are no discontinued medications.      Orders Placed This Encounter    semaglutide (OZEMPIC) 0.25 mg or 0.5 mg/dose (2 mg/1.5 ml) subq pen     Si.25-0.5 mg by SubCUTAneous route every seven (7) days. Inject 0.25 mg under the skin weekly for 4 weeks, then increase to 0.5 mg weekly thereafter     Dispense:  1 Box     Refill:  2     Patient verbalized understanding of the information presented and all of the patients questions were answered. APatient advised to call the office with any additional questions or concerns. Notifications of recommendations will be sent to Dr. Gissell Charles MD for review. Thank you for the consult,  Camille Cameron, PharmD, BCACP, [address] [place] [address] in place:  Yes   Recommendation Provided To: Patient/Caregiver: 2 via Virtual Visit   Intervention Detail: Adherence Monitorin and New Rx: 1, reason: Needs Additional Therapy   Gap Closed?:    Intervention Accepted By: Patient/Caregiver: 2   Time Spent (min): 60

## 2021-12-03 ENCOUNTER — TELEPHONE (OUTPATIENT)
Dept: INTERNAL MEDICINE CLINIC | Age: 56
End: 2021-12-03

## 2021-12-03 NOTE — TELEPHONE ENCOUNTER
Pharmacy Progress Note - Telephone Call    Mr. Nancy Salazar 55 y.o. was contacted via an outbound telephone call regarding his Ozempic therapy today. A voicemail was left for patient to return my call. Received confirmation from [facility]. $25 copay. Pharmacy needs to order medication for Monday. Thank you,  Camille Norwood, PharmD, BCACP, [address] [place] [address] in place:  Yes   Recommendation Provided To: Patient/Caregiver: 1 via Telephone   Intervention Detail: Patient Access Assistance/Sample Provided   Gap Closed?:    Intervention Accepted By: Patient/Caregiver: 1   Time Spent (min): 5

## 2021-12-12 DIAGNOSIS — E08.00 DIABETES MELLITUS DUE TO UNDERLYING CONDITION WITH HYPEROSMOLARITY WITHOUT COMA, WITHOUT LONG-TERM CURRENT USE OF INSULIN (HCC): ICD-10-CM

## 2021-12-13 RX ORDER — INSULIN LISPRO 100 [IU]/ML
INJECTION, SOLUTION INTRAVENOUS; SUBCUTANEOUS
Qty: 45 ML | Refills: 2 | Status: SHIPPED | OUTPATIENT
Start: 2021-12-13 | End: 2022-02-03

## 2021-12-13 RX ORDER — FLASH GLUCOSE SENSOR
1 KIT MISCELLANEOUS SEE ADMIN INSTRUCTIONS
Qty: 6 KIT | Refills: 11 | Status: SHIPPED | OUTPATIENT
Start: 2021-12-13

## 2021-12-13 NOTE — TELEPHONE ENCOUNTER
PCP: Rocky Sharp MD    Last appt: 2021  Future Appointments   Date Time Provider Caity Castillo   2022 11:30 AM Rocky Sharp MD [facility] BS AMB       Requested Prescriptions     Pending Prescriptions Disp Refills    insulin lispro (HumaLOG KwikPen Insulin) 100 unit/mL kwikpen 45 mL 2     Si units with breakfast, 14 units with lunch, and 18 units with dinner  Indications: type 2 diabetes mellitus       Prior labs and Blood pressures:  BP Readings from Last 3 Encounters:   21 124/80   21 (!) 145/90   21 132/77     Lab Results   Component Value Date/Time    Sodium 137 2021 08:48 AM    Potassium 4.4 2021 08:48 AM    Chloride 100 2021 08:48 AM    CO2 22 2021 08:48 AM    Anion gap 11 2020 11:52 AM    Glucose 329 (H) 2021 08:48 AM    BUN 14 2021 08:48 AM    Creatinine 0.96 2021 08:48 AM    BUN/Creatinine ratio 15 2021 08:48 AM    GFR est  2021 08:48 AM    GFR est non-AA 89 2021 08:48 AM    Calcium 9.4 2021 08:48 AM     Lab Results   Component Value Date/Time    Hemoglobin A1c 10.4 (H) 2021 08:48 AM    Hemoglobin A1c (POC) 8.6 (A) 2021 04:01 PM     Lab Results   Component Value Date/Time    Cholesterol, total 143 2021 08:48 AM    HDL Cholesterol 23 (L) 2021 08:48 AM    LDL, calculated 75 2021 08:48 AM    LDL, calculated 121.6 (H) 2020 02:38 AM    VLDL, calculated 45 (H) 2021 08:48 AM    VLDL, calculated 51.4 2020 02:38 AM    Triglyceride 274 (H) 2021 08:48 AM    CHOL/HDL Ratio 8.5 (H) 2020 02:38 AM     No results found for: LUCINDA Pruitt    Lab Results   Component Value Date/Time    TSH 1.32 2020 02:38 AM

## 2021-12-13 NOTE — TELEPHONE ENCOUNTER
PCP: Mike Serrato MD    Last appt: 2021  Future Appointments   Date Time Provider Caity Castillo   2022 11:30 AM Mike Serrato MD [facility] BS AMB       Requested Prescriptions     Pending Prescriptions Disp Refills    flash glucose sensor (FreeStyle Alyx 14 Day Sensor) kit 6 Kit 11     Si Each by Does Not Apply route See Admin Instructions. Apply and replace sensor every 2 weeks. Scan at least 3 times a day.   E11.65       Prior labs and Blood pressures:  BP Readings from Last 3 Encounters:   21 124/80   21 (!) 145/90   21 132/77     Lab Results   Component Value Date/Time    Sodium 137 2021 08:48 AM    Potassium 4.4 2021 08:48 AM    Chloride 100 2021 08:48 AM    CO2 22 2021 08:48 AM    Anion gap 11 2020 11:52 AM    Glucose 329 (H) 2021 08:48 AM    BUN 14 2021 08:48 AM    Creatinine 0.96 2021 08:48 AM    BUN/Creatinine ratio 15 2021 08:48 AM    GFR est  2021 08:48 AM    GFR est non-AA 89 2021 08:48 AM    Calcium 9.4 2021 08:48 AM     Lab Results   Component Value Date/Time    Hemoglobin A1c 10.4 (H) 2021 08:48 AM    Hemoglobin A1c (POC) 8.6 (A) 2021 04:01 PM     Lab Results   Component Value Date/Time    Cholesterol, total 143 2021 08:48 AM    HDL Cholesterol 23 (L) 2021 08:48 AM    LDL, calculated 75 2021 08:48 AM    LDL, calculated 121.6 (H) 2020 02:38 AM    VLDL, calculated 45 (H) 2021 08:48 AM    VLDL, calculated 51.4 2020 02:38 AM    Triglyceride 274 (H) 2021 08:48 AM    CHOL/HDL Ratio 8.5 (H) 2020 02:38 AM     No results found for: LUCINDA Faust    Lab Results   Component Value Date/Time    TSH 1.32 2020 02:38 AM

## 2022-01-05 DIAGNOSIS — I10 ESSENTIAL HYPERTENSION: ICD-10-CM

## 2022-01-05 RX ORDER — AMLODIPINE BESYLATE 10 MG/1
TABLET ORAL
Qty: 90 TABLET | Refills: 3 | Status: SHIPPED | OUTPATIENT
Start: 2022-01-05

## 2022-01-05 RX ORDER — METOPROLOL SUCCINATE 25 MG/1
TABLET, EXTENDED RELEASE ORAL
Qty: 90 TABLET | Refills: 1 | Status: SHIPPED | OUTPATIENT
Start: 2022-01-05 | End: 2022-07-05

## 2022-01-15 RX ORDER — INSULIN GLARGINE 300 U/ML
INJECTION, SOLUTION SUBCUTANEOUS
Qty: 18 ADJUSTABLE DOSE PRE-FILLED PEN SYRINGE | Refills: 5 | Status: SHIPPED | OUTPATIENT
Start: 2022-01-15 | End: 2022-02-03

## 2022-01-20 ENCOUNTER — PATIENT MESSAGE (OUTPATIENT)
Dept: INTERNAL MEDICINE CLINIC | Age: 57
End: 2022-01-20

## 2022-01-29 ENCOUNTER — DOCUMENTATION ONLY (OUTPATIENT)
Dept: INTERNAL MEDICINE CLINIC | Age: 57
End: 2022-01-29

## 2022-01-30 NOTE — PROGRESS NOTES
Pharmacy Progress Note     Prior authorization for Mr. Teran Risk 55 y.o. 's Corrinne Fells 14 day sensor submitted to [facility]. Verdict pending. This is a continuation of therapy. Thank you for the consult,  Camille Almeida, PharmD, BCACP, [address] in place: Yes   Recommendation Provided To:  Other: 1   Intervention Detail: Adherence Monitorin   Intervention Accepted By: Other: 1   Time Spent (min): 10

## 2022-02-03 ENCOUNTER — VIRTUAL VISIT (OUTPATIENT)
Dept: INTERNAL MEDICINE CLINIC | Age: 57
End: 2022-02-03

## 2022-02-03 DIAGNOSIS — E08.00 DIABETES MELLITUS DUE TO UNDERLYING CONDITION WITH HYPEROSMOLARITY WITHOUT COMA, WITHOUT LONG-TERM CURRENT USE OF INSULIN (HCC): ICD-10-CM

## 2022-02-03 DIAGNOSIS — E11.65 UNCONTROLLED TYPE 2 DIABETES MELLITUS WITH HYPERGLYCEMIA (HCC): Primary | ICD-10-CM

## 2022-02-03 RX ORDER — INSULIN GLARGINE 300 U/ML
62 INJECTION, SOLUTION SUBCUTANEOUS DAILY
Qty: 15 ML | Refills: 5
Start: 2022-02-03 | End: 2022-02-23

## 2022-02-03 RX ORDER — INSULIN LISPRO 100 [IU]/ML
INJECTION, SOLUTION INTRAVENOUS; SUBCUTANEOUS
Qty: 45 ML | Refills: 2
Start: 2022-02-03 | End: 2022-02-23

## 2022-02-03 NOTE — PROGRESS NOTES
Pharmacy Progress Note - Diabetes Management    Assessment / Plan:   Diabetes Management:  - Per ADA guidelines, Pt's A1c is not at goal of < 7%.   - CGM trends elevated for fasting and prandial.   - Reinforce importance of scanning sensors consistently. - Increase Toujeo to 62 units daily  - Increase Humalog to 16 units before breakfast, lunch and 22 units before dinner     S/O: Mr. Luca Thomas is a 56 y. o. male, referred by Lucio Beckman MD with a PMH of HTN, CVA (Dec 2020),  T2DM, Hx of pancreatitis, GERD, KATJA, was seen virtually today for diabetes management follow up.  Patient's last A1c was 10.4% (Nov 2021), 8.6% (March 2021), 10.5% (Dec 2020), 10.1% (Nov 2019).  PHI verified.      Interim update:   Did not start Ozempic. Was concern regarding GI and pancreatitis [place]. Does not want to start any meds that affects GI    Current anti-hyperglycemic regimen include(s):    - Toujeo 58 units daily HS  - Humalog 14 units before breakfast , lunch and 16 units before dinner    Denies any missed doses of insulin. Recall: Previous anti-hyperglycemic agents includes:  - Glipizide, metformin, Levemir, Novolog, 75/25 insulin, Actos. - Did not tolerate metformin - \"constant diarrhea\"     ROS:  Today, Pt endorses:  - Symptoms of Hyperglycemia: none  - Symptoms of Hypoglycemia: none    Self Monitoring Blood Glucose (SMBG) or CGM:  Alyx 2 CGM  Scanning 1-2x/day                  Nutrition/Lifestyle Modifications:  Denies any changes to nutrition  Reports home wt 3 days ago was 239 lbs    The ASCVD Risk score (Marbella Stanton et al., 2013) failed to calculate for the following reasons: The patient has a prior MI or stroke diagnosis     Vitals:   Wt Readings from Last 3 Encounters:   12/01/21 245 lb (111.1 kg)   03/31/21 231 lb (104.8 kg)   01/12/21 218 lb (98.9 kg)     BP Readings from Last 3 Encounters:   12/01/21 124/80   03/31/21 (!) 145/90   01/12/21 132/77     Pulse Readings from Last 3 Encounters:   12/01/21 97   03/31/21 92   01/12/21 96       Past Medical History:   Diagnosis Date    Acute hypoxemic respiratory failure (HCC) 4/15/2019    Diabetes (HCC)     GERD (gastroesophageal reflux disease) 10/1/2010    Influenza 1/19/2019    Nausea & vomiting     KATJA (obstructive sleep apnea) 10/1/2010    Overweight(278.02) 10/1/2010    Pancreatitis     Unspecified sleep apnea      Allergies   Allergen Reactions    Lisinopril Anaphylaxis     Angioedema       Current Outpatient Medications   Medication Sig    insulin lispro (HumaLOG KwikPen Insulin) 100 unit/mL kwikpen 14 units with breakfast, 14 units with lunch, and 18 units with dinner  Indications: type 2 diabetes mellitus    Toujeo SoloStar U-300 Insulin 300 unit/mL (1.5 mL) inpn pen 58 UNITS BY SUBCUTANEOUS ROUTE DAILY. ** 90 DAYS SUPPLY**    amLODIPine (NORVASC) 10 mg tablet TAKE 1 TABLET BY MOUTH EVERY DAY    metoprolol succinate (TOPROL-XL) 25 mg XL tablet TAKE 1 TABLET BY MOUTH EVERY DAY    flash glucose sensor (FreeStyle Alyx 14 Day Sensor) kit 1 Each by Does Not Apply route See Golden Garcia. Apply and replace sensor every 2 weeks. Scan at least 3 times a day. E11.65    semaglutide (OZEMPIC) 0.25 mg or 0.5 mg/dose (2 mg/1.5 ml) subq pen 0.25-0.5 mg by SubCUTAneous route every seven (7) days. Inject 0.25 mg under the skin weekly for 4 weeks, then increase to 0.5 mg weekly thereafter    atorvastatin (LIPITOR) 20 mg tablet Take 1 Tablet by mouth daily.  Insulin Needles, Disposable, 32 gauge x 5/32\" ndle 1 Pen Needle by SubCUTAneous route See Admin Instructions. Use to give insulin 5 times daily. Please dispense 4 mm length. E11.65    aspirin 81 mg chewable tablet Take 81 mg by mouth daily.  lancets misc Check fsbs bid (Patient not taking: Reported on 12/1/2021)     No current facility-administered medications for this visit.        Lab Results   Component Value Date/Time    Sodium 137 11/12/2021 08:48 AM    Potassium 4.4 11/12/2021 08:48 AM Chloride 100 11/12/2021 08:48 AM    CO2 22 11/12/2021 08:48 AM    Anion gap 11 12/22/2020 11:52 AM    Glucose 329 (H) 11/12/2021 08:48 AM    BUN 14 11/12/2021 08:48 AM    Creatinine 0.96 11/12/2021 08:48 AM    BUN/Creatinine ratio 15 11/12/2021 08:48 AM    GFR est  11/12/2021 08:48 AM    GFR est non-AA 89 11/12/2021 08:48 AM    Calcium 9.4 11/12/2021 08:48 AM    Bilirubin, total 0.3 11/12/2021 08:48 AM    Alk. phosphatase 134 (H) 11/12/2021 08:48 AM    Protein, total 7.1 11/12/2021 08:48 AM    Albumin 4.2 11/12/2021 08:48 AM    Globulin 4.4 (H) 12/22/2020 11:52 AM    A-G Ratio 1.4 11/12/2021 08:48 AM    ALT (SGPT) 24 11/12/2021 08:48 AM       Lab Results   Component Value Date/Time    Cholesterol, total 143 11/12/2021 08:48 AM    HDL Cholesterol 23 (L) 11/12/2021 08:48 AM    LDL, calculated 75 11/12/2021 08:48 AM    LDL, calculated 121.6 (H) 12/23/2020 02:38 AM    VLDL, calculated 45 (H) 11/12/2021 08:48 AM    VLDL, calculated 51.4 12/23/2020 02:38 AM    Triglyceride 274 (H) 11/12/2021 08:48 AM    CHOL/HDL Ratio 8.5 (H) 12/23/2020 02:38 AM       Lab Results   Component Value Date/Time    WBC 11.3 (H) 11/12/2021 08:48 AM    HGB 16.2 11/12/2021 08:48 AM    HCT 47.8 11/12/2021 08:48 AM    PLATELET 183 97/57/9353 08:48 AM    MCV 93 11/12/2021 08:48 AM       Lab Results   Component Value Date/Time    Microalb/Creat ratio (ug/mg creat.) 13 11/12/2021 08:48 AM    Microalbumin urine (POC) 30 02/04/2011 03:50 PM       HbA1c:  Lab Results   Component Value Date/Time    Hemoglobin A1c 10.4 (H) 11/12/2021 08:48 AM    Hemoglobin A1c (POC) 8.6 (A) 03/31/2021 04:01 PM     No components found for: 2     Last Point of Care HGB A1C  Hemoglobin A1c (POC)   Date Value Ref Range Status   03/31/2021 8.6 (A) 4.8 - 5.6 % Final        CrCl cannot be calculated (Unknown ideal weight. ). Medication reconciliation was completed during the visit.     Medications Discontinued During This Encounter   Medication Reason    semaglutide (OZEMPIC) 0.25 mg or 0.5 mg/dose (2 mg/1.5 ml) subq pen Not A Current Medication    insulin lispro (HumaLOG KwikPen Insulin) 100 unit/mL kwikpen     Toujeo SoloStar U-300 Insulin 300 unit/mL (1.5 mL) inpn pen        Patient verbalized understanding of the information presented and all of the patients questions were answered. Patient advised to call the office with any additional questions or concerns. Notifications of recommendations will be sent to Dr. Sabina Short MD for review. VV in 3 weeks     Thank you for the consult,  Camille Dey, PharmD, BCACP, [facility] [place] in place:  Yes   Recommendation Provided To: Patient/Caregiver: 6 via Virtual Visit   Intervention Detail: Discontinued Rx: 3, reason: Therapy Complete, Dose Adjustment: 2, reason: Therapy Optimization and Scheduled Appointment   Intervention Accepted By: Patient/Caregiver: 6   Time Spent (min): 20

## 2022-02-20 NOTE — PROGRESS NOTES
Pharmacy Progress Note - Diabetes Management    Assessment / Plan:   Diabetes Management:  - Per ADA guidelines, Pt's A1c is not at goal of < 7%.   - CGM trends consistently elevated for goal.   - Discuss SGLT2 as an option - patient is resistant to therapy d/t concern for nocturia/polyuria. - Given previous therapy hx, increase Humalog to 20 units before breakfast, lunch and 30 units before dinner. During his weekday/work days -- give 24 units before breakfast.  - Increase Toujeo to 68 units daily   - Need to scan sensor more frequently     S/O: Mr. Luca Thomas is a 57 y. o. male, referred by Sdi Heck MD with a PMH of HTN, CVA (Dec 2020),  T2DM, Hx of pancreatitis, GERD, KATJA, was seen virtually today for diabetes management follow up.  Patient's last A1c was 10.4% (Nov 2021), 8.6% (March 2021), 10.5% (Dec 2020), 10.1% (Nov 2019).  PHI verified.      Interim update: Toujeo increased from 58 units to 62 units daily -- 2/3/22  Humalog increased from 14 B, L and 16 before dinner to 16 B, L, 22 before dinner     Current anti-hyperglycemic regimen include(s):    - Toujeo 62 units daily  - Humalog 16 units before breakfast, lunch and 22 units before dinner     Missing Humalog dose at work - states work environment is not feasible to give insulin. He is able to give breakfast dose during work week. Recall: Previous anti-hyperglycemic agents includes:  - Glipizide, metformin, Levemir, Novolog, 75/25 insulin, Actos.   - Did not tolerate metformin - \"constant diarrhea\"   - Did not want to start GLP1RA d/t pancreatitis hx     ROS:  Today, Pt endorses:  - Symptoms of Hyperglycemia: none  - Symptoms of Hypoglycemia: none    Self Monitoring Blood Glucose (SMBG) or CGM:  Alyx 2 CGM  Scanning 1-2 x/day    30 days:         60 days:          Nutrition/Lifestyle Modifications:  Eats 3 meals/day;  B: sausage biscuit +/- watermelon + water  L: sandwich & chips  D: varies -- last night had tacos   Denies snacking. Reprots Wt 237 lbs today    The ASCVD Risk score (Humera Munguia, et al., 2013) failed to calculate for the following reasons: The patient has a prior MI or stroke diagnosis     Vitals: Wt Readings from Last 3 Encounters:   12/01/21 245 lb (111.1 kg)   03/31/21 231 lb (104.8 kg)   01/12/21 218 lb (98.9 kg)     BP Readings from Last 3 Encounters:   12/01/21 124/80   03/31/21 (!) 145/90   01/12/21 132/77     Pulse Readings from Last 3 Encounters:   12/01/21 97   03/31/21 92   01/12/21 96       Past Medical History:   Diagnosis Date    Acute hypoxemic respiratory failure (HCC) 4/15/2019    Diabetes (HCC)     GERD (gastroesophageal reflux disease) 10/1/2010    Influenza 1/19/2019    Nausea & vomiting     KATJA (obstructive sleep apnea) 10/1/2010    Overweight(278.02) 10/1/2010    Pancreatitis     Unspecified sleep apnea      Allergies   Allergen Reactions    Lisinopril Anaphylaxis     Angioedema       Current Outpatient Medications   Medication Sig    insulin lispro (HumaLOG KwikPen Insulin) 100 unit/mL kwikpen 16 units with breakfast and lunch, and 22 units before bedtime  Indications: type 2 diabetes mellitus    insulin glargine U-300 conc (Toujeo SoloStar U-300 Insulin) 300 unit/mL (1.5 mL) inpn pen 62 Units by SubCUTAneous route daily. Indications: type 2 diabetes mellitus    amLODIPine (NORVASC) 10 mg tablet TAKE 1 TABLET BY MOUTH EVERY DAY    metoprolol succinate (TOPROL-XL) 25 mg XL tablet TAKE 1 TABLET BY MOUTH EVERY DAY    flash glucose sensor (FreeStyle Alyx 14 Day Sensor) kit 1 Each by Does Not Apply route See Golden Garcia. Apply and replace sensor every 2 weeks. Scan at least 3 times a day. E11.65    atorvastatin (LIPITOR) 20 mg tablet Take 1 Tablet by mouth daily.  Insulin Needles, Disposable, 32 gauge x 5/32\" ndle 1 Pen Needle by SubCUTAneous route See Admin Instructions. Use to give insulin 5 times daily. Please dispense 4 mm length.  E11.65    aspirin 81 mg chewable tablet Take 81 mg by mouth daily.  lancets misc Check fsbs bid (Patient not taking: Reported on 12/1/2021)     No current facility-administered medications for this visit. Lab Results   Component Value Date/Time    Sodium 137 11/12/2021 08:48 AM    Potassium 4.4 11/12/2021 08:48 AM    Chloride 100 11/12/2021 08:48 AM    CO2 22 11/12/2021 08:48 AM    Anion gap 11 12/22/2020 11:52 AM    Glucose 329 (H) 11/12/2021 08:48 AM    BUN 14 11/12/2021 08:48 AM    Creatinine 0.96 11/12/2021 08:48 AM    BUN/Creatinine ratio 15 11/12/2021 08:48 AM    GFR est  11/12/2021 08:48 AM    GFR est non-AA 89 11/12/2021 08:48 AM    Calcium 9.4 11/12/2021 08:48 AM    Bilirubin, total 0.3 11/12/2021 08:48 AM    Alk.  phosphatase 134 (H) 11/12/2021 08:48 AM    Protein, total 7.1 11/12/2021 08:48 AM    Albumin 4.2 11/12/2021 08:48 AM    Globulin 4.4 (H) 12/22/2020 11:52 AM    A-G Ratio 1.4 11/12/2021 08:48 AM    ALT (SGPT) 24 11/12/2021 08:48 AM       Lab Results   Component Value Date/Time    Cholesterol, total 143 11/12/2021 08:48 AM    HDL Cholesterol 23 (L) 11/12/2021 08:48 AM    LDL, calculated 75 11/12/2021 08:48 AM    LDL, calculated 121.6 (H) 12/23/2020 02:38 AM    VLDL, calculated 45 (H) 11/12/2021 08:48 AM    VLDL, calculated 51.4 12/23/2020 02:38 AM    Triglyceride 274 (H) 11/12/2021 08:48 AM    CHOL/HDL Ratio 8.5 (H) 12/23/2020 02:38 AM       Lab Results   Component Value Date/Time    WBC 11.3 (H) 11/12/2021 08:48 AM    HGB 16.2 11/12/2021 08:48 AM    HCT 47.8 11/12/2021 08:48 AM    PLATELET 447 75/25/5151 08:48 AM    MCV 93 11/12/2021 08:48 AM       Lab Results   Component Value Date/Time    Microalb/Creat ratio (ug/mg creat.) 13 11/12/2021 08:48 AM    Microalbumin urine (POC) 30 02/04/2011 03:50 PM       HbA1c:  Lab Results   Component Value Date/Time    Hemoglobin A1c 10.4 (H) 11/12/2021 08:48 AM    Hemoglobin A1c (POC) 8.6 (A) 03/31/2021 04:01 PM     No components found for: 2     Last Point of Care HGB A1C  Hemoglobin A1c (POC)   Date Value Ref Range Status   2021 8.6 (A) 4.8 - 5.6 % Final        CrCl cannot be calculated (Unknown ideal weight. ). Medication reconciliation was completed during the visit. Medications Discontinued During This Encounter   Medication Reason    insulin lispro (HumaLOG KwikPen Insulin) 100 unit/mL kwikpen     insulin glargine U-300 conc (Toujeo SoloStar U-300 Insulin) 300 unit/mL (1.5 mL) inpn pen      Orders Placed This Encounter    insulin lispro (HumaLOG KwikPen Insulin) 100 unit/mL kwikpen     Si-24 units before breakfast, 20 units before lunch, and 30 units before dinner  Indications: type 2 diabetes mellitus     Dispense:  45 mL     Refill:  2    insulin glargine U-300 conc (Toujeo SoloStar U-300 Insulin) 300 unit/mL (1.5 mL) inpn pen     Si Units by SubCUTAneous route daily. Indications: type 2 diabetes mellitus     Dispense:  15 mL     Refill:  5     Patient verbalized understanding of the information presented and all of the patients questions were answered. AVS was handed to the patient. Patient advised to call the office with any additional questions or concerns. Notifications of recommendations will be sent to Dr. Dao Chisholm MD for review. Patient will return to clinic in 4 week(s) for follow up. Thank you for the consult,  Camille Gray, PharmD, BCACP, [address] [place] [address] in place:  Yes   Recommendation Provided To: Patient/Caregiver: 5 via Virtual Visit   Intervention Detail: Adherence Monitorin, Dose Adjustment: 2, reason: Therapy Optimization and Scheduled Appointment   Intervention Accepted By: Patient/Caregiver: 5   Time Spent (min): 30

## 2022-02-23 ENCOUNTER — VIRTUAL VISIT (OUTPATIENT)
Dept: INTERNAL MEDICINE CLINIC | Age: 57
End: 2022-02-23

## 2022-02-23 DIAGNOSIS — E08.00 DIABETES MELLITUS DUE TO UNDERLYING CONDITION WITH HYPEROSMOLARITY WITHOUT COMA, WITHOUT LONG-TERM CURRENT USE OF INSULIN (HCC): ICD-10-CM

## 2022-02-23 DIAGNOSIS — E11.65 UNCONTROLLED TYPE 2 DIABETES MELLITUS WITH HYPERGLYCEMIA (HCC): Primary | ICD-10-CM

## 2022-02-23 RX ORDER — INSULIN LISPRO 100 [IU]/ML
INJECTION, SOLUTION INTRAVENOUS; SUBCUTANEOUS
Qty: 45 ML | Refills: 2
Start: 2022-02-23 | End: 2022-03-23 | Stop reason: SDUPTHER

## 2022-02-23 RX ORDER — INSULIN GLARGINE 300 U/ML
68 INJECTION, SOLUTION SUBCUTANEOUS DAILY
Qty: 15 ML | Refills: 5
Start: 2022-02-23 | End: 2022-04-15 | Stop reason: SDUPTHER

## 2022-02-24 RX ORDER — INSULIN GLARGINE 300 U/ML
68 INJECTION, SOLUTION SUBCUTANEOUS DAILY
Qty: 9 ML | Refills: 1 | Status: SHIPPED | OUTPATIENT
Start: 2022-02-24 | End: 2022-03-23 | Stop reason: SDUPTHER

## 2022-02-24 RX ORDER — INSULIN LISPRO 100 [IU]/ML
INJECTION, SOLUTION INTRAVENOUS; SUBCUTANEOUS
Qty: 15 ML | Refills: 1 | Status: SHIPPED | OUTPATIENT
Start: 2022-02-24 | End: 2022-03-23 | Stop reason: SDUPTHER

## 2022-02-24 NOTE — TELEPHONE ENCOUNTER
Pharmacy Progress Note     Updated insulin rx submitted to [facility] per patient's request.  Dosage increase. There are no discontinued medications. Orders Placed This Encounter    insulin glargine U-300 conc (Toujeo SoloStar U-300 Insulin) 300 unit/mL (1.5 mL) inpn pen     Si Units by SubCUTAneous route daily. Dispense:  9 mL     Refill:  1    insulin lispro (HUMALOG) 100 unit/mL kwikpen     Sig: Inject 20-24 units before breakfast, 20 units before lunch and 30 units before dinner  Indications: type 2 diabetes mellitus     Dispense:  15 mL     Refill:  1     Dosage adjustment           Thank you for the consult,  Camille Preciado, PharmD, BCACP, [address] in place:  Yes   Recommendation Provided To: Patient/Caregiver: 2 via Krista Garcia Intervention Detail: New Rx: 2, reason: Needs Additional Therapy   Gap Closed?:    Intervention Accepted By: Patient/Caregiver: 2   Time Spent (min): 5

## 2022-03-20 PROBLEM — R27.0 ATAXIA: Status: ACTIVE | Noted: 2020-12-22

## 2022-03-21 NOTE — PROGRESS NOTES
Pharmacy Progress Note - Diabetes Management    Assessment / Plan:   Diabetes Management:  - Per ADA guidelines, Pt's A1c is not at goal of < 7%.  - CGM trending closer to fasting and post prandial goals  - Continue Toujeo 68 units daily  - Continue Humalog 24 units before breakfast, 20 units before lunch, and 30 units before dinner.   - Will send in rx for 6 mm pen needle length to see if this will resolve insulin pen administration issue  - Remind Pt of upcoming PCP appt. Due for A1c, BMP check. Will wait for lab results to determine f/u interval   - Recommend for patient to share home weight. S/O: Mr. Luca Thomas is a [id number]. o. male, referred by Gigi Jaquez MD with a PMH of HTN, CVA (Dec 2020),  T2DM, Hx of pancreatitis, GERD, KATJA, was seen virtually today for diabetes management follow up.  Patient's last A1c was 10.4% (Nov 2021), 8.6% (March 2021), 10.5% (Dec 2020), 10.1% (Nov 2019).  PHI verified.      Current anti-hyperglycemic regimen include(s):    - Toujeo 68 units daily HS  - Humalog 20 units before breakfast, lunch and 30 units before dinner   During workdays - give 24 units before breakfast ---> giving 24 units before breakfast    Request for 90 days supply of insulin   Having trouble with current 4 mm pen needles. Sometimes this will cause insulin pen to jam up. Recall: Previous anti-hyperglycemic agents includes:  - Glipizide, metformin, Levemir, Novolog, 75/25 insulin, Actos.   - Did not tolerate metformin - \"constant diarrhea\"   - Did not want to start GLP1RA d/t pancreatitis hx     ROS:  Today, Pt endorses:  - Symptoms of Hyperglycemia: none  - Symptoms of Hypoglycemia: none    Self Monitoring Blood Glucose (SMBG) or CGM:  Alyx 2 CGM ; Scanning 3x/day    30 days:                 Nutrition/Lifestyle Modifications:  Denies changes to nutrition  No recent wt check     Inquires about result from his blood occult stool sample test. Submitted to lab [facility] back in December.      [facility] (Elicia Harvey, et al., 2013) failed to calculate for the following reasons: The patient has a prior MI or stroke diagnosis     Vitals: Wt Readings from Last 3 Encounters:   12/01/21 245 lb (111.1 kg)   03/31/21 231 lb (104.8 kg)   01/12/21 218 lb (98.9 kg)     BP Readings from Last 3 Encounters:   12/01/21 124/80   03/31/21 (!) 145/90   01/12/21 132/77     Pulse Readings from Last 3 Encounters:   12/01/21 97   03/31/21 92   01/12/21 96     Past Medical History:   Diagnosis Date    Acute hypoxemic respiratory failure (HCC) 4/15/2019    Diabetes (HCC)     GERD (gastroesophageal reflux disease) 10/1/2010    Influenza 1/19/2019    Nausea & vomiting     KATJA (obstructive sleep apnea) 10/1/2010    Overweight(278.02) 10/1/2010    Pancreatitis     Unspecified sleep apnea      Allergies   Allergen Reactions    Lisinopril Anaphylaxis     Angioedema     Current Outpatient Medications   Medication Sig    insulin glargine U-300 conc (Toujeo SoloStar U-300 Insulin) 300 unit/mL (1.5 mL) inpn pen 68 Units by SubCUTAneous route daily.  insulin lispro (HUMALOG) 100 unit/mL kwikpen Inject 20-24 units before breakfast, 20 units before lunch and 30 units before dinner  Indications: type 2 diabetes mellitus    insulin lispro (HumaLOG KwikPen Insulin) 100 unit/mL kwikpen 20-24 units before breakfast, 20 units before lunch, and 30 units before dinner  Indications: type 2 diabetes mellitus    insulin glargine U-300 conc (Toujeo SoloStar U-300 Insulin) 300 unit/mL (1.5 mL) inpn pen 68 Units by SubCUTAneous route daily. Indications: type 2 diabetes mellitus    amLODIPine (NORVASC) 10 mg tablet TAKE 1 TABLET BY MOUTH EVERY DAY    metoprolol succinate (TOPROL-XL) 25 mg XL tablet TAKE 1 TABLET BY MOUTH EVERY DAY    flash glucose sensor (FreeStyle Alyx 14 Day Sensor) kit 1 Each by Does Not Apply route See Golden Garcia. Apply and replace sensor every 2 weeks. Scan at least 3 times a day.   E11.65    atorvastatin (LIPITOR) 20 mg tablet Take 1 Tablet by mouth daily.  Insulin Needles, Disposable, 32 gauge x 5/32\" ndle 1 Pen Needle by SubCUTAneous route See Admin Instructions. Use to give insulin 5 times daily. Please dispense 4 mm length. E11.65    aspirin 81 mg chewable tablet Take 81 mg by mouth daily.  lancets misc Check fsbs bid (Patient not taking: Reported on 12/1/2021)     No current facility-administered medications for this visit. Lab Results   Component Value Date/Time    Sodium 137 11/12/2021 08:48 AM    Potassium 4.4 11/12/2021 08:48 AM    Chloride 100 11/12/2021 08:48 AM    CO2 22 11/12/2021 08:48 AM    Anion gap 11 12/22/2020 11:52 AM    Glucose 329 (H) 11/12/2021 08:48 AM    BUN 14 11/12/2021 08:48 AM    Creatinine 0.96 11/12/2021 08:48 AM    BUN/Creatinine ratio 15 11/12/2021 08:48 AM    GFR est  11/12/2021 08:48 AM    GFR est non-AA 89 11/12/2021 08:48 AM    Calcium 9.4 11/12/2021 08:48 AM    Bilirubin, total 0.3 11/12/2021 08:48 AM    Alk.  phosphatase 134 (H) 11/12/2021 08:48 AM    Protein, total 7.1 11/12/2021 08:48 AM    Albumin 4.2 11/12/2021 08:48 AM    Globulin 4.4 (H) 12/22/2020 11:52 AM    A-G Ratio 1.4 11/12/2021 08:48 AM    ALT (SGPT) 24 11/12/2021 08:48 AM       Lab Results   Component Value Date/Time    Cholesterol, total 143 11/12/2021 08:48 AM    HDL Cholesterol 23 (L) 11/12/2021 08:48 AM    LDL, calculated 75 11/12/2021 08:48 AM    LDL, calculated 121.6 (H) 12/23/2020 02:38 AM    VLDL, calculated 45 (H) 11/12/2021 08:48 AM    VLDL, calculated 51.4 12/23/2020 02:38 AM    Triglyceride 274 (H) 11/12/2021 08:48 AM    CHOL/HDL Ratio 8.5 (H) 12/23/2020 02:38 AM       Lab Results   Component Value Date/Time    WBC 11.3 (H) 11/12/2021 08:48 AM    HGB 16.2 11/12/2021 08:48 AM    HCT 47.8 11/12/2021 08:48 AM    PLATELET 265 71/64/7947 08:48 AM    MCV 93 11/12/2021 08:48 AM       Lab Results   Component Value Date/Time    Microalb/Creat ratio (ug/mg creat.) 13 11/12/2021 08:48 AM    Microalbumin urine (POC) 30 2011 03:50 PM     HbA1c:  Lab Results   Component Value Date/Time    Hemoglobin A1c 10.4 (H) 2021 08:48 AM    Hemoglobin A1c (POC) 8.6 (A) 2021 04:01 PM     Last Point of Care HGB A1C  Hemoglobin A1c (POC)   Date Value Ref Range Status   2021 8.6 (A) 4.8 - 5.6 % Final        CrCl cannot be calculated (Unknown ideal weight. ). Medication reconciliation was completed during the visit. Medications Discontinued During This Encounter   Medication Reason    insulin lispro (HUMALOG) 100 unit/mL kwikpen DUPLICATE ORDER    insulin lispro (HumaLOG KwikPen Insulin) 100 unit/mL kwikpen REORDER    insulin glargine U-300 conc (Toujeo SoloStar U-300 Insulin) 300 unit/mL (1.5 mL) inpn pen DUPLICATE ORDER    lancets misc Not A Current Medication    Insulin Needles, Disposable, 32 gauge x 5/32\" ndle REORDER     Orders Placed This Encounter    insulin lispro (HumaLOG KwikPen Insulin) 100 unit/mL kwikpen     Si units before breakfast, 20 units before lunch, and 30 units before dinner  Indications: type 2 diabetes mellitus     Dispense:  75 mL     Refill:  1    Insulin Needles, Disposable, 31 gauge x 5/16\" ndle     Si Pen Needle by SubCUTAneous route See Admin Instructions. Use to give insulin under the skin four times daily. E11.65. Dispense 6mm length. Dispense:  500 Each     Refill:  11     Patient verbalized understanding of the information presented and all of the patients questions were answered. Patient advised to call the office with any additional questions or concerns. Notifications of recommendations will be sent to Dr. Dao Chisholm MD for review. Thank you for the consult,  Camille Gray, PharmD, BCACP, [address] in place:  Yes   Recommendation Provided To: Patient/Caregiver: 2 via Virtual Visit   Intervention Detail: Adherence Monitorin and New Rx: 1, reason: Cost/Formulary Change and Improve Adherence   Gap Closed?:    Intervention Accepted By: Patient/Caregiver: 2   Time Spent (min): 30

## 2022-03-23 ENCOUNTER — VIRTUAL VISIT (OUTPATIENT)
Dept: INTERNAL MEDICINE CLINIC | Age: 57
End: 2022-03-23

## 2022-03-23 DIAGNOSIS — E11.65 UNCONTROLLED TYPE 2 DIABETES MELLITUS WITH HYPERGLYCEMIA (HCC): Primary | ICD-10-CM

## 2022-03-23 DIAGNOSIS — E08.00 DIABETES MELLITUS DUE TO UNDERLYING CONDITION WITH HYPEROSMOLARITY WITHOUT COMA, WITHOUT LONG-TERM CURRENT USE OF INSULIN (HCC): ICD-10-CM

## 2022-03-23 RX ORDER — INSULIN LISPRO 100 [IU]/ML
INJECTION, SOLUTION INTRAVENOUS; SUBCUTANEOUS
Qty: 75 ML | Refills: 1 | Status: SHIPPED | OUTPATIENT
Start: 2022-03-23 | End: 2022-07-12

## 2022-03-23 RX ORDER — PEN NEEDLE, DIABETIC 30 GX3/16"
1 NEEDLE, DISPOSABLE MISCELLANEOUS SEE ADMIN INSTRUCTIONS
Qty: 500 EACH | Refills: 11 | Status: SHIPPED | OUTPATIENT
Start: 2022-03-23

## 2022-03-30 DIAGNOSIS — Z12.11 COLON CANCER SCREENING: Primary | ICD-10-CM

## 2022-04-05 ENCOUNTER — TELEPHONE (OUTPATIENT)
Dept: INTERNAL MEDICINE CLINIC | Age: 57
End: 2022-04-05

## 2022-04-05 NOTE — TELEPHONE ENCOUNTER
Writer called pharmacy in regards to order at this time. Writer provided verbal order for 6mm at this time. Pharmacist states that they would fill for patient at this time. Called patient. ID verified with Name and . Spoke with patient's wife in regards to message received. Informed wife that verbal order was given to pharmacy for fulfillment at this time and that pharmacy would be contacting patient once ready. Wife states that she understands the information received and has no further questions or concerns at this time.

## 2022-04-05 NOTE — TELEPHONE ENCOUNTER
Insulin Needles, Disposable, 31 gauge x 5/16\" ndle 500 Each 11 3/23/2022     Sig - Route: 1 Pen Needle by SubCUTAneous route See Admin Instructions. Use to give insulin under the skin four times daily. E11.65. Dispense 6mm length. - SubCUTAneous    Sent to pharmacy as: pen needle, diabetic 32 gauge x 5/16\"          Pt wife called and states that the pharmacy hasnt filled this because they told pt it was sent over as \"8 mm\"    Caller states pharm told her they were contacting our office to to get it changed to 6 mm    The script I see sent shows 6 mm? Caller asks for nurse to resubmitt script and contact pharmacy to confirm it is accurate for 6 mm as ronaldo advised it should be.

## 2022-04-15 DIAGNOSIS — Z79.4 TYPE 2 DIABETES MELLITUS WITH DIABETIC POLYNEUROPATHY, WITH LONG-TERM CURRENT USE OF INSULIN (HCC): Primary | ICD-10-CM

## 2022-04-15 DIAGNOSIS — E11.42 TYPE 2 DIABETES MELLITUS WITH DIABETIC POLYNEUROPATHY, WITH LONG-TERM CURRENT USE OF INSULIN (HCC): Primary | ICD-10-CM

## 2022-04-15 RX ORDER — INSULIN GLARGINE 300 U/ML
68 INJECTION, SOLUTION SUBCUTANEOUS DAILY
Qty: 15 ML | Refills: 5 | Status: SHIPPED | OUTPATIENT
Start: 2022-04-15 | End: 2022-07-12

## 2022-04-15 NOTE — TELEPHONE ENCOUNTER
Patient's wife, Yetta Schilder, states she spoke with pharmacy at [address] [place] [address] [address] they don't have prescription from 2/23/22 for patient's Insulin glargine U-300 conc (Toujeo SoloStar U-300 Insulin) 300 unit/mL (1.5 mL) inpn pen. Pharmacist/Aleksandra states they Never received thru [facility] but advised that other prescription on 3/23/22 were received. Yetta Schilder states they need to get New Prescription sent to [facility] in [employer] as she is changing pharmacies due to they get Horrible service at [facility]. Per Yetta Schilder she will get other medications that they say they have transferred on her own. Please call if any questions or to advise when done & a detailed message can be left on voice mail.  Thank you

## 2022-05-13 ENCOUNTER — VIRTUAL VISIT (OUTPATIENT)
Dept: INTERNAL MEDICINE CLINIC | Age: 57
End: 2022-05-13
Payer: COMMERCIAL

## 2022-05-13 DIAGNOSIS — Z86.73 HISTORY OF CVA (CEREBROVASCULAR ACCIDENT): ICD-10-CM

## 2022-05-13 DIAGNOSIS — E11.42 DIABETIC POLYNEUROPATHY ASSOCIATED WITH TYPE 2 DIABETES MELLITUS (HCC): ICD-10-CM

## 2022-05-13 DIAGNOSIS — E78.00 PURE HYPERCHOLESTEROLEMIA: ICD-10-CM

## 2022-05-13 DIAGNOSIS — Z12.11 COLON CANCER SCREENING: Primary | ICD-10-CM

## 2022-05-13 DIAGNOSIS — I50.22 SYSTOLIC CHF, CHRONIC (HCC): ICD-10-CM

## 2022-05-13 PROCEDURE — 99213 OFFICE O/P EST LOW 20 MIN: CPT | Performed by: INTERNAL MEDICINE

## 2022-05-13 NOTE — PATIENT INSTRUCTIONS
This is an established visit conducted via telemedicine. The patient has been instructed that this meets HIPAA criteria and acknowledges and agrees to this method of visitation.     Jses Mathur  70/38/61  22:08 AM

## 2022-05-13 NOTE — PROGRESS NOTES
HISTORY OF PRESENT ILLNESS  Stanley Burrell is a 65 y.o. male. HPI   Stanley Burrell, was evaluated through a synchronous (real-time) audio-video encounter. The patient (or guardian if applicable) is aware that this is a billable service, which includes applicable co-pays. Verbal consent to proceed has been obtained. The visit was conducted pursuant to the emergency declaration under the [facility] [address] and the [employer] General Act. Patient identification was verified, and a caregiver was present when appropriate. The patient was located at home in a state where the provider was licensed to provide care. --David Rosales MD on 5/13/2022 at 11:49 AM    An electronic signature was used to authenticate this note. 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), HTN HLD DM-2 uncontrolled and systolic CHF ef 32-79% with LVH  On toujeo 68 units qs  [id number] ac bkfst and lunch and 30 adc dinner  fsbs 150-200 in am and around   No hypoglycemia < 60  bp 130/86  BM some constipation  Last OV  Had recent labs a1c 10.4 LDL 75 hdl 23  Alk phos 134  No balance difficulties since CVA last year     Cgm--mostly 150 or less until last month per pt > BS went up on abx for left ankle cellulitis and after covid shot per pt  No change in diet  Had cataract sx and eye exam this year-no retinopathy per pt     Has numbness in feet  No cp or osb       Patient Active Problem List    Diagnosis Date Noted    Ataxia 12/22/2020    Diabetes mellitus ([address] 10/01/2010    HTN (hypertension) 10/01/2010    Pure hypercholesterolemia 10/01/2010    Achalasia 10/01/2010     Current Outpatient Medications   Medication Sig Dispense Refill    insulin glargine U-300 conc (Toujeo SoloStar U-300 Insulin) 300 unit/mL (1.5 mL) inpn pen 68 Units by SubCUTAneous route daily.  Indications: type 2 diabetes mellitus 15 mL 5    insulin lispro (HumaLOG KwikPen Insulin) 100 unit/mL kwikpen 24 units before breakfast, 20 units before lunch, and 30 units before dinner  Indications: type 2 diabetes mellitus 75 mL 1    Insulin Needles, Disposable, 31 gauge x 5/16\" ndle 1 Pen Needle by SubCUTAneous route See Admin Instructions. Use to give insulin under the skin four times daily. E11.65. Dispense 6mm length. 500 Each 11    amLODIPine (NORVASC) 10 mg tablet TAKE 1 TABLET BY MOUTH EVERY DAY 90 Tablet 3    metoprolol succinate (TOPROL-XL) 25 mg XL tablet TAKE 1 TABLET BY MOUTH EVERY DAY 90 Tablet 1    flash glucose sensor (FreeStyle Alyx 14 Day Sensor) kit 1 Each by Does Not Apply route See Golden Garcia. Apply and replace sensor every 2 weeks. Scan at least 3 times a day. E11.65 6 Kit 11    atorvastatin (LIPITOR) 20 mg tablet Take 1 Tablet by mouth daily. 90 Tablet 2    aspirin 81 mg chewable tablet Take 81 mg by mouth daily.        Allergies   Allergen Reactions    Lisinopril Anaphylaxis     Angioedema      Lab Results   Component Value Date/Time    WBC 11.3 (H) 11/12/2021 08:48 AM    HGB 16.2 11/12/2021 08:48 AM    HCT 47.8 11/12/2021 08:48 AM    PLATELET 525 03/26/1363 08:48 AM    MCV 93 11/12/2021 08:48 AM     Lab Results   Component Value Date/Time    Hemoglobin A1c 10.4 (H) 11/12/2021 08:48 AM    Hemoglobin A1c 10.5 (H) 12/23/2020 02:38 AM    Hemoglobin A1c 10.1 (H) 11/07/2019 03:59 PM    Glucose 329 (H) 11/12/2021 08:48 AM    Glucose (POC) 199 (H) 12/23/2020 09:10 PM    Microalb/Creat ratio (ug/mg creat.) 13 11/12/2021 08:48 AM    LDL, calculated 75 11/12/2021 08:48 AM    LDL, calculated 121.6 (H) 12/23/2020 02:38 AM    Creatinine 0.96 11/12/2021 08:48 AM      Lab Results   Component Value Date/Time    Cholesterol, total 143 11/12/2021 08:48 AM    HDL Cholesterol 23 (L) 11/12/2021 08:48 AM    LDL, calculated 75 11/12/2021 08:48 AM    LDL, calculated 121.6 (H) 12/23/2020 02:38 AM    Triglyceride 274 (H) 11/12/2021 08:48 AM    CHOL/HDL Ratio 8.5 (H) 12/23/2020 02:38 AM     Lab Results   Component Value Date/Time    GFR est non-AA 89 11/12/2021 08:48 AM    GFR est  11/12/2021 08:48 AM    Creatinine 0.96 11/12/2021 08:48 AM    BUN 14 11/12/2021 08:48 AM    Sodium 137 11/12/2021 08:48 AM    Potassium 4.4 11/12/2021 08:48 AM    Chloride 100 11/12/2021 08:48 AM    CO2 22 11/12/2021 08:48 AM    Magnesium 2.2 01/29/2019 02:48 AM    Phosphorus 2.7 01/29/2019 02:48 AM        ROS    Physical Exam  Constitutional:       Appearance: Normal appearance. Pulmonary:      Effort: Pulmonary effort is normal.   Neurological:      Mental Status: He is alert. Psychiatric:         Mood and Affect: Mood normal.         Behavior: Behavior normal.         Thought Content: Thought content normal.         Judgment: Judgment normal.         ASSESSMENT and PLAN  Diagnoses and all orders for this visit:    1. Colon cancer screening  -     COLOGUARD TEST (FECAL DNA COLORECTAL CANCER SCREENING)    2. Systolic CHF, chronic (HCC)   compensated  3. Diabetic polyneuropathy associated with type 2 diabetes mellitus (HCC)  -     HEMOGLOBIN A1C WITH EAG; Future  -     METABOLIC PANEL, COMPREHENSIVE; Future   Neuropathy symptoms imporved with improved glycemic control per pt   Glucose readings improved    Same insulin    Fu pharm d  4. History of CVA (cerebrovascular accident)  -     LIPID PANEL; Future   On aspirin  5. Pure hypercholesterolemia  -     LIPID PANEL; Future   Goal LDL < 70 on statin    Follow-up and Dispositions    · Return in about 6 months (around 11/13/2022) for CPEx 30 min.

## 2022-05-25 NOTE — ADDENDUM NOTE
Addended by: Dorita Faustin on: 5/25/2022 02:25 PM     Modules accepted: Orders Add 52 Modifier (Optional): no Medical Necessity Clause: This procedure was medically necessary because the lesions that were treated were: Detail Level: Detailed Post-Care Instructions: I reviewed with the patient in detail post-care instructions. Patient is to wear sunprotection, and avoid picking at any of the treated lesions. Pt may apply Vaseline to crusted or scabbing areas. Medical Necessity Information: It is in your best interest to select a reason for this procedure from the list below. All of these items fulfill various CMS LCD requirements except the new and changing color options. Consent: The patient's consent was obtained including but not limited to risks of crusting, scabbing, blistering, scarring, darker or lighter pigmentary change, recurrence, incomplete removal and infection. Number Of Freeze-Thaw Cycles: 1 freeze-thaw cycle Duration Of Freeze Thaw-Cycle (Seconds): 5-10

## 2022-06-23 ENCOUNTER — APPOINTMENT (OUTPATIENT)
Dept: INTERNAL MEDICINE CLINIC | Age: 57
End: 2022-06-23

## 2022-06-24 LAB
ALBUMIN SERPL-MCNC: 4.2 G/DL (ref 3.8–4.9)
ALBUMIN/GLOB SERPL: 1.6 {RATIO} (ref 1.2–2.2)
ALP SERPL-CCNC: 121 IU/L (ref 44–121)
ALT SERPL-CCNC: 22 IU/L (ref 0–44)
AST SERPL-CCNC: 16 IU/L (ref 0–40)
BILIRUB SERPL-MCNC: 0.4 MG/DL (ref 0–1.2)
BUN SERPL-MCNC: 13 MG/DL (ref 6–24)
BUN/CREAT SERPL: 13 (ref 9–20)
CALCIUM SERPL-MCNC: 9.1 MG/DL (ref 8.7–10.2)
CHLORIDE SERPL-SCNC: 105 MMOL/L (ref 96–106)
CHOLEST SERPL-MCNC: 121 MG/DL (ref 100–199)
CO2 SERPL-SCNC: 23 MMOL/L (ref 20–29)
CREAT SERPL-MCNC: 1.03 MG/DL (ref 0.76–1.27)
EGFR: 85 ML/MIN/1.73
EST. AVERAGE GLUCOSE BLD GHB EST-MCNC: 214 MG/DL
GLOBULIN SER CALC-MCNC: 2.6 G/DL (ref 1.5–4.5)
GLUCOSE SERPL-MCNC: 199 MG/DL (ref 65–99)
HBA1C MFR BLD: 9.1 % (ref 4.8–5.6)
HDLC SERPL-MCNC: 23 MG/DL
LDLC SERPL CALC-MCNC: 65 MG/DL (ref 0–99)
POTASSIUM SERPL-SCNC: 4.7 MMOL/L (ref 3.5–5.2)
PROT SERPL-MCNC: 6.8 G/DL (ref 6–8.5)
SODIUM SERPL-SCNC: 142 MMOL/L (ref 134–144)
TRIGL SERPL-MCNC: 194 MG/DL (ref 0–149)
VLDLC SERPL CALC-MCNC: 33 MG/DL (ref 5–40)

## 2022-06-24 NOTE — PROGRESS NOTES
Tel pt 3 mos bs avg is improving  but still above goal 214 avg ac 9. 1. work on diet. Advise fu with pharm D. LDL level at goal on statin.  Kidney liver lytes wnl

## 2022-06-27 NOTE — PROGRESS NOTES
Spoke with wife she is on his HIPAA consent form. She identified patient's name an . Informed her of patient's recent labs and Dr. Rosibel Dorado recommendations. She was instructed to share the information with patient and advised for him to call back for any questions and make an appointment with Terell Forrester. I will [phone] patient as well to call office.

## 2022-07-05 DIAGNOSIS — I10 ESSENTIAL HYPERTENSION: ICD-10-CM

## 2022-07-05 RX ORDER — METOPROLOL SUCCINATE 25 MG/1
TABLET, EXTENDED RELEASE ORAL
Qty: 90 TABLET | Refills: 1 | Status: SHIPPED | OUTPATIENT
Start: 2022-07-05

## 2022-07-09 NOTE — PROGRESS NOTES
Pharmacy Progress Note - Diabetes Management    Assessment / Plan:   Diabetes Management:  - Per ADA guidelines, Pt's A1c is not at goal of < 7%. LDL <70.   - Fasting CGM trends remain elevated for goal <130  - Increase Toujeo to 72 units daily  - Increase Humalog to 24 units before breakfast, 20 units before lunch, and 34 units before dinner  - Need to scan sensor more often      S/O: Mr. Luca Thomas is E 20 H. o. male, referred by Namrata Lira MD with a PMH of HTN, CVA (Dec 2020),  T2DM, Hx of pancreatitis, GERD, KATJA, was seen virtually today for diabetes management follow up.  Patient's last A1c was 9.1% (Jun 2022), 10.4% (Nov 2021), 8.6% (March 2021), 10.5% (Dec 2020), 10.1% (Nov 2019).  PHI verified.      Last seen by me March 2022. Interim update: Had VV with Dr Calin Dahl in May. A1c 9.1% in June. LDL now <70  Reports to receiving first Shingrix dose late May  Completed 2nd Moderna booster late May     Current anti-hyperglycemic regimen include(s):    - Toujeo 68 units HS  - Humalog 24 units before breakfast, 20 units before lunch, and 30 units before dinner     Recall: Previous anti-hyperglycemic agents includes:  - Glipizide, metformin, Levemir, Novolog, 75/25 insulin, Actos.   - Did not tolerate metformin - \"constant diarrhea\"   - Did not want to start GLP1RA d/t pancreatitis hx      Atorvastatin 20 mg daily; ASA 81 mg daily  LDL 65 ; ; HDL 23 (Jun 2022)    ROS:  Today, Pt endorses:  - Symptoms of Hyperglycemia: none  - Symptoms of Hypoglycemia: none    Blood Glucose Monitoring (BGM) or CGM:  Alyx 2 CGM  30 days    60 days: The ASCVD Risk score (Spink Nicci., et al., 2013) failed to calculate for the following reasons: The patient has a prior MI or stroke diagnosis     Vitals:   Wt Readings from Last 3 Encounters:   12/01/21 245 lb (111.1 kg)   03/31/21 231 lb (104.8 kg)   01/12/21 218 lb (98.9 kg)     BP Readings from Last 3 Encounters:   12/01/21 124/80   03/31/21 (!) 145/90   01/12/21 132/77     Pulse Readings from Last 3 Encounters:   12/01/21 97   03/31/21 92   01/12/21 96       Past Medical History:   Diagnosis Date    Acute hypoxemic respiratory failure (HCC) 4/15/2019    Diabetes (HCC)     GERD (gastroesophageal reflux disease) 10/1/2010    Influenza 1/19/2019    Nausea & vomiting     KATJA (obstructive sleep apnea) 10/1/2010    Overweight(278.02) 10/1/2010    Pancreatitis     Unspecified sleep apnea      Allergies   Allergen Reactions    Lisinopril Anaphylaxis     Angioedema       Current Outpatient Medications   Medication Sig    metoprolol succinate (TOPROL-XL) 25 mg XL tablet TAKE 1 TABLET BY MOUTH EVERY DAY    insulin glargine U-300 conc (Toujeo SoloStar U-300 Insulin) 300 unit/mL (1.5 mL) inpn pen 68 Units by SubCUTAneous route daily. Indications: type 2 diabetes mellitus    insulin lispro (HumaLOG KwikPen Insulin) 100 unit/mL kwikpen 24 units before breakfast, 20 units before lunch, and 30 units before dinner  Indications: type 2 diabetes mellitus    Insulin Needles, Disposable, 31 gauge x 5/16\" ndle 1 Pen Needle by SubCUTAneous route See Admin Instructions. Use to give insulin under the skin four times daily. E11.65. Dispense 6mm length.  amLODIPine (NORVASC) 10 mg tablet TAKE 1 TABLET BY MOUTH EVERY DAY    flash glucose sensor (FreeStyle Alyx 14 Day Sensor) kit 1 Each by Does Not Apply route See Golden Garcia. Apply and replace sensor every 2 weeks. Scan at least 3 times a day. E11.65    atorvastatin (LIPITOR) 20 mg tablet Take 1 Tablet by mouth daily.  aspirin 81 mg chewable tablet Take 81 mg by mouth daily. No current facility-administered medications for this visit.        Lab Results   Component Value Date/Time    Sodium 142 06/23/2022 12:00 AM    Potassium 4.7 06/23/2022 12:00 AM    Chloride 105 06/23/2022 12:00 AM    CO2 23 06/23/2022 12:00 AM    Anion gap 11 12/22/2020 11:52 AM    Glucose 199 (H) 06/23/2022 12:00 AM    BUN 13 06/23/2022 12:00 AM    Creatinine 1.03 06/23/2022 12:00 AM    BUN/Creatinine ratio 13 06/23/2022 12:00 AM    GFR est  11/12/2021 08:48 AM    GFR est non-AA 89 11/12/2021 08:48 AM    Calcium 9.1 06/23/2022 12:00 AM    Bilirubin, total 0.4 06/23/2022 12:00 AM    Alk. phosphatase 121 06/23/2022 12:00 AM    Protein, total 6.8 06/23/2022 12:00 AM    Albumin 4.2 06/23/2022 12:00 AM    Globulin 4.4 (H) 12/22/2020 11:52 AM    A-G Ratio 1.6 06/23/2022 12:00 AM    ALT (SGPT) 22 06/23/2022 12:00 AM       Lab Results   Component Value Date/Time    Cholesterol, total 121 06/23/2022 12:00 AM    HDL Cholesterol 23 (L) 06/23/2022 12:00 AM    LDL, calculated 65 06/23/2022 12:00 AM    LDL, calculated 121.6 (H) 12/23/2020 02:38 AM    VLDL, calculated 33 06/23/2022 12:00 AM    VLDL, calculated 51.4 12/23/2020 02:38 AM    Triglyceride 194 (H) 06/23/2022 12:00 AM    CHOL/HDL Ratio 8.5 (H) 12/23/2020 02:38 AM       Lab Results   Component Value Date/Time    WBC 11.3 (H) 11/12/2021 08:48 AM    HGB 16.2 11/12/2021 08:48 AM    HCT 47.8 11/12/2021 08:48 AM    PLATELET 531 15/94/2088 08:48 AM    MCV 93 11/12/2021 08:48 AM       Lab Results   Component Value Date/Time    Microalb/Creat ratio (ug/mg creat.) 13 11/12/2021 08:48 AM    Microalbumin urine (POC) 30 02/04/2011 03:50 PM       HbA1c:  Lab Results   Component Value Date/Time    Hemoglobin A1c 9.1 (H) 06/23/2022 12:00 AM    Hemoglobin A1c (POC) 8.6 (A) 03/31/2021 04:01 PM     No components found for: 2     Last Point of Care HGB A1C  Hemoglobin A1c (POC)   Date Value Ref Range Status   03/31/2021 8.6 (A) 4.8 - 5.6 % Final        CrCl cannot be calculated (Unknown ideal weight.).     Patient's Immunization History:    Immunizations by Immunization family     COVID-19, [place] Vaccine, Vaccine Unspecified 3/27/2021 4/17/2021 11/5/2021 5/28/2022    Influenza Vaccine 11/7/2019 10/14/2020 10/16/2021 10/16/2021    Pneumococcal Polysaccharide (PPSV-23) 5/30/2019       Tdap 12/1/2021 Zoster Vaccine, Unspecified Formulation 2022           Medication reconciliation was completed during the visit. Medications Discontinued During This Encounter   Medication Reason    atorvastatin (LIPITOR) 20 mg tablet REORDER    insulin lispro (HumaLOG KwikPen Insulin) 100 unit/mL kwikpen     insulin glargine U-300 conc (Toujeo SoloStar U-300 Insulin) 300 unit/mL (1.5 mL) inpn pen      Orders Placed This Encounter    atorvastatin (LIPITOR) 20 mg tablet     Sig: Take 1 Tablet by mouth daily. Dispense:  90 Tablet     Refill:  2    insulin lispro (HumaLOG KwikPen Insulin) 100 unit/mL kwikpen     Si units before breakfast, 20 units before lunch, and 34 units before dinner  Indications: type 2 diabetes mellitus     Dispense:  75 mL     Refill:  1    insulin glargine U-300 conc (Toujeo SoloStar U-300 Insulin) 300 unit/mL (1.5 mL) inpn pen     Si Units by SubCUTAneous route daily. Indications: type 2 diabetes mellitus     Dispense:  18 mL     Refill:  2     Patient verbalized understanding of the information presented and all of the patients questions were answered. Patient advised to call the office with any additional questions or concerns. Notifications of recommendations will be sent to Dr. Susan Camarena MD for review. Patient will return to clinic in 8 week(s) for follow up.      Thank you for the consult,  Camille Silva, PharmD, BCACP, [address] in place: Yes  Recommendation Provided To: Patient/Caregiver: 9 via Virtual Visit  Intervention Detail: Discontinued Rx: 3, reason: Duplicate Therapy, Dose Adjustment: 2, reason: Therapy Optimization, New Rx: 2, reason: Needs Additional Therapy, Refill(s) Provided and Scheduled Appointment  Intervention Accepted By: Patient/Caregiver: 9  Time Spent (min): 30

## 2022-07-12 ENCOUNTER — VIRTUAL VISIT (OUTPATIENT)
Dept: INTERNAL MEDICINE CLINIC | Age: 57
End: 2022-07-12

## 2022-07-12 DIAGNOSIS — E08.00 DIABETES MELLITUS DUE TO UNDERLYING CONDITION WITH HYPEROSMOLARITY WITHOUT COMA, WITHOUT LONG-TERM CURRENT USE OF INSULIN (HCC): ICD-10-CM

## 2022-07-12 DIAGNOSIS — E11.65 UNCONTROLLED TYPE 2 DIABETES MELLITUS WITH HYPERGLYCEMIA (HCC): ICD-10-CM

## 2022-07-12 DIAGNOSIS — Z79.4 TYPE 2 DIABETES MELLITUS WITH DIABETIC POLYNEUROPATHY, WITH LONG-TERM CURRENT USE OF INSULIN (HCC): Primary | ICD-10-CM

## 2022-07-12 DIAGNOSIS — E78.00 PURE HYPERCHOLESTEROLEMIA: ICD-10-CM

## 2022-07-12 DIAGNOSIS — E11.42 TYPE 2 DIABETES MELLITUS WITH DIABETIC POLYNEUROPATHY, WITH LONG-TERM CURRENT USE OF INSULIN (HCC): Primary | ICD-10-CM

## 2022-07-12 RX ORDER — INSULIN LISPRO 100 [IU]/ML
INJECTION, SOLUTION INTRAVENOUS; SUBCUTANEOUS
Qty: 75 ML | Refills: 1 | Status: SHIPPED | OUTPATIENT
Start: 2022-07-12

## 2022-07-12 RX ORDER — INSULIN GLARGINE 300 U/ML
72 INJECTION, SOLUTION SUBCUTANEOUS DAILY
Qty: 18 ML | Refills: 2 | Status: SHIPPED | OUTPATIENT
Start: 2022-07-12 | End: 2022-11-02 | Stop reason: SDUPTHER

## 2022-07-12 RX ORDER — ATORVASTATIN CALCIUM 20 MG/1
20 TABLET, FILM COATED ORAL DAILY
Qty: 90 TABLET | Refills: 2 | Status: SHIPPED | OUTPATIENT
Start: 2022-07-12

## 2022-08-19 LAB — HEMOCCULT STL QL IA: NEGATIVE

## 2022-09-17 NOTE — PROGRESS NOTES
Pharmacy Progress Note - Diabetes Management    Assessment / Plan:   Diabetes Management:  - Per ADA guidelines, Pt's A1c is not at goal of < 7%.   - CGM trending closer to goal. Post prandial readings remains area for improvement. Remind patient to scan sensor at least 4 times daily.   - Increase Toujeo to 76 units daily  - Continue Humalog 24 units before breakfast, 20 units before lunch, and 34 units before dinner  - Recommend annual flu shot. Still needs to complete 2nd dose of Shingrix. - Remind patient of upcoming PCP appt. S/O: Mr. Tanner Morales is a 65 y.o. male, referred by Dr. Yovani Castillo MD with a PMH of HTN, CVA (Dec 2020),  T2DM, Hx of pancreatitis, GERD, KATJA, was seen virtually today for diabetes management follow up. Patient's last A1c was 9.1% (Jun 2022), 10.4% (Nov 2021), 8.6% (March 2021), 10.5% (Dec 2020), 10.1% (Nov 2019). PHI verified. Interim update:  Having issue with Alyx sensor losing connectivity    Current anti-hyperglycemic regimen include(s):    - Toujeo 72 units daily  - Humalog 24 units before breakfast, 20 units before lunch, and 34 units before dinner    - Does not give Humalog dose lunch during the weekday at work. Will cover lunch dose on the weekends    Recall: Previous anti-hyperglycemic agents includes:  - Glipizide, metformin, Levemir, Novolog, 75/25 insulin, Actos. - Did not tolerate metformin - \"constant diarrhea\"   - Did not want to start GLP1RA d/t pancreatitis hx       Atorvastatin 20 mg daily; ASA 81 mg daily  LDL 65 ; ; HDL 23 (Jun 2022)    ROS:  Today, Pt endorses:  - Symptoms of Hyperglycemia: none  - Symptoms of Hypoglycemia: none    Blood Glucose Monitoring (BGM) or CGM:  Alyx 2 CGM   14 days:      30 days: The ASCVD Risk score (Dennise Chawla, et al., 2013) failed to calculate for the following reasons: The patient has a prior MI or stroke diagnosis     Vitals:   Wt Readings from Last 3 Encounters:   12/01/21 245 lb (111.1 kg)   03/31/21 231 lb (104.8 kg)   01/12/21 218 lb (98.9 kg)     BP Readings from Last 3 Encounters:   12/01/21 124/80   03/31/21 (!) 145/90   01/12/21 132/77     Pulse Readings from Last 3 Encounters:   12/01/21 97   03/31/21 92   01/12/21 96       Past Medical History:   Diagnosis Date    Acute hypoxemic respiratory failure (HCC) 4/15/2019    Diabetes (HCC)     GERD (gastroesophageal reflux disease) 10/1/2010    Influenza 1/19/2019    Nausea & vomiting     KATJA (obstructive sleep apnea) 10/1/2010    Overweight(278.02) 10/1/2010    Pancreatitis     Unspecified sleep apnea      Allergies   Allergen Reactions    Lisinopril Anaphylaxis     Angioedema       Current Outpatient Medications   Medication Sig    atorvastatin (LIPITOR) 20 mg tablet Take 1 Tablet by mouth daily. insulin lispro (HumaLOG KwikPen Insulin) 100 unit/mL kwikpen 24 units before breakfast, 20 units before lunch, and 34 units before dinner  Indications: type 2 diabetes mellitus    insulin glargine U-300 conc (Toujeo SoloStar U-300 Insulin) 300 unit/mL (1.5 mL) inpn pen 72 Units by SubCUTAneous route daily. Indications: type 2 diabetes mellitus    metoprolol succinate (TOPROL-XL) 25 mg XL tablet TAKE 1 TABLET BY MOUTH EVERY DAY    Insulin Needles, Disposable, 31 gauge x 5/16\" ndle 1 Pen Needle by SubCUTAneous route See Admin Instructions. Use to give insulin under the skin four times daily. E11.65. Dispense 6mm length. amLODIPine (NORVASC) 10 mg tablet TAKE 1 TABLET BY MOUTH EVERY DAY    flash glucose sensor (FreeStyle Alyx 14 Day Sensor) kit 1 Each by Does Not Apply route See Golden Garcia. Apply and replace sensor every 2 weeks. Scan at least 3 times a day. E11.65    aspirin 81 mg chewable tablet Take 81 mg by mouth daily. No current facility-administered medications for this visit.        Lab Results   Component Value Date/Time    Sodium 142 06/23/2022 12:00 AM    Potassium 4.7 06/23/2022 12:00 AM    Chloride 105 06/23/2022 12:00 AM CO2 23 06/23/2022 12:00 AM    Anion gap 11 12/22/2020 11:52 AM    Glucose 199 (H) 06/23/2022 12:00 AM    BUN 13 06/23/2022 12:00 AM    Creatinine 1.03 06/23/2022 12:00 AM    BUN/Creatinine ratio 13 06/23/2022 12:00 AM    GFR est  11/12/2021 08:48 AM    GFR est non-AA 89 11/12/2021 08:48 AM    Calcium 9.1 06/23/2022 12:00 AM    Bilirubin, total 0.4 06/23/2022 12:00 AM    Alk. phosphatase 121 06/23/2022 12:00 AM    Protein, total 6.8 06/23/2022 12:00 AM    Albumin 4.2 06/23/2022 12:00 AM    Globulin 4.4 (H) 12/22/2020 11:52 AM    A-G Ratio 1.6 06/23/2022 12:00 AM    ALT (SGPT) 22 06/23/2022 12:00 AM       Lab Results   Component Value Date/Time    Cholesterol, total 121 06/23/2022 12:00 AM    HDL Cholesterol 23 (L) 06/23/2022 12:00 AM    LDL, calculated 65 06/23/2022 12:00 AM    LDL, calculated 121.6 (H) 12/23/2020 02:38 AM    VLDL, calculated 33 06/23/2022 12:00 AM    VLDL, calculated 51.4 12/23/2020 02:38 AM    Triglyceride 194 (H) 06/23/2022 12:00 AM    CHOL/HDL Ratio 8.5 (H) 12/23/2020 02:38 AM       Lab Results   Component Value Date/Time    WBC 11.3 (H) 11/12/2021 08:48 AM    HGB 16.2 11/12/2021 08:48 AM    HCT 47.8 11/12/2021 08:48 AM    PLATELET 951 06/70/5296 08:48 AM    MCV 93 11/12/2021 08:48 AM       Lab Results   Component Value Date/Time    Microalb/Creat ratio (ug/mg creat.) 13 11/12/2021 08:48 AM    Microalbumin urine (POC) 30 02/04/2011 03:50 PM       HbA1c:  Lab Results   Component Value Date/Time    Hemoglobin A1c 9.1 (H) 06/23/2022 12:00 AM    Hemoglobin A1c (POC) 8.6 (A) 03/31/2021 04:01 PM     No components found for: 2     Last Point of Care HGB A1C  Hemoglobin A1c (POC)   Date Value Ref Range Status   03/31/2021 8.6 (A) 4.8 - 5.6 % Final        CrCl cannot be calculated (Unknown ideal weight. ). Medication reconciliation was completed during the visit. There are no discontinued medications.     Patient verbalized understanding of the information presented and all of the patients questions were answered. Patient advised to call the office with any additional questions or concerns. Notifications of recommendations will be sent to Dr. Harvey Shaikh MD for review.     RTC 8 weeks     Thank you for the consult,  Camille Mederos, PharmD, BCACP, [address] [place] [address] in place: Yes  Recommendation Provided To: Patient/Caregiver: 2 via Virtual Visit  Intervention Detail: Dose Adjustment: 1, reason: Therapy Optimization and Scheduled Appointment  Intervention Accepted By: Patient/Caregiver: 2  Time Spent (min): 30

## 2022-09-21 ENCOUNTER — VIRTUAL VISIT (OUTPATIENT)
Dept: INTERNAL MEDICINE CLINIC | Age: 57
End: 2022-09-21

## 2022-09-21 DIAGNOSIS — Z79.4 TYPE 2 DIABETES MELLITUS WITH DIABETIC POLYNEUROPATHY, WITH LONG-TERM CURRENT USE OF INSULIN (HCC): Primary | ICD-10-CM

## 2022-09-21 DIAGNOSIS — E11.42 TYPE 2 DIABETES MELLITUS WITH DIABETIC POLYNEUROPATHY, WITH LONG-TERM CURRENT USE OF INSULIN (HCC): Primary | ICD-10-CM

## 2022-11-02 DIAGNOSIS — E11.42 TYPE 2 DIABETES MELLITUS WITH DIABETIC POLYNEUROPATHY, WITH LONG-TERM CURRENT USE OF INSULIN (HCC): ICD-10-CM

## 2022-11-02 DIAGNOSIS — Z79.4 TYPE 2 DIABETES MELLITUS WITH DIABETIC POLYNEUROPATHY, WITH LONG-TERM CURRENT USE OF INSULIN (HCC): ICD-10-CM

## 2022-11-03 RX ORDER — INSULIN GLARGINE 300 U/ML
72 INJECTION, SOLUTION SUBCUTANEOUS DAILY
Qty: 18 ML | Refills: 2 | Status: SHIPPED | OUTPATIENT
Start: 2022-11-03 | End: 2022-11-07 | Stop reason: SDUPTHER

## 2022-11-03 NOTE — TELEPHONE ENCOUNTER
PCP: Kt Rhodes MD    Last appt: 2022  Future Appointments   Date Time Provider Caity Castillo   2022 11:30 AM tK Rhodes MD [facility] BS AMB   2022  3:45 PM Cuauhtemoc Lundberg, PHARMD [facility] BS AMB       Requested Prescriptions     Pending Prescriptions Disp Refills    insulin glargine U-300 conc (Toujeo SoloStar U-300 Insulin) 300 unit/mL (1.5 mL) inpn pen 18 mL 2     Si Units by SubCUTAneous route daily.  Indications: type 2 diabetes mellitus

## 2022-11-07 DIAGNOSIS — Z79.4 TYPE 2 DIABETES MELLITUS WITH DIABETIC POLYNEUROPATHY, WITH LONG-TERM CURRENT USE OF INSULIN (HCC): ICD-10-CM

## 2022-11-07 DIAGNOSIS — E11.42 TYPE 2 DIABETES MELLITUS WITH DIABETIC POLYNEUROPATHY, WITH LONG-TERM CURRENT USE OF INSULIN (HCC): ICD-10-CM

## 2022-11-07 RX ORDER — INSULIN GLARGINE 300 U/ML
76 INJECTION, SOLUTION SUBCUTANEOUS DAILY
Qty: 24 ML | Refills: 1 | Status: SHIPPED | OUTPATIENT
Start: 2022-11-07 | End: 2022-11-10 | Stop reason: SDUPTHER

## 2022-11-07 NOTE — TELEPHONE ENCOUNTER
PCP: Emily Molina MD    Last appt: 2022  Future Appointments   Date Time Provider Caity Castillo   2022 11:30 AM Emily Molina MD [facility] BS AMB   2022  3:45 PM Jony Monroe, PHARMD [facility] BS AMB       Requested Prescriptions     Pending Prescriptions Disp Refills    insulin glargine U-300 conc (Toujeo SoloStar U-300 Insulin) 300 unit/mL (1.5 mL) inpn pen 24 mL 1     Si Units by SubCUTAneous route daily.  Indications: type 2 diabetes mellitus

## 2022-11-07 NOTE — TELEPHONE ENCOUNTER
Patient's wife, Adiar Diaz, states she needs a call back in reference to the Incorrect Dosage of insulin glargine U-300 conc (Toujeo SoloStar U-300 Insulin) 300 unit/mL (1.5 mL) inpn pen being sent in on 11/3/22. Please call to discuss & advise.  Thank you

## 2022-11-10 DIAGNOSIS — Z79.4 TYPE 2 DIABETES MELLITUS WITH DIABETIC POLYNEUROPATHY, WITH LONG-TERM CURRENT USE OF INSULIN (HCC): ICD-10-CM

## 2022-11-10 DIAGNOSIS — E11.42 TYPE 2 DIABETES MELLITUS WITH DIABETIC POLYNEUROPATHY, WITH LONG-TERM CURRENT USE OF INSULIN (HCC): ICD-10-CM

## 2022-11-10 RX ORDER — INSULIN GLARGINE 300 U/ML
76 INJECTION, SOLUTION SUBCUTANEOUS DAILY
Qty: 24 ML | Refills: 1 | Status: SHIPPED | OUTPATIENT
Start: 2022-11-10

## 2022-11-10 NOTE — TELEPHONE ENCOUNTER
Requested Prescriptions     Pending Prescriptions Disp Refills    insulin glargine U-300 conc (Toujeo SoloStar U-300 Insulin) 300 unit/mL (1.5 mL) inpn pen 24 mL 1     Si Units by SubCUTAneous route daily. Indications: type 2 diabetes mellitus       Allergies   Allergen Reactions    Lisinopril Anaphylaxis     Angioedema       [facility]  Is order duplicate: no    Current Outpatient Medications   Medication Instructions    amLODIPine (NORVASC) 10 mg tablet TAKE 1 TABLET BY MOUTH EVERY DAY    aspirin 81 mg, Oral, DAILY    atorvastatin (LIPITOR) 20 mg, Oral, DAILY    flash glucose sensor (FreeStyle Alyx 14 Day Sensor) kit 1 Each, Does Not Apply, SEE ADMIN INSTRUCTIONS, Apply and replace sensor every 2 weeks. Scan at least 3 times a day. E11.65    insulin lispro (HumaLOG KwikPen Insulin) 100 unit/mL kwikpen 24 units before breakfast, 20 units before lunch, and 34 units before dinner    Insulin Needles, Disposable, 31 gauge x 5/16\" ndle 1 Pen Needle, SubCUTAneous, SEE ADMIN INSTRUCTIONS, Use to give insulin under the skin four times daily. E11.65.  Dispense 6mm length.    metoprolol succinate (TOPROL-XL) 25 mg XL tablet TAKE 1 TABLET BY MOUTH EVERY DAY    Toujeo SoloStar U-300 Insulin 76 Units, SubCUTAneous, DAILY       Signed By: George Sim     November 10, 2022

## 2022-11-11 ENCOUNTER — TELEPHONE (OUTPATIENT)
Dept: INTERNAL MEDICINE CLINIC | Age: 57
End: 2022-11-11

## 2022-11-11 DIAGNOSIS — E11.40 TYPE 2 DIABETES MELLITUS WITH DIABETIC NEUROPATHY, WITH LONG-TERM CURRENT USE OF INSULIN (HCC): ICD-10-CM

## 2022-11-11 DIAGNOSIS — E78.5 HYPERLIPIDEMIA, UNSPECIFIED HYPERLIPIDEMIA TYPE: ICD-10-CM

## 2022-11-11 DIAGNOSIS — Z00.00 ROUTINE GENERAL MEDICAL EXAMINATION AT A HEALTH CARE FACILITY: Primary | ICD-10-CM

## 2022-11-11 DIAGNOSIS — I10 HYPERTENSION, UNSPECIFIED TYPE: ICD-10-CM

## 2022-11-11 DIAGNOSIS — Z79.4 TYPE 2 DIABETES MELLITUS WITH DIABETIC NEUROPATHY, WITH LONG-TERM CURRENT USE OF INSULIN (HCC): ICD-10-CM

## 2022-11-11 NOTE — TELEPHONE ENCOUNTER
Patient would like to come in early Monday morning fasting to get his labs and then return later for his 11:30 appointment. Patient would like a call back or My Chart message to confirm. Thanks.

## 2022-11-13 NOTE — PROGRESS NOTES
HISTORY OF PRESENT ILLNESS  Pillo Wynn is a 65 y.o. male. 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 90 % in range per pt  Chf sxs--none--did not see CARD MD for mild CHF by echo 2020  Exercise--walks on treadmil  Had labs drawn today  S/p catatract sx this year  Has neuropathy in feet chornically  Stopped aspirin due to nosebleeds  Last OV  On toujeo 68 units qs  [id number] ac bkfst and lunch and 30 adc dinner  fsbs 150-200 in am and around   No hypoglycemia < 60  bp 130/86  BM some constipation    Patient Active Problem List    Diagnosis Date Noted    Ataxia 12/22/2020    Diabetes mellitus ([facility] [address] 10/01/2010    HTN (hypertension) 10/01/2010    Pure hypercholesterolemia 10/01/2010    Achalasia 10/01/2010     Current Outpatient Medications   Medication Sig Dispense Refill    insulin glargine U-300 conc (Toujeo SoloStar U-300 Insulin) 300 unit/mL (1.5 mL) inpn pen 76 Units by SubCUTAneous route daily. Indications: type 2 diabetes mellitus 24 mL 1    atorvastatin (LIPITOR) 20 mg tablet Take 1 Tablet by mouth daily. 90 Tablet 2    insulin lispro (HumaLOG KwikPen Insulin) 100 unit/mL kwikpen 24 units before breakfast, 20 units before lunch, and 34 units before dinner  Indications: type 2 diabetes mellitus 75 mL 1    metoprolol succinate (TOPROL-XL) 25 mg XL tablet TAKE 1 TABLET BY MOUTH EVERY DAY 90 Tablet 1    Insulin Needles, Disposable, 31 gauge x 5/16\" ndle 1 Pen Needle by SubCUTAneous route See Admin Instructions. Use to give insulin under the skin four times daily. E11.65. Dispense 6mm length.  500 Each 11    amLODIPine (NORVASC) 10 mg tablet TAKE 1 TABLET BY MOUTH EVERY DAY 90 Tablet 3    flash glucose sensor (FreeStyle Alyx 14 Day Sensor) kit 1 Each by Does Not Apply route See Admin Instructions. Apply and replace sensor every 2 weeks. Scan at least 3 times a day. E11.65 6 Kit 11    aspirin 81 mg chewable tablet Take 81 mg by mouth daily. Allergies   Allergen Reactions    Lisinopril Anaphylaxis     Angioedema      Lab Results   Component Value Date/Time    WBC 11.3 (H) 11/12/2021 08:48 AM    HGB 16.2 11/12/2021 08:48 AM    HCT 47.8 11/12/2021 08:48 AM    PLATELET 598 49/52/3836 08:48 AM    MCV 93 11/12/2021 08:48 AM     Lab Results   Component Value Date/Time    Hemoglobin A1c 9.1 (H) 06/23/2022 12:00 AM    Hemoglobin A1c 10.4 (H) 11/12/2021 08:48 AM    Hemoglobin A1c 10.5 (H) 12/23/2020 02:38 AM    Glucose 199 (H) 06/23/2022 12:00 AM    Glucose (POC) 199 (H) 12/23/2020 09:10 PM    Microalb/Creat ratio (ug/mg creat.) 13 11/12/2021 08:48 AM    LDL, calculated 65 06/23/2022 12:00 AM    LDL, calculated 121.6 (H) 12/23/2020 02:38 AM    Creatinine 1.03 06/23/2022 12:00 AM      Lab Results   Component Value Date/Time    Cholesterol, total 121 06/23/2022 12:00 AM    HDL Cholesterol 23 (L) 06/23/2022 12:00 AM    LDL, calculated 65 06/23/2022 12:00 AM    LDL, calculated 121.6 (H) 12/23/2020 02:38 AM    Triglyceride 194 (H) 06/23/2022 12:00 AM    CHOL/HDL Ratio 8.5 (H) 12/23/2020 02:38 AM     Lab Results   Component Value Date/Time    GFR est non-AA 89 11/12/2021 08:48 AM    GFR est  11/12/2021 08:48 AM    Creatinine 1.03 06/23/2022 12:00 AM    BUN 13 06/23/2022 12:00 AM    Sodium 142 06/23/2022 12:00 AM    Potassium 4.7 06/23/2022 12:00 AM    Chloride 105 06/23/2022 12:00 AM    CO2 23 06/23/2022 12:00 AM    Magnesium 2.2 01/29/2019 02:48 AM    Phosphorus 2.7 01/29/2019 02:48 AM        Review of Systems   Constitutional:  Negative for chills, fever, malaise/fatigue and weight loss. HENT: Negative. Eyes:  Negative for blurred vision and double vision. Respiratory:  Negative for cough and shortness of breath.     Cardiovascular:  Negative for chest pain and palpitations. Gastrointestinal:  Negative for abdominal pain, blood in stool, constipation, diarrhea, melena, nausea and vomiting. Genitourinary:  Negative for dysuria, frequency, hematuria and urgency. Musculoskeletal:  Negative for back pain, falls, joint pain and myalgias. Skin: Negative. Neurological:  Negative for dizziness, tremors and headaches. Physical Exam  Vitals and nursing note reviewed. Constitutional:       General: He is not in acute distress. Appearance: Normal appearance. He is well-developed and normal weight. Comments: Appears stated age   HENT:      Head: Normocephalic. Right Ear: Tympanic membrane normal.      Left Ear: Tympanic membrane normal.   Eyes:      Pupils: Pupils are equal, round, and reactive to light. Neck:      Vascular: Carotid bruit present. Comments: Left bruit  Cardiovascular:      Rate and Rhythm: Normal rate and regular rhythm. Pulses: Normal pulses. Heart sounds: Normal heart sounds. Pulmonary:      Effort: Pulmonary effort is normal.      Breath sounds: Normal breath sounds. Abdominal:      General: Abdomen is flat. Palpations: Abdomen is soft. Musculoskeletal:      Cervical back: Normal range of motion. Right lower leg: No edema. Left lower leg: No edema. Lymphadenopathy:      Cervical: No cervical adenopathy. Neurological:      Mental Status: He is alert.       Comments:      Diabetic foot exam performed by Pillo Chapman MD       Measurement  Response Nurse Comment Physician Comment  Monofilament  R - reduced sensation with micro filament  L - reduced sensation with micro filament    Pulse DP R - 2+ (normal)  L - 2+ (normal)    Pulse TP R - 2+ (normal)  L - 2+ (normal)    Structural deformity R - None  L - None    Skin Integrity / Deformity R - None  L - None       Reviewed by:            Psychiatric:         Mood and Affect: Mood normal.         Behavior: Behavior normal. Judgment: Judgment normal.     ASSESSMENT and PLAN    ICD-10-CM ICD-9-CM    1. Type 2 diabetes mellitus with other specified complication, with long-term current use of insulin ([facility])  E11.69 250.80  DIABETES FOOT EXAM  Fu Pharm Din 2 weeks  Improved control    Z79.4 V58.67       2. Hypertension, unspecified type  I10 401.9 controlled      3. Hyperlipidemia, unspecified hyperlipidemia type  E78.5 272.4 On statin-labs pending      4. History of CVA (cerebrovascular accident)  Z86.73 V12.54 DUPLEX CAROTID BILATERAL  Restart aspirin 81 mg qd      5. Routine general medical examination at a health care facility  Z00.00 V70.0 Fu labs  Flu shot today  Recommended covid booster      6. Type 2 diabetes mellitus with diabetic neuropathy, with long-term current use of insulin ([facility])  E11.40 250.60  DIABETES FOOT EXAM    Z79.4 357.2      V58.67       7.  Carotid bruit, unspecified laterality  R09.89 785.9 DUPLEX CAROTID BILATERAL   Rtc 6 months

## 2022-11-14 ENCOUNTER — DOCUMENTATION ONLY (OUTPATIENT)
Dept: INTERNAL MEDICINE CLINIC | Age: 57
End: 2022-11-14

## 2022-11-14 ENCOUNTER — OFFICE VISIT (OUTPATIENT)
Dept: INTERNAL MEDICINE CLINIC | Age: 57
End: 2022-11-14
Payer: COMMERCIAL

## 2022-11-14 VITALS
HEIGHT: 76 IN | WEIGHT: 248.6 LBS | HEART RATE: 80 BPM | BODY MASS INDEX: 30.27 KG/M2 | OXYGEN SATURATION: 97 % | TEMPERATURE: 97.3 F | SYSTOLIC BLOOD PRESSURE: 130 MMHG | DIASTOLIC BLOOD PRESSURE: 84 MMHG | RESPIRATION RATE: 18 BRPM

## 2022-11-14 DIAGNOSIS — E78.5 HYPERLIPIDEMIA, UNSPECIFIED HYPERLIPIDEMIA TYPE: ICD-10-CM

## 2022-11-14 DIAGNOSIS — Z86.73 HISTORY OF CVA (CEREBROVASCULAR ACCIDENT): ICD-10-CM

## 2022-11-14 DIAGNOSIS — Z00.00 ROUTINE GENERAL MEDICAL EXAMINATION AT A HEALTH CARE FACILITY: ICD-10-CM

## 2022-11-14 DIAGNOSIS — I10 HYPERTENSION, UNSPECIFIED TYPE: ICD-10-CM

## 2022-11-14 DIAGNOSIS — E11.69 TYPE 2 DIABETES MELLITUS WITH OTHER SPECIFIED COMPLICATION, WITH LONG-TERM CURRENT USE OF INSULIN (HCC): Primary | ICD-10-CM

## 2022-11-14 DIAGNOSIS — Z79.4 TYPE 2 DIABETES MELLITUS WITH DIABETIC NEUROPATHY, WITH LONG-TERM CURRENT USE OF INSULIN (HCC): ICD-10-CM

## 2022-11-14 DIAGNOSIS — Z79.4 TYPE 2 DIABETES MELLITUS WITH OTHER SPECIFIED COMPLICATION, WITH LONG-TERM CURRENT USE OF INSULIN (HCC): Primary | ICD-10-CM

## 2022-11-14 DIAGNOSIS — R09.89 CAROTID BRUIT, UNSPECIFIED LATERALITY: ICD-10-CM

## 2022-11-14 DIAGNOSIS — E11.40 TYPE 2 DIABETES MELLITUS WITH DIABETIC NEUROPATHY, WITH LONG-TERM CURRENT USE OF INSULIN (HCC): ICD-10-CM

## 2022-11-14 PROCEDURE — 99396 PREV VISIT EST AGE 40-64: CPT | Performed by: INTERNAL MEDICINE

## 2022-11-14 NOTE — PROGRESS NOTES
Chief Complaint   Patient presents with    Complete Physical       1. \"Have you been to the ER, urgent care clinic since your last visit? Hospitalized since your last visit? \" No    2. \"Have you seen or consulted any other health care providers outside of the [address] [place] [address] since your last visit? \" No     3. For patients aged 39-70: Has the patient had a colonoscopy / FIT/ Cologuard? Yes - no Care Gap present      If the patient is female:    4. For patients aged 41-77: Has the patient had a mammogram within the past 2 years? NA - based on age or sex      11. For patients aged 21-65: Has the patient had a pap smear?  NA - based on age or sex    Visit Vitals  /84 (BP 1 Location: Left upper arm, BP Patient Position: Sitting)   Pulse 80   Temp 97.3 °F (36.3 °C) (Temporal)   Resp 18   Ht 6' 4\" (1.93 m)   Wt 248 lb 9.6 oz (112.8 kg)   SpO2 97%   BMI 30.26 kg/m²

## 2022-11-14 NOTE — PROGRESS NOTES
Pharmacy Progress Note - Diabetes Management     Mr. Herb Johnston 65 y.o. was seen following his PCP appt today. Reports to giving Toujeo 76 units daily  Giving Humalog 24 units before breakfast, 20 units before lunch, and 34 units before dinner     30 days:            14 days: Wt Readings from Last 3 Encounters:   11/14/22 248 lb 9.6 oz (112.8 kg)   12/01/21 245 lb (111.1 kg)   03/31/21 231 lb (104.8 kg)     BP Readings from Last 3 Encounters:   11/14/22 130/84   12/01/21 124/80   03/31/21 (!) 145/90     Lab Results   Component Value Date/Time    Hemoglobin A1c 9.1 (H) 06/23/2022 12:00 AM    Hemoglobin A1c 10.4 (H) 11/12/2021 08:48 AM    Hemoglobin A1c 10.5 (H) 12/23/2020 02:38 AM     Estimated Creatinine Clearance: 110.1 mL/min (by C-G formula based on SCr of 1.03 mg/dL). - Based on current CGM trend, adjust Humalog to 24 units before breakfast, 20 units before lunch, and 30 units before dinner  - Continue Toujeo 76 units daily  - Keep scheduled appt for 11/30/22. - All questions answered at this time.        Thank you for the consult,  Camille Hoffman, PharmD, BCACP, [facility] in place: Yes  Recommendation Provided To: Patient/Caregiver: 1 via In person  Intervention Detail: Dose Adjustment: 1, reason: Therapy De-escalation  Intervention Accepted By: Patient/Caregiver: 1  Time Spent (min): 10

## 2022-11-14 NOTE — PROGRESS NOTES
11 y.o. male, PMH of HTN, CVA (Dec 2020),  T2DM, Hx of pancreatitis, GERD, KATJA, being followed by diabetes management. Labs done this AM  Patient's last A1c was 9.1% (Jun 2022), 10.4% (Nov 2021), 8.6% (March 2021), 10.5% (Dec 2020), 10.1% (Nov 2019). LDL - 65    Flu - 10/22/22  Shingrix 2nd dose- 10/8/22     9/21/22 Diabetes Management:  - Per ADA guidelines, Pt's A1c is not at goal of < 7%.   - CGM trending closer to goal. Post prandial readings remains area for improvement. Remind patient to scan sensor at least 4 times daily.   - Increase Toujeo to 76 units daily  - Continue Humalog 24 units before breakfast, 20 units before lunch, and 34 units before dinner      Denies chills, fever, malaise/fatigue and weight loss. Eyes:  Negative for blurred vision and double vision. Respiratory:  Denies cough, SOB, Diff breathing   Cardiovascular:  Denies chest pain and palpitations. Gastrointestinal:  Denies abd pain, blood in stool, constipation, diarrhea, melena, nausea and vomiting. Genitourinary:  Denies dysuria, frequency, hematuria and urgency. Musculoskeletal:  Denies back pain, falls, joint pain and myalgias. Skin: Negative. Neurological:  Denies for dizziness, tremors, seizures, headaches. Psych:  Denies anxiety or depression     Physical Exam  Constitutional:       General: He is not in acute distress. Appearance: Normal appearance. He is well-developed and normal weight. Comments: Appears stated age   HENT:      Head: Normocephalic. Right Ear: Tympanic membrane normal.      Left Ear: Tympanic membrane normal.   Eyes:      Pupils: Pupils are equal, round, and reactive to light. Cardiovascular:      Rate and Rhythm: Normal rate and regular rhythm. Pulses: Normal pulses. Heart sounds: Normal heart sounds. Neck  - possible mild slight left bruit detected  Pulmonary:      Effort: Pulmonary effort is normal.      Breath sounds: Normal breath sounds.    Abdominal: General: Abdomen is flat. Palpations: Abdomen is soft. Musculoskeletal:      Cervical back: Normal range of motion. Right lower leg: No edema. Left lower leg: No edema. Lymphadenopathy:      Cervical: No cervical adenopathy. Neurological:      General: No focal deficit present. Mental Status: He is alert.      Diabetic foot exam - Decreased sensation both feet  Psychiatric:         Mood and Affect: Mood normal.         Behavior: Behavior normal.         Judgment: Judgment normal.      ASSESSMENT and PLAN  DM-2    A1C    Continue current med therapy and f/up with Diabetes Management to follow  Hypercholesteremia    Lipid Panel  HTN    CBC, CMP    Lifestyle  Cartoid Buit/ CVA -     Doppler

## 2022-11-15 LAB
ALBUMIN SERPL-MCNC: 4.3 G/DL (ref 3.8–4.9)
ALBUMIN/CREAT UR: 8 MG/G CREAT (ref 0–29)
ALBUMIN/GLOB SERPL: 1.7 {RATIO} (ref 1.2–2.2)
ALP SERPL-CCNC: 128 IU/L (ref 44–121)
ALT SERPL-CCNC: 15 IU/L (ref 0–44)
AST SERPL-CCNC: 15 IU/L (ref 0–40)
BILIRUB SERPL-MCNC: 0.3 MG/DL (ref 0–1.2)
BUN SERPL-MCNC: 15 MG/DL (ref 6–24)
BUN/CREAT SERPL: 18 (ref 9–20)
CALCIUM SERPL-MCNC: 9.3 MG/DL (ref 8.7–10.2)
CHLORIDE SERPL-SCNC: 105 MMOL/L (ref 96–106)
CHOLEST SERPL-MCNC: 116 MG/DL (ref 100–199)
CO2 SERPL-SCNC: 21 MMOL/L (ref 20–29)
CREAT SERPL-MCNC: 0.85 MG/DL (ref 0.76–1.27)
CREAT UR-MCNC: 209.4 MG/DL
EGFR: 102 ML/MIN/1.73
ERYTHROCYTE [DISTWIDTH] IN BLOOD BY AUTOMATED COUNT: 12.5 % (ref 11.6–15.4)
EST. AVERAGE GLUCOSE BLD GHB EST-MCNC: 206 MG/DL
GLOBULIN SER CALC-MCNC: 2.5 G/DL (ref 1.5–4.5)
GLUCOSE SERPL-MCNC: 194 MG/DL (ref 70–99)
HBA1C MFR BLD: 8.8 % (ref 4.8–5.6)
HCT VFR BLD AUTO: 47.2 % (ref 37.5–51)
HDLC SERPL-MCNC: 21 MG/DL
HGB BLD-MCNC: 15.7 G/DL (ref 13–17.7)
LDLC SERPL CALC-MCNC: 61 MG/DL (ref 0–99)
MCH RBC QN AUTO: 31 PG (ref 26.6–33)
MCHC RBC AUTO-ENTMCNC: 33.3 G/DL (ref 31.5–35.7)
MCV RBC AUTO: 93 FL (ref 79–97)
MICROALBUMIN UR-MCNC: 17 UG/ML
PLATELET # BLD AUTO: 327 X10E3/UL (ref 150–450)
POTASSIUM SERPL-SCNC: 4.7 MMOL/L (ref 3.5–5.2)
PROT SERPL-MCNC: 6.8 G/DL (ref 6–8.5)
PSA SERPL-MCNC: 0.4 NG/ML (ref 0–4)
RBC # BLD AUTO: 5.07 X10E6/UL (ref 4.14–5.8)
REFLEX CRITERIA: NORMAL
SODIUM SERPL-SCNC: 139 MMOL/L (ref 134–144)
TRIGL SERPL-MCNC: 207 MG/DL (ref 0–149)
TSH SERPL DL<=0.005 MIU/L-ACNC: 1.17 UIU/ML (ref 0.45–4.5)
VLDLC SERPL CALC-MCNC: 34 MG/DL (ref 5–40)
WBC # BLD AUTO: 9.1 X10E3/UL (ref 3.4–10.8)

## 2022-11-22 ENCOUNTER — TELEPHONE (OUTPATIENT)
Dept: INTERNAL MEDICINE CLINIC | Age: 57
End: 2022-11-22

## 2022-11-22 ENCOUNTER — HOSPITAL ENCOUNTER (OUTPATIENT)
Dept: VASCULAR SURGERY | Age: 57
Discharge: HOME | End: 2022-11-22
Attending: INTERNAL MEDICINE
Payer: COMMERCIAL

## 2022-11-22 DIAGNOSIS — Z86.73 HISTORY OF CVA (CEREBROVASCULAR ACCIDENT): ICD-10-CM

## 2022-11-22 DIAGNOSIS — R09.89 CAROTID BRUIT, UNSPECIFIED LATERALITY: ICD-10-CM

## 2022-11-22 PROCEDURE — 93880 EXTRACRANIAL BILAT STUDY: CPT

## 2022-11-23 ENCOUNTER — TELEPHONE (OUTPATIENT)
Dept: INTERNAL MEDICINE CLINIC | Age: 57
End: 2022-11-23

## 2022-11-23 NOTE — TELEPHONE ENCOUNTER
Ozempic (0.25 or 0.5 MG/DOSE) 2MG/1.5ML pen-injectors       Status: PA Response - Approved    Created: November 19th, 2022    Sent: November 22nd, 2022

## 2022-11-25 DIAGNOSIS — I65.23 CAROTID STENOSIS, ASYMPTOMATIC, BILATERAL: Primary | ICD-10-CM

## 2022-11-25 LAB
LEFT CCA DIST DIAS: 38.5 CM/S
LEFT CCA DIST SYS: 154.9 CM/S
LEFT CCA MID DIAS: 51.4 CM/S
LEFT CCA MID SYS: 222.8 CM/S
LEFT CCA PROX DIAS: 23.9 CM/S
LEFT CCA PROX SYS: 107.3 CM/S
LEFT ECA DIAS: 8.6 CM/S
LEFT ECA SYS: 118.3 CM/S
LEFT ICA DIST DIAS: 21.7 CM/S
LEFT ICA DIST SYS: 78.8 CM/S
LEFT ICA MID DIAS: 21.7 CM/S
LEFT ICA MID SYS: 118.3 CM/S
LEFT ICA PROX DIAS: 22.3 CM/S
LEFT ICA PROX SYS: 180.7 CM/S
LEFT ICA/CCA SYS: 1.2 NO UNITS
LEFT VERTEBRAL DIAS: 0 CM/S
LEFT VERTEBRAL SYS: 0 CM/S
RIGHT CCA DIST DIAS: 21.7 CM/S
RIGHT CCA DIST SYS: 91.9 CM/S
RIGHT CCA PROX DIAS: 17.3 CM/S
RIGHT CCA PROX SYS: 100.7 CM/S
RIGHT ECA DIAS: 8.6 CM/S
RIGHT ECA SYS: 111.7 CM/S
RIGHT ICA DIST DIAS: 23.9 CM/S
RIGHT ICA DIST SYS: 89.8 CM/S
RIGHT ICA MID DIAS: 28.3 CM/S
RIGHT ICA MID SYS: 96.3 CM/S
RIGHT ICA PROX DIAS: 12.9 CM/S
RIGHT ICA PROX SYS: 72.2 CM/S
RIGHT ICA/CCA SYS: 1 NO UNITS
RIGHT VERTEBRAL DIAS: 12.2 CM/S
RIGHT VERTEBRAL SYS: 51.5 CM/S
VAS LEFT SUBCLAVIAN PROX EDV: 0 CM/S
VAS LEFT SUBCLAVIAN PROX PSV: 193.6 CM/S
VAS RIGHT SUBCLAVIAN PROX EDV: 0 CM/S
VAS RIGHT SUBCLAVIAN PROX PSV: 203.3 CM/S

## 2022-11-26 NOTE — PROGRESS NOTES
Anai has mild stenosis or narrowing on right carotid art and moderate stenosis on left from plaque build up. Continue lipitor.  Needs to see vascular MD for close monitoring particular the left side--advise appt with vascular Dr Johnie Kat or associate please [phone]

## 2022-11-26 NOTE — PROGRESS NOTES
Pharmacy Progress Note - Diabetes Management    Assessment / Plan:   Diabetes Management:  - Per ADA guidelines, Pt's A1c is not at goal of < 7%. - Overall CGM trending towards goal. Mindful of evening/post prandial change in hyperglycemia.   - Decrease Humalog to 24 units before breakfast, 20 units before lunch, and 30 units before dinner  - Continue Toujeo 76 units daily for now     S/O: Mr. Joceline aLnge is a 65 y.o. male, referred by Dr. Gretchen Kearney MD with a PMH of HTN, CVA (Dec 2020),  T2DM, Hx of pancreatitis, GERD, KATJA, was seen virtually today for diabetes management follow up. Patient's last A1c was 8.8% (Nov 2022), 9.1% (Jun 2022), 10.4% (Nov 2021), 8.6% (March 2021), 10.5% (Dec 2020), 10.1% (Nov 2019). PHI verified. Interim history:  Referred to Dr Luis Johnston for carotid stenosis. A1c was 8.8% in Nov    Current anti-hyperglycemic regimen include(s):    - Toujeo 76 units daily  - Humalog 24 units before breakfast, 20 units before lunch, and 30 units before dinner   --- reports to giving 24 units before breakfast, lunch, and 34 units before dinner. Will decrease dinner dose to 30 units \"depending on what was dinner\"     Atorvastatin 20 mg daily, ASA 81 mg daily  LDL 61 ;  ; HDL 21 (Nov 2022)    ROS:  Today, Pt endorses:  - Symptoms of Hyperglycemia: none  - Symptoms of Hypoglycemia: none    Blood Glucose Monitoring (BGM) or CGM:  Alyx 2 CGM        14 days:                    Nutrition/Lifestyle Modifications:  Dinner: lasagna + salad + water       The ASCVD Risk score (Herbert JEFFERS, et al., 2019) failed to calculate for the following reasons: The patient has a prior MI or stroke diagnosis     Vitals:   Wt Readings from Last 3 Encounters:   11/14/22 248 lb 9.6 oz (112.8 kg)   12/01/21 245 lb (111.1 kg)   03/31/21 231 lb (104.8 kg)     BP Readings from Last 3 Encounters:   11/14/22 130/84   12/01/21 124/80   03/31/21 (!) 145/90     Pulse Readings from Last 3 Encounters:   11/14/22 80 12/01/21 97   03/31/21 92       Past Medical History:   Diagnosis Date    Acute hypoxemic respiratory failure (HCC) 4/15/2019    Diabetes (HCC)     GERD (gastroesophageal reflux disease) 10/1/2010    Influenza 1/19/2019    Nausea & vomiting     KATJA (obstructive sleep apnea) 10/1/2010    Overweight(278.02) 10/1/2010    Pancreatitis     Unspecified sleep apnea      Allergies   Allergen Reactions    Lisinopril Anaphylaxis     Angioedema       Current Outpatient Medications   Medication Sig    insulin glargine U-300 conc (Toujeo SoloStar U-300 Insulin) 300 unit/mL (1.5 mL) inpn pen 76 Units by SubCUTAneous route daily. Indications: type 2 diabetes mellitus    atorvastatin (LIPITOR) 20 mg tablet Take 1 Tablet by mouth daily. insulin lispro (HumaLOG KwikPen Insulin) 100 unit/mL kwikpen 24 units before breakfast, 20 units before lunch, and 34 units before dinner  Indications: type 2 diabetes mellitus    metoprolol succinate (TOPROL-XL) 25 mg XL tablet TAKE 1 TABLET BY MOUTH EVERY DAY    Insulin Needles, Disposable, 31 gauge x 5/16\" ndle 1 Pen Needle by SubCUTAneous route See Admin Instructions. Use to give insulin under the skin four times daily. E11.65. Dispense 6mm length. (Patient not taking: Reported on 11/14/2022)    amLODIPine (NORVASC) 10 mg tablet TAKE 1 TABLET BY MOUTH EVERY DAY    flash glucose sensor (FreeStyle Alyx 14 Day Sensor) kit 1 Each by Does Not Apply route See Golden Garcia. Apply and replace sensor every 2 weeks. Scan at least 3 times a day. E11.65    aspirin 81 mg chewable tablet Take 81 mg by mouth daily. No current facility-administered medications for this visit.        Lab Results   Component Value Date/Time    Sodium 139 11/14/2022 08:30 AM    Potassium 4.7 11/14/2022 08:30 AM    Chloride 105 11/14/2022 08:30 AM    CO2 21 11/14/2022 08:30 AM    Anion gap 11 12/22/2020 11:52 AM    Glucose 194 (H) 11/14/2022 08:30 AM    BUN 15 11/14/2022 08:30 AM    Creatinine 0.85 11/14/2022 08:30 AM    BUN/Creatinine ratio 18 11/14/2022 08:30 AM    GFR est  11/12/2021 08:48 AM    GFR est non-AA 89 11/12/2021 08:48 AM    Calcium 9.3 11/14/2022 08:30 AM    Bilirubin, total 0.3 11/14/2022 08:30 AM    Alk. phosphatase 128 (H) 11/14/2022 08:30 AM    Protein, total 6.8 11/14/2022 08:30 AM    Albumin 4.3 11/14/2022 08:30 AM    Globulin 4.4 (H) 12/22/2020 11:52 AM    A-G Ratio 1.7 11/14/2022 08:30 AM    ALT (SGPT) 15 11/14/2022 08:30 AM       Lab Results   Component Value Date/Time    Cholesterol, total 116 11/14/2022 08:30 AM    HDL Cholesterol 21 (L) 11/14/2022 08:30 AM    LDL, calculated 61 11/14/2022 08:30 AM    LDL, calculated 121.6 (H) 12/23/2020 02:38 AM    VLDL, calculated 34 11/14/2022 08:30 AM    VLDL, calculated 51.4 12/23/2020 02:38 AM    Triglyceride 207 (H) 11/14/2022 08:30 AM    CHOL/HDL Ratio 8.5 (H) 12/23/2020 02:38 AM       Lab Results   Component Value Date/Time    WBC 9.1 11/14/2022 08:30 AM    HGB 15.7 11/14/2022 08:30 AM    HCT 47.2 11/14/2022 08:30 AM    PLATELET 205 20/53/0599 08:30 AM    MCV 93 11/14/2022 08:30 AM       Lab Results   Component Value Date/Time    Microalb/Creat ratio (ug/mg creat.) 8 11/14/2022 08:30 AM    Microalbumin urine (POC) 30 02/04/2011 03:50 PM       HbA1c:  Lab Results   Component Value Date/Time    Hemoglobin A1c 8.8 (H) 11/14/2022 08:30 AM    Hemoglobin A1c (POC) 8.6 (A) 03/31/2021 04:01 PM     No components found for: 2     Last Point of Care HGB A1C  Hemoglobin A1c (POC)   Date Value Ref Range Status   03/31/2021 8.6 (A) 4.8 - 5.6 % Final        CrCl cannot be calculated (Unknown ideal weight. ). Medication reconciliation was completed during the visit. There are no discontinued medications. Patient verbalized understanding of the information presented and all of the patients questions were answered. Patient advised to call the office with any additional questions or concerns.     Notifications of recommendations will be sent to Dr. Qing Avila, MD for review.     RTC 8 weeks    Thank you for the consult,  Camille Moss, PharmD, BCACP, [address] [place] [address] in place: Yes  Recommendation Provided To: Patient/Caregiver: 3 via Virtual Visit  Intervention Detail: Adherence Monitorin, Dose Adjustment: 1, reason: Therapy De-escalation, and Scheduled Appointment  Intervention Accepted By: Patient/Caregiver: 3  Time Spent (min): 20

## 2022-11-28 NOTE — PROGRESS NOTES
Reviewed results in detail with patient per Dr. Jodi Lubin. Discussed preventative and ongoing care that will need to be adhered to. Patient provided with contact info to [facility]. Advised pt we will contact him with additional info from Dr. Jodi Lubin for below question. Patient expresses concern and confusion over how his last duplex after his stroke in 2020 mentioned no plaque build up and now is showing moderate build up. He would like to know directly from Dr. Jodi Lubin how there can be such vast changes in 2 years.

## 2022-11-30 ENCOUNTER — VIRTUAL VISIT (OUTPATIENT)
Dept: INTERNAL MEDICINE CLINIC | Age: 57
End: 2022-11-30

## 2022-11-30 DIAGNOSIS — Z79.4 TYPE 2 DIABETES MELLITUS WITH DIABETIC NEUROPATHY, WITH LONG-TERM CURRENT USE OF INSULIN (HCC): Primary | ICD-10-CM

## 2022-11-30 DIAGNOSIS — E11.40 TYPE 2 DIABETES MELLITUS WITH DIABETIC NEUROPATHY, WITH LONG-TERM CURRENT USE OF INSULIN (HCC): Primary | ICD-10-CM

## 2022-12-31 DIAGNOSIS — I10 ESSENTIAL HYPERTENSION: ICD-10-CM

## 2022-12-31 RX ORDER — AMLODIPINE BESYLATE 10 MG/1
TABLET ORAL
Qty: 90 TABLET | Refills: 2 | Status: SHIPPED | OUTPATIENT
Start: 2022-12-31

## 2022-12-31 RX ORDER — METOPROLOL SUCCINATE 25 MG/1
TABLET, EXTENDED RELEASE ORAL
Qty: 90 TABLET | Refills: 1 | Status: SHIPPED | OUTPATIENT
Start: 2022-12-31

## 2023-01-06 RX ORDER — FLASH GLUCOSE SENSOR
KIT MISCELLANEOUS
Qty: 2 KIT | Status: SHIPPED | OUTPATIENT
Start: 2023-01-06

## 2023-02-08 ENCOUNTER — VIRTUAL VISIT (OUTPATIENT)
Dept: INTERNAL MEDICINE CLINIC | Age: 58
End: 2023-02-08

## 2023-02-08 DIAGNOSIS — E11.40 TYPE 2 DIABETES MELLITUS WITH DIABETIC NEUROPATHY, WITH LONG-TERM CURRENT USE OF INSULIN (HCC): Primary | ICD-10-CM

## 2023-02-08 DIAGNOSIS — Z79.4 TYPE 2 DIABETES MELLITUS WITH DIABETIC NEUROPATHY, WITH LONG-TERM CURRENT USE OF INSULIN (HCC): Primary | ICD-10-CM

## 2023-02-08 NOTE — PROGRESS NOTES
Pharmacy Progress Note - Diabetes Management    Assessment / Plan:   Diabetes Management:  - Per ADA guidelines, Pt's A1c is not at goal of < 7%. - Based on continued early afternoon hypoglycemia --- adjust Humalog to 20 units before breakfast, 18 units before lunch, and 34 units before dinner   - Continue Toujeo 76 units daily     S/O: Mr. Diana Lew is a 65 y.o. male, referred by Dr. Shelia Mittal MD with a PMH of HTN, CVA (Dec 2020),  T2DM, Hx of pancreatitis, GERD, KATJA, was seen virtually today for diabetes management follow up. Patient's last A1c was 8.8% (Nov 2022), 9.1% (Jun 2022), 10.4% (Nov 2021), 8.6% (March 2021), 10.5% (Dec 2020), 10.1% (Nov 2019). PHI verified. Interim update:   Had appt with Dr Mic Claros ([facility] surgery) yesterday - no med changes  Has f/up in 6 months for repeat bilateral carotid duplex     Current anti-hyperglycemic regimen include(s):    - Toujeo 76 units daily  - Humalog 24 units before breakfast, 20 units before lunch, and 30 units before dinner --- reports to giving 22 units before breakfast/lunch and 30 units with dinner. Recall inconsistent lunch dose during work week. Atorvastatin 20 mg daily, ASA 81 mg daily  LDL 61 ;  ; HDL 21 (Nov 2022)    ROS:  Today, Pt endorses:  - Symptoms of Hyperglycemia: none  - Symptoms of Hypoglycemia: none    Blood Glucose Monitoring (BGM) or CGM:  Alyx 2 CGM    14 days:      30 days:                    Breakfast: 1 bowl of melon + 1-2 sausages + water   Dinner - largest meal of the day     The ASCVD Risk score (Herbert JEFFERS, et al., 2019) failed to calculate for the following reasons: The patient has a prior MI or stroke diagnosis     Vitals:   Wt Readings from Last 3 Encounters:   11/14/22 248 lb 9.6 oz (112.8 kg)   12/01/21 245 lb (111.1 kg)   03/31/21 231 lb (104.8 kg)     BP Readings from Last 3 Encounters:   11/14/22 130/84   12/01/21 124/80   03/31/21 (!) 145/90     Pulse Readings from Last 3 Encounters:   11/14/22 80 12/01/21 97   03/31/21 92       Past Medical History:   Diagnosis Date    Acute hypoxemic respiratory failure ([address] 4/15/2019    Diabetes (HCC)     GERD (gastroesophageal reflux disease) 10/1/2010    Influenza 1/19/2019    Nausea & vomiting     KATJA (obstructive sleep apnea) 10/1/2010    Overweight(278.02) 10/1/2010    Pancreatitis     Unspecified sleep apnea      Allergies   Allergen Reactions    Lisinopril Anaphylaxis     Angioedema       Current Outpatient Medications   Medication Sig    FreeStyle Alyx 14 Day Sensor kit APPLY AND REPLACE SENSOR EVERY 2 WEEKS. SCAN AT LEAST 3 TIMES A DAY. E11.65    amLODIPine (NORVASC) 10 mg tablet TAKE 1 TABLET BY MOUTH EVERY DAY    metoprolol succinate (TOPROL-XL) 25 mg XL tablet TAKE 1 TABLET BY MOUTH EVERY DAY    insulin glargine U-300 conc (Toujeo SoloStar U-300 Insulin) 300 unit/mL (1.5 mL) inpn pen 76 Units by SubCUTAneous route daily. Indications: type 2 diabetes mellitus    atorvastatin (LIPITOR) 20 mg tablet Take 1 Tablet by mouth daily. insulin lispro (HumaLOG KwikPen Insulin) 100 unit/mL kwikpen 24 units before breakfast, 20 units before lunch, and 34 units before dinner  Indications: type 2 diabetes mellitus    Insulin Needles, Disposable, 31 gauge x 5/16\" ndle 1 Pen Needle by SubCUTAneous route See Admin Instructions. Use to give insulin under the skin four times daily. E11.65. Dispense 6mm length. (Patient not taking: Reported on 11/14/2022)    aspirin 81 mg chewable tablet Take 81 mg by mouth daily. No current facility-administered medications for this visit.        Lab Results   Component Value Date/Time    Sodium 139 11/14/2022 08:30 AM    Potassium 4.7 11/14/2022 08:30 AM    Chloride 105 11/14/2022 08:30 AM    CO2 21 11/14/2022 08:30 AM    Anion gap 11 12/22/2020 11:52 AM    Glucose 194 (H) 11/14/2022 08:30 AM    BUN 15 11/14/2022 08:30 AM    Creatinine 0.85 11/14/2022 08:30 AM    BUN/Creatinine ratio 18 11/14/2022 08:30 AM    GFR est  11/12/2021 08:48 AM    GFR est non-AA 89 11/12/2021 08:48 AM    Calcium 9.3 11/14/2022 08:30 AM    Bilirubin, total 0.3 11/14/2022 08:30 AM    Alk. phosphatase 128 (H) 11/14/2022 08:30 AM    Protein, total 6.8 11/14/2022 08:30 AM    Albumin 4.3 11/14/2022 08:30 AM    Globulin 4.4 (H) 12/22/2020 11:52 AM    A-G Ratio 1.7 11/14/2022 08:30 AM    ALT (SGPT) 15 11/14/2022 08:30 AM       Lab Results   Component Value Date/Time    Cholesterol, total 116 11/14/2022 08:30 AM    HDL Cholesterol 21 (L) 11/14/2022 08:30 AM    LDL, calculated 61 11/14/2022 08:30 AM    LDL, calculated 121.6 (H) 12/23/2020 02:38 AM    VLDL, calculated 34 11/14/2022 08:30 AM    VLDL, calculated 51.4 12/23/2020 02:38 AM    Triglyceride 207 (H) 11/14/2022 08:30 AM    CHOL/HDL Ratio 8.5 (H) 12/23/2020 02:38 AM       Lab Results   Component Value Date/Time    WBC 9.1 11/14/2022 08:30 AM    HGB 15.7 11/14/2022 08:30 AM    HCT 47.2 11/14/2022 08:30 AM    PLATELET 518 17/78/8942 08:30 AM    MCV 93 11/14/2022 08:30 AM       Lab Results   Component Value Date/Time    Microalb/Creat ratio (ug/mg creat.) 8 11/14/2022 08:30 AM    Microalbumin urine (POC) 30 02/04/2011 03:50 PM       HbA1c:  Lab Results   Component Value Date/Time    Hemoglobin A1c 8.8 (H) 11/14/2022 08:30 AM    Hemoglobin A1c (POC) 8.6 (A) 03/31/2021 04:01 PM     No components found for: 2     Last Point of Care HGB A1C  Hemoglobin A1c (POC)   Date Value Ref Range Status   03/31/2021 8.6 (A) 4.8 - 5.6 % Final        CrCl cannot be calculated (Unknown ideal weight. ). Medication reconciliation was completed during the visit. There are no discontinued medications. Patient verbalized understanding of the information presented and all of the patients questions were answered. Patient advised to call the office with any additional questions or concerns. Notifications of recommendations will be sent to Dr. Jose Tang MD for review.     RTC 8 weeks    Thank you for the consult,  Camille Witt, PharmD, BCACP, [facility]    Program: Medical Group  CPA in place: Yes  Recommendation Provided To: Patient/Caregiver: 2 via Virtual Visit  Intervention Detail: Dose Adjustment: 1, reason: Therapy De-escalation and Scheduled Appointment  Intervention Accepted By: Patient/Caregiver: 2  Time Spent (min):  40

## 2023-04-17 DIAGNOSIS — E78.00 PURE HYPERCHOLESTEROLEMIA: ICD-10-CM

## 2023-04-17 RX ORDER — ATORVASTATIN CALCIUM 20 MG/1
TABLET, FILM COATED ORAL
Qty: 90 TABLET | Refills: 2 | Status: SHIPPED | OUTPATIENT
Start: 2023-04-17

## 2023-06-23 DIAGNOSIS — E08.00 DIABETES MELLITUS DUE TO UNDERLYING CONDITION WITH HYPEROSMOLARITY WITHOUT NONKETOTIC HYPERGLYCEMIC-HYPEROSMOLAR COMA (NKHHC) (HCC): ICD-10-CM

## 2023-06-23 RX ORDER — INSULIN LISPRO 100 [IU]/ML
INJECTION, SOLUTION INTRAVENOUS; SUBCUTANEOUS
Qty: 75 ML | Refills: 3 | Status: SHIPPED | OUTPATIENT
Start: 2023-06-23

## 2023-07-03 DIAGNOSIS — I10 ESSENTIAL (PRIMARY) HYPERTENSION: ICD-10-CM

## 2023-07-03 RX ORDER — METOPROLOL SUCCINATE 25 MG/1
TABLET, EXTENDED RELEASE ORAL
Qty: 90 TABLET | Refills: 1 | Status: SHIPPED | OUTPATIENT
Start: 2023-07-03

## 2023-07-19 DIAGNOSIS — E11.42 TYPE 2 DIABETES MELLITUS WITH DIABETIC POLYNEUROPATHY (HCC): ICD-10-CM

## 2023-07-19 RX ORDER — INSULIN GLARGINE 300 U/ML
INJECTION, SOLUTION SUBCUTANEOUS
Qty: 8 ADJUSTABLE DOSE PRE-FILLED PEN SYRINGE | Refills: 3 | Status: SHIPPED | OUTPATIENT
Start: 2023-07-19

## 2023-08-17 ENCOUNTER — PHARMACY VISIT (OUTPATIENT)
Age: 58
End: 2023-08-17

## 2023-08-17 DIAGNOSIS — Z79.4 TYPE 2 DIABETES MELLITUS WITH DIABETIC POLYNEUROPATHY, WITH LONG-TERM CURRENT USE OF INSULIN (HCC): Primary | ICD-10-CM

## 2023-08-17 DIAGNOSIS — E11.42 TYPE 2 DIABETES MELLITUS WITH DIABETIC POLYNEUROPATHY, WITH LONG-TERM CURRENT USE OF INSULIN (HCC): Primary | ICD-10-CM

## 2023-08-17 RX ORDER — FLASH GLUCOSE SENSOR
1 KIT MISCELLANEOUS SEE ADMIN INSTRUCTIONS
COMMUNITY
Start: 2023-07-20

## 2023-08-17 NOTE — PROGRESS NOTES
Pharmacy Progress Note - Diabetes Management    Assessment / Plan:   Diabetes Management:  - Per ADA guidelines, Pt's A1c is not at goal of < 7%. - Mindful of recent increase in hypoglycemia frequency. Patient appears to be asymptomatic to them  - Recommend for patient to scan sensor more often   - Adjust Toujeo to 70 units daily  - Continue Humalog 18 units before breakfast, lunch, and 34 units before dinner  - Remind patient of upcoming PCP appt next month. Due for updated labs and vitals. Will wait for results to determine f/up interval      S/O: Mr. Ophelia Osorio is a 63 y.o. male, referred by Dr. Hafsa Packer MD with a PMH of HTN, CVA (Dec 2020),  T2DM, Hx of pancreatitis,  GERD, RUDDY, was seen virtually today for diabetes management follow up. Patient's  last A1c was 8.8% (Nov 2022), 9.1% (Jun 2022), 10.4% (Nov 2021), 8.6% (March 2021), 10.5% (Dec 2020), 10.1% (Nov 2019). PHI verified. Interim update:   Saw podiatry at the beginning of August. Had a good report. Current anti-hyperglycemic regimen include(s):    - Toujeo 74 units daily  - Humalog 18 units before breakfast, lunch, and 34 units before dinner --- reports some nights if he has a lighter dinner, he may give less Humalog      Atorvastatin 20 mg daily, ASA 81 mg daily   LDL 61 ;  ; HDL 21 (Nov 2022)    ROS:  Today, Pt endorses:  - Symptoms of Hyperglycemia: none  - Symptoms of Hypoglycemia: none    Blood Glucose Monitoring (BGM) or CGM:  Randee 2 CGM  At this time: 145 -- post lunch     30 days:      14 days:                      Nutrition/Lifestyle Modifications:  Eats 3 meals/day  Breakfast today: watermelon (~1 cup) + sausage  Lunch today: 1 sandwich    Wt was 240 lbs today    The ASCVD Risk score (Vania JOSE, et al., 2019) failed to calculate for the following reasons: The valid total cholesterol range is 130 to 320 mg/dL     Vitals:   Wt Readings from Last 3 Encounters:   11/14/22 248 lb 9.6 oz (112.8 kg)   12/01/21 245 lb

## 2023-09-14 RX ORDER — PEN NEEDLE, DIABETIC 32 GX 1/4"
NEEDLE, DISPOSABLE MISCELLANEOUS
Qty: 400 EACH | Refills: 10 | Status: SHIPPED | OUTPATIENT
Start: 2023-09-14

## 2023-09-17 PROBLEM — Z86.73 HISTORY OF CEREBROVASCULAR ACCIDENT: Status: ACTIVE | Noted: 2023-09-17

## 2023-09-17 PROBLEM — I50.22 CHRONIC SYSTOLIC CONGESTIVE HEART FAILURE (HCC): Status: ACTIVE | Noted: 2023-09-17

## 2023-09-19 ENCOUNTER — OFFICE VISIT (OUTPATIENT)
Age: 58
End: 2023-09-19
Payer: COMMERCIAL

## 2023-09-19 VITALS
DIASTOLIC BLOOD PRESSURE: 76 MMHG | OXYGEN SATURATION: 95 % | SYSTOLIC BLOOD PRESSURE: 110 MMHG | HEART RATE: 89 BPM | RESPIRATION RATE: 16 BRPM | HEIGHT: 76 IN | TEMPERATURE: 97.2 F | WEIGHT: 246 LBS | BODY MASS INDEX: 29.96 KG/M2

## 2023-09-19 DIAGNOSIS — E78.00 PURE HYPERCHOLESTEROLEMIA: ICD-10-CM

## 2023-09-19 DIAGNOSIS — I50.22 CHRONIC SYSTOLIC CONGESTIVE HEART FAILURE (HCC): ICD-10-CM

## 2023-09-19 DIAGNOSIS — Z86.73 HISTORY OF CEREBROVASCULAR ACCIDENT: ICD-10-CM

## 2023-09-19 DIAGNOSIS — I65.23 CAROTID STENOSIS, ASYMPTOMATIC, BILATERAL: ICD-10-CM

## 2023-09-19 DIAGNOSIS — F41.9 ANXIETY: ICD-10-CM

## 2023-09-19 DIAGNOSIS — G47.33 OSA (OBSTRUCTIVE SLEEP APNEA): ICD-10-CM

## 2023-09-19 DIAGNOSIS — E11.9 TYPE 2 DIABETES MELLITUS WITHOUT COMPLICATION, WITH LONG-TERM CURRENT USE OF INSULIN (HCC): Primary | ICD-10-CM

## 2023-09-19 DIAGNOSIS — I50.20 SYSTOLIC CONGESTIVE HEART FAILURE, UNSPECIFIED HF CHRONICITY (HCC): ICD-10-CM

## 2023-09-19 DIAGNOSIS — I10 HYPERTENSION, UNSPECIFIED TYPE: ICD-10-CM

## 2023-09-19 DIAGNOSIS — Z12.11 COLON CANCER SCREENING: ICD-10-CM

## 2023-09-19 DIAGNOSIS — Z11.4 ENCOUNTER FOR SCREENING FOR HIV: ICD-10-CM

## 2023-09-19 DIAGNOSIS — Z79.4 TYPE 2 DIABETES MELLITUS WITHOUT COMPLICATION, WITH LONG-TERM CURRENT USE OF INSULIN (HCC): Primary | ICD-10-CM

## 2023-09-19 PROCEDURE — 3074F SYST BP LT 130 MM HG: CPT | Performed by: INTERNAL MEDICINE

## 2023-09-19 PROCEDURE — 3078F DIAST BP <80 MM HG: CPT | Performed by: INTERNAL MEDICINE

## 2023-09-19 PROCEDURE — 99213 OFFICE O/P EST LOW 20 MIN: CPT | Performed by: INTERNAL MEDICINE

## 2023-09-19 SDOH — ECONOMIC STABILITY: FOOD INSECURITY: WITHIN THE PAST 12 MONTHS, YOU WORRIED THAT YOUR FOOD WOULD RUN OUT BEFORE YOU GOT MONEY TO BUY MORE.: NEVER TRUE

## 2023-09-19 SDOH — ECONOMIC STABILITY: HOUSING INSECURITY
IN THE LAST 12 MONTHS, WAS THERE A TIME WHEN YOU DID NOT HAVE A STEADY PLACE TO SLEEP OR SLEPT IN A SHELTER (INCLUDING NOW)?: NO

## 2023-09-19 SDOH — ECONOMIC STABILITY: FOOD INSECURITY: WITHIN THE PAST 12 MONTHS, THE FOOD YOU BOUGHT JUST DIDN'T LAST AND YOU DIDN'T HAVE MONEY TO GET MORE.: NEVER TRUE

## 2023-09-19 SDOH — ECONOMIC STABILITY: INCOME INSECURITY: HOW HARD IS IT FOR YOU TO PAY FOR THE VERY BASICS LIKE FOOD, HOUSING, MEDICAL CARE, AND HEATING?: NOT HARD AT ALL

## 2023-09-19 ASSESSMENT — PATIENT HEALTH QUESTIONNAIRE - PHQ9
SUM OF ALL RESPONSES TO PHQ9 QUESTIONS 1 & 2: 2
1. LITTLE INTEREST OR PLEASURE IN DOING THINGS: 1
SUM OF ALL RESPONSES TO PHQ QUESTIONS 1-9: 2
SUM OF ALL RESPONSES TO PHQ QUESTIONS 1-9: 2
2. FEELING DOWN, DEPRESSED OR HOPELESS: 1
SUM OF ALL RESPONSES TO PHQ QUESTIONS 1-9: 2
SUM OF ALL RESPONSES TO PHQ QUESTIONS 1-9: 2

## 2023-09-19 NOTE — PROGRESS NOTES
1. \"Have you been to the ER, urgent care clinic since your last visit? Hospitalized since your last visit? \" No    2. \"Have you seen or consulted any other health care providers outside of the [address] [place] [address] since your last visit? \"         Dr. Mindi Grimes Dr. Dewitt Blizzard // podiatrist    3. For patients aged 43-73: Has the patient had a colonoscopy / FIT/ Cologuard?  Cologuard 2022
1/2 Ag/Ab, 4TH Generation,W Rflx Confirm; Future    Chronic systolic congestive heart failure (HCC)  No symptoms-saw CARD MD after CVA  Systolic congestive heart failure, unspecified HF chronicity ([address])    Colon cancer screening  -     Occult Blood Stool Immunoassay; Future    Anxiety   Declines SSRI, atarax  RUDDY (obstructive sleep apnea)  -     [address] [place] [address] MD Andrei, Sleep Medicine, [facility] ruddy per pt-not on cpap. awakening some at night .         Rtc 6 months wellness  Yasmeen Morrissey MD

## 2023-10-03 DIAGNOSIS — I10 ESSENTIAL (PRIMARY) HYPERTENSION: ICD-10-CM

## 2023-10-03 RX ORDER — AMLODIPINE BESYLATE 10 MG/1
10 TABLET ORAL DAILY
Qty: 90 TABLET | Refills: 2 | Status: SHIPPED | OUTPATIENT
Start: 2023-10-03

## 2023-10-24 ENCOUNTER — NURSE ONLY (OUTPATIENT)
Age: 58
End: 2023-10-24

## 2023-10-24 DIAGNOSIS — Z79.4 TYPE 2 DIABETES MELLITUS WITHOUT COMPLICATION, WITH LONG-TERM CURRENT USE OF INSULIN (HCC): ICD-10-CM

## 2023-10-24 DIAGNOSIS — Z11.4 ENCOUNTER FOR SCREENING FOR HIV: ICD-10-CM

## 2023-10-24 DIAGNOSIS — E78.00 PURE HYPERCHOLESTEROLEMIA: ICD-10-CM

## 2023-10-24 DIAGNOSIS — I10 HYPERTENSION, UNSPECIFIED TYPE: ICD-10-CM

## 2023-10-24 DIAGNOSIS — E11.9 TYPE 2 DIABETES MELLITUS WITHOUT COMPLICATION, WITH LONG-TERM CURRENT USE OF INSULIN (HCC): ICD-10-CM

## 2023-10-25 LAB
ALBUMIN SERPL-MCNC: 4.2 G/DL (ref 3.8–4.9)
ALBUMIN/GLOB SERPL: 1.4 {RATIO} (ref 1.2–2.2)
ALP SERPL-CCNC: 127 IU/L (ref 44–121)
ALT SERPL-CCNC: 22 IU/L (ref 0–44)
AST SERPL-CCNC: 23 IU/L (ref 0–40)
BILIRUB SERPL-MCNC: 0.4 MG/DL (ref 0–1.2)
BUN SERPL-MCNC: 13 MG/DL (ref 6–24)
BUN/CREAT SERPL: 15 (ref 9–20)
CALCIUM SERPL-MCNC: 9.4 MG/DL (ref 8.7–10.2)
CHLORIDE SERPL-SCNC: 101 MMOL/L (ref 96–106)
CHOLEST SERPL-MCNC: 114 MG/DL (ref 100–199)
CO2 SERPL-SCNC: 24 MMOL/L (ref 20–29)
CREAT SERPL-MCNC: 0.84 MG/DL (ref 0.76–1.27)
EGFRCR SERPLBLD CKD-EPI 2021: 102 ML/MIN/1.73
GLOBULIN SER CALC-MCNC: 3 G/DL (ref 1.5–4.5)
GLUCOSE SERPL-MCNC: 174 MG/DL (ref 70–99)
HBA1C MFR BLD: 9.3 % (ref 4.8–5.6)
HDLC SERPL-MCNC: 23 MG/DL
LDLC SERPL CALC-MCNC: 66 MG/DL (ref 0–99)
POTASSIUM SERPL-SCNC: 4.4 MMOL/L (ref 3.5–5.2)
PROT SERPL-MCNC: 7.2 G/DL (ref 6–8.5)
SODIUM SERPL-SCNC: 139 MMOL/L (ref 134–144)
TRIGL SERPL-MCNC: 141 MG/DL (ref 0–149)
VLDLC SERPL CALC-MCNC: 25 MG/DL (ref 5–40)

## 2023-10-31 LAB — HIV 1+2 AB+HIV1 P24 AG SERPL QL IA: NON REACTIVE

## 2023-12-25 DIAGNOSIS — I10 ESSENTIAL (PRIMARY) HYPERTENSION: ICD-10-CM

## 2023-12-26 RX ORDER — METOPROLOL SUCCINATE 25 MG/1
TABLET, EXTENDED RELEASE ORAL
Qty: 90 TABLET | Refills: 3 | Status: SHIPPED | OUTPATIENT
Start: 2023-12-26

## 2024-01-12 RX ORDER — FLASH GLUCOSE SENSOR
KIT MISCELLANEOUS
Qty: 2 EACH | Refills: 11 | Status: SHIPPED | OUTPATIENT
Start: 2024-01-12

## 2024-01-14 DIAGNOSIS — E78.00 PURE HYPERCHOLESTEROLEMIA, UNSPECIFIED: ICD-10-CM

## 2024-01-15 RX ORDER — ATORVASTATIN CALCIUM 20 MG/1
20 TABLET, FILM COATED ORAL DAILY
Qty: 90 TABLET | Refills: 2 | Status: SHIPPED | OUTPATIENT
Start: 2024-01-15

## 2024-02-18 DIAGNOSIS — E11.42 TYPE 2 DIABETES MELLITUS WITH DIABETIC POLYNEUROPATHY (HCC): ICD-10-CM

## 2024-02-19 RX ORDER — INSULIN GLARGINE 300 U/ML
74 INJECTION, SOLUTION SUBCUTANEOUS DAILY
Qty: 8 ADJUSTABLE DOSE PRE-FILLED PEN SYRINGE | Refills: 5 | Status: SHIPPED | OUTPATIENT
Start: 2024-02-19

## 2024-03-18 ASSESSMENT — ENCOUNTER SYMPTOMS
EYES NEGATIVE: 1
ALLERGIC/IMMUNOLOGIC NEGATIVE: 1
RESPIRATORY NEGATIVE: 1
GASTROINTESTINAL NEGATIVE: 1

## 2024-03-19 NOTE — PROGRESS NOTES
HISTORY OF PRESENT ILLNESS   Thor Norwood   is a 59 y.o.  male.  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 150-180 usually  30 d average 190  No retinopathy on recent  Saw podiatrist last year    Fastng readings > 130 in am  Home BP    C/o rigth shoulder pain x 6-8 months-has pain with abduction but has been improving. He heard a pop in the shoulder when lifting something-prefers to observe for now    Last OV     Referred to VASC MD last year for mod left carotid stenosis. Had appt this year-stable--fu 1 year  CARD---none     Last LDL 61 a1c 8.8 last year     Saw Pharm D recently -CGM readings elveated -around 195 avg  Toujeo increase to 74 units qd , humalog 18 ac bkfst and lunch and 34 ac dinner  30 d avg glcuose dowm tp 157 with some hypoglycemia so toujeo lowered to 70 units qd  Has only hd 1 or 2 low readibgs sine toufjeo lowered to 70 units qd     Due for labs     Period of anxiety last 4 months     Sees podiatry Dr Rebolledo for DM-2 neuropathy     Balance ok and ataxia resolved per pt  Patient Active Problem List    Diagnosis Date Noted    History of cerebrovascular accident 09/17/2023    Chronic systolic congestive heart failure (HCC) 09/17/2023    Carotid stenosis, asymptomatic, bilateral 11/25/2022    Ataxia 12/22/2020    HTN (hypertension) 10/01/2010    Achalasia 10/01/2010    Pure hypercholesterolemia 10/01/2010    Diabetes mellitus (HCC) 10/01/2010     Current Outpatient Medications   Medication Sig Dispense Refill    insulin glargine, 1 unit dial, (TOUJEO SOLOSTAR) 300 UNIT/ML concentrated injection pen Inject 74 Units into the skin daily 8 Adjustable Dose Pre-filled Pen Syringe 5    atorvastatin (LIPITOR) 20 MG tablet TAKE 1 TABLET

## 2024-03-20 ENCOUNTER — OFFICE VISIT (OUTPATIENT)
Age: 59
End: 2024-03-20
Payer: COMMERCIAL

## 2024-03-20 VITALS
RESPIRATION RATE: 18 BRPM | BODY MASS INDEX: 30.59 KG/M2 | HEART RATE: 78 BPM | WEIGHT: 251.2 LBS | HEIGHT: 76 IN | DIASTOLIC BLOOD PRESSURE: 72 MMHG | SYSTOLIC BLOOD PRESSURE: 136 MMHG | OXYGEN SATURATION: 95 % | TEMPERATURE: 97.1 F

## 2024-03-20 DIAGNOSIS — I10 HYPERTENSION, UNSPECIFIED TYPE: ICD-10-CM

## 2024-03-20 DIAGNOSIS — Z00.00 ROUTINE PHYSICAL EXAMINATION: Primary | ICD-10-CM

## 2024-03-20 DIAGNOSIS — E78.5 HYPERLIPIDEMIA, UNSPECIFIED HYPERLIPIDEMIA TYPE: ICD-10-CM

## 2024-03-20 DIAGNOSIS — I50.20 SYSTOLIC CONGESTIVE HEART FAILURE, UNSPECIFIED HF CHRONICITY (HCC): ICD-10-CM

## 2024-03-20 DIAGNOSIS — Z79.4 TYPE 2 DIABETES MELLITUS WITH DIABETIC POLYNEUROPATHY, WITH LONG-TERM CURRENT USE OF INSULIN (HCC): ICD-10-CM

## 2024-03-20 DIAGNOSIS — E11.42 TYPE 2 DIABETES MELLITUS WITH DIABETIC POLYNEUROPATHY, WITH LONG-TERM CURRENT USE OF INSULIN (HCC): ICD-10-CM

## 2024-03-20 DIAGNOSIS — Z00.00 ROUTINE PHYSICAL EXAMINATION: ICD-10-CM

## 2024-03-20 PROCEDURE — 3078F DIAST BP <80 MM HG: CPT | Performed by: INTERNAL MEDICINE

## 2024-03-20 PROCEDURE — 3075F SYST BP GE 130 - 139MM HG: CPT | Performed by: INTERNAL MEDICINE

## 2024-03-20 PROCEDURE — 99396 PREV VISIT EST AGE 40-64: CPT | Performed by: INTERNAL MEDICINE

## 2024-03-20 ASSESSMENT — PATIENT HEALTH QUESTIONNAIRE - PHQ9
SUM OF ALL RESPONSES TO PHQ QUESTIONS 1-9: 0
SUM OF ALL RESPONSES TO PHQ9 QUESTIONS 1 & 2: 0
SUM OF ALL RESPONSES TO PHQ QUESTIONS 1-9: 0
2. FEELING DOWN, DEPRESSED OR HOPELESS: NOT AT ALL
1. LITTLE INTEREST OR PLEASURE IN DOING THINGS: NOT AT ALL

## 2024-03-20 NOTE — PROGRESS NOTES
1. \"Have you been to the ER, urgent care clinic since your last visit?  Hospitalized since your last visit?\" No    2. \"Have you seen or consulted any other health care providers outside of the [facility] since your last visit?\" No     3. For patients aged 45-75: Has the patient had a colonoscopy / FIT/ Cologuard? No

## 2024-03-21 LAB
ALBUMIN SERPL-MCNC: 4.3 G/DL (ref 3.8–4.9)
ALBUMIN/GLOB SERPL: 1.5 {RATIO} (ref 1.2–2.2)
ALP SERPL-CCNC: 128 IU/L (ref 44–121)
ALT SERPL-CCNC: 26 IU/L (ref 0–44)
AST SERPL-CCNC: 25 IU/L (ref 0–40)
BILIRUB SERPL-MCNC: 0.4 MG/DL (ref 0–1.2)
BUN SERPL-MCNC: 12 MG/DL (ref 6–24)
BUN/CREAT SERPL: 14 (ref 9–20)
CALCIUM SERPL-MCNC: 9.5 MG/DL (ref 8.7–10.2)
CHLORIDE SERPL-SCNC: 101 MMOL/L (ref 96–106)
CHOLEST SERPL-MCNC: 123 MG/DL (ref 100–199)
CO2 SERPL-SCNC: 24 MMOL/L (ref 20–29)
CREAT SERPL-MCNC: 0.86 MG/DL (ref 0.76–1.27)
EGFRCR SERPLBLD CKD-EPI 2021: 100 ML/MIN/1.73
ERYTHROCYTE [DISTWIDTH] IN BLOOD BY AUTOMATED COUNT: 12.3 % (ref 11.6–15.4)
GLOBULIN SER CALC-MCNC: 2.8 G/DL (ref 1.5–4.5)
GLUCOSE SERPL-MCNC: 189 MG/DL (ref 70–99)
HBA1C MFR BLD: 9.5 % (ref 4.8–5.6)
HCT VFR BLD AUTO: 48.6 % (ref 37.5–51)
HDLC SERPL-MCNC: 24 MG/DL
HGB BLD-MCNC: 16.1 G/DL (ref 13–17.7)
LDLC SERPL CALC-MCNC: 70 MG/DL (ref 0–99)
MCH RBC QN AUTO: 31.1 PG (ref 26.6–33)
MCHC RBC AUTO-ENTMCNC: 33.1 G/DL (ref 31.5–35.7)
MCV RBC AUTO: 94 FL (ref 79–97)
PLATELET # BLD AUTO: 321 X10E3/UL (ref 150–450)
POTASSIUM SERPL-SCNC: 4.3 MMOL/L (ref 3.5–5.2)
PROT SERPL-MCNC: 7.1 G/DL (ref 6–8.5)
PSA SERPL-MCNC: 0.3 NG/ML (ref 0–4)
RBC # BLD AUTO: 5.17 X10E6/UL (ref 4.14–5.8)
SODIUM SERPL-SCNC: 140 MMOL/L (ref 134–144)
TRIGL SERPL-MCNC: 171 MG/DL (ref 0–149)
VLDLC SERPL CALC-MCNC: 29 MG/DL (ref 5–40)
WBC # BLD AUTO: 12.1 X10E3/UL (ref 3.4–10.8)

## 2024-05-30 ENCOUNTER — PHARMACY VISIT (OUTPATIENT)
Age: 59
End: 2024-05-30

## 2024-05-30 DIAGNOSIS — E11.42 TYPE 2 DIABETES MELLITUS WITH DIABETIC POLYNEUROPATHY, WITH LONG-TERM CURRENT USE OF INSULIN (HCC): Primary | ICD-10-CM

## 2024-05-30 DIAGNOSIS — Z79.4 TYPE 2 DIABETES MELLITUS WITH DIABETIC POLYNEUROPATHY, WITH LONG-TERM CURRENT USE OF INSULIN (HCC): Primary | ICD-10-CM

## 2024-05-30 NOTE — PROGRESS NOTES
Patient verbalized understanding of the information presented and all of the patient’s questions were answered. Notifications of recommendations will be sent to Jayden Boogie MD for review.    Thank you for the consult,  Dilia Irving, PharmD, BCACP, [facility]          For Pharmacy Admin Tracking Only    Program: Medical Group  CPA in place:  Yes  Recommendation Provided To: Patient/Caregiver: 3 via Virtual Visit  Intervention Detail: Adherence Monitorin and Referral to Other Provider  Intervention Accepted By: Patient/Caregiver: 3  Gap Closed?: Yes   Time Spent (min): 45

## 2024-06-06 DIAGNOSIS — Z79.4 TYPE 2 DIABETES MELLITUS WITH DIABETIC POLYNEUROPATHY, WITH LONG-TERM CURRENT USE OF INSULIN (HCC): Primary | ICD-10-CM

## 2024-06-06 DIAGNOSIS — E11.42 TYPE 2 DIABETES MELLITUS WITH DIABETIC POLYNEUROPATHY, WITH LONG-TERM CURRENT USE OF INSULIN (HCC): Primary | ICD-10-CM

## 2024-06-06 RX ORDER — BLOOD-GLUCOSE SENSOR
1 EACH MISCELLANEOUS
Qty: 2 EACH | Refills: 5 | Status: SHIPPED | OUTPATIENT
Start: 2024-06-06

## 2024-06-14 ENCOUNTER — TELEPHONE (OUTPATIENT)
Age: 59
End: 2024-06-14

## 2024-06-14 NOTE — TELEPHONE ENCOUNTER
Spoke with patient.     BS dropping - checked after dinner. Low as 50  Got lightheaded and felt like he was going to pass out last night when BS got to the 50s.  Tried to get sugars back up by drinking juice to about 70    Started jardiance yesterday, took first pill in the morning. No changes to insulin- 34 units fast acting  BS always between 150-200 In the mornings usually     during time of call    During time of call - pt reports no symptoms but due for next jardiance pill. Holding his insulin until advise on what to do    Please advise.

## 2024-06-14 NOTE — TELEPHONE ENCOUNTER
Attempted to reach patient. Left message on vm to return call    Per Dr. Bradley   Lower toujeo to 40 units qd and fast acting to 20 units at meals. Can hold toujeo today

## 2024-06-14 NOTE — TELEPHONE ENCOUNTER
Caller is returning a call from clinical team regarding this encounter    Clinical team not available to take the call    Please call pt back.

## 2024-06-14 NOTE — TELEPHONE ENCOUNTER
Spoke with patient. Advised. Verbalized understanding.     Per Dr. Bradley   Lower toujeo to 40 units qd and fast acting to 20 units at meals. Can hold toujeo today

## 2024-06-14 NOTE — TELEPHONE ENCOUNTER
Patient states he needs a call back Asap in reference to his Jardiance that patient received reply Via My Chart but took his 1st dosage of the medication last night & Sugar Dropped Way too Low. Patient state he needs to discuss what to do as he is Due to take his 2nd dose this morning & is concerned with reaction. Please call Asap. Thank you

## 2024-07-01 ENCOUNTER — PATIENT MESSAGE (OUTPATIENT)
Age: 59
End: 2024-07-01

## 2024-07-01 DIAGNOSIS — I10 ESSENTIAL (PRIMARY) HYPERTENSION: ICD-10-CM

## 2024-07-01 RX ORDER — AMLODIPINE BESYLATE 10 MG/1
10 TABLET ORAL DAILY
Qty: 90 TABLET | Refills: 2 | Status: SHIPPED | OUTPATIENT
Start: 2024-07-01

## 2024-07-08 ENCOUNTER — CLINICAL DOCUMENTATION (OUTPATIENT)
Age: 59
End: 2024-07-08

## 2024-07-08 NOTE — PROGRESS NOTES
Pharmacy Progress Note     Randee 3 CGM report for Mr. Thor Norwood 59 y.o.     14 days:            30 days:      60 days:                          Thank you for the consult,  Dilia Irving, PharmD, BCACP, [facility]                For Pharmacy Admin Tracking Only    Program: Medical Group  CPA in place:  Yes  Recommendation Provided To: Provider: 1 via Note to Provider  Time Spent (min): 5

## 2024-07-09 NOTE — TELEPHONE ENCOUNTER
Per Dr. Bradley  Tel pt or forward message. I reveiwed the Freestyle data--glucose control much improved recenty so continue current medicines and diet

## 2024-08-07 DIAGNOSIS — E08.00 DIABETES MELLITUS DUE TO UNDERLYING CONDITION WITH HYPEROSMOLARITY WITHOUT NONKETOTIC HYPERGLYCEMIC-HYPEROSMOLAR COMA (NKHHC) (HCC): ICD-10-CM

## 2024-08-07 RX ORDER — INSULIN LISPRO 100 [IU]/ML
INJECTION, SOLUTION INTRAVENOUS; SUBCUTANEOUS
Qty: 75 ML | Refills: 3 | Status: SHIPPED | OUTPATIENT
Start: 2024-08-07

## 2024-09-06 DIAGNOSIS — E78.00 PURE HYPERCHOLESTEROLEMIA, UNSPECIFIED: ICD-10-CM

## 2024-09-06 RX ORDER — ATORVASTATIN CALCIUM 20 MG/1
20 TABLET, FILM COATED ORAL DAILY
Qty: 90 TABLET | Refills: 2 | Status: SHIPPED | OUTPATIENT
Start: 2024-09-06

## 2024-09-26 SDOH — ECONOMIC STABILITY: INCOME INSECURITY: HOW HARD IS IT FOR YOU TO PAY FOR THE VERY BASICS LIKE FOOD, HOUSING, MEDICAL CARE, AND HEATING?: PATIENT DECLINED

## 2024-09-26 SDOH — ECONOMIC STABILITY: FOOD INSECURITY: WITHIN THE PAST 12 MONTHS, THE FOOD YOU BOUGHT JUST DIDN'T LAST AND YOU DIDN'T HAVE MONEY TO GET MORE.: PATIENT DECLINED

## 2024-09-26 SDOH — ECONOMIC STABILITY: TRANSPORTATION INSECURITY
IN THE PAST 12 MONTHS, HAS LACK OF TRANSPORTATION KEPT YOU FROM MEETINGS, WORK, OR FROM GETTING THINGS NEEDED FOR DAILY LIVING?: PATIENT DECLINED

## 2024-09-26 SDOH — ECONOMIC STABILITY: FOOD INSECURITY: WITHIN THE PAST 12 MONTHS, YOU WORRIED THAT YOUR FOOD WOULD RUN OUT BEFORE YOU GOT MONEY TO BUY MORE.: PATIENT DECLINED

## 2024-09-27 ENCOUNTER — OFFICE VISIT (OUTPATIENT)
Age: 59
End: 2024-09-27
Payer: COMMERCIAL

## 2024-09-27 VITALS
SYSTOLIC BLOOD PRESSURE: 132 MMHG | RESPIRATION RATE: 20 BRPM | TEMPERATURE: 97.2 F | BODY MASS INDEX: 28.49 KG/M2 | HEIGHT: 76 IN | WEIGHT: 234 LBS | HEART RATE: 80 BPM | DIASTOLIC BLOOD PRESSURE: 78 MMHG | OXYGEN SATURATION: 96 %

## 2024-09-27 DIAGNOSIS — I65.23 BILATERAL CAROTID ARTERY STENOSIS: ICD-10-CM

## 2024-09-27 DIAGNOSIS — I10 HYPERTENSION, UNSPECIFIED TYPE: ICD-10-CM

## 2024-09-27 DIAGNOSIS — E78.5 HYPERLIPIDEMIA, UNSPECIFIED HYPERLIPIDEMIA TYPE: ICD-10-CM

## 2024-09-27 DIAGNOSIS — Z79.4 TYPE 2 DIABETES MELLITUS WITH DIABETIC POLYNEUROPATHY, WITH LONG-TERM CURRENT USE OF INSULIN (HCC): Primary | ICD-10-CM

## 2024-09-27 DIAGNOSIS — E11.42 TYPE 2 DIABETES MELLITUS WITH DIABETIC POLYNEUROPATHY, WITH LONG-TERM CURRENT USE OF INSULIN (HCC): Primary | ICD-10-CM

## 2024-09-27 DIAGNOSIS — Z12.11 COLON CANCER SCREENING: ICD-10-CM

## 2024-09-27 PROCEDURE — 3078F DIAST BP <80 MM HG: CPT | Performed by: INTERNAL MEDICINE

## 2024-09-27 PROCEDURE — 3075F SYST BP GE 130 - 139MM HG: CPT | Performed by: INTERNAL MEDICINE

## 2024-09-27 PROCEDURE — 3046F HEMOGLOBIN A1C LEVEL >9.0%: CPT | Performed by: INTERNAL MEDICINE

## 2024-09-27 PROCEDURE — 99214 OFFICE O/P EST MOD 30 MIN: CPT | Performed by: INTERNAL MEDICINE

## 2024-09-27 ASSESSMENT — PATIENT HEALTH QUESTIONNAIRE - PHQ9
SUM OF ALL RESPONSES TO PHQ QUESTIONS 1-9: 1
SUM OF ALL RESPONSES TO PHQ QUESTIONS 1-9: 1
SUM OF ALL RESPONSES TO PHQ9 QUESTIONS 1 & 2: 1
SUM OF ALL RESPONSES TO PHQ QUESTIONS 1-9: 1
SUM OF ALL RESPONSES TO PHQ QUESTIONS 1-9: 1
2. FEELING DOWN, DEPRESSED OR HOPELESS: SEVERAL DAYS
1. LITTLE INTEREST OR PLEASURE IN DOING THINGS: NOT AT ALL

## 2024-09-28 LAB
ALBUMIN SERPL-MCNC: 4.3 G/DL (ref 3.8–4.9)
ALP SERPL-CCNC: 123 IU/L (ref 44–121)
ALT SERPL-CCNC: 21 IU/L (ref 0–44)
AST SERPL-CCNC: 20 IU/L (ref 0–40)
BILIRUB SERPL-MCNC: 0.4 MG/DL (ref 0–1.2)
BUN SERPL-MCNC: 11 MG/DL (ref 6–24)
BUN/CREAT SERPL: 11 (ref 9–20)
CALCIUM SERPL-MCNC: 9.3 MG/DL (ref 8.7–10.2)
CHLORIDE SERPL-SCNC: 104 MMOL/L (ref 96–106)
CHOLEST SERPL-MCNC: 104 MG/DL (ref 100–199)
CO2 SERPL-SCNC: 24 MMOL/L (ref 20–29)
CREAT SERPL-MCNC: 0.97 MG/DL (ref 0.76–1.27)
EGFRCR SERPLBLD CKD-EPI 2021: 90 ML/MIN/1.73
GLOBULIN SER CALC-MCNC: 2.4 G/DL (ref 1.5–4.5)
GLUCOSE SERPL-MCNC: 171 MG/DL (ref 70–99)
HBA1C MFR BLD: 8.3 % (ref 4.8–5.6)
HDLC SERPL-MCNC: 22 MG/DL
LDLC SERPL CALC-MCNC: 62 MG/DL (ref 0–99)
POTASSIUM SERPL-SCNC: 4.4 MMOL/L (ref 3.5–5.2)
PROT SERPL-MCNC: 6.7 G/DL (ref 6–8.5)
SODIUM SERPL-SCNC: 142 MMOL/L (ref 134–144)
TRIGL SERPL-MCNC: 108 MG/DL (ref 0–149)
VLDLC SERPL CALC-MCNC: 20 MG/DL (ref 5–40)

## 2024-09-30 LAB
ALBUMIN/CREAT UR: <4 MG/G CREAT (ref 0–29)
CREAT UR-MCNC: 80.1 MG/DL
MICROALBUMIN UR-MCNC: <3 UG/ML

## 2024-12-02 DIAGNOSIS — Z79.4 TYPE 2 DIABETES MELLITUS WITH DIABETIC POLYNEUROPATHY, WITH LONG-TERM CURRENT USE OF INSULIN (HCC): ICD-10-CM

## 2024-12-02 DIAGNOSIS — E11.42 TYPE 2 DIABETES MELLITUS WITH DIABETIC POLYNEUROPATHY, WITH LONG-TERM CURRENT USE OF INSULIN (HCC): ICD-10-CM

## 2024-12-02 RX ORDER — ACYCLOVIR 800 MG/1
TABLET ORAL
Qty: 2 EACH | Refills: 5 | Status: SHIPPED | OUTPATIENT
Start: 2024-12-02

## 2024-12-02 RX ORDER — EMPAGLIFLOZIN 10 MG/1
10 TABLET, FILM COATED ORAL DAILY
Qty: 90 TABLET | Refills: 1 | Status: SHIPPED | OUTPATIENT
Start: 2024-12-02

## 2024-12-14 DIAGNOSIS — I10 ESSENTIAL (PRIMARY) HYPERTENSION: ICD-10-CM

## 2024-12-16 RX ORDER — METOPROLOL SUCCINATE 25 MG/1
TABLET, EXTENDED RELEASE ORAL
Qty: 90 TABLET | Refills: 3 | Status: SHIPPED | OUTPATIENT
Start: 2024-12-16

## 2025-02-24 DIAGNOSIS — Z79.4 TYPE 2 DIABETES MELLITUS WITH DIABETIC POLYNEUROPATHY, WITH LONG-TERM CURRENT USE OF INSULIN (HCC): ICD-10-CM

## 2025-02-24 DIAGNOSIS — E11.42 TYPE 2 DIABETES MELLITUS WITH DIABETIC POLYNEUROPATHY, WITH LONG-TERM CURRENT USE OF INSULIN (HCC): ICD-10-CM

## 2025-02-25 RX ORDER — ACYCLOVIR 800 MG/1
1 TABLET ORAL
Qty: 6 EACH | Refills: 3 | Status: SHIPPED | OUTPATIENT
Start: 2025-02-25

## 2025-03-11 DIAGNOSIS — E11.42 TYPE 2 DIABETES MELLITUS WITH DIABETIC POLYNEUROPATHY (HCC): ICD-10-CM

## 2025-03-12 RX ORDER — PEN NEEDLE, DIABETIC 32 GX 1/4"
NEEDLE, DISPOSABLE MISCELLANEOUS
Qty: 400 EACH | Refills: 10 | Status: SHIPPED | OUTPATIENT
Start: 2025-03-12

## 2025-03-12 RX ORDER — INSULIN GLARGINE 300 U/ML
74 INJECTION, SOLUTION SUBCUTANEOUS DAILY
Qty: 24 ADJUSTABLE DOSE PRE-FILLED PEN SYRINGE | Refills: 3 | Status: SHIPPED | OUTPATIENT
Start: 2025-03-12

## 2025-04-08 ENCOUNTER — TELEPHONE (OUTPATIENT)
Age: 60
End: 2025-04-08

## 2025-04-08 DIAGNOSIS — Z00.00 ROUTINE PHYSICAL EXAMINATION: Primary | ICD-10-CM

## 2025-04-08 DIAGNOSIS — E11.42 TYPE 2 DIABETES MELLITUS WITH DIABETIC POLYNEUROPATHY, WITH LONG-TERM CURRENT USE OF INSULIN (HCC): ICD-10-CM

## 2025-04-08 DIAGNOSIS — Z79.4 TYPE 2 DIABETES MELLITUS WITH DIABETIC POLYNEUROPATHY, WITH LONG-TERM CURRENT USE OF INSULIN (HCC): ICD-10-CM

## 2025-04-08 NOTE — TELEPHONE ENCOUNTER
Patient is requesting lab orders prior to his appointment next week. Please call patient when they are available Thanks.

## 2025-04-09 NOTE — TELEPHONE ENCOUNTER
Attempted to reach patient. Left message on vm to return call  Labs ordered to be done prior to next visit at [facility]

## 2025-04-14 DIAGNOSIS — E11.42 TYPE 2 DIABETES MELLITUS WITH DIABETIC POLYNEUROPATHY, WITH LONG-TERM CURRENT USE OF INSULIN (HCC): ICD-10-CM

## 2025-04-14 DIAGNOSIS — I10 ESSENTIAL (PRIMARY) HYPERTENSION: ICD-10-CM

## 2025-04-14 DIAGNOSIS — Z79.4 TYPE 2 DIABETES MELLITUS WITH DIABETIC POLYNEUROPATHY, WITH LONG-TERM CURRENT USE OF INSULIN (HCC): ICD-10-CM

## 2025-04-14 DIAGNOSIS — Z00.00 ROUTINE PHYSICAL EXAMINATION: ICD-10-CM

## 2025-04-14 RX ORDER — AMLODIPINE BESYLATE 10 MG/1
10 TABLET ORAL DAILY
Qty: 90 TABLET | Refills: 2 | Status: SHIPPED | OUTPATIENT
Start: 2025-04-14

## 2025-04-15 ENCOUNTER — OFFICE VISIT (OUTPATIENT)
Age: 60
End: 2025-04-15
Payer: COMMERCIAL

## 2025-04-15 VITALS
RESPIRATION RATE: 18 BRPM | TEMPERATURE: 97.2 F | BODY MASS INDEX: 27.96 KG/M2 | DIASTOLIC BLOOD PRESSURE: 86 MMHG | HEIGHT: 76 IN | OXYGEN SATURATION: 95 % | WEIGHT: 229.6 LBS | SYSTOLIC BLOOD PRESSURE: 128 MMHG | HEART RATE: 79 BPM

## 2025-04-15 DIAGNOSIS — Z86.73 HISTORY OF CEREBELLAR STROKE: ICD-10-CM

## 2025-04-15 DIAGNOSIS — Z79.4 TYPE 2 DIABETES MELLITUS WITH DIABETIC POLYNEUROPATHY, WITH LONG-TERM CURRENT USE OF INSULIN (HCC): ICD-10-CM

## 2025-04-15 DIAGNOSIS — E11.42 TYPE 2 DIABETES MELLITUS WITH DIABETIC POLYNEUROPATHY, WITH LONG-TERM CURRENT USE OF INSULIN (HCC): ICD-10-CM

## 2025-04-15 DIAGNOSIS — Z00.00 ROUTINE PHYSICAL EXAMINATION: ICD-10-CM

## 2025-04-15 DIAGNOSIS — I10 HYPERTENSION, UNSPECIFIED TYPE: ICD-10-CM

## 2025-04-15 DIAGNOSIS — I73.9 CLAUDICATION: ICD-10-CM

## 2025-04-15 DIAGNOSIS — E78.00 PURE HYPERCHOLESTEROLEMIA: ICD-10-CM

## 2025-04-15 DIAGNOSIS — I50.20 SYSTOLIC CONGESTIVE HEART FAILURE, UNSPECIFIED HF CHRONICITY (HCC): Primary | ICD-10-CM

## 2025-04-15 LAB
ALBUMIN SERPL-MCNC: 4.2 G/DL (ref 3.8–4.9)
ALP SERPL-CCNC: 104 IU/L (ref 44–121)
ALT SERPL-CCNC: 22 IU/L (ref 0–44)
AST SERPL-CCNC: 22 IU/L (ref 0–40)
BILIRUB SERPL-MCNC: 0.5 MG/DL (ref 0–1.2)
BUN SERPL-MCNC: 14 MG/DL (ref 6–24)
BUN/CREAT SERPL: 15 (ref 9–20)
CALCIUM SERPL-MCNC: 8.9 MG/DL (ref 8.7–10.2)
CHLORIDE SERPL-SCNC: 105 MMOL/L (ref 96–106)
CHOLEST SERPL-MCNC: 105 MG/DL (ref 100–199)
CO2 SERPL-SCNC: 23 MMOL/L (ref 20–29)
CREAT SERPL-MCNC: 0.91 MG/DL (ref 0.76–1.27)
EGFRCR SERPLBLD CKD-EPI 2021: 97 ML/MIN/1.73
ERYTHROCYTE [DISTWIDTH] IN BLOOD BY AUTOMATED COUNT: 12.6 % (ref 11.6–15.4)
GLOBULIN SER CALC-MCNC: 2.3 G/DL (ref 1.5–4.5)
GLUCOSE SERPL-MCNC: 158 MG/DL (ref 70–99)
HBA1C MFR BLD: 8.2 % (ref 4.8–5.6)
HCT VFR BLD AUTO: 49.8 % (ref 37.5–51)
HDLC SERPL-MCNC: 24 MG/DL
HGB BLD-MCNC: 16.4 G/DL (ref 13–17.7)
LDLC SERPL CALC-MCNC: 64 MG/DL (ref 0–99)
MCH RBC QN AUTO: 31.5 PG (ref 26.6–33)
MCHC RBC AUTO-ENTMCNC: 32.9 G/DL (ref 31.5–35.7)
MCV RBC AUTO: 96 FL (ref 79–97)
PLATELET # BLD AUTO: 283 X10E3/UL (ref 150–450)
POTASSIUM SERPL-SCNC: 4.3 MMOL/L (ref 3.5–5.2)
PROT SERPL-MCNC: 6.5 G/DL (ref 6–8.5)
PSA SERPL-MCNC: 0.3 NG/ML (ref 0–4)
RBC # BLD AUTO: 5.2 X10E6/UL (ref 4.14–5.8)
REFLEX CRITERIA: NORMAL
SODIUM SERPL-SCNC: 141 MMOL/L (ref 134–144)
TRIGL SERPL-MCNC: 86 MG/DL (ref 0–149)
TSH SERPL DL<=0.005 MIU/L-ACNC: 1.05 UIU/ML (ref 0.45–4.5)
VLDLC SERPL CALC-MCNC: 17 MG/DL (ref 5–40)
WBC # BLD AUTO: 8.9 X10E3/UL (ref 3.4–10.8)

## 2025-04-15 PROCEDURE — 3079F DIAST BP 80-89 MM HG: CPT | Performed by: INTERNAL MEDICINE

## 2025-04-15 PROCEDURE — 3074F SYST BP LT 130 MM HG: CPT | Performed by: INTERNAL MEDICINE

## 2025-04-15 PROCEDURE — 99396 PREV VISIT EST AGE 40-64: CPT | Performed by: INTERNAL MEDICINE

## 2025-04-15 SDOH — ECONOMIC STABILITY: FOOD INSECURITY: WITHIN THE PAST 12 MONTHS, YOU WORRIED THAT YOUR FOOD WOULD RUN OUT BEFORE YOU GOT MONEY TO BUY MORE.: NEVER TRUE

## 2025-04-15 SDOH — ECONOMIC STABILITY: FOOD INSECURITY: WITHIN THE PAST 12 MONTHS, THE FOOD YOU BOUGHT JUST DIDN'T LAST AND YOU DIDN'T HAVE MONEY TO GET MORE.: NEVER TRUE

## 2025-04-15 ASSESSMENT — PATIENT HEALTH QUESTIONNAIRE - PHQ9
2. FEELING DOWN, DEPRESSED OR HOPELESS: NOT AT ALL
SUM OF ALL RESPONSES TO PHQ QUESTIONS 1-9: 0
1. LITTLE INTEREST OR PLEASURE IN DOING THINGS: NOT AT ALL

## 2025-04-15 NOTE — PROGRESS NOTES
\"Have you been to the ER, urgent care clinic since your last visit?  Hospitalized since your last visit?\"    no    “Have you seen or consulted any other health care providers outside our system since your last visit?”    no    “Have you had a colorectal cancer screening such as a colonoscopy/FIT/Cologuard?    no    No colonoscopy on file  No cologuard on file  Date of last FIT: 8/9/2022   No flexible sigmoidoscopy on file     “Have you had a diabetic eye exam?”    12/2024  Date of last diabetic eye exam: 7/30/2016          
(TOPROL XL) 25 MG extended release tablet TAKE 1 TABLET BY MOUTH EVERY DAY 90 tablet 3    JARDIANCE 10 MG tablet TAKE 1 TABLET BY MOUTH EVERY DAY 90 tablet 1    atorvastatin (LIPITOR) 20 MG tablet TAKE 1 TABLET BY MOUTH EVERY DAY 90 tablet 2    insulin lispro, 1 Unit Dial, (HUMALOG/ADMELOG) 100 UNIT/ML SOPN INJECT 24 UNITS BEFORE BREAKFAST, 20 UNITS BEFORE LUNCH, AND 34 UNITS BEFORE DINNER (DIABETES) (Patient taking differently: 20U with meals Breakfast, lunch, dinner.) 75 mL 3    amLODIPine (NORVASC) 10 MG tablet TAKE 1 TABLET BY MOUTH EVERY DAY 90 tablet 2    Continuous Blood Gluc Sensor (LagotekSTYLE DEE 14 DAY SENSOR) MISC APPLY AND REPLACE SENSOR EVERY 2 WEEKS. SCAN AT LEAST 3 TIMES A DAY 2 each 11    NONFORMULARY Beets supplements      aspirin 81 MG chewable tablet Take 1 tablet by mouth daily       No current facility-administered medications for this visit.     Allergies   Allergen Reactions    Lisinopril Anaphylaxis     Angioedema     Social History     Tobacco Use    Smoking status: Former    Smokeless tobacco: Never   Substance Use Topics    Alcohol use: Not Currently        BMP:   Lab Results   Component Value Date/Time     09/27/2024 12:00 AM    K 4.4 09/27/2024 12:00 AM     09/27/2024 12:00 AM    CO2 24 09/27/2024 12:00 AM    BUN 11 09/27/2024 12:00 AM    CREATININE 0.97 09/27/2024 12:00 AM    GLUCOSE 171 09/27/2024 12:00 AM    CALCIUM 9.3 09/27/2024 12:00 AM      CBC:   Lab Results   Component Value Date/Time    WBC 12.1 03/20/2024 12:00 AM    RBC 5.17 03/20/2024 12:00 AM    HGB 16.1 03/20/2024 12:00 AM    HCT 48.6 03/20/2024 12:00 AM    MCV 94 03/20/2024 12:00 AM    MCH 31.1 03/20/2024 12:00 AM    MCHC 33.1 03/20/2024 12:00 AM    RDW 12.3 03/20/2024 12:00 AM     03/20/2024 12:00 AM    MPV 10.8 12/24/2020 04:47 AM      Lipids   Lab Results   Component Value Date/Time    CHOL 104 09/27/2024 12:00 AM    TRIG 108 09/27/2024 12:00 AM    HDL 22 09/27/2024 12:00 AM    CHOLGUILLERMINAO 8.5

## 2025-05-08 ENCOUNTER — HOSPITAL ENCOUNTER (OUTPATIENT)
Facility: HOSPITAL | Age: 60
Discharge: HOME | End: 2025-05-10
Attending: INTERNAL MEDICINE
Payer: COMMERCIAL

## 2025-05-08 ENCOUNTER — RESULTS FOLLOW-UP (OUTPATIENT)
Age: 60
End: 2025-05-08

## 2025-05-08 DIAGNOSIS — I73.9 PAD (PERIPHERAL ARTERY DISEASE): Primary | ICD-10-CM

## 2025-05-08 DIAGNOSIS — I73.9 CLAUDICATION: ICD-10-CM

## 2025-05-08 LAB
VAS LEFT ABI: 0.63
VAS LEFT ARM BP: 136 MMHG
VAS LEFT DORSALIS PEDIS BP: 85 MMHG
VAS LEFT PTA BP: 78 MMHG
VAS LEFT TBI: 0.47
VAS LEFT TOE PRESSURE: 64 MMHG
VAS RIGHT ABI: 0.97
VAS RIGHT ARM BP: 130 MMHG
VAS RIGHT DORSALIS PEDIS BP: 132 MMHG
VAS RIGHT PTA BP: 126 MMHG
VAS RIGHT TBI: 0.65
VAS RIGHT TOE PRESSURE: 89 MMHG

## 2025-05-08 PROCEDURE — 93922 UPR/L XTREMITY ART 2 LEVELS: CPT

## 2025-05-09 NOTE — TELEPHONE ENCOUNTER
Lauryn with Dr Ferrari  Phone: [phone]      States pt called and requested to keep aug appt.    States wants to check with pcp if it is recommened to keep aug or if pt should be seen sooner.    Please  to confirm when pt should be scheduled.

## 2025-05-20 DIAGNOSIS — Z79.4 TYPE 2 DIABETES MELLITUS WITH DIABETIC POLYNEUROPATHY, WITH LONG-TERM CURRENT USE OF INSULIN (HCC): ICD-10-CM

## 2025-05-20 DIAGNOSIS — E11.42 TYPE 2 DIABETES MELLITUS WITH DIABETIC POLYNEUROPATHY, WITH LONG-TERM CURRENT USE OF INSULIN (HCC): ICD-10-CM

## 2025-05-21 RX ORDER — ACYCLOVIR 800 MG/1
1 TABLET ORAL
Qty: 6 EACH | Refills: 3 | Status: SHIPPED | OUTPATIENT
Start: 2025-05-21

## 2025-05-28 DIAGNOSIS — Z79.4 TYPE 2 DIABETES MELLITUS WITH DIABETIC POLYNEUROPATHY, WITH LONG-TERM CURRENT USE OF INSULIN (HCC): Primary | ICD-10-CM

## 2025-05-28 DIAGNOSIS — E11.9 TYPE 2 DIABETES MELLITUS WITHOUT COMPLICATION, WITH LONG-TERM CURRENT USE OF INSULIN (HCC): ICD-10-CM

## 2025-05-28 DIAGNOSIS — E11.42 TYPE 2 DIABETES MELLITUS WITH DIABETIC POLYNEUROPATHY, WITH LONG-TERM CURRENT USE OF INSULIN (HCC): Primary | ICD-10-CM

## 2025-05-28 DIAGNOSIS — Z79.4 TYPE 2 DIABETES MELLITUS WITHOUT COMPLICATION, WITH LONG-TERM CURRENT USE OF INSULIN (HCC): ICD-10-CM

## 2025-05-28 RX ORDER — HYDROCHLOROTHIAZIDE 12.5 MG/1
CAPSULE ORAL
Qty: 2 EACH | Refills: 5 | Status: SHIPPED | OUTPATIENT
Start: 2025-05-28

## 2025-05-28 NOTE — TELEPHONE ENCOUNTER
PCP: Jayden Bradley MD    Last appt: 4/15/2025   Future Appointments   Date Time Provider Department Center   7/15/2025  3:15 PM Dilia Irving [facility] DEP   11/5/2025  9:15 AM Jayden Bradley MD [facility] DEP       Requested Prescriptions     Pending Prescriptions Disp Refills    Continuous Glucose Sensor (FREESTYLE DEE 3 PLUS SENSOR) [facility]

## 2025-05-28 NOTE — TELEPHONE ENCOUNTER
Wife states that the refill was to have been for the SENSOR A DEE 3 PLUS    Please be sure this is a 90 day supply to [facility] [employer] [phone]

## 2025-05-31 DIAGNOSIS — E11.42 TYPE 2 DIABETES MELLITUS WITH DIABETIC POLYNEUROPATHY, WITH LONG-TERM CURRENT USE OF INSULIN (HCC): ICD-10-CM

## 2025-05-31 DIAGNOSIS — Z79.4 TYPE 2 DIABETES MELLITUS WITH DIABETIC POLYNEUROPATHY, WITH LONG-TERM CURRENT USE OF INSULIN (HCC): ICD-10-CM

## 2025-06-02 RX ORDER — EMPAGLIFLOZIN 10 MG/1
10 TABLET, FILM COATED ORAL DAILY
Qty: 90 TABLET | Refills: 1 | Status: SHIPPED | OUTPATIENT
Start: 2025-06-02

## 2025-07-14 DIAGNOSIS — E78.00 PURE HYPERCHOLESTEROLEMIA, UNSPECIFIED: ICD-10-CM

## 2025-07-14 RX ORDER — ATORVASTATIN CALCIUM 20 MG/1
20 TABLET, FILM COATED ORAL DAILY
Qty: 90 TABLET | Refills: 2 | Status: SHIPPED | OUTPATIENT
Start: 2025-07-14

## 2025-07-21 ENCOUNTER — HOSPITAL ENCOUNTER (OUTPATIENT)
Facility: HOSPITAL | Age: 60
Discharge: HOME | End: 2025-07-24

## 2025-08-07 ENCOUNTER — INITIAL CONSULT (OUTPATIENT)
Age: 60
End: 2025-08-07
Payer: COMMERCIAL

## 2025-08-07 VITALS
OXYGEN SATURATION: 97 % | DIASTOLIC BLOOD PRESSURE: 76 MMHG | HEART RATE: 70 BPM | BODY MASS INDEX: 25.89 KG/M2 | TEMPERATURE: 97.8 F | WEIGHT: 212.6 LBS | SYSTOLIC BLOOD PRESSURE: 140 MMHG

## 2025-08-07 DIAGNOSIS — I25.119 CORONARY ARTERY DISEASE INVOLVING NATIVE CORONARY ARTERY OF NATIVE HEART WITH ANGINA PECTORIS: Primary | ICD-10-CM

## 2025-08-07 DIAGNOSIS — I25.118 CORONARY ARTERY DISEASE INVOLVING NATIVE CORONARY ARTERY OF NATIVE HEART WITH OTHER FORM OF ANGINA PECTORIS: Primary | ICD-10-CM

## 2025-08-07 DIAGNOSIS — I25.118 CORONARY ARTERY DISEASE INVOLVING NATIVE CORONARY ARTERY OF NATIVE HEART WITH OTHER FORM OF ANGINA PECTORIS: ICD-10-CM

## 2025-08-07 PROBLEM — I25.10 CORONARY ARTERY DISEASE: Status: ACTIVE | Noted: 2025-08-07

## 2025-08-07 PROCEDURE — 3077F SYST BP >= 140 MM HG: CPT | Performed by: NURSE PRACTITIONER

## 2025-08-07 PROCEDURE — 99205 OFFICE O/P NEW HI 60 MIN: CPT | Performed by: NURSE PRACTITIONER

## 2025-08-07 PROCEDURE — 3078F DIAST BP <80 MM HG: CPT | Performed by: NURSE PRACTITIONER

## 2025-08-11 ENCOUNTER — HOSPITAL ENCOUNTER (OUTPATIENT)
Facility: HOSPITAL | Age: 60
Discharge: HOME | End: 2025-08-13
Attending: THORACIC SURGERY (CARDIOTHORACIC VASCULAR SURGERY)
Payer: COMMERCIAL

## 2025-08-11 ENCOUNTER — HOSPITAL ENCOUNTER (OUTPATIENT)
Facility: HOSPITAL | Age: 60
Discharge: HOME | End: 2025-08-14
Attending: THORACIC SURGERY (CARDIOTHORACIC VASCULAR SURGERY)
Payer: COMMERCIAL

## 2025-08-11 DIAGNOSIS — I25.118 CORONARY ARTERY DISEASE INVOLVING NATIVE CORONARY ARTERY OF NATIVE HEART WITH OTHER FORM OF ANGINA PECTORIS: ICD-10-CM

## 2025-08-11 LAB
VAS LEFT CCA DIST EDV: 35.2 CM/S
VAS LEFT CCA DIST PSV: 144 CM/S
VAS LEFT CCA MID EDV: 40.4 CM/S
VAS LEFT CCA MID PSV: 164.7 CM/S
VAS LEFT CCA PROX EDV: 24.8 CM/S
VAS LEFT CCA PROX PSV: 105.2 CM/S
VAS LEFT ECA EDV: 10.1 CM/S
VAS LEFT ECA PSV: 113.2 CM/S
VAS LEFT GSV ANKLE DIAM: 3.5 MM
VAS LEFT GSV AT KNEE DIAM: 2.2 MM
VAS LEFT GSV BK MID DIAM: 2.1 MM
VAS LEFT GSV BK PROX DIAM: 2.1 MM
VAS LEFT GSV THIGH DIST DIAM: 2.6 MM
VAS LEFT GSV THIGH MID DIAM: 3.5 MM
VAS LEFT GSV THIGH PROX DIAM: 3.6 MM
VAS LEFT ICA DIST EDV: 31.4 CM/S
VAS LEFT ICA DIST PSV: 64.4 CM/S
VAS LEFT ICA MID EDV: 35.8 CM/S
VAS LEFT ICA MID PSV: 101.6 CM/S
VAS LEFT ICA PROX EDV: 32.7 CM/S
VAS LEFT ICA PROX PSV: 65.6 CM/S
VAS LEFT ICA/CCA PSV: 0.7 NO UNITS
VAS LEFT SUBCLAVIAN PROX EDV: 0 CM/S
VAS LEFT SUBCLAVIAN PROX PSV: 137 CM/S
VAS RIGHT CCA DIST EDV: 21.7 CM/S
VAS RIGHT CCA DIST PSV: 96.6 CM/S
VAS RIGHT CCA PROX EDV: 19.9 CM/S
VAS RIGHT CCA PROX PSV: 93 CM/S
VAS RIGHT ECA EDV: 14.5 CM/S
VAS RIGHT ECA PSV: 139.6 CM/S
VAS RIGHT GSV ANKLE DIAM: 3.6 MM
VAS RIGHT GSV AT KNEE DIAM: 2.8 MM
VAS RIGHT GSV BK MID DIAM: 3.5 MM
VAS RIGHT GSV BK PROX DIAM: 2.5 MM
VAS RIGHT GSV THIGH DIST DIAM: 2.3 MM
VAS RIGHT GSV THIGH MID DIAM: 2.6 MM
VAS RIGHT GSV THIGH PROX DIAM: 4 MM
VAS RIGHT ICA DIST EDV: 24.4 CM/S
VAS RIGHT ICA DIST PSV: 63.3 CM/S
VAS RIGHT ICA MID EDV: 28.5 CM/S
VAS RIGHT ICA MID PSV: 96.1 CM/S
VAS RIGHT ICA PROX EDV: 24.1 CM/S
VAS RIGHT ICA PROX PSV: 80.6 CM/S
VAS RIGHT ICA/CCA PSV: 1 NO UNITS
VAS RIGHT SUBCLAVIAN PROX EDV: 0 CM/S
VAS RIGHT SUBCLAVIAN PROX PSV: 243.5 CM/S
VAS RIGHT VERTEBRAL EDV: 14.5 CM/S
VAS RIGHT VERTEBRAL PSV: 47.6 CM/S

## 2025-08-11 PROCEDURE — 93880 EXTRACRANIAL BILAT STUDY: CPT

## 2025-08-11 PROCEDURE — 93970 EXTREMITY STUDY: CPT

## 2025-08-11 PROCEDURE — 93970 EXTREMITY STUDY: CPT | Performed by: SURGERY

## 2025-08-11 PROCEDURE — 94010 BREATHING CAPACITY TEST: CPT

## 2025-08-11 PROCEDURE — 94729 DIFFUSING CAPACITY: CPT

## 2025-08-11 PROCEDURE — 93880 EXTRACRANIAL BILAT STUDY: CPT | Performed by: SURGERY

## 2025-08-12 ENCOUNTER — HOSPITAL ENCOUNTER (OUTPATIENT)
Facility: HOSPITAL | Age: 60
Discharge: HOME | End: 2025-08-15
Payer: COMMERCIAL

## 2025-08-12 ENCOUNTER — CLINICAL DOCUMENTATION (OUTPATIENT)
Age: 60
End: 2025-08-12

## 2025-08-12 VITALS
HEART RATE: 71 BPM | DIASTOLIC BLOOD PRESSURE: 67 MMHG | RESPIRATION RATE: 18 BRPM | OXYGEN SATURATION: 96 % | TEMPERATURE: 97.7 F | WEIGHT: 215.83 LBS | BODY MASS INDEX: 26.84 KG/M2 | SYSTOLIC BLOOD PRESSURE: 122 MMHG | HEIGHT: 75 IN

## 2025-08-12 LAB
ALBUMIN SERPL-MCNC: 3.7 G/DL (ref 3.5–5.2)
ALBUMIN/GLOB SERPL: 1.2 (ref 1.1–2.2)
ALP SERPL-CCNC: 103 U/L (ref 40–129)
ALT SERPL-CCNC: 15 U/L (ref 10–50)
ANION GAP SERPL CALC-SCNC: 10 MMOL/L (ref 2–14)
APPEARANCE UR: CLEAR
APTT PPP: 29.6 SEC (ref 22.1–31)
ARTERIAL PATENCY WRIST A: YES
AST SERPL-CCNC: 22 U/L (ref 10–50)
BACTERIA URNS QL MICRO: NEGATIVE /HPF
BASE EXCESS BLDA CALC-SCNC: 2.3 MMOL/L
BASOPHILS # BLD: 0.05 K/UL (ref 0–0.1)
BASOPHILS NFR BLD: 0.6 % (ref 0–1)
BDY SITE: ABNORMAL
BILIRUB SERPL-MCNC: 0.5 MG/DL (ref 0–1.2)
BILIRUB UR QL: NEGATIVE
BUN SERPL-MCNC: 17 MG/DL (ref 6–20)
BUN/CREAT SERPL: 19 (ref 12–20)
CALCIUM SERPL-MCNC: 9.3 MG/DL (ref 8.6–10)
CHLORIDE SERPL-SCNC: 100 MMOL/L (ref 98–107)
CO2 SERPL-SCNC: 24 MMOL/L (ref 20–29)
COLOR UR: ABNORMAL
CREAT SERPL-MCNC: 0.9 MG/DL (ref 0.7–1.2)
DIFFERENTIAL METHOD BLD: NORMAL
EKG ATRIAL RATE: 70 BPM
EKG DIAGNOSIS: NORMAL
EKG P AXIS: 41 DEGREES
EKG P-R INTERVAL: 218 MS
EKG Q-T INTERVAL: 406 MS
EKG QRS DURATION: 96 MS
EKG QTC CALCULATION (BAZETT): 438 MS
EKG R AXIS: -45 DEGREES
EKG T AXIS: 54 DEGREES
EKG VENTRICULAR RATE: 70 BPM
EOSINOPHIL # BLD: 0.17 K/UL (ref 0–0.4)
EOSINOPHIL NFR BLD: 2 % (ref 0–7)
EPITH CASTS URNS QL MICRO: ABNORMAL /LPF
ERYTHROCYTE [DISTWIDTH] IN BLOOD BY AUTOMATED COUNT: 12.4 % (ref 11.5–14.5)
EST. AVERAGE GLUCOSE BLD GHB EST-MCNC: 167 MG/DL
FIBRINOGEN PPP-MCNC: 316 MG/DL (ref 200–475)
GLOBULIN SER CALC-MCNC: 3.1 G/DL (ref 2–4)
GLUCOSE SERPL-MCNC: 191 MG/DL (ref 65–100)
GLUCOSE UR STRIP.AUTO-MCNC: >1000 MG/DL
HBA1C MFR BLD: 7.5 % (ref 4–5.6)
HCO3 BLDA-SCNC: 27 MMOL/L (ref 22–26)
HCT VFR BLD AUTO: 48.5 % (ref 36.6–50.3)
HGB BLD-MCNC: 16.2 G/DL (ref 12.1–17)
HGB UR QL STRIP: NEGATIVE
HISTORY CHECK: NORMAL
HYALINE CASTS URNS QL MICRO: ABNORMAL /LPF (ref 0–5)
IMM GRANULOCYTES # BLD AUTO: 0.04 K/UL (ref 0–0.04)
IMM GRANULOCYTES NFR BLD AUTO: 0.5 % (ref 0–0.5)
INR PPP: 1.1 (ref 0.9–1.1)
KETONES UR QL STRIP.AUTO: NEGATIVE MG/DL
LEUKOCYTE ESTERASE UR QL STRIP.AUTO: NEGATIVE
LYMPHOCYTES # BLD: 1.78 K/UL (ref 0.8–3.5)
LYMPHOCYTES NFR BLD: 21.2 % (ref 12–49)
MAGNESIUM SERPL-MCNC: 2.1 MG/DL (ref 1.6–2.6)
MCH RBC QN AUTO: 31.1 PG (ref 26–34)
MCHC RBC AUTO-ENTMCNC: 33.4 G/DL (ref 30–36.5)
MCV RBC AUTO: 93.1 FL (ref 80–99)
MONOCYTES # BLD: 0.73 K/UL (ref 0–1)
MONOCYTES NFR BLD: 8.7 % (ref 5–13)
NEUTS SEG # BLD: 5.64 K/UL (ref 1.8–8)
NEUTS SEG NFR BLD: 67 % (ref 32–75)
NITRITE UR QL STRIP.AUTO: NEGATIVE
NRBC # BLD: 0 K/UL (ref 0–0.01)
NRBC BLD-RTO: 0 PER 100 WBC
NT PRO BNP: 72 PG/ML (ref 0–125)
PCO2 BLDA: 40 MMHG (ref 35–45)
PH BLDA: 7.44 (ref 7.35–7.45)
PH UR STRIP: 5.5 (ref 5–8)
PLATELET # BLD AUTO: 243 K/UL (ref 150–400)
PMV BLD AUTO: 10.2 FL (ref 8.9–12.9)
PO2 BLDA: 78 MMHG (ref 80–100)
POTASSIUM SERPL-SCNC: 4.2 MMOL/L (ref 3.5–5.1)
PROT SERPL-MCNC: 6.8 G/DL (ref 6.4–8.3)
PROT UR STRIP-MCNC: NEGATIVE MG/DL
PROTHROMBIN TIME: 11.4 SEC (ref 9.2–11.2)
RBC # BLD AUTO: 5.21 M/UL (ref 4.1–5.7)
RBC #/AREA URNS HPF: ABNORMAL /HPF (ref 0–5)
SAO2 % BLD: 96 % (ref 92–97)
SAO2% DEVICE SAO2% SENSOR NAME: ABNORMAL
SARS-COV-2 RNA RESP QL NAA+PROBE: NOT DETECTED
SODIUM SERPL-SCNC: 134 MMOL/L (ref 136–145)
SOURCE: NORMAL
SP GR UR REFRACTOMETRY: 1.01 (ref 1–1.03)
SPECIMEN SITE: ABNORMAL
THERAPEUTIC RANGE: ABNORMAL SECS (ref 58–77)
URINE CULTURE IF INDICATED: ABNORMAL
UROBILINOGEN UR QL STRIP.AUTO: 1 EU/DL (ref 0.2–1)
WBC # BLD AUTO: 8.4 K/UL (ref 4.1–11.1)
WBC URNS QL MICRO: ABNORMAL /HPF (ref 0–4)

## 2025-08-12 PROCEDURE — 83036 HEMOGLOBIN GLYCOSYLATED A1C: CPT

## 2025-08-12 PROCEDURE — 84443 ASSAY THYROID STIM HORMONE: CPT

## 2025-08-12 PROCEDURE — 36600 WITHDRAWAL OF ARTERIAL BLOOD: CPT

## 2025-08-12 PROCEDURE — 80053 COMPREHEN METABOLIC PANEL: CPT

## 2025-08-12 PROCEDURE — 82803 BLOOD GASES ANY COMBINATION: CPT

## 2025-08-12 PROCEDURE — 81001 URINALYSIS AUTO W/SCOPE: CPT

## 2025-08-12 PROCEDURE — 83880 ASSAY OF NATRIURETIC PEPTIDE: CPT

## 2025-08-12 PROCEDURE — 85730 THROMBOPLASTIN TIME PARTIAL: CPT

## 2025-08-12 PROCEDURE — 86901 BLOOD TYPING SEROLOGIC RH(D): CPT

## 2025-08-12 PROCEDURE — 85610 PROTHROMBIN TIME: CPT

## 2025-08-12 PROCEDURE — 85384 FIBRINOGEN ACTIVITY: CPT

## 2025-08-12 PROCEDURE — 87635 SARS-COV-2 COVID-19 AMP PRB: CPT

## 2025-08-12 PROCEDURE — 36415 COLL VENOUS BLD VENIPUNCTURE: CPT

## 2025-08-12 PROCEDURE — 93005 ELECTROCARDIOGRAM TRACING: CPT | Performed by: THORACIC SURGERY (CARDIOTHORACIC VASCULAR SURGERY)

## 2025-08-12 PROCEDURE — 86900 BLOOD TYPING SEROLOGIC ABO: CPT

## 2025-08-12 PROCEDURE — 86850 RBC ANTIBODY SCREEN: CPT

## 2025-08-12 PROCEDURE — 71046 X-RAY EXAM CHEST 2 VIEWS: CPT

## 2025-08-12 PROCEDURE — 83735 ASSAY OF MAGNESIUM: CPT

## 2025-08-12 PROCEDURE — 85025 COMPLETE CBC W/AUTO DIFF WBC: CPT

## 2025-08-12 RX ORDER — SODIUM CHLORIDE, SODIUM LACTATE, POTASSIUM CHLORIDE, CALCIUM CHLORIDE 600; 310; 30; 20 MG/100ML; MG/100ML; MG/100ML; MG/100ML
INJECTION, SOLUTION INTRAVENOUS CONTINUOUS
OUTPATIENT
Start: 2025-08-18

## 2025-08-12 ASSESSMENT — PAIN SCALES - GENERAL: PAINLEVEL_OUTOF10: 0

## 2025-08-14 LAB — TSH SERPL DL<=0.05 MIU/L-ACNC: 0.77 UIU/ML (ref 0.45–4.5)

## 2025-08-14 RX ORDER — AMIODARONE HYDROCHLORIDE 200 MG/1
400 TABLET ORAL 2 TIMES DAILY
Qty: 12 TABLET | Refills: 0 | Status: SHIPPED | OUTPATIENT
Start: 2025-08-14 | End: 2025-08-17

## 2025-08-14 RX ORDER — MUPIROCIN 2 %
OINTMENT (GRAM) TOPICAL 2 TIMES DAILY
Qty: 1 EACH | Refills: 0 | Status: SHIPPED | OUTPATIENT
Start: 2025-08-16 | End: 2025-08-18

## 2025-08-14 RX ORDER — CHLORHEXIDINE GLUCONATE ORAL RINSE 1.2 MG/ML
15 SOLUTION DENTAL 2 TIMES DAILY
Qty: 60 ML | Refills: 0 | Status: SHIPPED | OUTPATIENT
Start: 2025-08-16 | End: 2025-08-18

## 2025-08-15 ENCOUNTER — TELEPHONE (OUTPATIENT)
Age: 60
End: 2025-08-15

## 2025-08-17 LAB
ABO + RH BLD: NORMAL
BLD PROD TYP BPU: NORMAL
BLD PROD TYP BPU: NORMAL
BLOOD BANK DISPENSE STATUS: NORMAL
BLOOD BANK DISPENSE STATUS: NORMAL
BLOOD GROUP ANTIBODIES SERPL: NORMAL
BPU ID: NORMAL
BPU ID: NORMAL
CROSSMATCH RESULT: NORMAL
CROSSMATCH RESULT: NORMAL
SPECIMEN EXP DATE BLD: NORMAL
UNIT DIVISION: 0
UNIT DIVISION: 0

## 2025-08-18 ENCOUNTER — ANESTHESIA EVENT (OUTPATIENT)
Facility: HOSPITAL | Age: 60
End: 2025-08-18
Payer: COMMERCIAL

## 2025-08-18 ENCOUNTER — APPOINTMENT (OUTPATIENT)
Facility: HOSPITAL | Age: 60
DRG: 236 | End: 2025-08-18
Attending: THORACIC SURGERY (CARDIOTHORACIC VASCULAR SURGERY)
Payer: COMMERCIAL

## 2025-08-18 ENCOUNTER — HOSPITAL ENCOUNTER (INPATIENT)
Facility: HOSPITAL | Age: 60
Discharge: HOME | DRG: 236 | End: 2025-08-20
Attending: THORACIC SURGERY (CARDIOTHORACIC VASCULAR SURGERY)
Payer: COMMERCIAL

## 2025-08-18 ENCOUNTER — ANESTHESIA (OUTPATIENT)
Facility: HOSPITAL | Age: 60
End: 2025-08-18
Payer: COMMERCIAL

## 2025-08-18 ENCOUNTER — HOSPITAL ENCOUNTER (INPATIENT)
Facility: HOSPITAL | Age: 60
LOS: 5 days | Discharge: HOME HEALTH | DRG: 236 | End: 2025-08-23
Attending: THORACIC SURGERY (CARDIOTHORACIC VASCULAR SURGERY) | Admitting: THORACIC SURGERY (CARDIOTHORACIC VASCULAR SURGERY)
Payer: COMMERCIAL

## 2025-08-18 DIAGNOSIS — E11.42 TYPE 2 DIABETES MELLITUS WITH DIABETIC POLYNEUROPATHY (HCC): ICD-10-CM

## 2025-08-18 DIAGNOSIS — I25.119 CORONARY ARTERY DISEASE INVOLVING NATIVE CORONARY ARTERY OF NATIVE HEART WITH ANGINA PECTORIS: Primary | ICD-10-CM

## 2025-08-18 DIAGNOSIS — Z95.1 S/P CABG X 2: ICD-10-CM

## 2025-08-18 PROBLEM — I25.10 CAD IN NATIVE ARTERY: Status: ACTIVE | Noted: 2025-08-18

## 2025-08-18 PROBLEM — Z98.890 S/P LEFT ATRIAL APPENDAGE LIGATION: Status: ACTIVE | Noted: 2025-08-18

## 2025-08-18 LAB
ACUTE KIDNEY INJURY RISK NEPHROCHECK: 0.07 (ref 0–0.3)
ALBUMIN SERPL-MCNC: 3.4 G/DL (ref 3.5–5.2)
ALBUMIN SERPL-MCNC: 3.9 G/DL (ref 3.5–5.2)
ALBUMIN/GLOB SERPL: 1.7 (ref 1.1–2.2)
ALBUMIN/GLOB SERPL: 1.9 (ref 1.1–2.2)
ALP SERPL-CCNC: 62 U/L (ref 40–129)
ALP SERPL-CCNC: 64 U/L (ref 40–129)
ALT SERPL-CCNC: 13 U/L (ref 10–50)
ALT SERPL-CCNC: 14 U/L (ref 10–50)
ANION GAP BLD CALC-SCNC: 10 (ref 10–20)
ANION GAP BLD CALC-SCNC: 12 (ref 10–20)
ANION GAP BLD CALC-SCNC: 13 (ref 10–20)
ANION GAP BLD CALC-SCNC: 15 (ref 10–20)
ANION GAP BLD CALC-SCNC: 9 (ref 10–20)
ANION GAP BLD CALC-SCNC: 9 (ref 10–20)
ANION GAP SERPL CALC-SCNC: 8 MMOL/L (ref 2–14)
ANION GAP SERPL CALC-SCNC: 8 MMOL/L (ref 2–14)
APTT PPP: 32.6 SEC (ref 22.1–31)
ARTERIAL PATENCY WRIST A: ABNORMAL
ARTERIAL PATENCY WRIST A: ABNORMAL
AST SERPL-CCNC: 23 U/L (ref 10–50)
AST SERPL-CCNC: 26 U/L (ref 10–50)
BASE DEFICIT BLD-SCNC: 0.3 MMOL/L
BASE DEFICIT BLD-SCNC: 0.8 MMOL/L
BASE DEFICIT BLD-SCNC: 1.6 MMOL/L
BASE DEFICIT BLD-SCNC: 3 MMOL/L
BASE DEFICIT BLDA-SCNC: 4.9 MMOL/L
BASE DEFICIT BLDA-SCNC: 5.2 MMOL/L
BASE EXCESS BLD CALC-SCNC: 0.1 MMOL/L
BASE EXCESS BLD CALC-SCNC: 1.2 MMOL/L
BDY SITE: ABNORMAL
BDY SITE: ABNORMAL
BILIRUB SERPL-MCNC: 0.8 MG/DL (ref 0–1.2)
BILIRUB SERPL-MCNC: 0.8 MG/DL (ref 0–1.2)
BUN SERPL-MCNC: 18 MG/DL (ref 6–20)
BUN SERPL-MCNC: 20 MG/DL (ref 6–20)
BUN/CREAT SERPL: 20 (ref 12–20)
BUN/CREAT SERPL: 21 (ref 12–20)
CA-I BLD-MCNC: 1.14 MMOL/L (ref 1.15–1.33)
CA-I BLD-MCNC: 1.16 MMOL/L (ref 1.15–1.33)
CA-I BLD-MCNC: 1.21 MMOL/L (ref 1.15–1.33)
CA-I BLD-MCNC: 1.22 MMOL/L (ref 1.15–1.33)
CA-I BLD-MCNC: 1.24 MMOL/L (ref 1.15–1.33)
CA-I BLD-MCNC: 1.3 MMOL/L (ref 1.15–1.33)
CA-I BLD-SCNC: 1.16 MMOL/L (ref 1.13–1.32)
CALCIUM SERPL-MCNC: 8.2 MG/DL (ref 8.6–10)
CALCIUM SERPL-MCNC: 8.8 MG/DL (ref 8.6–10)
CHLORIDE BLD-SCNC: 104 MMOL/L (ref 100–111)
CHLORIDE BLD-SCNC: 105 MMOL/L (ref 100–111)
CHLORIDE BLD-SCNC: 105 MMOL/L (ref 100–111)
CHLORIDE BLD-SCNC: 106 MMOL/L (ref 100–111)
CHLORIDE BLD-SCNC: 107 MMOL/L (ref 100–111)
CHLORIDE BLD-SCNC: 108 MMOL/L (ref 100–111)
CHLORIDE SERPL-SCNC: 108 MMOL/L (ref 98–107)
CHLORIDE SERPL-SCNC: 108 MMOL/L (ref 98–107)
CO2 BLD-SCNC: 23 MMOL/L (ref 22–29)
CO2 BLD-SCNC: 23 MMOL/L (ref 22–29)
CO2 BLD-SCNC: 25 MMOL/L (ref 22–29)
CO2 BLD-SCNC: 26 MMOL/L (ref 22–29)
CO2 SERPL-SCNC: 23 MMOL/L (ref 20–29)
CO2 SERPL-SCNC: 23 MMOL/L (ref 20–29)
CREAT SERPL-MCNC: 0.9 MG/DL (ref 0.7–1.2)
CREAT SERPL-MCNC: 0.92 MG/DL (ref 0.7–1.2)
CREAT UR-MCNC: 0.8 MG/DL (ref 0.6–1.3)
CREAT UR-MCNC: 0.9 MG/DL (ref 0.6–1.3)
CREAT UR-MCNC: 1 MG/DL (ref 0.6–1.3)
CREAT UR-MCNC: 1.1 MG/DL (ref 0.6–1.3)
ERYTHROCYTE [DISTWIDTH] IN BLOOD BY AUTOMATED COUNT: 12.6 % (ref 11.5–14.5)
ERYTHROCYTE [DISTWIDTH] IN BLOOD BY AUTOMATED COUNT: 12.7 % (ref 11.5–14.5)
FIO2 ON VENT: 40 %
FIO2 ON VENT: 80 %
GAS FLOW.O2 SETTING OXYMISER: 16
GLOBULIN SER CALC-MCNC: 2 G/DL (ref 2–4)
GLOBULIN SER CALC-MCNC: 2.1 G/DL (ref 2–4)
GLUCOSE BLD STRIP.AUTO-MCNC: 119 MG/DL (ref 65–117)
GLUCOSE BLD STRIP.AUTO-MCNC: 120 MG/DL (ref 65–117)
GLUCOSE BLD STRIP.AUTO-MCNC: 125 MG/DL (ref 65–117)
GLUCOSE BLD STRIP.AUTO-MCNC: 130 MG/DL (ref 65–117)
GLUCOSE BLD STRIP.AUTO-MCNC: 130 MG/DL (ref 65–117)
GLUCOSE BLD STRIP.AUTO-MCNC: 131 MG/DL (ref 65–117)
GLUCOSE BLD STRIP.AUTO-MCNC: 135 MG/DL (ref 65–117)
GLUCOSE BLD STRIP.AUTO-MCNC: 135 MG/DL (ref 74–99)
GLUCOSE BLD STRIP.AUTO-MCNC: 153 MG/DL (ref 74–99)
GLUCOSE BLD STRIP.AUTO-MCNC: 161 MG/DL (ref 74–99)
GLUCOSE BLD STRIP.AUTO-MCNC: 163 MG/DL (ref 74–99)
GLUCOSE BLD STRIP.AUTO-MCNC: 166 MG/DL (ref 74–99)
GLUCOSE BLD STRIP.AUTO-MCNC: 170 MG/DL (ref 74–99)
GLUCOSE BLD STRIP.AUTO-MCNC: 215 MG/DL (ref 65–117)
GLUCOSE BLD STRIP.AUTO-MCNC: 90 MG/DL (ref 65–117)
GLUCOSE SERPL-MCNC: 154 MG/DL (ref 65–100)
GLUCOSE SERPL-MCNC: 167 MG/DL (ref 65–100)
HCO3 BLDA-SCNC: 20 MMOL/L (ref 22–26)
HCO3 BLDA-SCNC: 21 MMOL/L (ref 22–26)
HCO3 BLDA-SCNC: 24 MMOL/L
HCO3 BLDA-SCNC: 24 MMOL/L
HCO3 BLDA-SCNC: 26 MMOL/L
HCO3 BLDA-SCNC: 27 MMOL/L
HCT VFR BLD AUTO: 38.3 % (ref 36.6–50.3)
HCT VFR BLD AUTO: 38.4 % (ref 36.6–50.3)
HGB BLD-MCNC: 11.5 G/DL (ref 12.1–17)
HGB BLD-MCNC: 11.5 G/DL (ref 12.1–17)
HGB BLD-MCNC: 11.9 G/DL (ref 12.1–17)
HGB BLD-MCNC: 12.3 G/DL (ref 12.1–17)
HGB BLD-MCNC: 12.5 G/DL (ref 12.1–17)
HGB BLD-MCNC: 12.5 G/DL (ref 12.1–17)
HGB BLD-MCNC: 14.1 G/DL (ref 12.1–17)
HGB BLD-MCNC: 14.2 G/DL (ref 12.1–17)
INR PPP: 1.2 (ref 0.9–1.1)
LACTATE BLD-SCNC: 1.06 MMOL/L (ref 0.4–2)
LACTATE BLD-SCNC: 1.15 MMOL/L (ref 0.4–2)
LACTATE BLD-SCNC: 1.29 MMOL/L (ref 0.4–2)
LACTATE BLD-SCNC: <0.3 MMOL/L (ref 0.4–2)
MAGNESIUM SERPL-MCNC: 2.9 MG/DL (ref 1.6–2.6)
MCH RBC QN AUTO: 31 PG (ref 26–34)
MCH RBC QN AUTO: 31.1 PG (ref 26–34)
MCHC RBC AUTO-ENTMCNC: 32.6 G/DL (ref 30–36.5)
MCHC RBC AUTO-ENTMCNC: 32.6 G/DL (ref 30–36.5)
MCV RBC AUTO: 95 FL (ref 80–99)
MCV RBC AUTO: 95.5 FL (ref 80–99)
NRBC # BLD: 0 K/UL (ref 0–0.01)
NRBC # BLD: 0 K/UL (ref 0–0.01)
NRBC BLD-RTO: 0 PER 100 WBC
NRBC BLD-RTO: 0 PER 100 WBC
PCO2 BLD: 42.9 MMHG (ref 35–48)
PCO2 BLD: 43.4 MMHG (ref 35–48)
PCO2 BLD: 47.3 MMHG (ref 35–48)
PCO2 BLD: 47.5 MMHG (ref 35–48)
PCO2 BLD: 48.8 MMHG (ref 35–48)
PCO2 BLD: 50.5 MMHG (ref 35–48)
PCO2 BLDA: 39 MMHG (ref 35–45)
PCO2 BLDA: 42 MMHG (ref 35–45)
PEEP RESPIRATORY: 5
PEEP RESPIRATORY: 5
PH BLD: 7.31 (ref 7.35–7.45)
PH BLD: 7.33 (ref 7.35–7.45)
PH BLD: 7.34 (ref 7.35–7.45)
PH BLD: 7.34 (ref 7.35–7.45)
PH BLD: 7.35 (ref 7.35–7.45)
PH BLD: 7.4 (ref 7.35–7.45)
PH BLDA: 7.32 (ref 7.35–7.45)
PH BLDA: 7.33 (ref 7.35–7.45)
PLATELET # BLD AUTO: 207 K/UL (ref 150–400)
PLATELET # BLD AUTO: 210 K/UL (ref 150–400)
PMV BLD AUTO: 10.1 FL (ref 8.9–12.9)
PMV BLD AUTO: 10.3 FL (ref 8.9–12.9)
PO2 BLD: 161 MMHG (ref 83–108)
PO2 BLD: 166 MMHG (ref 83–108)
PO2 BLD: 200 MMHG (ref 83–108)
PO2 BLD: 460 MMHG (ref 83–108)
PO2 BLD: >515 MMHG (ref 83–108)
PO2 BLD: >515 MMHG (ref 83–108)
PO2 BLDA: 122 MMHG (ref 80–100)
PO2 BLDA: 85 MMHG (ref 80–100)
POTASSIUM BLD-SCNC: 3.8 MMOL/L (ref 3.5–5.5)
POTASSIUM BLD-SCNC: 3.9 MMOL/L (ref 3.5–5.5)
POTASSIUM BLD-SCNC: 4.1 MMOL/L (ref 3.5–5.5)
POTASSIUM BLD-SCNC: 4.2 MMOL/L (ref 3.5–5.5)
POTASSIUM BLD-SCNC: 4.4 MMOL/L (ref 3.5–5.5)
POTASSIUM BLD-SCNC: 4.5 MMOL/L (ref 3.5–5.5)
POTASSIUM SERPL-SCNC: 4 MMOL/L (ref 3.5–5.1)
POTASSIUM SERPL-SCNC: 4.6 MMOL/L (ref 3.5–5.1)
PRESSURE SUPPORT SETTING VENT: 5
PRESSURE SUPPORT SETTING VENT: 5
PROT SERPL-MCNC: 5.4 G/DL (ref 6.4–8.3)
PROT SERPL-MCNC: 6 G/DL (ref 6.4–8.3)
PROTHROMBIN TIME: 12.3 SEC (ref 9.2–11.2)
RBC # BLD AUTO: 4.02 M/UL (ref 4.1–5.7)
RBC # BLD AUTO: 4.03 M/UL (ref 4.1–5.7)
SAO2 % BLD: 100 % (ref 94–98)
SAO2 % BLD: 100 % (ref 94–98)
SAO2 % BLD: 96 % (ref 92–97)
SAO2 % BLD: 98 % (ref 92–97)
SAO2 % BLD: 99 % (ref 94–98)
SAO2 % BLD: 99 % (ref 94–98)
SAO2% DEVICE SAO2% SENSOR NAME: ABNORMAL
SAO2% DEVICE SAO2% SENSOR NAME: ABNORMAL
SERVICE CMNT-IMP: ABNORMAL
SERVICE CMNT-IMP: NORMAL
SODIUM BLD-SCNC: 140 MMOL/L (ref 136–145)
SODIUM BLD-SCNC: 141 MMOL/L (ref 136–145)
SODIUM BLD-SCNC: 141 MMOL/L (ref 136–145)
SODIUM BLD-SCNC: 142 MMOL/L (ref 136–145)
SODIUM BLD-SCNC: 143 MMOL/L (ref 136–145)
SODIUM BLD-SCNC: 143 MMOL/L (ref 136–145)
SODIUM SERPL-SCNC: 138 MMOL/L (ref 136–145)
SODIUM SERPL-SCNC: 139 MMOL/L (ref 136–145)
SPECIMEN SITE: ABNORMAL
THERAPEUTIC RANGE: ABNORMAL SECS (ref 58–77)
VENTILATION MODE VENT: ABNORMAL
VT SETTING VENT: 550
WBC # BLD AUTO: 19.4 K/UL (ref 4.1–11.1)
WBC # BLD AUTO: 21.5 K/UL (ref 4.1–11.1)

## 2025-08-18 PROCEDURE — C1889 IMPLANT/INSERT DEVICE, NOC: HCPCS | Performed by: THORACIC SURGERY (CARDIOTHORACIC VASCULAR SURGERY)

## 2025-08-18 PROCEDURE — 84295 ASSAY OF SERUM SODIUM: CPT

## 2025-08-18 PROCEDURE — 2709999900 HC NON-CHARGEABLE SUPPLY: Performed by: THORACIC SURGERY (CARDIOTHORACIC VASCULAR SURGERY)

## 2025-08-18 PROCEDURE — 36415 COLL VENOUS BLD VENIPUNCTURE: CPT

## 2025-08-18 PROCEDURE — 82330 ASSAY OF CALCIUM: CPT

## 2025-08-18 PROCEDURE — 02100Z9 BYPASS CORONARY ARTERY, ONE ARTERY FROM LEFT INTERNAL MAMMARY, OPEN APPROACH: ICD-10-PCS | Performed by: THORACIC SURGERY (CARDIOTHORACIC VASCULAR SURGERY)

## 2025-08-18 PROCEDURE — 85610 PROTHROMBIN TIME: CPT

## 2025-08-18 PROCEDURE — 6370000000 HC RX 637 (ALT 250 FOR IP): Performed by: NURSE ANESTHETIST, CERTIFIED REGISTERED

## 2025-08-18 PROCEDURE — 80053 COMPREHEN METABOLIC PANEL: CPT

## 2025-08-18 PROCEDURE — 3700000001 HC ADD 15 MINUTES (ANESTHESIA): Performed by: THORACIC SURGERY (CARDIOTHORACIC VASCULAR SURGERY)

## 2025-08-18 PROCEDURE — P9045 ALBUMIN (HUMAN), 5%, 250 ML: HCPCS | Performed by: PHYSICIAN ASSISTANT

## 2025-08-18 PROCEDURE — 82962 GLUCOSE BLOOD TEST: CPT

## 2025-08-18 PROCEDURE — P9045 ALBUMIN (HUMAN), 5%, 250 ML: HCPCS | Performed by: NURSE ANESTHETIST, CERTIFIED REGISTERED

## 2025-08-18 PROCEDURE — 6360000002 HC RX W HCPCS: Performed by: PHYSICIAN ASSISTANT

## 2025-08-18 PROCEDURE — 2500000003 HC RX 250 WO HCPCS: Performed by: THORACIC SURGERY (CARDIOTHORACIC VASCULAR SURGERY)

## 2025-08-18 PROCEDURE — C1751 CATH, INF, PER/CENT/MIDLINE: HCPCS | Performed by: THORACIC SURGERY (CARDIOTHORACIC VASCULAR SURGERY)

## 2025-08-18 PROCEDURE — 6370000000 HC RX 637 (ALT 250 FOR IP): Performed by: PHYSICIAN ASSISTANT

## 2025-08-18 PROCEDURE — C1713 ANCHOR/SCREW BN/BN,TIS/BN: HCPCS | Performed by: THORACIC SURGERY (CARDIOTHORACIC VASCULAR SURGERY)

## 2025-08-18 PROCEDURE — B246ZZ4 ULTRASONOGRAPHY OF RIGHT AND LEFT HEART, TRANSESOPHAGEAL: ICD-10-PCS | Performed by: ANESTHESIOLOGY

## 2025-08-18 PROCEDURE — 3700000000 HC ANESTHESIA ATTENDED CARE: Performed by: THORACIC SURGERY (CARDIOTHORACIC VASCULAR SURGERY)

## 2025-08-18 PROCEDURE — 6360000002 HC RX W HCPCS: Performed by: NURSE ANESTHETIST, CERTIFIED REGISTERED

## 2025-08-18 PROCEDURE — 94002 VENT MGMT INPAT INIT DAY: CPT

## 2025-08-18 PROCEDURE — 37799 UNLISTED PX VASCULAR SURGERY: CPT

## 2025-08-18 PROCEDURE — 06BP4ZZ EXCISION OF RIGHT SAPHENOUS VEIN, PERCUTANEOUS ENDOSCOPIC APPROACH: ICD-10-PCS | Performed by: THORACIC SURGERY (CARDIOTHORACIC VASCULAR SURGERY)

## 2025-08-18 PROCEDURE — 71045 X-RAY EXAM CHEST 1 VIEW: CPT

## 2025-08-18 PROCEDURE — 6360000002 HC RX W HCPCS: Performed by: THORACIC SURGERY (CARDIOTHORACIC VASCULAR SURGERY)

## 2025-08-18 PROCEDURE — 2500000003 HC RX 250 WO HCPCS: Performed by: PHYSICIAN ASSISTANT

## 2025-08-18 PROCEDURE — 2720000010 HC SURG SUPPLY STERILE: Performed by: THORACIC SURGERY (CARDIOTHORACIC VASCULAR SURGERY)

## 2025-08-18 PROCEDURE — 2580000003 HC RX 258: Performed by: NURSE ANESTHETIST, CERTIFIED REGISTERED

## 2025-08-18 PROCEDURE — C1729 CATH, DRAINAGE: HCPCS | Performed by: THORACIC SURGERY (CARDIOTHORACIC VASCULAR SURGERY)

## 2025-08-18 PROCEDURE — 3600000008 HC SURGERY OHS BASE: Performed by: THORACIC SURGERY (CARDIOTHORACIC VASCULAR SURGERY)

## 2025-08-18 PROCEDURE — 82947 ASSAY GLUCOSE BLOOD QUANT: CPT

## 2025-08-18 PROCEDURE — 6370000000 HC RX 637 (ALT 250 FOR IP): Performed by: THORACIC SURGERY (CARDIOTHORACIC VASCULAR SURGERY)

## 2025-08-18 PROCEDURE — 3600000018 HC SURGERY OHS ADDTL 15MIN: Performed by: THORACIC SURGERY (CARDIOTHORACIC VASCULAR SURGERY)

## 2025-08-18 PROCEDURE — 02L70CK OCCLUSION OF LEFT ATRIAL APPENDAGE WITH EXTRALUMINAL DEVICE, OPEN APPROACH: ICD-10-PCS | Performed by: THORACIC SURGERY (CARDIOTHORACIC VASCULAR SURGERY)

## 2025-08-18 PROCEDURE — 93005 ELECTROCARDIOGRAM TRACING: CPT

## 2025-08-18 PROCEDURE — 83735 ASSAY OF MAGNESIUM: CPT

## 2025-08-18 PROCEDURE — 6360000002 HC RX W HCPCS: Performed by: ANESTHESIOLOGY

## 2025-08-18 PROCEDURE — 2500000003 HC RX 250 WO HCPCS: Performed by: NURSE ANESTHETIST, CERTIFIED REGISTERED

## 2025-08-18 PROCEDURE — 021009W BYPASS CORONARY ARTERY, ONE ARTERY FROM AORTA WITH AUTOLOGOUS VENOUS TISSUE, OPEN APPROACH: ICD-10-PCS | Performed by: THORACIC SURGERY (CARDIOTHORACIC VASCULAR SURGERY)

## 2025-08-18 PROCEDURE — 85027 COMPLETE CBC AUTOMATED: CPT

## 2025-08-18 PROCEDURE — 2580000003 HC RX 258: Performed by: PHYSICIAN ASSISTANT

## 2025-08-18 PROCEDURE — 2100000001 HC CVICU R&B

## 2025-08-18 PROCEDURE — 85018 HEMOGLOBIN: CPT

## 2025-08-18 PROCEDURE — 2580000003 HC RX 258: Performed by: ANESTHESIOLOGY

## 2025-08-18 PROCEDURE — 82803 BLOOD GASES ANY COMBINATION: CPT

## 2025-08-18 PROCEDURE — 5A1221Z PERFORMANCE OF CARDIAC OUTPUT, CONTINUOUS: ICD-10-PCS | Performed by: THORACIC SURGERY (CARDIOTHORACIC VASCULAR SURGERY)

## 2025-08-18 PROCEDURE — 84132 ASSAY OF SERUM POTASSIUM: CPT

## 2025-08-18 PROCEDURE — A4648 IMPLANTABLE TISSUE MARKER: HCPCS | Performed by: THORACIC SURGERY (CARDIOTHORACIC VASCULAR SURGERY)

## 2025-08-18 PROCEDURE — 85730 THROMBOPLASTIN TIME PARTIAL: CPT

## 2025-08-18 DEVICE — CLIP MED SUTURE LESS 40 MM SYS 1 HND STRL ATRICLIP FLX V: Type: IMPLANTABLE DEVICE | Site: HEART | Status: FUNCTIONAL

## 2025-08-18 RX ORDER — SODIUM CHLORIDE 450 MG/100ML
INJECTION, SOLUTION INTRAVENOUS CONTINUOUS
Status: DISCONTINUED | OUTPATIENT
Start: 2025-08-18 | End: 2025-08-20

## 2025-08-18 RX ORDER — SODIUM CHLORIDE 9 MG/ML
INJECTION, SOLUTION INTRAVENOUS PRN
Status: DISCONTINUED | OUTPATIENT
Start: 2025-08-18 | End: 2025-08-18 | Stop reason: HOSPADM

## 2025-08-18 RX ORDER — ACETAMINOPHEN 500 MG
1000 TABLET ORAL EVERY 6 HOURS
Status: DISCONTINUED | OUTPATIENT
Start: 2025-08-18 | End: 2025-08-22

## 2025-08-18 RX ORDER — MIDAZOLAM HYDROCHLORIDE 1 MG/ML
INJECTION, SOLUTION INTRAMUSCULAR; INTRAVENOUS
Status: DISCONTINUED | OUTPATIENT
Start: 2025-08-18 | End: 2025-08-18 | Stop reason: SDUPTHER

## 2025-08-18 RX ORDER — SODIUM CHLORIDE, SODIUM LACTATE, POTASSIUM CHLORIDE, CALCIUM CHLORIDE 600; 310; 30; 20 MG/100ML; MG/100ML; MG/100ML; MG/100ML
INJECTION, SOLUTION INTRAVENOUS CONTINUOUS
Status: DISCONTINUED | OUTPATIENT
Start: 2025-08-18 | End: 2025-08-18 | Stop reason: HOSPADM

## 2025-08-18 RX ORDER — DEXAMETHASONE SODIUM PHOSPHATE 4 MG/ML
INJECTION, SOLUTION INTRA-ARTICULAR; INTRALESIONAL; INTRAMUSCULAR; INTRAVENOUS; SOFT TISSUE
Status: DISCONTINUED | OUTPATIENT
Start: 2025-08-18 | End: 2025-08-18 | Stop reason: SDUPTHER

## 2025-08-18 RX ORDER — PROTAMINE SULFATE 10 MG/ML
INJECTION, SOLUTION INTRAVENOUS
Status: DISCONTINUED | OUTPATIENT
Start: 2025-08-18 | End: 2025-08-18 | Stop reason: SDUPTHER

## 2025-08-18 RX ORDER — HYDRALAZINE HYDROCHLORIDE 20 MG/ML
10 INJECTION INTRAMUSCULAR; INTRAVENOUS EVERY 6 HOURS PRN
Status: DISCONTINUED | OUTPATIENT
Start: 2025-08-18 | End: 2025-08-23 | Stop reason: HOSPADM

## 2025-08-18 RX ORDER — DEXMEDETOMIDINE HYDROCHLORIDE 4 UG/ML
.1-1.5 INJECTION, SOLUTION INTRAVENOUS CONTINUOUS
Status: DISCONTINUED | OUTPATIENT
Start: 2025-08-18 | End: 2025-08-20

## 2025-08-18 RX ORDER — SUCCINYLCHOLINE CHLORIDE 20 MG/ML
INJECTION INTRAMUSCULAR; INTRAVENOUS
Status: DISCONTINUED | OUTPATIENT
Start: 2025-08-18 | End: 2025-08-18 | Stop reason: SDUPTHER

## 2025-08-18 RX ORDER — SODIUM CHLORIDE 0.9 % (FLUSH) 0.9 %
5-40 SYRINGE (ML) INJECTION EVERY 12 HOURS SCHEDULED
Status: DISCONTINUED | OUTPATIENT
Start: 2025-08-18 | End: 2025-08-18 | Stop reason: HOSPADM

## 2025-08-18 RX ORDER — ALBUMIN HUMAN 50 G/1000ML
SOLUTION INTRAVENOUS
Status: DISPENSED
Start: 2025-08-18 | End: 2025-08-19

## 2025-08-18 RX ORDER — CEFAZOLIN SODIUM 1 G/3ML
INJECTION, POWDER, FOR SOLUTION INTRAMUSCULAR; INTRAVENOUS PRN
Status: DISCONTINUED | OUTPATIENT
Start: 2025-08-18 | End: 2025-08-18 | Stop reason: ALTCHOICE

## 2025-08-18 RX ORDER — SODIUM CHLORIDE 9 MG/ML
INJECTION, SOLUTION INTRAVENOUS CONTINUOUS
Status: DISCONTINUED | OUTPATIENT
Start: 2025-08-18 | End: 2025-08-20

## 2025-08-18 RX ORDER — HEPARIN SODIUM 1000 [USP'U]/ML
INJECTION, SOLUTION INTRAVENOUS; SUBCUTANEOUS
Status: DISCONTINUED | OUTPATIENT
Start: 2025-08-18 | End: 2025-08-18 | Stop reason: SDUPTHER

## 2025-08-18 RX ORDER — OXYCODONE HYDROCHLORIDE 5 MG/1
10 TABLET ORAL EVERY 4 HOURS PRN
Status: DISCONTINUED | OUTPATIENT
Start: 2025-08-18 | End: 2025-08-23 | Stop reason: HOSPADM

## 2025-08-18 RX ORDER — GABAPENTIN 100 MG/1
200 CAPSULE ORAL 3 TIMES DAILY
Status: DISCONTINUED | OUTPATIENT
Start: 2025-08-18 | End: 2025-08-23 | Stop reason: HOSPADM

## 2025-08-18 RX ORDER — ASPIRIN 81 MG/1
81 TABLET ORAL DAILY
Status: DISCONTINUED | OUTPATIENT
Start: 2025-08-19 | End: 2025-08-23 | Stop reason: HOSPADM

## 2025-08-18 RX ORDER — ATORVASTATIN CALCIUM 20 MG/1
20 TABLET, FILM COATED ORAL NIGHTLY
Status: DISCONTINUED | OUTPATIENT
Start: 2025-08-18 | End: 2025-08-23 | Stop reason: HOSPADM

## 2025-08-18 RX ORDER — SODIUM CHLORIDE 0.9 % (FLUSH) 0.9 %
5-40 SYRINGE (ML) INJECTION PRN
Status: DISCONTINUED | OUTPATIENT
Start: 2025-08-18 | End: 2025-08-18 | Stop reason: HOSPADM

## 2025-08-18 RX ORDER — AMIODARONE HYDROCHLORIDE 200 MG/1
400 TABLET ORAL 2 TIMES DAILY
Status: DISCONTINUED | OUTPATIENT
Start: 2025-08-19 | End: 2025-08-23 | Stop reason: HOSPADM

## 2025-08-18 RX ORDER — METOPROLOL TARTRATE 25 MG/1
25 TABLET, FILM COATED ORAL ONCE
Status: COMPLETED | OUTPATIENT
Start: 2025-08-18 | End: 2025-08-18

## 2025-08-18 RX ORDER — MUPIROCIN 2 %
OINTMENT (GRAM) TOPICAL 2 TIMES DAILY
Status: COMPLETED | OUTPATIENT
Start: 2025-08-18 | End: 2025-08-22

## 2025-08-18 RX ORDER — PAPAVERINE HYDROCHLORIDE 30 MG/ML
INJECTION INTRAMUSCULAR; INTRAVENOUS PRN
Status: DISCONTINUED | OUTPATIENT
Start: 2025-08-18 | End: 2025-08-18 | Stop reason: ALTCHOICE

## 2025-08-18 RX ORDER — MAGNESIUM SULFATE HEPTAHYDRATE 40 MG/ML
INJECTION, SOLUTION INTRAVENOUS
Status: DISCONTINUED | OUTPATIENT
Start: 2025-08-18 | End: 2025-08-18 | Stop reason: SDUPTHER

## 2025-08-18 RX ORDER — POLYETHYLENE GLYCOL 3350 17 G/17G
17 POWDER, FOR SOLUTION ORAL DAILY
Status: DISCONTINUED | OUTPATIENT
Start: 2025-08-18 | End: 2025-08-23 | Stop reason: HOSPADM

## 2025-08-18 RX ORDER — GLUCAGON 1 MG/ML
1 KIT INJECTION PRN
Status: DISCONTINUED | OUTPATIENT
Start: 2025-08-18 | End: 2025-08-23 | Stop reason: HOSPADM

## 2025-08-18 RX ORDER — GABAPENTIN 300 MG/1
300 CAPSULE ORAL ONCE
Status: COMPLETED | OUTPATIENT
Start: 2025-08-18 | End: 2025-08-18

## 2025-08-18 RX ORDER — MAGNESIUM SULFATE IN WATER 40 MG/ML
2000 INJECTION, SOLUTION INTRAVENOUS PRN
Status: DISCONTINUED | OUTPATIENT
Start: 2025-08-18 | End: 2025-08-23 | Stop reason: HOSPADM

## 2025-08-18 RX ORDER — SODIUM CHLORIDE 0.9 % (FLUSH) 0.9 %
5-40 SYRINGE (ML) INJECTION EVERY 12 HOURS SCHEDULED
Status: DISCONTINUED | OUTPATIENT
Start: 2025-08-18 | End: 2025-08-23 | Stop reason: HOSPADM

## 2025-08-18 RX ORDER — DIPHENHYDRAMINE HCL 25 MG
25 CAPSULE ORAL NIGHTLY PRN
Status: DISCONTINUED | OUTPATIENT
Start: 2025-08-19 | End: 2025-08-23 | Stop reason: HOSPADM

## 2025-08-18 RX ORDER — INDOMETHACIN 25 MG/1
CAPSULE ORAL
Status: DISPENSED
Start: 2025-08-18 | End: 2025-08-19

## 2025-08-18 RX ORDER — LIDOCAINE HYDROCHLORIDE 20 MG/ML
INJECTION, SOLUTION EPIDURAL; INFILTRATION; INTRACAUDAL; PERINEURAL
Status: DISCONTINUED | OUTPATIENT
Start: 2025-08-18 | End: 2025-08-18 | Stop reason: SDUPTHER

## 2025-08-18 RX ORDER — CHLORHEXIDINE GLUCONATE ORAL RINSE 1.2 MG/ML
15 SOLUTION DENTAL 2 TIMES DAILY
Status: DISCONTINUED | OUTPATIENT
Start: 2025-08-18 | End: 2025-08-23 | Stop reason: HOSPADM

## 2025-08-18 RX ORDER — FENTANYL CITRATE 50 UG/ML
INJECTION, SOLUTION INTRAMUSCULAR; INTRAVENOUS
Status: DISCONTINUED | OUTPATIENT
Start: 2025-08-18 | End: 2025-08-18 | Stop reason: SDUPTHER

## 2025-08-18 RX ORDER — ACETAMINOPHEN 500 MG
1000 TABLET ORAL ONCE
Status: COMPLETED | OUTPATIENT
Start: 2025-08-18 | End: 2025-08-18

## 2025-08-18 RX ORDER — ROCURONIUM BROMIDE 10 MG/ML
INJECTION, SOLUTION INTRAVENOUS
Status: DISCONTINUED | OUTPATIENT
Start: 2025-08-18 | End: 2025-08-18 | Stop reason: SDUPTHER

## 2025-08-18 RX ORDER — ALBUMIN HUMAN 50 G/1000ML
SOLUTION INTRAVENOUS
Status: DISCONTINUED | OUTPATIENT
Start: 2025-08-18 | End: 2025-08-18 | Stop reason: SDUPTHER

## 2025-08-18 RX ORDER — BISACODYL 10 MG
10 SUPPOSITORY, RECTAL RECTAL DAILY PRN
Status: DISCONTINUED | OUTPATIENT
Start: 2025-08-18 | End: 2025-08-23 | Stop reason: HOSPADM

## 2025-08-18 RX ORDER — IPRATROPIUM BROMIDE AND ALBUTEROL SULFATE 2.5; .5 MG/3ML; MG/3ML
1 SOLUTION RESPIRATORY (INHALATION) EVERY 4 HOURS PRN
Status: DISCONTINUED | OUTPATIENT
Start: 2025-08-18 | End: 2025-08-23 | Stop reason: HOSPADM

## 2025-08-18 RX ORDER — DEXTROSE MONOHYDRATE 100 MG/ML
INJECTION, SOLUTION INTRAVENOUS CONTINUOUS PRN
Status: DISCONTINUED | OUTPATIENT
Start: 2025-08-18 | End: 2025-08-23 | Stop reason: HOSPADM

## 2025-08-18 RX ORDER — 0.9 % SODIUM CHLORIDE 0.9 %
INTRAVENOUS SOLUTION INTRAVENOUS
Status: DISCONTINUED | OUTPATIENT
Start: 2025-08-18 | End: 2025-08-18 | Stop reason: SDUPTHER

## 2025-08-18 RX ORDER — LIDOCAINE 4 G/G
2 PATCH TOPICAL DAILY
Status: DISCONTINUED | OUTPATIENT
Start: 2025-08-18 | End: 2025-08-23 | Stop reason: HOSPADM

## 2025-08-18 RX ORDER — ONDANSETRON 2 MG/ML
INJECTION INTRAMUSCULAR; INTRAVENOUS
Status: DISCONTINUED | OUTPATIENT
Start: 2025-08-18 | End: 2025-08-18 | Stop reason: SDUPTHER

## 2025-08-18 RX ORDER — BUPIVACAINE HYDROCHLORIDE 2.5 MG/ML
INJECTION, SOLUTION EPIDURAL; INFILTRATION; INTRACAUDAL; PERINEURAL PRN
Status: DISCONTINUED | OUTPATIENT
Start: 2025-08-18 | End: 2025-08-18 | Stop reason: ALTCHOICE

## 2025-08-18 RX ORDER — ALBUMIN HUMAN 50 G/1000ML
12.5 SOLUTION INTRAVENOUS PRN
Status: COMPLETED | OUTPATIENT
Start: 2025-08-18 | End: 2025-08-19

## 2025-08-18 RX ORDER — PROPOFOL 10 MG/ML
INJECTION, EMULSION INTRAVENOUS
Status: DISCONTINUED | OUTPATIENT
Start: 2025-08-18 | End: 2025-08-18 | Stop reason: SDUPTHER

## 2025-08-18 RX ORDER — DESMOPRESSIN ACETATE 4 UG/ML
INJECTION, SOLUTION INTRAVENOUS; SUBCUTANEOUS
Status: DISCONTINUED | OUTPATIENT
Start: 2025-08-18 | End: 2025-08-18 | Stop reason: SDUPTHER

## 2025-08-18 RX ORDER — LANOLIN ALCOHOL/MO/W.PET/CERES
400 CREAM (GRAM) TOPICAL 2 TIMES DAILY
Status: DISCONTINUED | OUTPATIENT
Start: 2025-08-19 | End: 2025-08-23 | Stop reason: HOSPADM

## 2025-08-18 RX ORDER — EPHEDRINE SULFATE/0.9% NACL/PF 50 MG/5 ML
SYRINGE (ML) INTRAVENOUS
Status: DISCONTINUED | OUTPATIENT
Start: 2025-08-18 | End: 2025-08-18 | Stop reason: SDUPTHER

## 2025-08-18 RX ORDER — OXYCODONE HYDROCHLORIDE 5 MG/1
5 TABLET ORAL EVERY 4 HOURS PRN
Status: DISCONTINUED | OUTPATIENT
Start: 2025-08-18 | End: 2025-08-23 | Stop reason: HOSPADM

## 2025-08-18 RX ORDER — SODIUM CHLORIDE, SODIUM LACTATE, POTASSIUM CHLORIDE, CALCIUM CHLORIDE 600; 310; 30; 20 MG/100ML; MG/100ML; MG/100ML; MG/100ML
INJECTION, SOLUTION INTRAVENOUS
Status: DISCONTINUED | OUTPATIENT
Start: 2025-08-18 | End: 2025-08-18 | Stop reason: SDUPTHER

## 2025-08-18 RX ORDER — ONDANSETRON 2 MG/ML
4 INJECTION INTRAMUSCULAR; INTRAVENOUS EVERY 4 HOURS PRN
Status: DISCONTINUED | OUTPATIENT
Start: 2025-08-18 | End: 2025-08-23 | Stop reason: HOSPADM

## 2025-08-18 RX ORDER — MIDAZOLAM HYDROCHLORIDE 2 MG/2ML
1 INJECTION, SOLUTION INTRAMUSCULAR; INTRAVENOUS
Status: DISCONTINUED | OUTPATIENT
Start: 2025-08-18 | End: 2025-08-23 | Stop reason: HOSPADM

## 2025-08-18 RX ORDER — SODIUM CHLORIDE 0.9 % (FLUSH) 0.9 %
5-40 SYRINGE (ML) INJECTION PRN
Status: DISCONTINUED | OUTPATIENT
Start: 2025-08-18 | End: 2025-08-23 | Stop reason: HOSPADM

## 2025-08-18 RX ORDER — FAMOTIDINE 20 MG/1
20 TABLET, FILM COATED ORAL 2 TIMES DAILY
Status: COMPLETED | OUTPATIENT
Start: 2025-08-18 | End: 2025-08-22

## 2025-08-18 RX ORDER — POTASSIUM CHLORIDE 29.8 MG/ML
20 INJECTION INTRAVENOUS PRN
Status: DISCONTINUED | OUTPATIENT
Start: 2025-08-18 | End: 2025-08-23 | Stop reason: HOSPADM

## 2025-08-18 RX ORDER — ONDANSETRON 2 MG/ML
4 INJECTION INTRAMUSCULAR; INTRAVENOUS ONCE
Status: COMPLETED | OUTPATIENT
Start: 2025-08-18 | End: 2025-08-18

## 2025-08-18 RX ORDER — 0.9 % SODIUM CHLORIDE 0.9 %
INTRAVENOUS SOLUTION INTRAVENOUS
Status: DISCONTINUED | OUTPATIENT
Start: 2025-08-18 | End: 2025-08-18

## 2025-08-18 RX ORDER — INSULIN LISPRO 100 [IU]/ML
0-6 INJECTION, SOLUTION INTRAVENOUS; SUBCUTANEOUS NIGHTLY
Status: DISCONTINUED | OUTPATIENT
Start: 2025-08-18 | End: 2025-08-23 | Stop reason: HOSPADM

## 2025-08-18 RX ORDER — EPHEDRINE SULFATE/0.9% NACL/PF 25 MG/5 ML
SYRINGE (ML) INTRAVENOUS
Status: DISCONTINUED | OUTPATIENT
Start: 2025-08-18 | End: 2025-08-18 | Stop reason: SDUPTHER

## 2025-08-18 RX ORDER — INDOMETHACIN 25 MG/1
50 CAPSULE ORAL
Status: COMPLETED | OUTPATIENT
Start: 2025-08-18 | End: 2025-08-18

## 2025-08-18 RX ORDER — SENNA AND DOCUSATE SODIUM 50; 8.6 MG/1; MG/1
1 TABLET, FILM COATED ORAL 2 TIMES DAILY
Status: DISCONTINUED | OUTPATIENT
Start: 2025-08-18 | End: 2025-08-23 | Stop reason: HOSPADM

## 2025-08-18 RX ORDER — INSULIN LISPRO 100 [IU]/ML
0-12 INJECTION, SOLUTION INTRAVENOUS; SUBCUTANEOUS
Status: DISCONTINUED | OUTPATIENT
Start: 2025-08-18 | End: 2025-08-23 | Stop reason: HOSPADM

## 2025-08-18 RX ADMIN — PROPOFOL 50 MG: 10 INJECTION, EMULSION INTRAVENOUS at 08:18

## 2025-08-18 RX ADMIN — SODIUM BICARBONATE 50 MEQ: 84 INJECTION, SOLUTION INTRAVENOUS at 14:03

## 2025-08-18 RX ADMIN — ALBUMIN (HUMAN) 12.5 G: 12.5 INJECTION, SOLUTION INTRAVENOUS at 13:14

## 2025-08-18 RX ADMIN — GABAPENTIN 300 MG: 300 CAPSULE ORAL at 06:23

## 2025-08-18 RX ADMIN — MIDAZOLAM HYDROCHLORIDE 2 MG: 1 INJECTION, SOLUTION INTRAMUSCULAR; INTRAVENOUS at 07:46

## 2025-08-18 RX ADMIN — SODIUM CHLORIDE, PRESERVATIVE FREE 10 ML: 5 INJECTION INTRAVENOUS at 21:00

## 2025-08-18 RX ADMIN — SODIUM CHLORIDE 1.5 UNITS/HR: 9 INJECTION, SOLUTION INTRAVENOUS at 08:00

## 2025-08-18 RX ADMIN — ONDANSETRON 4 MG: 2 INJECTION, SOLUTION INTRAMUSCULAR; INTRAVENOUS at 10:32

## 2025-08-18 RX ADMIN — ALBUMIN (HUMAN) 12.5 G: 12.5 INJECTION, SOLUTION INTRAVENOUS at 11:29

## 2025-08-18 RX ADMIN — Medication 3 AMPULE: at 06:30

## 2025-08-18 RX ADMIN — POTASSIUM CHLORIDE 20 MEQ: 29.8 INJECTION, SOLUTION INTRAVENOUS at 14:10

## 2025-08-18 RX ADMIN — MUPIROCIN: 20 OINTMENT TOPICAL at 21:23

## 2025-08-18 RX ADMIN — TRANEXAMIC ACID 1 MG/KG/HR: 100 INJECTION, SOLUTION INTRAVENOUS at 08:03

## 2025-08-18 RX ADMIN — Medication 8 MCG: at 11:31

## 2025-08-18 RX ADMIN — PROTAMINE SULFATE 250 MG: 10 INJECTION, SOLUTION INTRAVENOUS at 10:29

## 2025-08-18 RX ADMIN — ACETAMINOPHEN 1000 MG: 500 TABLET, FILM COATED ORAL at 18:32

## 2025-08-18 RX ADMIN — ONDANSETRON 4 MG: 2 INJECTION, SOLUTION INTRAMUSCULAR; INTRAVENOUS at 13:29

## 2025-08-18 RX ADMIN — ONDANSETRON 4 MG: 2 INJECTION, SOLUTION INTRAMUSCULAR; INTRAVENOUS at 17:50

## 2025-08-18 RX ADMIN — FAMOTIDINE 20 MG: 20 TABLET, FILM COATED ORAL at 21:21

## 2025-08-18 RX ADMIN — DEXMEDETOMIDINE 0.3 MCG/KG/HR: 100 INJECTION, SOLUTION INTRAVENOUS at 08:04

## 2025-08-18 RX ADMIN — SODIUM CHLORIDE: 0.45 INJECTION, SOLUTION INTRAVENOUS at 13:17

## 2025-08-18 RX ADMIN — PHENYLEPHRINE HYDROCHLORIDE 40 MCG/MIN: 50 INJECTION INTRAVENOUS at 12:46

## 2025-08-18 RX ADMIN — HEPARIN SODIUM 40000 UNITS: 1000 INJECTION, SOLUTION INTRAVENOUS; SUBCUTANEOUS at 09:09

## 2025-08-18 RX ADMIN — FENTANYL CITRATE 50 MCG: 50 INJECTION, SOLUTION INTRAMUSCULAR; INTRAVENOUS at 11:49

## 2025-08-18 RX ADMIN — FENTANYL CITRATE 50 MCG: 50 INJECTION, SOLUTION INTRAMUSCULAR; INTRAVENOUS at 07:00

## 2025-08-18 RX ADMIN — LIDOCAINE HYDROCHLORIDE 60 MG: 20 INJECTION, SOLUTION EPIDURAL; INFILTRATION; INTRACAUDAL; PERINEURAL at 07:46

## 2025-08-18 RX ADMIN — SODIUM CHLORIDE: 0.9 INJECTION, SOLUTION INTRAVENOUS at 13:18

## 2025-08-18 RX ADMIN — ALBUMIN (HUMAN) 12.5 G: 12.5 INJECTION, SOLUTION INTRAVENOUS at 12:00

## 2025-08-18 RX ADMIN — GABAPENTIN 200 MG: 100 CAPSULE ORAL at 21:21

## 2025-08-18 RX ADMIN — SODIUM CHLORIDE, POTASSIUM CHLORIDE, SODIUM LACTATE AND CALCIUM CHLORIDE: 600; 310; 30; 20 INJECTION, SOLUTION INTRAVENOUS at 07:30

## 2025-08-18 RX ADMIN — HYDROMORPHONE HYDROCHLORIDE 0.5 MG: 1 INJECTION, SOLUTION INTRAMUSCULAR; INTRAVENOUS; SUBCUTANEOUS at 23:06

## 2025-08-18 RX ADMIN — SUCCINYLCHOLINE CHLORIDE 120 MG: 20 INJECTION, SOLUTION INTRAMUSCULAR; INTRAVENOUS at 07:46

## 2025-08-18 RX ADMIN — ROCURONIUM BROMIDE 50 MG: 10 INJECTION INTRAVENOUS at 08:11

## 2025-08-18 RX ADMIN — OXYCODONE HYDROCHLORIDE 10 MG: 5 TABLET ORAL at 19:39

## 2025-08-18 RX ADMIN — DEXAMETHASONE SODIUM PHOSPHATE 4 MG: 4 INJECTION INTRA-ARTICULAR; INTRALESIONAL; INTRAMUSCULAR; INTRAVENOUS; SOFT TISSUE at 08:01

## 2025-08-18 RX ADMIN — PROPOFOL 50 MG: 10 INJECTION, EMULSION INTRAVENOUS at 08:16

## 2025-08-18 RX ADMIN — MUPIROCIN: 20 OINTMENT TOPICAL at 13:29

## 2025-08-18 RX ADMIN — ATORVASTATIN CALCIUM 20 MG: 20 TABLET, FILM COATED ORAL at 21:22

## 2025-08-18 RX ADMIN — MAGNESIUM SULFATE HEPTAHYDRATE 2000 MG: 40 INJECTION, SOLUTION INTRAVENOUS at 11:07

## 2025-08-18 RX ADMIN — FENTANYL CITRATE 100 MCG: 50 INJECTION, SOLUTION INTRAMUSCULAR; INTRAVENOUS at 08:12

## 2025-08-18 RX ADMIN — PHENYLEPHRINE HYDROCHLORIDE 40 MCG/MIN: 10 INJECTION INTRAVENOUS at 07:50

## 2025-08-18 RX ADMIN — DESMOPRESSIN ACETATE 30 MCG: 4 INJECTION, SOLUTION INTRAVENOUS; SUBCUTANEOUS at 10:38

## 2025-08-18 RX ADMIN — Medication 16 MCG: at 11:40

## 2025-08-18 RX ADMIN — SODIUM CHLORIDE 150 ML/HR: 9 INJECTION, SOLUTION INTRAVENOUS at 07:30

## 2025-08-18 RX ADMIN — FENTANYL CITRATE 100 MCG: 50 INJECTION, SOLUTION INTRAMUSCULAR; INTRAVENOUS at 08:16

## 2025-08-18 RX ADMIN — EPHEDRINE SULFATE 10 MG: 5 INJECTION INTRAVENOUS at 07:46

## 2025-08-18 RX ADMIN — SODIUM CHLORIDE, SODIUM LACTATE, POTASSIUM CHLORIDE, AND CALCIUM CHLORIDE: .6; .31; .03; .02 INJECTION, SOLUTION INTRAVENOUS at 07:08

## 2025-08-18 RX ADMIN — PROPOFOL 100 MG: 10 INJECTION, EMULSION INTRAVENOUS at 07:46

## 2025-08-18 RX ADMIN — FENTANYL CITRATE 100 MCG: 50 INJECTION, SOLUTION INTRAMUSCULAR; INTRAVENOUS at 10:47

## 2025-08-18 RX ADMIN — CALCIUM CHLORIDE 1000 MG: 100 INJECTION, SOLUTION INTRAVENOUS at 13:33

## 2025-08-18 RX ADMIN — SUGAMMADEX 200 MG: 100 INJECTION, SOLUTION INTRAVENOUS at 11:49

## 2025-08-18 RX ADMIN — ACETAMINOPHEN 1000 MG: 325 TABLET ORAL at 06:22

## 2025-08-18 RX ADMIN — PHENYLEPHRINE HYDROCHLORIDE 80 MCG: 10 INJECTION INTRAVENOUS at 08:42

## 2025-08-18 RX ADMIN — FAMOTIDINE 20 MG: 10 INJECTION, SOLUTION INTRAVENOUS at 13:29

## 2025-08-18 RX ADMIN — ROCURONIUM BROMIDE 50 MG: 10 INJECTION INTRAVENOUS at 07:55

## 2025-08-18 RX ADMIN — FENTANYL CITRATE 150 MCG: 50 INJECTION, SOLUTION INTRAMUSCULAR; INTRAVENOUS at 08:22

## 2025-08-18 RX ADMIN — Medication 10 MG: at 09:10

## 2025-08-18 RX ADMIN — FENTANYL CITRATE 50 MCG: 50 INJECTION, SOLUTION INTRAMUSCULAR; INTRAVENOUS at 09:03

## 2025-08-18 RX ADMIN — MIDAZOLAM HYDROCHLORIDE 1 MG: 1 INJECTION, SOLUTION INTRAMUSCULAR; INTRAVENOUS at 07:07

## 2025-08-18 RX ADMIN — FENTANYL CITRATE 50 MCG: 50 INJECTION, SOLUTION INTRAMUSCULAR; INTRAVENOUS at 07:46

## 2025-08-18 RX ADMIN — HYDROMORPHONE HYDROCHLORIDE 0.5 MG: 1 INJECTION, SOLUTION INTRAMUSCULAR; INTRAVENOUS; SUBCUTANEOUS at 15:17

## 2025-08-18 RX ADMIN — SODIUM CHLORIDE 3.2 UNITS/HR: 9 INJECTION, SOLUTION INTRAVENOUS at 12:25

## 2025-08-18 RX ADMIN — MIDAZOLAM HYDROCHLORIDE 2 MG: 1 INJECTION, SOLUTION INTRAMUSCULAR; INTRAVENOUS at 06:59

## 2025-08-18 RX ADMIN — ALBUMIN (HUMAN) 12.5 G: 12.5 INJECTION, SOLUTION INTRAVENOUS at 11:39

## 2025-08-18 RX ADMIN — WATER 2000 MG: 1 INJECTION INTRAMUSCULAR; INTRAVENOUS; SUBCUTANEOUS at 15:17

## 2025-08-18 RX ADMIN — WATER 2000 MG: 1 INJECTION INTRAMUSCULAR; INTRAVENOUS; SUBCUTANEOUS at 08:05

## 2025-08-18 RX ADMIN — METOPROLOL TARTRATE 25 MG: 25 TABLET, FILM COATED ORAL at 06:23

## 2025-08-18 RX ADMIN — FENTANYL CITRATE 100 MCG: 50 INJECTION, SOLUTION INTRAMUSCULAR; INTRAVENOUS at 08:28

## 2025-08-18 RX ADMIN — PROPOFOL 80 MG: 10 INJECTION, EMULSION INTRAVENOUS at 08:22

## 2025-08-18 RX ADMIN — CALCIUM CHLORIDE 1000 MG: 100 INJECTION, SOLUTION INTRAVENOUS at 18:57

## 2025-08-18 RX ADMIN — SENNOSIDES, DOCUSATE SODIUM 1 TABLET: 50; 8.6 TABLET, FILM COATED ORAL at 21:22

## 2025-08-18 RX ADMIN — 0.12% CHLORHEXIDINE GLUCONATE 15 ML: 1.2 RINSE ORAL at 21:22

## 2025-08-18 ASSESSMENT — PAIN DESCRIPTION - LOCATION
LOCATION: INCISION
LOCATION: CHEST
LOCATION: BACK;INCISION
LOCATION: CHEST
LOCATION: INCISION
LOCATION: INCISION
LOCATION: BACK;INCISION
LOCATION: INCISION;BACK
LOCATION: CHEST

## 2025-08-18 ASSESSMENT — PAIN - FUNCTIONAL ASSESSMENT
PAIN_FUNCTIONAL_ASSESSMENT: 0-10
PAIN_FUNCTIONAL_ASSESSMENT: 0-10
PAIN_FUNCTIONAL_ASSESSMENT: PREVENTS OR INTERFERES SOME ACTIVE ACTIVITIES AND ADLS
PAIN_FUNCTIONAL_ASSESSMENT: ACTIVITIES ARE NOT PREVENTED
PAIN_FUNCTIONAL_ASSESSMENT: 0-10
PAIN_FUNCTIONAL_ASSESSMENT: 0-10
PAIN_FUNCTIONAL_ASSESSMENT: ACTIVITIES ARE NOT PREVENTED
PAIN_FUNCTIONAL_ASSESSMENT: 0-10
PAIN_FUNCTIONAL_ASSESSMENT: ACTIVITIES ARE NOT PREVENTED
PAIN_FUNCTIONAL_ASSESSMENT: ACTIVITIES ARE NOT PREVENTED
PAIN_FUNCTIONAL_ASSESSMENT: INTOLERABLE, UNABLE TO DO ANY ACTIVE OR PASSIVE ACTIVITIES
PAIN_FUNCTIONAL_ASSESSMENT: ACTIVITIES ARE NOT PREVENTED
PAIN_FUNCTIONAL_ASSESSMENT: PREVENTS OR INTERFERES SOME ACTIVE ACTIVITIES AND ADLS
PAIN_FUNCTIONAL_ASSESSMENT: ACTIVITIES ARE NOT PREVENTED

## 2025-08-18 ASSESSMENT — PULMONARY FUNCTION TESTS
PIF_VALUE: 23
PIF_VALUE: 14

## 2025-08-18 ASSESSMENT — PAIN SCALES - GENERAL
PAINLEVEL_OUTOF10: 6
PAINLEVEL_OUTOF10: 5
PAINLEVEL_OUTOF10: 9
PAINLEVEL_OUTOF10: 6
PAINLEVEL_OUTOF10: 3
PAINLEVEL_OUTOF10: 7
PAINLEVEL_OUTOF10: 5
PAINLEVEL_OUTOF10: 8
PAINLEVEL_OUTOF10: 6

## 2025-08-18 ASSESSMENT — PAIN DESCRIPTION - FREQUENCY
FREQUENCY: INTERMITTENT
FREQUENCY: INTERMITTENT
FREQUENCY: CONTINUOUS
FREQUENCY: INTERMITTENT
FREQUENCY: CONTINUOUS
FREQUENCY: CONTINUOUS

## 2025-08-18 ASSESSMENT — PAIN DESCRIPTION - PAIN TYPE
TYPE: SURGICAL PAIN

## 2025-08-18 ASSESSMENT — PAIN DESCRIPTION - ONSET
ONSET: ON-GOING
ONSET: PROGRESSIVE
ONSET: PROGRESSIVE
ONSET: ON-GOING
ONSET: ON-GOING
ONSET: PROGRESSIVE

## 2025-08-18 ASSESSMENT — PAIN DESCRIPTION - ORIENTATION
ORIENTATION: ANTERIOR
ORIENTATION: ANTERIOR;POSTERIOR
ORIENTATION: ANTERIOR
ORIENTATION: ANTERIOR;POSTERIOR
ORIENTATION: ANTERIOR;PROXIMAL
ORIENTATION: ANTERIOR;POSTERIOR

## 2025-08-18 ASSESSMENT — PAIN DESCRIPTION - DESCRIPTORS
DESCRIPTORS: ACHING

## 2025-08-19 ENCOUNTER — APPOINTMENT (OUTPATIENT)
Facility: HOSPITAL | Age: 60
DRG: 236 | End: 2025-08-19
Attending: THORACIC SURGERY (CARDIOTHORACIC VASCULAR SURGERY)
Payer: COMMERCIAL

## 2025-08-19 LAB
ACUTE KIDNEY INJURY RISK NEPHROCHECK: 0.14 (ref 0–0.3)
ANION GAP SERPL CALC-SCNC: 10 MMOL/L (ref 2–14)
BUN SERPL-MCNC: 14 MG/DL (ref 6–20)
BUN/CREAT SERPL: 19 (ref 12–20)
CALCIUM SERPL-MCNC: 9.1 MG/DL (ref 8.6–10)
CHLORIDE SERPL-SCNC: 104 MMOL/L (ref 98–107)
CO2 SERPL-SCNC: 22 MMOL/L (ref 20–29)
CREAT SERPL-MCNC: 0.73 MG/DL (ref 0.7–1.2)
ERYTHROCYTE [DISTWIDTH] IN BLOOD BY AUTOMATED COUNT: 12.7 % (ref 11.5–14.5)
GLUCOSE BLD STRIP.AUTO-MCNC: 105 MG/DL (ref 65–117)
GLUCOSE BLD STRIP.AUTO-MCNC: 106 MG/DL (ref 65–117)
GLUCOSE BLD STRIP.AUTO-MCNC: 124 MG/DL (ref 65–117)
GLUCOSE BLD STRIP.AUTO-MCNC: 130 MG/DL (ref 65–117)
GLUCOSE BLD STRIP.AUTO-MCNC: 130 MG/DL (ref 65–117)
GLUCOSE BLD STRIP.AUTO-MCNC: 132 MG/DL (ref 65–117)
GLUCOSE BLD STRIP.AUTO-MCNC: 136 MG/DL (ref 65–117)
GLUCOSE BLD STRIP.AUTO-MCNC: 139 MG/DL (ref 65–117)
GLUCOSE BLD STRIP.AUTO-MCNC: 143 MG/DL (ref 65–117)
GLUCOSE BLD STRIP.AUTO-MCNC: 155 MG/DL (ref 65–117)
GLUCOSE BLD STRIP.AUTO-MCNC: 163 MG/DL (ref 65–117)
GLUCOSE BLD STRIP.AUTO-MCNC: 90 MG/DL (ref 65–117)
GLUCOSE BLD STRIP.AUTO-MCNC: 91 MG/DL (ref 65–117)
GLUCOSE BLD STRIP.AUTO-MCNC: 96 MG/DL (ref 65–117)
GLUCOSE BLD STRIP.AUTO-MCNC: 97 MG/DL (ref 65–117)
GLUCOSE BLD STRIP.AUTO-MCNC: 98 MG/DL (ref 65–117)
GLUCOSE SERPL-MCNC: 99 MG/DL (ref 65–100)
HCT VFR BLD AUTO: 37.5 % (ref 36.6–50.3)
HGB BLD-MCNC: 12.4 G/DL (ref 12.1–17)
MAGNESIUM SERPL-MCNC: 2.1 MG/DL (ref 1.6–2.6)
MCH RBC QN AUTO: 31.3 PG (ref 26–34)
MCHC RBC AUTO-ENTMCNC: 33.1 G/DL (ref 30–36.5)
MCV RBC AUTO: 94.7 FL (ref 80–99)
NRBC # BLD: 0 K/UL (ref 0–0.01)
NRBC BLD-RTO: 0 PER 100 WBC
PLATELET # BLD AUTO: 196 K/UL (ref 150–400)
PMV BLD AUTO: 10.4 FL (ref 8.9–12.9)
POTASSIUM SERPL-SCNC: 4.6 MMOL/L (ref 3.5–5.1)
RBC # BLD AUTO: 3.96 M/UL (ref 4.1–5.7)
SERVICE CMNT-IMP: ABNORMAL
SERVICE CMNT-IMP: NORMAL
SODIUM SERPL-SCNC: 136 MMOL/L (ref 136–145)
WBC # BLD AUTO: 18.7 K/UL (ref 4.1–11.1)

## 2025-08-19 PROCEDURE — 93005 ELECTROCARDIOGRAM TRACING: CPT

## 2025-08-19 PROCEDURE — 71045 X-RAY EXAM CHEST 1 VIEW: CPT

## 2025-08-19 PROCEDURE — 97530 THERAPEUTIC ACTIVITIES: CPT | Performed by: OCCUPATIONAL THERAPIST

## 2025-08-19 PROCEDURE — 97165 OT EVAL LOW COMPLEX 30 MIN: CPT | Performed by: OCCUPATIONAL THERAPIST

## 2025-08-19 PROCEDURE — 6370000000 HC RX 637 (ALT 250 FOR IP): Performed by: NURSE PRACTITIONER

## 2025-08-19 PROCEDURE — P9045 ALBUMIN (HUMAN), 5%, 250 ML: HCPCS | Performed by: PHYSICIAN ASSISTANT

## 2025-08-19 PROCEDURE — 2060000000 HC ICU INTERMEDIATE R&B

## 2025-08-19 PROCEDURE — 97161 PT EVAL LOW COMPLEX 20 MIN: CPT

## 2025-08-19 PROCEDURE — 2500000003 HC RX 250 WO HCPCS: Performed by: PHYSICIAN ASSISTANT

## 2025-08-19 PROCEDURE — 85027 COMPLETE CBC AUTOMATED: CPT

## 2025-08-19 PROCEDURE — 6360000002 HC RX W HCPCS: Performed by: NURSE PRACTITIONER

## 2025-08-19 PROCEDURE — 97162 PT EVAL MOD COMPLEX 30 MIN: CPT

## 2025-08-19 PROCEDURE — 36415 COLL VENOUS BLD VENIPUNCTURE: CPT

## 2025-08-19 PROCEDURE — 80048 BASIC METABOLIC PNL TOTAL CA: CPT

## 2025-08-19 PROCEDURE — 97535 SELF CARE MNGMENT TRAINING: CPT | Performed by: OCCUPATIONAL THERAPIST

## 2025-08-19 PROCEDURE — 82962 GLUCOSE BLOOD TEST: CPT

## 2025-08-19 PROCEDURE — 97110 THERAPEUTIC EXERCISES: CPT

## 2025-08-19 PROCEDURE — 97110 THERAPEUTIC EXERCISES: CPT | Performed by: OCCUPATIONAL THERAPIST

## 2025-08-19 PROCEDURE — 83735 ASSAY OF MAGNESIUM: CPT

## 2025-08-19 PROCEDURE — 99222 1ST HOSP IP/OBS MODERATE 55: CPT

## 2025-08-19 PROCEDURE — 6360000002 HC RX W HCPCS: Performed by: PHYSICIAN ASSISTANT

## 2025-08-19 PROCEDURE — 97116 GAIT TRAINING THERAPY: CPT

## 2025-08-19 PROCEDURE — 6370000000 HC RX 637 (ALT 250 FOR IP): Performed by: PHYSICIAN ASSISTANT

## 2025-08-19 RX ORDER — FUROSEMIDE 10 MG/ML
20 INJECTION INTRAMUSCULAR; INTRAVENOUS ONCE
Status: COMPLETED | OUTPATIENT
Start: 2025-08-19 | End: 2025-08-19

## 2025-08-19 RX ORDER — METHOCARBAMOL 500 MG/1
1000 TABLET, FILM COATED ORAL ONCE
Status: COMPLETED | OUTPATIENT
Start: 2025-08-19 | End: 2025-08-19

## 2025-08-19 RX ADMIN — MUPIROCIN: 20 OINTMENT TOPICAL at 08:46

## 2025-08-19 RX ADMIN — FAMOTIDINE 20 MG: 20 TABLET, FILM COATED ORAL at 08:42

## 2025-08-19 RX ADMIN — GABAPENTIN 200 MG: 100 CAPSULE ORAL at 08:43

## 2025-08-19 RX ADMIN — METHOCARBAMOL 1000 MG: 500 TABLET ORAL at 02:56

## 2025-08-19 RX ADMIN — MUPIROCIN: 20 OINTMENT TOPICAL at 20:29

## 2025-08-19 RX ADMIN — Medication 400 MG: at 08:43

## 2025-08-19 RX ADMIN — SENNOSIDES, DOCUSATE SODIUM 1 TABLET: 50; 8.6 TABLET, FILM COATED ORAL at 08:42

## 2025-08-19 RX ADMIN — WATER 2000 MG: 1 INJECTION INTRAMUSCULAR; INTRAVENOUS; SUBCUTANEOUS at 17:17

## 2025-08-19 RX ADMIN — ASPIRIN 81 MG: 81 TABLET, DELAYED RELEASE ORAL at 08:43

## 2025-08-19 RX ADMIN — FUROSEMIDE 20 MG: 10 INJECTION, SOLUTION INTRAMUSCULAR; INTRAVENOUS at 08:42

## 2025-08-19 RX ADMIN — AMIODARONE HYDROCHLORIDE 400 MG: 200 TABLET ORAL at 20:28

## 2025-08-19 RX ADMIN — ACETAMINOPHEN 1000 MG: 500 TABLET, FILM COATED ORAL at 08:44

## 2025-08-19 RX ADMIN — 0.12% CHLORHEXIDINE GLUCONATE 15 ML: 1.2 RINSE ORAL at 20:30

## 2025-08-19 RX ADMIN — HYDROMORPHONE HYDROCHLORIDE 0.5 MG: 1 INJECTION, SOLUTION INTRAMUSCULAR; INTRAVENOUS; SUBCUTANEOUS at 03:00

## 2025-08-19 RX ADMIN — Medication 400 MG: at 20:28

## 2025-08-19 RX ADMIN — OXYCODONE HYDROCHLORIDE 10 MG: 5 TABLET ORAL at 12:09

## 2025-08-19 RX ADMIN — ACETAMINOPHEN 1000 MG: 500 TABLET, FILM COATED ORAL at 12:09

## 2025-08-19 RX ADMIN — SODIUM CHLORIDE, PRESERVATIVE FREE 10 ML: 5 INJECTION INTRAVENOUS at 22:30

## 2025-08-19 RX ADMIN — FAMOTIDINE 20 MG: 20 TABLET, FILM COATED ORAL at 20:28

## 2025-08-19 RX ADMIN — POLYETHYLENE GLYCOL 3350 17 G: 17 POWDER, FOR SOLUTION ORAL at 08:44

## 2025-08-19 RX ADMIN — 0.12% CHLORHEXIDINE GLUCONATE 15 ML: 1.2 RINSE ORAL at 08:41

## 2025-08-19 RX ADMIN — SENNOSIDES, DOCUSATE SODIUM 1 TABLET: 50; 8.6 TABLET, FILM COATED ORAL at 20:27

## 2025-08-19 RX ADMIN — GABAPENTIN 200 MG: 100 CAPSULE ORAL at 12:08

## 2025-08-19 RX ADMIN — ALBUMIN (HUMAN) 12.5 G: 12.5 INJECTION, SOLUTION INTRAVENOUS at 05:21

## 2025-08-19 RX ADMIN — WATER 2000 MG: 1 INJECTION INTRAMUSCULAR; INTRAVENOUS; SUBCUTANEOUS at 08:41

## 2025-08-19 RX ADMIN — AMIODARONE HYDROCHLORIDE 400 MG: 200 TABLET ORAL at 08:42

## 2025-08-19 RX ADMIN — ATORVASTATIN CALCIUM 20 MG: 20 TABLET, FILM COATED ORAL at 20:27

## 2025-08-19 RX ADMIN — HYDROMORPHONE HYDROCHLORIDE 0.5 MG: 1 INJECTION, SOLUTION INTRAMUSCULAR; INTRAVENOUS; SUBCUTANEOUS at 00:55

## 2025-08-19 RX ADMIN — ACETAMINOPHEN 1000 MG: 500 TABLET, FILM COATED ORAL at 00:59

## 2025-08-19 RX ADMIN — WATER 2000 MG: 1 INJECTION INTRAMUSCULAR; INTRAVENOUS; SUBCUTANEOUS at 00:27

## 2025-08-19 RX ADMIN — SODIUM CHLORIDE, PRESERVATIVE FREE 10 ML: 5 INJECTION INTRAVENOUS at 08:46

## 2025-08-19 RX ADMIN — GABAPENTIN 200 MG: 100 CAPSULE ORAL at 20:30

## 2025-08-19 RX ADMIN — ONDANSETRON 4 MG: 2 INJECTION, SOLUTION INTRAMUSCULAR; INTRAVENOUS at 07:11

## 2025-08-19 ASSESSMENT — PAIN DESCRIPTION - FREQUENCY
FREQUENCY: INTERMITTENT
FREQUENCY: CONTINUOUS
FREQUENCY: CONTINUOUS
FREQUENCY: INTERMITTENT
FREQUENCY: INTERMITTENT

## 2025-08-19 ASSESSMENT — PAIN DESCRIPTION - ONSET
ONSET: GRADUAL
ONSET: GRADUAL
ONSET: ON-GOING
ONSET: ON-GOING
ONSET: GRADUAL

## 2025-08-19 ASSESSMENT — PAIN DESCRIPTION - LOCATION
LOCATION: CHEST

## 2025-08-19 ASSESSMENT — PAIN SCALES - GENERAL
PAINLEVEL_OUTOF10: 3
PAINLEVEL_OUTOF10: 4
PAINLEVEL_OUTOF10: 0
PAINLEVEL_OUTOF10: 3
PAINLEVEL_OUTOF10: 3
PAINLEVEL_OUTOF10: 10
PAINLEVEL_OUTOF10: 7
PAINLEVEL_OUTOF10: 9

## 2025-08-19 ASSESSMENT — PAIN - FUNCTIONAL ASSESSMENT
PAIN_FUNCTIONAL_ASSESSMENT: PREVENTS OR INTERFERES SOME ACTIVE ACTIVITIES AND ADLS
PAIN_FUNCTIONAL_ASSESSMENT: ACTIVITIES ARE NOT PREVENTED
PAIN_FUNCTIONAL_ASSESSMENT: 0-10
PAIN_FUNCTIONAL_ASSESSMENT: 0-10
PAIN_FUNCTIONAL_ASSESSMENT: ACTIVITIES ARE NOT PREVENTED
PAIN_FUNCTIONAL_ASSESSMENT: 0-10
PAIN_FUNCTIONAL_ASSESSMENT: ACTIVITIES ARE NOT PREVENTED
PAIN_FUNCTIONAL_ASSESSMENT: ACTIVITIES ARE NOT PREVENTED

## 2025-08-19 ASSESSMENT — PAIN DESCRIPTION - PAIN TYPE
TYPE: SURGICAL PAIN

## 2025-08-19 ASSESSMENT — PAIN DESCRIPTION - DESCRIPTORS
DESCRIPTORS: ACHING

## 2025-08-19 ASSESSMENT — PAIN DESCRIPTION - ORIENTATION
ORIENTATION: ANTERIOR;POSTERIOR
ORIENTATION: ANTERIOR
ORIENTATION: RIGHT;LEFT
ORIENTATION: ANTERIOR;POSTERIOR
ORIENTATION: LEFT;RIGHT

## 2025-08-20 ENCOUNTER — APPOINTMENT (OUTPATIENT)
Facility: HOSPITAL | Age: 60
DRG: 236 | End: 2025-08-20
Attending: THORACIC SURGERY (CARDIOTHORACIC VASCULAR SURGERY)
Payer: COMMERCIAL

## 2025-08-20 DIAGNOSIS — Z95.1 S/P CABG (CORONARY ARTERY BYPASS GRAFT): Primary | ICD-10-CM

## 2025-08-20 LAB
ANION GAP SERPL CALC-SCNC: 9 MMOL/L (ref 2–14)
BUN SERPL-MCNC: 15 MG/DL (ref 6–20)
BUN/CREAT SERPL: 17 (ref 12–20)
CALCIUM SERPL-MCNC: 9 MG/DL (ref 8.6–10)
CHLORIDE SERPL-SCNC: 99 MMOL/L (ref 98–107)
CO2 SERPL-SCNC: 27 MMOL/L (ref 20–29)
CREAT SERPL-MCNC: 0.87 MG/DL (ref 0.7–1.2)
ERYTHROCYTE [DISTWIDTH] IN BLOOD BY AUTOMATED COUNT: 13.3 % (ref 11.5–14.5)
GLUCOSE BLD STRIP.AUTO-MCNC: 104 MG/DL (ref 65–117)
GLUCOSE BLD STRIP.AUTO-MCNC: 107 MG/DL (ref 65–117)
GLUCOSE BLD STRIP.AUTO-MCNC: 114 MG/DL (ref 65–117)
GLUCOSE BLD STRIP.AUTO-MCNC: 114 MG/DL (ref 65–117)
GLUCOSE BLD STRIP.AUTO-MCNC: 121 MG/DL (ref 65–117)
GLUCOSE BLD STRIP.AUTO-MCNC: 130 MG/DL (ref 65–117)
GLUCOSE BLD STRIP.AUTO-MCNC: 148 MG/DL (ref 65–117)
GLUCOSE BLD STRIP.AUTO-MCNC: 166 MG/DL (ref 65–117)
GLUCOSE BLD STRIP.AUTO-MCNC: 204 MG/DL (ref 65–117)
GLUCOSE BLD STRIP.AUTO-MCNC: 93 MG/DL (ref 65–117)
GLUCOSE BLD STRIP.AUTO-MCNC: 97 MG/DL (ref 65–117)
GLUCOSE BLD STRIP.AUTO-MCNC: NORMAL MG/DL (ref 65–117)
GLUCOSE SERPL-MCNC: 99 MG/DL (ref 65–100)
HCT VFR BLD AUTO: 38.1 % (ref 36.6–50.3)
HGB BLD-MCNC: 12.3 G/DL (ref 12.1–17)
MAGNESIUM SERPL-MCNC: 2.1 MG/DL (ref 1.6–2.6)
MCH RBC QN AUTO: 30.6 PG (ref 26–34)
MCHC RBC AUTO-ENTMCNC: 32.3 G/DL (ref 30–36.5)
MCV RBC AUTO: 94.8 FL (ref 80–99)
NRBC # BLD: 0 K/UL (ref 0–0.01)
NRBC BLD-RTO: 0 PER 100 WBC
PLATELET # BLD AUTO: 183 K/UL (ref 150–400)
PMV BLD AUTO: 10.9 FL (ref 8.9–12.9)
POTASSIUM SERPL-SCNC: 4.1 MMOL/L (ref 3.5–5.1)
RBC # BLD AUTO: 4.02 M/UL (ref 4.1–5.7)
SERVICE CMNT-IMP: ABNORMAL
SERVICE CMNT-IMP: NORMAL
SODIUM SERPL-SCNC: 135 MMOL/L (ref 136–145)
WBC # BLD AUTO: 16.7 K/UL (ref 4.1–11.1)

## 2025-08-20 PROCEDURE — 97110 THERAPEUTIC EXERCISES: CPT

## 2025-08-20 PROCEDURE — 82962 GLUCOSE BLOOD TEST: CPT

## 2025-08-20 PROCEDURE — 2060000000 HC ICU INTERMEDIATE R&B

## 2025-08-20 PROCEDURE — 97116 GAIT TRAINING THERAPY: CPT

## 2025-08-20 PROCEDURE — 85027 COMPLETE CBC AUTOMATED: CPT

## 2025-08-20 PROCEDURE — 6370000000 HC RX 637 (ALT 250 FOR IP): Performed by: THORACIC SURGERY (CARDIOTHORACIC VASCULAR SURGERY)

## 2025-08-20 PROCEDURE — 6370000000 HC RX 637 (ALT 250 FOR IP)

## 2025-08-20 PROCEDURE — 71045 X-RAY EXAM CHEST 1 VIEW: CPT

## 2025-08-20 PROCEDURE — 97535 SELF CARE MNGMENT TRAINING: CPT | Performed by: OCCUPATIONAL THERAPIST

## 2025-08-20 PROCEDURE — 97530 THERAPEUTIC ACTIVITIES: CPT | Performed by: OCCUPATIONAL THERAPIST

## 2025-08-20 PROCEDURE — 71046 X-RAY EXAM CHEST 2 VIEWS: CPT

## 2025-08-20 PROCEDURE — 80048 BASIC METABOLIC PNL TOTAL CA: CPT

## 2025-08-20 PROCEDURE — 36415 COLL VENOUS BLD VENIPUNCTURE: CPT

## 2025-08-20 PROCEDURE — 83735 ASSAY OF MAGNESIUM: CPT

## 2025-08-20 PROCEDURE — 6360000002 HC RX W HCPCS: Performed by: PHYSICIAN ASSISTANT

## 2025-08-20 PROCEDURE — 2500000003 HC RX 250 WO HCPCS: Performed by: PHYSICIAN ASSISTANT

## 2025-08-20 PROCEDURE — 6360000002 HC RX W HCPCS: Performed by: THORACIC SURGERY (CARDIOTHORACIC VASCULAR SURGERY)

## 2025-08-20 PROCEDURE — 99232 SBSQ HOSP IP/OBS MODERATE 35: CPT

## 2025-08-20 PROCEDURE — 6370000000 HC RX 637 (ALT 250 FOR IP): Performed by: PHYSICIAN ASSISTANT

## 2025-08-20 RX ORDER — POTASSIUM CHLORIDE 7.45 MG/ML
10 INJECTION INTRAVENOUS
Status: DISPENSED | OUTPATIENT
Start: 2025-08-20 | End: 2025-08-20

## 2025-08-20 RX ORDER — METOPROLOL TARTRATE 25 MG/1
12.5 TABLET, FILM COATED ORAL 2 TIMES DAILY
Status: DISCONTINUED | OUTPATIENT
Start: 2025-08-20 | End: 2025-08-23 | Stop reason: HOSPADM

## 2025-08-20 RX ORDER — FUROSEMIDE 10 MG/ML
20 INJECTION INTRAMUSCULAR; INTRAVENOUS 2 TIMES DAILY
Status: COMPLETED | OUTPATIENT
Start: 2025-08-20 | End: 2025-08-20

## 2025-08-20 RX ORDER — INSULIN GLARGINE 100 [IU]/ML
25 INJECTION, SOLUTION SUBCUTANEOUS DAILY
Status: DISCONTINUED | OUTPATIENT
Start: 2025-08-20 | End: 2025-08-21

## 2025-08-20 RX ORDER — ENOXAPARIN SODIUM 100 MG/ML
40 INJECTION SUBCUTANEOUS DAILY
Status: DISCONTINUED | OUTPATIENT
Start: 2025-08-20 | End: 2025-08-23 | Stop reason: HOSPADM

## 2025-08-20 RX ORDER — POTASSIUM CHLORIDE 1500 MG/1
20 TABLET, EXTENDED RELEASE ORAL ONCE
Status: DISCONTINUED | OUTPATIENT
Start: 2025-08-20 | End: 2025-08-20

## 2025-08-20 RX ADMIN — GABAPENTIN 200 MG: 100 CAPSULE ORAL at 21:14

## 2025-08-20 RX ADMIN — AMIODARONE HYDROCHLORIDE 400 MG: 200 TABLET ORAL at 21:08

## 2025-08-20 RX ADMIN — FAMOTIDINE 20 MG: 20 TABLET, FILM COATED ORAL at 09:25

## 2025-08-20 RX ADMIN — Medication 400 MG: at 09:28

## 2025-08-20 RX ADMIN — WATER 2000 MG: 1 INJECTION INTRAMUSCULAR; INTRAVENOUS; SUBCUTANEOUS at 00:18

## 2025-08-20 RX ADMIN — GABAPENTIN 200 MG: 100 CAPSULE ORAL at 16:00

## 2025-08-20 RX ADMIN — ACETAMINOPHEN 1000 MG: 500 TABLET, FILM COATED ORAL at 23:59

## 2025-08-20 RX ADMIN — OXYCODONE HYDROCHLORIDE 5 MG: 5 TABLET ORAL at 04:11

## 2025-08-20 RX ADMIN — SODIUM CHLORIDE, PRESERVATIVE FREE 10 ML: 5 INJECTION INTRAVENOUS at 21:14

## 2025-08-20 RX ADMIN — FUROSEMIDE 20 MG: 10 INJECTION, SOLUTION INTRAMUSCULAR; INTRAVENOUS at 09:29

## 2025-08-20 RX ADMIN — ACETAMINOPHEN 1000 MG: 500 TABLET, FILM COATED ORAL at 04:57

## 2025-08-20 RX ADMIN — SENNOSIDES, DOCUSATE SODIUM 1 TABLET: 50; 8.6 TABLET, FILM COATED ORAL at 09:27

## 2025-08-20 RX ADMIN — Medication 400 MG: at 21:13

## 2025-08-20 RX ADMIN — MUPIROCIN: 20 OINTMENT TOPICAL at 09:31

## 2025-08-20 RX ADMIN — ENOXAPARIN SODIUM 40 MG: 100 INJECTION SUBCUTANEOUS at 09:30

## 2025-08-20 RX ADMIN — ATORVASTATIN CALCIUM 20 MG: 20 TABLET, FILM COATED ORAL at 21:09

## 2025-08-20 RX ADMIN — INSULIN GLARGINE 25 UNITS: 100 INJECTION, SOLUTION SUBCUTANEOUS at 11:45

## 2025-08-20 RX ADMIN — ONDANSETRON 4 MG: 2 INJECTION, SOLUTION INTRAMUSCULAR; INTRAVENOUS at 04:11

## 2025-08-20 RX ADMIN — POLYETHYLENE GLYCOL 3350 17 G: 17 POWDER, FOR SOLUTION ORAL at 09:23

## 2025-08-20 RX ADMIN — INSULIN LISPRO 2 UNITS: 100 INJECTION, SOLUTION INTRAVENOUS; SUBCUTANEOUS at 21:19

## 2025-08-20 RX ADMIN — 0.12% CHLORHEXIDINE GLUCONATE 15 ML: 1.2 RINSE ORAL at 21:27

## 2025-08-20 RX ADMIN — FAMOTIDINE 20 MG: 20 TABLET, FILM COATED ORAL at 21:09

## 2025-08-20 RX ADMIN — SENNOSIDES, DOCUSATE SODIUM 1 TABLET: 50; 8.6 TABLET, FILM COATED ORAL at 21:12

## 2025-08-20 RX ADMIN — MUPIROCIN: 20 OINTMENT TOPICAL at 21:48

## 2025-08-20 RX ADMIN — FUROSEMIDE 20 MG: 10 INJECTION, SOLUTION INTRAMUSCULAR; INTRAVENOUS at 16:03

## 2025-08-20 RX ADMIN — ASPIRIN 81 MG: 81 TABLET, DELAYED RELEASE ORAL at 09:25

## 2025-08-20 RX ADMIN — METOPROLOL TARTRATE 12.5 MG: 25 TABLET, FILM COATED ORAL at 09:27

## 2025-08-20 RX ADMIN — GABAPENTIN 200 MG: 100 CAPSULE ORAL at 09:24

## 2025-08-20 RX ADMIN — SODIUM CHLORIDE, PRESERVATIVE FREE 10 ML: 5 INJECTION INTRAVENOUS at 09:31

## 2025-08-20 RX ADMIN — METOPROLOL TARTRATE 12.5 MG: 25 TABLET, FILM COATED ORAL at 21:10

## 2025-08-20 RX ADMIN — POTASSIUM CHLORIDE 10 MEQ: 7.46 INJECTION, SOLUTION INTRAVENOUS at 11:19

## 2025-08-20 RX ADMIN — 0.12% CHLORHEXIDINE GLUCONATE 15 ML: 1.2 RINSE ORAL at 09:30

## 2025-08-20 RX ADMIN — AMIODARONE HYDROCHLORIDE 400 MG: 200 TABLET ORAL at 09:27

## 2025-08-20 ASSESSMENT — PAIN SCALES - GENERAL
PAINLEVEL_OUTOF10: 0
PAINLEVEL_OUTOF10: 6
PAINLEVEL_OUTOF10: 2
PAINLEVEL_OUTOF10: 3

## 2025-08-20 ASSESSMENT — PAIN - FUNCTIONAL ASSESSMENT
PAIN_FUNCTIONAL_ASSESSMENT: 0-10
PAIN_FUNCTIONAL_ASSESSMENT: ACTIVITIES ARE NOT PREVENTED

## 2025-08-20 ASSESSMENT — PAIN DESCRIPTION - DESCRIPTORS
DESCRIPTORS: ACHING
DESCRIPTORS: ACHING

## 2025-08-20 ASSESSMENT — PAIN DESCRIPTION - LOCATION
LOCATION: INCISION
LOCATION: CHEST

## 2025-08-20 ASSESSMENT — PAIN DESCRIPTION - FREQUENCY: FREQUENCY: INTERMITTENT

## 2025-08-20 ASSESSMENT — PAIN DESCRIPTION - PAIN TYPE: TYPE: SURGICAL PAIN

## 2025-08-20 ASSESSMENT — PAIN DESCRIPTION - ORIENTATION: ORIENTATION: ANTERIOR;POSTERIOR

## 2025-08-21 ENCOUNTER — APPOINTMENT (OUTPATIENT)
Facility: HOSPITAL | Age: 60
DRG: 236 | End: 2025-08-21
Attending: THORACIC SURGERY (CARDIOTHORACIC VASCULAR SURGERY)
Payer: COMMERCIAL

## 2025-08-21 LAB
ANION GAP SERPL CALC-SCNC: 9 MMOL/L (ref 2–14)
BUN SERPL-MCNC: 21 MG/DL (ref 6–20)
BUN/CREAT SERPL: 24 (ref 12–20)
CALCIUM SERPL-MCNC: 8.9 MG/DL (ref 8.6–10)
CHLORIDE SERPL-SCNC: 98 MMOL/L (ref 98–107)
CO2 SERPL-SCNC: 29 MMOL/L (ref 20–29)
CREAT SERPL-MCNC: 0.86 MG/DL (ref 0.7–1.2)
EKG ATRIAL RATE: 60 BPM
EKG ATRIAL RATE: 66 BPM
EKG DIAGNOSIS: NORMAL
EKG DIAGNOSIS: NORMAL
EKG P AXIS: 54 DEGREES
EKG P AXIS: 71 DEGREES
EKG P-R INTERVAL: 164 MS
EKG P-R INTERVAL: 272 MS
EKG Q-T INTERVAL: 432 MS
EKG Q-T INTERVAL: 512 MS
EKG QRS DURATION: 104 MS
EKG QRS DURATION: 106 MS
EKG QTC CALCULATION (BAZETT): 452 MS
EKG QTC CALCULATION (BAZETT): 512 MS
EKG R AXIS: 31 DEGREES
EKG R AXIS: 4 DEGREES
EKG T AXIS: 40 DEGREES
EKG T AXIS: 50 DEGREES
EKG VENTRICULAR RATE: 60 BPM
EKG VENTRICULAR RATE: 66 BPM
ERYTHROCYTE [DISTWIDTH] IN BLOOD BY AUTOMATED COUNT: 13.1 % (ref 11.5–14.5)
GLUCOSE BLD STRIP.AUTO-MCNC: 154 MG/DL (ref 65–117)
GLUCOSE BLD STRIP.AUTO-MCNC: 184 MG/DL (ref 65–117)
GLUCOSE BLD STRIP.AUTO-MCNC: 214 MG/DL (ref 65–117)
GLUCOSE BLD STRIP.AUTO-MCNC: 249 MG/DL (ref 65–117)
GLUCOSE SERPL-MCNC: 141 MG/DL (ref 65–100)
HCT VFR BLD AUTO: 38.4 % (ref 36.6–50.3)
HGB BLD-MCNC: 12.4 G/DL (ref 12.1–17)
MAGNESIUM SERPL-MCNC: 2.3 MG/DL (ref 1.6–2.6)
MCH RBC QN AUTO: 31 PG (ref 26–34)
MCHC RBC AUTO-ENTMCNC: 32.3 G/DL (ref 30–36.5)
MCV RBC AUTO: 96 FL (ref 80–99)
NRBC # BLD: 0 K/UL (ref 0–0.01)
NRBC BLD-RTO: 0 PER 100 WBC
PLATELET # BLD AUTO: 201 K/UL (ref 150–400)
PMV BLD AUTO: 10.5 FL (ref 8.9–12.9)
POTASSIUM SERPL-SCNC: 4.1 MMOL/L (ref 3.5–5.1)
RBC # BLD AUTO: 4 M/UL (ref 4.1–5.7)
SERVICE CMNT-IMP: ABNORMAL
SODIUM SERPL-SCNC: 136 MMOL/L (ref 136–145)
WBC # BLD AUTO: 15.8 K/UL (ref 4.1–11.1)

## 2025-08-21 PROCEDURE — 97110 THERAPEUTIC EXERCISES: CPT | Performed by: OCCUPATIONAL THERAPIST

## 2025-08-21 PROCEDURE — 85027 COMPLETE CBC AUTOMATED: CPT

## 2025-08-21 PROCEDURE — 6370000000 HC RX 637 (ALT 250 FOR IP): Performed by: PHYSICIAN ASSISTANT

## 2025-08-21 PROCEDURE — 2500000003 HC RX 250 WO HCPCS: Performed by: PHYSICIAN ASSISTANT

## 2025-08-21 PROCEDURE — 2060000000 HC ICU INTERMEDIATE R&B

## 2025-08-21 PROCEDURE — 6370000000 HC RX 637 (ALT 250 FOR IP): Performed by: THORACIC SURGERY (CARDIOTHORACIC VASCULAR SURGERY)

## 2025-08-21 PROCEDURE — 99231 SBSQ HOSP IP/OBS SF/LOW 25: CPT

## 2025-08-21 PROCEDURE — 97116 GAIT TRAINING THERAPY: CPT

## 2025-08-21 PROCEDURE — 36415 COLL VENOUS BLD VENIPUNCTURE: CPT

## 2025-08-21 PROCEDURE — 82962 GLUCOSE BLOOD TEST: CPT

## 2025-08-21 PROCEDURE — 80048 BASIC METABOLIC PNL TOTAL CA: CPT

## 2025-08-21 PROCEDURE — 6360000002 HC RX W HCPCS: Performed by: THORACIC SURGERY (CARDIOTHORACIC VASCULAR SURGERY)

## 2025-08-21 PROCEDURE — 2700000000 HC OXYGEN THERAPY PER DAY

## 2025-08-21 PROCEDURE — 97535 SELF CARE MNGMENT TRAINING: CPT | Performed by: OCCUPATIONAL THERAPIST

## 2025-08-21 PROCEDURE — 6360000002 HC RX W HCPCS: Performed by: PHYSICIAN ASSISTANT

## 2025-08-21 PROCEDURE — 6370000000 HC RX 637 (ALT 250 FOR IP)

## 2025-08-21 PROCEDURE — 97530 THERAPEUTIC ACTIVITIES: CPT | Performed by: OCCUPATIONAL THERAPIST

## 2025-08-21 PROCEDURE — 83735 ASSAY OF MAGNESIUM: CPT

## 2025-08-21 PROCEDURE — 71045 X-RAY EXAM CHEST 1 VIEW: CPT

## 2025-08-21 RX ORDER — FUROSEMIDE 10 MG/ML
40 INJECTION INTRAMUSCULAR; INTRAVENOUS ONCE
Status: COMPLETED | OUTPATIENT
Start: 2025-08-21 | End: 2025-08-21

## 2025-08-21 RX ORDER — INSULIN GLARGINE 100 [IU]/ML
35 INJECTION, SOLUTION SUBCUTANEOUS DAILY
Status: DISCONTINUED | OUTPATIENT
Start: 2025-08-22 | End: 2025-08-23 | Stop reason: HOSPADM

## 2025-08-21 RX ORDER — POTASSIUM CHLORIDE 1500 MG/1
20 TABLET, EXTENDED RELEASE ORAL ONCE
Status: DISCONTINUED | OUTPATIENT
Start: 2025-08-21 | End: 2025-08-21

## 2025-08-21 RX ADMIN — ENOXAPARIN SODIUM 40 MG: 100 INJECTION SUBCUTANEOUS at 08:43

## 2025-08-21 RX ADMIN — ACETAMINOPHEN 1000 MG: 500 TABLET, FILM COATED ORAL at 06:22

## 2025-08-21 RX ADMIN — Medication 400 MG: at 21:03

## 2025-08-21 RX ADMIN — POLYETHYLENE GLYCOL 3350 17 G: 17 POWDER, FOR SOLUTION ORAL at 08:36

## 2025-08-21 RX ADMIN — SODIUM CHLORIDE, PRESERVATIVE FREE 10 ML: 5 INJECTION INTRAVENOUS at 08:44

## 2025-08-21 RX ADMIN — METOPROLOL TARTRATE 12.5 MG: 25 TABLET, FILM COATED ORAL at 21:01

## 2025-08-21 RX ADMIN — SENNOSIDES, DOCUSATE SODIUM 1 TABLET: 50; 8.6 TABLET, FILM COATED ORAL at 21:03

## 2025-08-21 RX ADMIN — AMIODARONE HYDROCHLORIDE 400 MG: 200 TABLET ORAL at 08:40

## 2025-08-21 RX ADMIN — INSULIN LISPRO 2 UNITS: 100 INJECTION, SOLUTION INTRAVENOUS; SUBCUTANEOUS at 21:29

## 2025-08-21 RX ADMIN — FAMOTIDINE 20 MG: 20 TABLET, FILM COATED ORAL at 21:03

## 2025-08-21 RX ADMIN — INSULIN LISPRO 2 UNITS: 100 INJECTION, SOLUTION INTRAVENOUS; SUBCUTANEOUS at 12:24

## 2025-08-21 RX ADMIN — INSULIN LISPRO 2 UNITS: 100 INJECTION, SOLUTION INTRAVENOUS; SUBCUTANEOUS at 08:34

## 2025-08-21 RX ADMIN — ACETAMINOPHEN 1000 MG: 500 TABLET, FILM COATED ORAL at 19:34

## 2025-08-21 RX ADMIN — AMIODARONE HYDROCHLORIDE 400 MG: 200 TABLET ORAL at 21:01

## 2025-08-21 RX ADMIN — MAGNESIUM HYDROXIDE 30 ML: 400 SUSPENSION ORAL at 08:43

## 2025-08-21 RX ADMIN — ATORVASTATIN CALCIUM 20 MG: 20 TABLET, FILM COATED ORAL at 21:03

## 2025-08-21 RX ADMIN — SENNOSIDES, DOCUSATE SODIUM 1 TABLET: 50; 8.6 TABLET, FILM COATED ORAL at 08:41

## 2025-08-21 RX ADMIN — FAMOTIDINE 20 MG: 20 TABLET, FILM COATED ORAL at 06:40

## 2025-08-21 RX ADMIN — POTASSIUM BICARBONATE 20 MEQ: 782 TABLET, EFFERVESCENT ORAL at 08:38

## 2025-08-21 RX ADMIN — 0.12% CHLORHEXIDINE GLUCONATE 15 ML: 1.2 RINSE ORAL at 21:03

## 2025-08-21 RX ADMIN — SODIUM CHLORIDE, PRESERVATIVE FREE 10 ML: 5 INJECTION INTRAVENOUS at 21:02

## 2025-08-21 RX ADMIN — INSULIN GLARGINE 25 UNITS: 100 INJECTION, SOLUTION SUBCUTANEOUS at 08:35

## 2025-08-21 RX ADMIN — FUROSEMIDE 40 MG: 10 INJECTION, SOLUTION INTRAMUSCULAR; INTRAVENOUS at 08:43

## 2025-08-21 RX ADMIN — ASPIRIN 81 MG: 81 TABLET, DELAYED RELEASE ORAL at 08:39

## 2025-08-21 RX ADMIN — INSULIN LISPRO 4 UNITS: 100 INJECTION, SOLUTION INTRAVENOUS; SUBCUTANEOUS at 16:52

## 2025-08-21 RX ADMIN — ACETAMINOPHEN 1000 MG: 500 TABLET, FILM COATED ORAL at 12:28

## 2025-08-21 RX ADMIN — METOPROLOL TARTRATE 12.5 MG: 25 TABLET, FILM COATED ORAL at 08:42

## 2025-08-21 RX ADMIN — MUPIROCIN: 20 OINTMENT TOPICAL at 21:05

## 2025-08-21 RX ADMIN — 0.12% CHLORHEXIDINE GLUCONATE 15 ML: 1.2 RINSE ORAL at 08:44

## 2025-08-21 RX ADMIN — MUPIROCIN: 20 OINTMENT TOPICAL at 08:45

## 2025-08-21 RX ADMIN — Medication 400 MG: at 08:41

## 2025-08-21 ASSESSMENT — PAIN SCALES - GENERAL
PAINLEVEL_OUTOF10: 0

## 2025-08-22 ENCOUNTER — APPOINTMENT (OUTPATIENT)
Facility: HOSPITAL | Age: 60
DRG: 236 | End: 2025-08-22
Attending: THORACIC SURGERY (CARDIOTHORACIC VASCULAR SURGERY)
Payer: COMMERCIAL

## 2025-08-22 LAB
ABO + RH BLD: NORMAL
ANION GAP SERPL CALC-SCNC: 13 MMOL/L (ref 2–14)
BLD PROD TYP BPU: NORMAL
BLD PROD TYP BPU: NORMAL
BLOOD BANK DISPENSE STATUS: NORMAL
BLOOD BANK DISPENSE STATUS: NORMAL
BLOOD GROUP ANTIBODIES SERPL: NORMAL
BPU ID: NORMAL
BPU ID: NORMAL
BUN SERPL-MCNC: 25 MG/DL (ref 6–20)
BUN/CREAT SERPL: 28 (ref 12–20)
CALCIUM SERPL-MCNC: 8.8 MG/DL (ref 8.6–10)
CHLORIDE SERPL-SCNC: 97 MMOL/L (ref 98–107)
CO2 SERPL-SCNC: 29 MMOL/L (ref 20–29)
CREAT SERPL-MCNC: 0.89 MG/DL (ref 0.7–1.2)
CROSSMATCH RESULT: NORMAL
CROSSMATCH RESULT: NORMAL
ERYTHROCYTE [DISTWIDTH] IN BLOOD BY AUTOMATED COUNT: 13.1 % (ref 11.5–14.5)
GLUCOSE BLD STRIP.AUTO-MCNC: 159 MG/DL (ref 65–117)
GLUCOSE BLD STRIP.AUTO-MCNC: 184 MG/DL (ref 65–117)
GLUCOSE BLD STRIP.AUTO-MCNC: 187 MG/DL (ref 65–117)
GLUCOSE BLD STRIP.AUTO-MCNC: 299 MG/DL (ref 65–117)
GLUCOSE SERPL-MCNC: 164 MG/DL (ref 65–100)
HCT VFR BLD AUTO: 38 % (ref 36.6–50.3)
HGB BLD-MCNC: 12.6 G/DL (ref 12.1–17)
MAGNESIUM SERPL-MCNC: 2.4 MG/DL (ref 1.6–2.6)
MCH RBC QN AUTO: 31.3 PG (ref 26–34)
MCHC RBC AUTO-ENTMCNC: 33.2 G/DL (ref 30–36.5)
MCV RBC AUTO: 94.5 FL (ref 80–99)
NRBC # BLD: 0 K/UL (ref 0–0.01)
NRBC BLD-RTO: 0 PER 100 WBC
PLATELET # BLD AUTO: 252 K/UL (ref 150–400)
PMV BLD AUTO: 10.6 FL (ref 8.9–12.9)
POTASSIUM SERPL-SCNC: 3.8 MMOL/L (ref 3.5–5.1)
RBC # BLD AUTO: 4.02 M/UL (ref 4.1–5.7)
SERVICE CMNT-IMP: ABNORMAL
SODIUM SERPL-SCNC: 138 MMOL/L (ref 136–145)
SPECIMEN EXP DATE BLD: NORMAL
UNIT DIVISION: 0
UNIT DIVISION: 0
WBC # BLD AUTO: 13.4 K/UL (ref 4.1–11.1)

## 2025-08-22 PROCEDURE — 2500000003 HC RX 250 WO HCPCS: Performed by: PHYSICIAN ASSISTANT

## 2025-08-22 PROCEDURE — 6360000002 HC RX W HCPCS

## 2025-08-22 PROCEDURE — 83735 ASSAY OF MAGNESIUM: CPT

## 2025-08-22 PROCEDURE — 6370000000 HC RX 637 (ALT 250 FOR IP)

## 2025-08-22 PROCEDURE — 2060000000 HC ICU INTERMEDIATE R&B

## 2025-08-22 PROCEDURE — 85027 COMPLETE CBC AUTOMATED: CPT

## 2025-08-22 PROCEDURE — 6360000002 HC RX W HCPCS: Performed by: THORACIC SURGERY (CARDIOTHORACIC VASCULAR SURGERY)

## 2025-08-22 PROCEDURE — 6370000000 HC RX 637 (ALT 250 FOR IP): Performed by: THORACIC SURGERY (CARDIOTHORACIC VASCULAR SURGERY)

## 2025-08-22 PROCEDURE — 6370000000 HC RX 637 (ALT 250 FOR IP): Performed by: PHYSICIAN ASSISTANT

## 2025-08-22 PROCEDURE — 80048 BASIC METABOLIC PNL TOTAL CA: CPT

## 2025-08-22 PROCEDURE — 97116 GAIT TRAINING THERAPY: CPT

## 2025-08-22 PROCEDURE — 82962 GLUCOSE BLOOD TEST: CPT

## 2025-08-22 PROCEDURE — 97530 THERAPEUTIC ACTIVITIES: CPT

## 2025-08-22 PROCEDURE — 71045 X-RAY EXAM CHEST 1 VIEW: CPT

## 2025-08-22 PROCEDURE — 97535 SELF CARE MNGMENT TRAINING: CPT | Performed by: OCCUPATIONAL THERAPIST

## 2025-08-22 PROCEDURE — 99232 SBSQ HOSP IP/OBS MODERATE 35: CPT | Performed by: CLINICAL NURSE SPECIALIST

## 2025-08-22 PROCEDURE — 36415 COLL VENOUS BLD VENIPUNCTURE: CPT

## 2025-08-22 RX ORDER — ACETAMINOPHEN 500 MG
1000 TABLET ORAL EVERY 6 HOURS
Status: DISCONTINUED | OUTPATIENT
Start: 2025-08-23 | End: 2025-08-23 | Stop reason: HOSPADM

## 2025-08-22 RX ORDER — BUMETANIDE 0.25 MG/ML
2 INJECTION, SOLUTION INTRAMUSCULAR; INTRAVENOUS 2 TIMES DAILY
Status: DISCONTINUED | OUTPATIENT
Start: 2025-08-22 | End: 2025-08-23

## 2025-08-22 RX ORDER — INSULIN GLARGINE 300 U/ML
45 INJECTION, SOLUTION SUBCUTANEOUS NIGHTLY
Qty: 5 ADJUSTABLE DOSE PRE-FILLED PEN SYRINGE | Refills: 0 | Status: SHIPPED
Start: 2025-08-22

## 2025-08-22 RX ORDER — LIDOCAINE 4 G/G
1 PATCH TOPICAL DAILY
Status: DISCONTINUED | OUTPATIENT
Start: 2025-08-22 | End: 2025-08-23 | Stop reason: HOSPADM

## 2025-08-22 RX ORDER — POTASSIUM CHLORIDE 1500 MG/1
40 TABLET, EXTENDED RELEASE ORAL 2 TIMES DAILY
Status: DISCONTINUED | OUTPATIENT
Start: 2025-08-22 | End: 2025-08-22

## 2025-08-22 RX ADMIN — AMIODARONE HYDROCHLORIDE 400 MG: 200 TABLET ORAL at 08:53

## 2025-08-22 RX ADMIN — SENNOSIDES, DOCUSATE SODIUM 1 TABLET: 50; 8.6 TABLET, FILM COATED ORAL at 21:20

## 2025-08-22 RX ADMIN — Medication 400 MG: at 21:19

## 2025-08-22 RX ADMIN — ASPIRIN 81 MG: 81 TABLET, DELAYED RELEASE ORAL at 08:55

## 2025-08-22 RX ADMIN — SODIUM CHLORIDE, PRESERVATIVE FREE 10 ML: 5 INJECTION INTRAVENOUS at 20:25

## 2025-08-22 RX ADMIN — SODIUM CHLORIDE, PRESERVATIVE FREE 10 ML: 5 INJECTION INTRAVENOUS at 09:00

## 2025-08-22 RX ADMIN — ACETAMINOPHEN 1000 MG: 500 TABLET, FILM COATED ORAL at 12:40

## 2025-08-22 RX ADMIN — METOPROLOL TARTRATE 12.5 MG: 25 TABLET, FILM COATED ORAL at 21:19

## 2025-08-22 RX ADMIN — ATORVASTATIN CALCIUM 20 MG: 20 TABLET, FILM COATED ORAL at 21:19

## 2025-08-22 RX ADMIN — FAMOTIDINE 20 MG: 20 TABLET, FILM COATED ORAL at 21:19

## 2025-08-22 RX ADMIN — INSULIN LISPRO 2 UNITS: 100 INJECTION, SOLUTION INTRAVENOUS; SUBCUTANEOUS at 12:39

## 2025-08-22 RX ADMIN — METOPROLOL TARTRATE 12.5 MG: 25 TABLET, FILM COATED ORAL at 08:53

## 2025-08-22 RX ADMIN — BUMETANIDE 2 MG: 0.25 INJECTION INTRAMUSCULAR; INTRAVENOUS at 10:25

## 2025-08-22 RX ADMIN — MUPIROCIN: 20 OINTMENT TOPICAL at 09:30

## 2025-08-22 RX ADMIN — FAMOTIDINE 20 MG: 20 TABLET, FILM COATED ORAL at 07:28

## 2025-08-22 RX ADMIN — BUMETANIDE 2 MG: 0.25 INJECTION INTRAMUSCULAR; INTRAVENOUS at 18:46

## 2025-08-22 RX ADMIN — POTASSIUM BICARBONATE 40 MEQ: 782 TABLET, EFFERVESCENT ORAL at 10:24

## 2025-08-22 RX ADMIN — ENOXAPARIN SODIUM 40 MG: 100 INJECTION SUBCUTANEOUS at 08:55

## 2025-08-22 RX ADMIN — MUPIROCIN: 20 OINTMENT TOPICAL at 21:22

## 2025-08-22 RX ADMIN — INSULIN GLARGINE 35 UNITS: 100 INJECTION, SOLUTION SUBCUTANEOUS at 08:36

## 2025-08-22 RX ADMIN — AMIODARONE HYDROCHLORIDE 400 MG: 200 TABLET ORAL at 21:19

## 2025-08-22 RX ADMIN — SENNOSIDES, DOCUSATE SODIUM 1 TABLET: 50; 8.6 TABLET, FILM COATED ORAL at 08:55

## 2025-08-22 RX ADMIN — GABAPENTIN 200 MG: 100 CAPSULE ORAL at 08:45

## 2025-08-22 RX ADMIN — INSULIN LISPRO 2 UNITS: 100 INJECTION, SOLUTION INTRAVENOUS; SUBCUTANEOUS at 18:19

## 2025-08-22 RX ADMIN — ACETAMINOPHEN 1000 MG: 500 TABLET, FILM COATED ORAL at 00:18

## 2025-08-22 RX ADMIN — Medication 400 MG: at 08:55

## 2025-08-22 RX ADMIN — ACETAMINOPHEN 1000 MG: 500 TABLET, FILM COATED ORAL at 07:28

## 2025-08-22 RX ADMIN — INSULIN LISPRO 3 UNITS: 100 INJECTION, SOLUTION INTRAVENOUS; SUBCUTANEOUS at 20:23

## 2025-08-22 ASSESSMENT — PAIN SCALES - GENERAL
PAINLEVEL_OUTOF10: 0
PAINLEVEL_OUTOF10: 2
PAINLEVEL_OUTOF10: 0

## 2025-08-22 ASSESSMENT — PAIN - FUNCTIONAL ASSESSMENT: PAIN_FUNCTIONAL_ASSESSMENT: 0-10

## 2025-08-23 ENCOUNTER — APPOINTMENT (OUTPATIENT)
Facility: HOSPITAL | Age: 60
DRG: 236 | End: 2025-08-23
Attending: THORACIC SURGERY (CARDIOTHORACIC VASCULAR SURGERY)
Payer: COMMERCIAL

## 2025-08-23 VITALS
TEMPERATURE: 98 F | SYSTOLIC BLOOD PRESSURE: 114 MMHG | BODY MASS INDEX: 25.07 KG/M2 | DIASTOLIC BLOOD PRESSURE: 71 MMHG | HEART RATE: 80 BPM | OXYGEN SATURATION: 90 % | RESPIRATION RATE: 16 BRPM | HEIGHT: 75 IN | WEIGHT: 201.6 LBS

## 2025-08-23 LAB
ANION GAP SERPL CALC-SCNC: 13 MMOL/L (ref 2–14)
BUN SERPL-MCNC: 24 MG/DL (ref 6–20)
BUN/CREAT SERPL: 24 (ref 12–20)
CALCIUM SERPL-MCNC: 9.3 MG/DL (ref 8.6–10)
CHLORIDE SERPL-SCNC: 93 MMOL/L (ref 98–107)
CO2 SERPL-SCNC: 32 MMOL/L (ref 20–29)
CREAT SERPL-MCNC: 1 MG/DL (ref 0.7–1.2)
ERYTHROCYTE [DISTWIDTH] IN BLOOD BY AUTOMATED COUNT: 13.1 % (ref 11.5–14.5)
GLUCOSE BLD STRIP.AUTO-MCNC: 185 MG/DL (ref 65–117)
GLUCOSE SERPL-MCNC: 188 MG/DL (ref 65–100)
HCT VFR BLD AUTO: 42.4 % (ref 36.6–50.3)
HGB BLD-MCNC: 13.7 G/DL (ref 12.1–17)
MAGNESIUM SERPL-MCNC: 2.3 MG/DL (ref 1.6–2.6)
MCH RBC QN AUTO: 30.6 PG (ref 26–34)
MCHC RBC AUTO-ENTMCNC: 32.3 G/DL (ref 30–36.5)
MCV RBC AUTO: 94.9 FL (ref 80–99)
NRBC # BLD: 0 K/UL (ref 0–0.01)
NRBC BLD-RTO: 0 PER 100 WBC
PLATELET # BLD AUTO: 360 K/UL (ref 150–400)
PMV BLD AUTO: 10.1 FL (ref 8.9–12.9)
POTASSIUM SERPL-SCNC: 3.7 MMOL/L (ref 3.5–5.1)
RBC # BLD AUTO: 4.47 M/UL (ref 4.1–5.7)
SERVICE CMNT-IMP: ABNORMAL
SODIUM SERPL-SCNC: 138 MMOL/L (ref 136–145)
WBC # BLD AUTO: 13.9 K/UL (ref 4.1–11.1)

## 2025-08-23 PROCEDURE — 85027 COMPLETE CBC AUTOMATED: CPT

## 2025-08-23 PROCEDURE — 80048 BASIC METABOLIC PNL TOTAL CA: CPT

## 2025-08-23 PROCEDURE — 6370000000 HC RX 637 (ALT 250 FOR IP): Performed by: THORACIC SURGERY (CARDIOTHORACIC VASCULAR SURGERY)

## 2025-08-23 PROCEDURE — 71045 X-RAY EXAM CHEST 1 VIEW: CPT

## 2025-08-23 PROCEDURE — 6370000000 HC RX 637 (ALT 250 FOR IP)

## 2025-08-23 PROCEDURE — 6360000002 HC RX W HCPCS: Performed by: THORACIC SURGERY (CARDIOTHORACIC VASCULAR SURGERY)

## 2025-08-23 PROCEDURE — 82962 GLUCOSE BLOOD TEST: CPT

## 2025-08-23 PROCEDURE — 36415 COLL VENOUS BLD VENIPUNCTURE: CPT

## 2025-08-23 PROCEDURE — 6370000000 HC RX 637 (ALT 250 FOR IP): Performed by: PHYSICIAN ASSISTANT

## 2025-08-23 PROCEDURE — 83735 ASSAY OF MAGNESIUM: CPT

## 2025-08-23 PROCEDURE — 2500000003 HC RX 250 WO HCPCS: Performed by: PHYSICIAN ASSISTANT

## 2025-08-23 RX ORDER — AMIODARONE HYDROCHLORIDE 200 MG/1
TABLET ORAL
Qty: 84 TABLET | Refills: 0 | Status: SHIPPED | OUTPATIENT
Start: 2025-08-23

## 2025-08-23 RX ORDER — POLYETHYLENE GLYCOL 3350 17 G/17G
17 POWDER, FOR SOLUTION ORAL DAILY
Qty: 20 PACKET | Refills: 0 | Status: SHIPPED | OUTPATIENT
Start: 2025-08-23 | End: 2025-09-22

## 2025-08-23 RX ORDER — POTASSIUM CHLORIDE 1500 MG/1
20 TABLET, EXTENDED RELEASE ORAL DAILY
Qty: 3 TABLET | Refills: 0 | Status: SHIPPED | OUTPATIENT
Start: 2025-08-23

## 2025-08-23 RX ORDER — SENNA AND DOCUSATE SODIUM 50; 8.6 MG/1; MG/1
1 TABLET, FILM COATED ORAL 2 TIMES DAILY
Qty: 60 TABLET | Refills: 0 | Status: SHIPPED | OUTPATIENT
Start: 2025-08-23

## 2025-08-23 RX ORDER — FUROSEMIDE 40 MG/1
40 TABLET ORAL DAILY
Qty: 3 TABLET | Refills: 0 | Status: SHIPPED | OUTPATIENT
Start: 2025-08-23

## 2025-08-23 RX ORDER — OXYCODONE HYDROCHLORIDE 5 MG/1
5 TABLET ORAL EVERY 4 HOURS PRN
Qty: 12 TABLET | Refills: 0 | Status: SHIPPED | OUTPATIENT
Start: 2025-08-23 | End: 2025-08-26

## 2025-08-23 RX ORDER — ACETAMINOPHEN 500 MG
1000 TABLET ORAL EVERY 6 HOURS
Qty: 120 TABLET | Refills: 1 | Status: SHIPPED | OUTPATIENT
Start: 2025-08-23

## 2025-08-23 RX ADMIN — Medication 400 MG: at 08:46

## 2025-08-23 RX ADMIN — ACETAMINOPHEN 1000 MG: 500 TABLET ORAL at 06:39

## 2025-08-23 RX ADMIN — SODIUM CHLORIDE, PRESERVATIVE FREE 10 ML: 5 INJECTION INTRAVENOUS at 08:49

## 2025-08-23 RX ADMIN — ENOXAPARIN SODIUM 40 MG: 100 INJECTION SUBCUTANEOUS at 08:33

## 2025-08-23 RX ADMIN — METOPROLOL TARTRATE 12.5 MG: 25 TABLET, FILM COATED ORAL at 08:42

## 2025-08-23 RX ADMIN — ACETAMINOPHEN 1000 MG: 500 TABLET ORAL at 00:08

## 2025-08-23 RX ADMIN — ASPIRIN 81 MG: 81 TABLET, DELAYED RELEASE ORAL at 08:42

## 2025-08-23 RX ADMIN — INSULIN LISPRO 2 UNITS: 100 INJECTION, SOLUTION INTRAVENOUS; SUBCUTANEOUS at 08:32

## 2025-08-23 RX ADMIN — SENNOSIDES, DOCUSATE SODIUM 1 TABLET: 50; 8.6 TABLET, FILM COATED ORAL at 08:42

## 2025-08-23 RX ADMIN — AMIODARONE HYDROCHLORIDE 400 MG: 200 TABLET ORAL at 08:54

## 2025-08-23 ASSESSMENT — PAIN SCALES - GENERAL
PAINLEVEL_OUTOF10: 0
PAINLEVEL_OUTOF10: 2

## 2025-08-23 ASSESSMENT — PAIN DESCRIPTION - DESCRIPTORS: DESCRIPTORS: SORE

## 2025-08-23 ASSESSMENT — PAIN - FUNCTIONAL ASSESSMENT: PAIN_FUNCTIONAL_ASSESSMENT: ACTIVITIES ARE NOT PREVENTED

## 2025-08-25 ENCOUNTER — TELEPHONE (OUTPATIENT)
Facility: HOSPITAL | Age: 60
End: 2025-08-25

## 2025-08-27 ENCOUNTER — TELEPHONE (OUTPATIENT)
Facility: HOSPITAL | Age: 60
End: 2025-08-27

## 2025-09-02 ENCOUNTER — OFFICE VISIT (OUTPATIENT)
Age: 60
End: 2025-09-02

## 2025-09-02 VITALS
TEMPERATURE: 98.3 F | OXYGEN SATURATION: 95 % | HEART RATE: 69 BPM | SYSTOLIC BLOOD PRESSURE: 122 MMHG | WEIGHT: 203.4 LBS | DIASTOLIC BLOOD PRESSURE: 77 MMHG | BODY MASS INDEX: 25.42 KG/M2

## 2025-09-02 DIAGNOSIS — Z98.890 S/P LEFT ATRIAL APPENDAGE LIGATION: ICD-10-CM

## 2025-09-02 DIAGNOSIS — Z95.1 S/P CABG X 2: Primary | ICD-10-CM

## 2025-09-02 PROCEDURE — 99024 POSTOP FOLLOW-UP VISIT: CPT | Performed by: THORACIC SURGERY (CARDIOTHORACIC VASCULAR SURGERY)

## (undated) DEVICE — LEAD PACE L475MM CHNL A OR V MYOCARDIAL STEROID ELUT SIL

## (undated) DEVICE — CANNULA PERF L55IN OD7FR AORT ROOT AG STD TIP FLOW GRD INTRO

## (undated) DEVICE — CANNULA PERF L2IN BLNT TIP 2MM VES CLR RADPQ BODY FEM LUER

## (undated) DEVICE — CANISTER SUCT 3000CC RIG LOK SNAP ON LID W/ STD EL SUCT PRT

## (undated) DEVICE — ELECTRODE PT RET AD L9FT HI MOIST COND ADH HYDRGEL CORDED

## (undated) DEVICE — GLOVE ORANGE PI 7 1/2   MSG9075

## (undated) DEVICE — 1200 GUARD II KIT W/5MM TUBE W/O VAC TUBE: Brand: GUARDIAN

## (undated) DEVICE — SOLIDIFIER MEDC 1200ML -- CONVERT TO 356117

## (undated) DEVICE — GOWN SURG XL L47IN BLU NONREINFORCED HK AND LOOP AAMI LEV 3

## (undated) DEVICE — SOLUTION IV 500 ML 0.9 NACL INJ EXCEL CONTAINER USP LF

## (undated) DEVICE — Device

## (undated) DEVICE — SYRINGE MED 20ML STD CLR PLAS LUERSLIP TIP N CTRL DISP

## (undated) DEVICE — BLANKET WRM W25XL64IN NONWOVEN SFT LTWT PLIABLE HYPR

## (undated) DEVICE — BLADE OPHTH 180DEG CUT SURF BLU STR SHRP DBL BVL GRINDLESS

## (undated) DEVICE — RETRACTOR SURG INSRT SUT HLD OCTOBASE

## (undated) DEVICE — SUTURE ABSORBABLE MONOFILAMENT 3-0 PS-2 15 CM 19 MM UD

## (undated) DEVICE — AGENT HEMSTAT W4XL4IN OXIDIZED REGENERATED CELOS ABSRB SFT

## (undated) DEVICE — ADHESIVE SKIN CLOSURE TOP 0.8 CC PREM PUR LIQUIBAND RAPID LF

## (undated) DEVICE — CONTAINER SPEC 4OZ POLYPR GRAD SCR LID LEAK RESIST ID LBL

## (undated) DEVICE — DRESSING FOAM 1IN 4MM H DISK CHG IMPREG AEGIS

## (undated) DEVICE — BANDAGE COMPR W6INXL10YD ST M E WHITE/BEIGE

## (undated) DEVICE — INFECTION CONTROL KIT SYS

## (undated) DEVICE — SYRINGE 50ML E/T

## (undated) DEVICE — TRANSDUCER KT INVASIVE BLD PRSS 3 LN 3 TRNSDUCS 48IN TBNG

## (undated) DEVICE — FORCEPS BX L160CM DIA8MM GRSP DISECT CUP TIP NONLOCKING ROT

## (undated) DEVICE — DRAPE SLUSH DISC W44XL66IN ST FOR RND BSIN HUSH SLUSH SYS

## (undated) DEVICE — SOLUTION IV 1000 ML 0.9 NACL INJ USP EXCEL PLAS CONTAINER

## (undated) DEVICE — SUTURE SZ 7 L18IN NONABSORBABLE SIL CCS L48MM 1/2 CIR STRNM M655G

## (undated) DEVICE — SPONGE GZ W4XL4IN COT RADPQ HIGHLY ABSRB STERILE

## (undated) DEVICE — ADHESIVE SKIN CLOSURE XL 42 CM 2.7 CC MESH LIQUIBAND SECUR

## (undated) DEVICE — SUTURE VICRYL 3-0 L36IN ABSRB VLT CT-1 L36MM 1/2 CIR J344H

## (undated) DEVICE — CANNULA SUCT TIP L8MM DIA24FR INTCARD RIG SUC 0.25IN CONN

## (undated) DEVICE — SYRINGE 20ML LL S/C 50

## (undated) DEVICE — ESOPHAGEAL/COLONIC/BILIARY WIREGUIDED BALLOON DILATATION CATHETER: Brand: CRE™ PRO

## (undated) DEVICE — SUTURE NONABSORBABLE MONOFILAMENT 6-0 C-1 1X30 IN PROLENE 8706H

## (undated) DEVICE — KENDALL SCD EXPRESS SLEEVES, KNEE LENGTH, MEDIUM: Brand: KENDALL SCD

## (undated) DEVICE — COVER TBL W79XL90IN HVY DUTY REINF FAN FLD DISPOSABLE

## (undated) DEVICE — SUTURE NNBSRBBLE MNFLMNT 2X30 EV DBLE ARMD POLYPR PRLNE

## (undated) DEVICE — SYSTEM ENDOSCP VES HARV W/ TOOL CANN SEAL SHT PRT BLNT TIP

## (undated) DEVICE — Device: Brand: DEFENDO VALVE AND CONNECTOR KIT

## (undated) DEVICE — TAPE SURG W4INXL10YD SFT CLTH H2O RESIST MEDIPORE H

## (undated) DEVICE — SYRINGE MED 10ML LUERLOCK TIP W/O SFTY DISP

## (undated) DEVICE — DEVON™ KNEE AND BODY STRAP 60" X 3" (1.5 M X 7.6 CM): Brand: DEVON

## (undated) DEVICE — ESOPHAGEAL BALLOON DILATATION CATHETER: Brand: CRE FIXED WIRE

## (undated) DEVICE — DRESSING FOAM W8.7XL9.1IN SAFETAC LAYR SELF ADH MEPILEX

## (undated) DEVICE — GLOVE SURG SZ 8 L12IN FNGR THK79MIL GRN LTX FREE

## (undated) DEVICE — SYRINGE MEDICAL 3ML CLEAR PLASTIC STANDARD NON CONTROL LUERLOCK TIP DISPOSABLE

## (undated) DEVICE — ELECTRODE DEFIB AD RADIOTRANSPLANTED AD MULTIFUNCTIONAL PHY CTRL

## (undated) DEVICE — SUTURE PROL SZ 4-0 L36IN NONABSORBABLE BLU L26MM SH 1/2 CIR 8521H

## (undated) DEVICE — SOLUTION IV 1000ML 140MEQ/L SOD 5MEQ/L K 3MEQ/L MG 27MEQ/L

## (undated) DEVICE — SET PERF L203CM 12IN RED AND BLU AORT ROOT MULT SLIP CONN

## (undated) DEVICE — DRAIN SURG SGL COLL PT TB FOR ATS BG OASIS

## (undated) DEVICE — MARKER GRFT SIL DISK LTWT BIOCOMPATIBLE RADPQ DISP

## (undated) DEVICE — CANNULA PERF 36 46FR L40CM 1 2IN ACCEPTANCE VAC ASST STD THN

## (undated) DEVICE — SURGIFOAM SPNG SZ 100

## (undated) DEVICE — KIT ANTIFOG 6CC W/ SOL ADH BK SPNG W/ RADPQ BND SFT PK

## (undated) DEVICE — ESOPHAGEAL/PYLORIC/COLONIC/BILIARY WIREGUIDED BALLOON DILATATION CATHETER: Brand: CRE™ PRO

## (undated) DEVICE — TUBING SUCT 12FR MAL ALUM SHFT FN CAP VENT UNIV CONN W/ OBT

## (undated) DEVICE — SUTURE MONOCRYL SZ 3-0 L27IN ABSRB UD L24MM PS-1 3/8 CIR PRIM Y936H

## (undated) DEVICE — KIT BLWR MISTER 5P 15L W/ TBNG SET IRRIG MIST TO IMPROVE

## (undated) DEVICE — SOLUTION IRRIG 1000ML H2O PIC PLAS SHATTERPROOF CONTAINER

## (undated) DEVICE — BLADE OPHTH KNF D3MM 15DEG CATRCT BLU MICRO-SHARP

## (undated) DEVICE — CABLE EXTN L6FT BLU FAX LOC W/ SAFE CONN OR SCR DN DISP

## (undated) DEVICE — BASKET SPEC RETRV L230CM W22MM ODSEC2.5MM 3 DIM NET ROT

## (undated) DEVICE — SET TUBE INSUFFLATION HI FLOW PNEUMOCLEAR

## (undated) DEVICE — SUTURE PROL SZ 7-0 L30IN NONABSORBABLE BLU L9.3MM BV-1 1/2 8703H

## (undated) DEVICE — NEEDLE HYPO 18 GAX1.5 IN REG SAFETY PLAS HUB PNK DISP

## (undated) DEVICE — CABLE EXTN L6FT ATR VENT SCR DN W/ REMINGTON SFTY SEP TEMP

## (undated) DEVICE — CANNULA AORT 21F CRV TIP W SUT BUMP 3 8IN CONN EZ GLDE

## (undated) DEVICE — TUBING ART PRSS L72IN PT72

## (undated) DEVICE — SOLUTION IRRIG 1000ML 09% SOD CHL USP PIC PLAS CONTAINER

## (undated) DEVICE — SYRINGE MED 50ML LUERLOCK TIP

## (undated) DEVICE — SYRINGE MED 30ML STD CLR PLAS LUERLOCK TIP N CTRL DISP

## (undated) DEVICE — CATHETER THOR 32FR L23IN PVC 6 EYELET STR ATRAUM

## (undated) DEVICE — CANNULA PERF 15FR L12.5IN RG STPCOCK WIREWOUND BODY

## (undated) DEVICE — BAG TRNSF 300ML BLD PK W/ 1 SPIK